# Patient Record
Sex: FEMALE | Race: OTHER | NOT HISPANIC OR LATINO
[De-identification: names, ages, dates, MRNs, and addresses within clinical notes are randomized per-mention and may not be internally consistent; named-entity substitution may affect disease eponyms.]

---

## 2019-02-11 ENCOUNTER — APPOINTMENT (OUTPATIENT)
Dept: FAMILY MEDICINE | Facility: CLINIC | Age: 64
End: 2019-02-11
Payer: COMMERCIAL

## 2019-02-11 ENCOUNTER — NON-APPOINTMENT (OUTPATIENT)
Age: 64
End: 2019-02-11

## 2019-02-11 ENCOUNTER — TRANSCRIPTION ENCOUNTER (OUTPATIENT)
Age: 64
End: 2019-02-11

## 2019-02-11 ENCOUNTER — LABORATORY RESULT (OUTPATIENT)
Age: 64
End: 2019-02-11

## 2019-02-11 VITALS
HEIGHT: 61 IN | TEMPERATURE: 98.5 F | OXYGEN SATURATION: 96 % | BODY MASS INDEX: 40.59 KG/M2 | RESPIRATION RATE: 14 BRPM | DIASTOLIC BLOOD PRESSURE: 90 MMHG | HEART RATE: 94 BPM | WEIGHT: 215 LBS | SYSTOLIC BLOOD PRESSURE: 152 MMHG

## 2019-02-11 DIAGNOSIS — Z78.9 OTHER SPECIFIED HEALTH STATUS: ICD-10-CM

## 2019-02-11 DIAGNOSIS — Z82.49 FAMILY HISTORY OF ISCHEMIC HEART DISEASE AND OTHER DISEASES OF THE CIRCULATORY SYSTEM: ICD-10-CM

## 2019-02-11 DIAGNOSIS — R03.0 ELEVATED BLOOD-PRESSURE READING, W/OUT DIAGNOSIS OF HYPERTENSION: ICD-10-CM

## 2019-02-11 PROCEDURE — 93000 ELECTROCARDIOGRAM COMPLETE: CPT

## 2019-02-11 PROCEDURE — 99386 PREV VISIT NEW AGE 40-64: CPT | Mod: 25

## 2019-02-11 NOTE — HISTORY OF PRESENT ILLNESS
[de-identified] : 63 y.o. F presenting for CPE. 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 She has not seen a doctor in over 5 years due to lapse in insurance. She recalls being told she has HTN, HLD, and DM but is not on any medications because she has not followed up in years. She denies CP/SOB, abd pain, n/v/d/c. She is stressed out because her  recently had a SAH and was in the ICU for several weeks. He is now home. She is a daily smoker for 40 years, 10 cigarettes a day. Is trying to cut back with nicotine gum but when something stressful happens she goes back to smoking. Tried chantix in the past and did not do well on it.

## 2019-02-11 NOTE — PLAN
[FreeTextEntry1] : HCM: \par -routine blood work including titers for work form \par -EKG reviewed, shows LAE ? 2/2 untreated HTN, recommend cardio eval \par -UTD with colo \par -needs pap and mammo, pt adamantly refusing to have mammogram done after her last experience, discussed risks of forgoing breast cancer screening, pt understands risks and does not want mammogram \par -had PCV shot as pt is smoker \par \par HTN: \par -pt to return in 2 weeks for repeat BP check and bring in  home log of BP readings \par -will likely need to be started on medication \par \par Smoking cessation: \par -options provided including wellbutrin and nicotene gum/lozenges/patches\par -pt to think about it and f/u with me again \par \par

## 2019-02-11 NOTE — HEALTH RISK ASSESSMENT
[Patient reported mammogram was normal] : Patient reported mammogram was normal [Patient reported PAP Smear was normal] : Patient reported PAP Smear was normal [Patient reported colonoscopy was normal] : Patient reported colonoscopy was normal [MammogramDate] : 01/13 [PapSmearDate] : 01/13 [ColonoscopyDate] : 01/13

## 2019-02-12 ENCOUNTER — OTHER (OUTPATIENT)
Age: 64
End: 2019-02-12

## 2019-02-12 DIAGNOSIS — R74.8 ABNORMAL LEVELS OF OTHER SERUM ENZYMES: ICD-10-CM

## 2019-02-12 LAB
25(OH)D3 SERPL-MCNC: 19.9 NG/ML
ALBUMIN SERPL ELPH-MCNC: 4.2 G/DL
ALP BLD-CCNC: 110 U/L
ALT SERPL-CCNC: 62 U/L
ANION GAP SERPL CALC-SCNC: 12 MMOL/L
AST SERPL-CCNC: 54 U/L
BASOPHILS # BLD AUTO: 0.04 K/UL
BASOPHILS NFR BLD AUTO: 0.4 %
BILIRUB SERPL-MCNC: 0.3 MG/DL
BUN SERPL-MCNC: 14 MG/DL
CALCIUM SERPL-MCNC: 9.5 MG/DL
CHLORIDE SERPL-SCNC: 104 MMOL/L
CHOLEST SERPL-MCNC: 237 MG/DL
CHOLEST/HDLC SERPL: 5.3 RATIO
CO2 SERPL-SCNC: 24 MMOL/L
CREAT SERPL-MCNC: 0.9 MG/DL
EOSINOPHIL # BLD AUTO: 0.47 K/UL
EOSINOPHIL NFR BLD AUTO: 4.9 %
GLUCOSE SERPL-MCNC: 240 MG/DL
HBA1C MFR BLD HPLC: 8.8 %
HBV SURFACE AB SERPL IA-ACNC: <3 MIU/ML
HCT VFR BLD CALC: 42.3 %
HDLC SERPL-MCNC: 45 MG/DL
HGB BLD-MCNC: 13.1 G/DL
IMM GRANULOCYTES NFR BLD AUTO: 0.4 %
LDLC SERPL CALC-MCNC: 150 MG/DL
LYMPHOCYTES # BLD AUTO: 2.76 K/UL
LYMPHOCYTES NFR BLD AUTO: 28.8 %
M TB IFN-G BLD-IMP: NEGATIVE
MAN DIFF?: NORMAL
MCHC RBC-ENTMCNC: 25.7 PG
MCHC RBC-ENTMCNC: 31 GM/DL
MCV RBC AUTO: 83.1 FL
MEV IGG FLD QL IA: >300 AU/ML
MEV IGG+IGM SER-IMP: POSITIVE
MONOCYTES # BLD AUTO: 0.65 K/UL
MONOCYTES NFR BLD AUTO: 6.8 %
MUV AB SER-ACNC: POSITIVE
MUV IGG SER QL IA: >300 AU/ML
NEUTROPHILS # BLD AUTO: 5.62 K/UL
NEUTROPHILS NFR BLD AUTO: 58.7 %
PLATELET # BLD AUTO: 205 K/UL
POTASSIUM SERPL-SCNC: 4 MMOL/L
PROT SERPL-MCNC: 6.9 G/DL
QUANTIFERON TB PLUS MITOGEN MINUS NIL: 8.86 IU/ML
QUANTIFERON TB PLUS NIL: 0.01 IU/ML
QUANTIFERON TB PLUS TB1 MINUS NIL: 0.01 IU/ML
QUANTIFERON TB PLUS TB2 MINUS NIL: 0 IU/ML
RBC # BLD: 5.09 M/UL
RBC # FLD: 14.5 %
RUBV IGG FLD-ACNC: 20 INDEX
RUBV IGG SER-IMP: POSITIVE
SODIUM SERPL-SCNC: 140 MMOL/L
TRIGL SERPL-MCNC: 209 MG/DL
TSH SERPL-ACNC: 4.23 UIU/ML
VZV AB TITR SER: POSITIVE
VZV IGG SER IF-ACNC: 1598 INDEX
WBC # FLD AUTO: 9.58 K/UL

## 2019-02-25 ENCOUNTER — APPOINTMENT (OUTPATIENT)
Dept: FAMILY MEDICINE | Facility: CLINIC | Age: 64
End: 2019-02-25
Payer: COMMERCIAL

## 2019-02-25 VITALS
BODY MASS INDEX: 40.62 KG/M2 | TEMPERATURE: 98.2 F | SYSTOLIC BLOOD PRESSURE: 140 MMHG | HEART RATE: 86 BPM | OXYGEN SATURATION: 96 % | RESPIRATION RATE: 14 BRPM | WEIGHT: 215 LBS | DIASTOLIC BLOOD PRESSURE: 82 MMHG

## 2019-02-25 DIAGNOSIS — Z12.11 ENCOUNTER FOR SCREENING FOR MALIGNANT NEOPLASM OF COLON: ICD-10-CM

## 2019-02-25 PROCEDURE — 90471 IMMUNIZATION ADMIN: CPT

## 2019-02-25 PROCEDURE — 99214 OFFICE O/P EST MOD 30 MIN: CPT | Mod: 25

## 2019-02-25 PROCEDURE — 90739 HEPB VACC 2/4 DOSE ADULT IM: CPT

## 2019-02-25 NOTE — HISTORY OF PRESENT ILLNESS
[de-identified] : Pt here for f/u. Started olmesartan, metformin, and lipitor. Pt reports starting keto diet and losing 4 pounds. Complaining of cramps in calves that she didn't used to have before. BP at home fluctuates but usually around 130/90.

## 2019-02-25 NOTE — PLAN
[FreeTextEntry1] : -discussed labs again with pt \par -needs to go for US of liver \par -hep B vaccination today \par -for muscle cramps: check labs, advise co-q 10, maintain good hydration \par -f/u in 4-6 weeks for BP check, repeat LFTs

## 2019-02-26 ENCOUNTER — FORM ENCOUNTER (OUTPATIENT)
Age: 64
End: 2019-02-26

## 2019-02-26 LAB
ALBUMIN SERPL ELPH-MCNC: 4.7 G/DL
ALP BLD-CCNC: 97 U/L
ALT SERPL-CCNC: 32 U/L
ANION GAP SERPL CALC-SCNC: 15 MMOL/L
AST SERPL-CCNC: 30 U/L
BILIRUB SERPL-MCNC: 0.2 MG/DL
BUN SERPL-MCNC: 17 MG/DL
CALCIUM SERPL-MCNC: 10.4 MG/DL
CHLORIDE SERPL-SCNC: 108 MMOL/L
CK SERPL-CCNC: 113 U/L
CO2 SERPL-SCNC: 24 MMOL/L
CREAT SERPL-MCNC: 0.97 MG/DL
GLUCOSE SERPL-MCNC: 150 MG/DL
POTASSIUM SERPL-SCNC: 5 MMOL/L
PROT SERPL-MCNC: 7.1 G/DL
SODIUM SERPL-SCNC: 147 MMOL/L

## 2019-02-27 ENCOUNTER — APPOINTMENT (OUTPATIENT)
Dept: ULTRASOUND IMAGING | Facility: CLINIC | Age: 64
End: 2019-02-27

## 2019-02-27 ENCOUNTER — MEDICATION RENEWAL (OUTPATIENT)
Age: 64
End: 2019-02-27

## 2019-02-27 ENCOUNTER — OUTPATIENT (OUTPATIENT)
Dept: OUTPATIENT SERVICES | Facility: HOSPITAL | Age: 64
LOS: 1 days | End: 2019-02-27
Payer: COMMERCIAL

## 2019-02-27 DIAGNOSIS — Z00.8 ENCOUNTER FOR OTHER GENERAL EXAMINATION: ICD-10-CM

## 2019-02-27 PROCEDURE — 76705 ECHO EXAM OF ABDOMEN: CPT | Mod: 26

## 2019-02-27 PROCEDURE — 76705 ECHO EXAM OF ABDOMEN: CPT

## 2019-02-28 ENCOUNTER — TRANSCRIPTION ENCOUNTER (OUTPATIENT)
Age: 64
End: 2019-02-28

## 2019-03-01 ENCOUNTER — APPOINTMENT (OUTPATIENT)
Dept: FAMILY MEDICINE | Facility: CLINIC | Age: 64
End: 2019-03-01
Payer: COMMERCIAL

## 2019-03-01 VITALS
SYSTOLIC BLOOD PRESSURE: 130 MMHG | BODY MASS INDEX: 40.22 KG/M2 | OXYGEN SATURATION: 94 % | HEIGHT: 61 IN | DIASTOLIC BLOOD PRESSURE: 70 MMHG | RESPIRATION RATE: 14 BRPM | WEIGHT: 213 LBS | HEART RATE: 74 BPM | TEMPERATURE: 97.6 F

## 2019-03-01 PROCEDURE — 99214 OFFICE O/P EST MOD 30 MIN: CPT

## 2019-03-01 RX ORDER — METFORMIN ER 500 MG 500 MG/1
500 TABLET ORAL
Qty: 120 | Refills: 2 | Status: DISCONTINUED | COMMUNITY
Start: 2019-02-12 | End: 2019-03-01

## 2019-03-01 RX ORDER — DULAGLUTIDE 0.75 MG/.5ML
0.75 INJECTION, SOLUTION SUBCUTANEOUS WEEKLY
Qty: 4 | Refills: 0 | Status: COMPLETED | COMMUNITY
Start: 2019-03-01

## 2019-03-01 NOTE — HISTORY OF PRESENT ILLNESS
[de-identified] : Pt here for f/u. Upon last visit, pt was complaining of new onset leg cramps after starting metformin which she has previously been on, lactic acid levels found to be high. Pt has been drinking lots of water and is here for repeat levels. Pt is otherwise feeling ok. Discussed her options in terms of other diabetic medications. She was previously on januvia.

## 2019-03-01 NOTE — PLAN
[FreeTextEntry1] : Metformin-induced lactic acidosis: \par -pt has discontinued metformin\par -repeat lactate levels today\par -continue with hydration, lemon juice, apple cider vinegar \par \par DM 2: \par -start januvia and trulicity \par -repeat hga1c in 3 months \par -continue with diet and exercise \par -needs to f/u with optho \par \par Hepatic steatosis: \par -confirmed on US of liver \par -needs f/u MRI to evaluate area of fatty sparing to r/o mass \par \par

## 2019-03-02 ENCOUNTER — OTHER (OUTPATIENT)
Age: 64
End: 2019-03-02

## 2019-03-02 LAB — LACTATE BLDA-MCNC: 2.9 MMOL/L

## 2019-03-05 ENCOUNTER — OTHER (OUTPATIENT)
Age: 64
End: 2019-03-05

## 2019-03-05 DIAGNOSIS — E87.2 ACIDOSIS: ICD-10-CM

## 2019-03-06 ENCOUNTER — EMERGENCY (EMERGENCY)
Facility: HOSPITAL | Age: 64
LOS: 1 days | Discharge: HOME | End: 2019-03-06
Attending: STUDENT IN AN ORGANIZED HEALTH CARE EDUCATION/TRAINING PROGRAM | Admitting: STUDENT IN AN ORGANIZED HEALTH CARE EDUCATION/TRAINING PROGRAM
Payer: COMMERCIAL

## 2019-03-06 ENCOUNTER — OTHER (OUTPATIENT)
Age: 64
End: 2019-03-06

## 2019-03-06 VITALS
TEMPERATURE: 99 F | OXYGEN SATURATION: 97 % | SYSTOLIC BLOOD PRESSURE: 158 MMHG | HEART RATE: 82 BPM | DIASTOLIC BLOOD PRESSURE: 62 MMHG | RESPIRATION RATE: 16 BRPM

## 2019-03-06 VITALS
SYSTOLIC BLOOD PRESSURE: 183 MMHG | HEIGHT: 61 IN | HEART RATE: 85 BPM | DIASTOLIC BLOOD PRESSURE: 121 MMHG | OXYGEN SATURATION: 98 % | WEIGHT: 212.97 LBS | RESPIRATION RATE: 16 BRPM | TEMPERATURE: 98 F

## 2019-03-06 DIAGNOSIS — R93.2 ABNORMAL FINDINGS ON DIAGNOSTIC IMAGING OF LIVER AND BILIARY TRACT: ICD-10-CM

## 2019-03-06 DIAGNOSIS — Z98.51 TUBAL LIGATION STATUS: Chronic | ICD-10-CM

## 2019-03-06 DIAGNOSIS — Z98.891 HISTORY OF UTERINE SCAR FROM PREVIOUS SURGERY: Chronic | ICD-10-CM

## 2019-03-06 DIAGNOSIS — Z98.890 OTHER SPECIFIED POSTPROCEDURAL STATES: Chronic | ICD-10-CM

## 2019-03-06 LAB
ALBUMIN SERPL ELPH-MCNC: 3.8 G/DL — SIGNIFICANT CHANGE UP (ref 3.3–5)
ALP SERPL-CCNC: 112 U/L — SIGNIFICANT CHANGE UP (ref 40–120)
ALT FLD-CCNC: 49 U/L — SIGNIFICANT CHANGE UP (ref 12–78)
ANION GAP SERPL CALC-SCNC: 8 MMOL/L — SIGNIFICANT CHANGE UP (ref 5–17)
APPEARANCE UR: CLEAR — SIGNIFICANT CHANGE UP
APTT BLD: 32.2 SEC — SIGNIFICANT CHANGE UP (ref 28.5–37)
AST SERPL-CCNC: 42 U/L — HIGH (ref 15–37)
BACTERIA # UR AUTO: ABNORMAL
BASOPHILS # BLD AUTO: 0.11 K/UL — SIGNIFICANT CHANGE UP (ref 0–0.2)
BASOPHILS NFR BLD AUTO: 0.8 % — SIGNIFICANT CHANGE UP (ref 0–2)
BILIRUB SERPL-MCNC: 0.2 MG/DL — SIGNIFICANT CHANGE UP (ref 0.2–1.2)
BILIRUB UR-MCNC: NEGATIVE — SIGNIFICANT CHANGE UP
BUN SERPL-MCNC: 21 MG/DL — SIGNIFICANT CHANGE UP (ref 7–23)
CALCIUM SERPL-MCNC: 8.9 MG/DL — SIGNIFICANT CHANGE UP (ref 8.5–10.1)
CHLORIDE SERPL-SCNC: 108 MMOL/L — SIGNIFICANT CHANGE UP (ref 96–108)
CO2 SERPL-SCNC: 25 MMOL/L — SIGNIFICANT CHANGE UP (ref 22–31)
COLOR SPEC: SIGNIFICANT CHANGE UP
CREAT SERPL-MCNC: 1.1 MG/DL — SIGNIFICANT CHANGE UP (ref 0.5–1.3)
DIFF PNL FLD: ABNORMAL
EOSINOPHIL # BLD AUTO: 0.53 K/UL — HIGH (ref 0–0.5)
EOSINOPHIL NFR BLD AUTO: 3.8 % — SIGNIFICANT CHANGE UP (ref 0–6)
EPI CELLS # UR: SIGNIFICANT CHANGE UP
FLU A RESULT: SIGNIFICANT CHANGE UP
FLU A RESULT: SIGNIFICANT CHANGE UP
FLUAV AG NPH QL: SIGNIFICANT CHANGE UP
FLUBV AG NPH QL: SIGNIFICANT CHANGE UP
GLUCOSE SERPL-MCNC: 115 MG/DL — HIGH (ref 70–99)
GLUCOSE UR QL: NEGATIVE — SIGNIFICANT CHANGE UP
HCT VFR BLD CALC: 42.7 % — SIGNIFICANT CHANGE UP (ref 34.5–45)
HEMOCCULT STL QL IA: NEGATIVE
HGB BLD-MCNC: 13.6 G/DL — SIGNIFICANT CHANGE UP (ref 11.5–15.5)
IMM GRANULOCYTES NFR BLD AUTO: 0.3 % — SIGNIFICANT CHANGE UP (ref 0–1.5)
INR BLD: 0.97 RATIO — SIGNIFICANT CHANGE UP (ref 0.88–1.16)
KETONES UR-MCNC: NEGATIVE — SIGNIFICANT CHANGE UP
LACTATE BLDA-MCNC: 4.4 MMOL/L
LACTATE SERPL-SCNC: 1.3 MMOL/L — SIGNIFICANT CHANGE UP (ref 0.7–2)
LEUKOCYTE ESTERASE UR-ACNC: NEGATIVE — SIGNIFICANT CHANGE UP
LYMPHOCYTES # BLD AUTO: 34.3 % — SIGNIFICANT CHANGE UP (ref 13–44)
LYMPHOCYTES # BLD AUTO: 4.75 K/UL — HIGH (ref 1–3.3)
MCHC RBC-ENTMCNC: 26.7 PG — LOW (ref 27–34)
MCHC RBC-ENTMCNC: 31.9 GM/DL — LOW (ref 32–36)
MCV RBC AUTO: 83.9 FL — SIGNIFICANT CHANGE UP (ref 80–100)
MONOCYTES # BLD AUTO: 0.98 K/UL — HIGH (ref 0–0.9)
MONOCYTES NFR BLD AUTO: 7.1 % — SIGNIFICANT CHANGE UP (ref 2–14)
NEUTROPHILS # BLD AUTO: 7.43 K/UL — HIGH (ref 1.8–7.4)
NEUTROPHILS NFR BLD AUTO: 53.7 % — SIGNIFICANT CHANGE UP (ref 43–77)
NITRITE UR-MCNC: NEGATIVE — SIGNIFICANT CHANGE UP
NRBC # BLD: 0 /100 WBCS — SIGNIFICANT CHANGE UP (ref 0–0)
PH UR: 5 — SIGNIFICANT CHANGE UP (ref 5–8)
PLATELET # BLD AUTO: 264 K/UL — SIGNIFICANT CHANGE UP (ref 150–400)
POTASSIUM SERPL-MCNC: 3.9 MMOL/L — SIGNIFICANT CHANGE UP (ref 3.5–5.3)
POTASSIUM SERPL-SCNC: 3.9 MMOL/L — SIGNIFICANT CHANGE UP (ref 3.5–5.3)
PROT SERPL-MCNC: 7.8 G/DL — SIGNIFICANT CHANGE UP (ref 6–8.3)
PROT UR-MCNC: NEGATIVE — SIGNIFICANT CHANGE UP
PROTHROM AB SERPL-ACNC: 11 SEC — SIGNIFICANT CHANGE UP (ref 10–12.9)
RBC # BLD: 5.09 M/UL — SIGNIFICANT CHANGE UP (ref 3.8–5.2)
RBC # FLD: 14.6 % — HIGH (ref 10.3–14.5)
RBC CASTS # UR COMP ASSIST: SIGNIFICANT CHANGE UP /HPF (ref 0–4)
RSV RESULT: SIGNIFICANT CHANGE UP
RSV RNA RESP QL NAA+PROBE: SIGNIFICANT CHANGE UP
SODIUM SERPL-SCNC: 141 MMOL/L — SIGNIFICANT CHANGE UP (ref 135–145)
SP GR SPEC: 1.01 — SIGNIFICANT CHANGE UP (ref 1.01–1.02)
UROBILINOGEN FLD QL: NEGATIVE — SIGNIFICANT CHANGE UP
WBC # BLD: 13.84 K/UL — HIGH (ref 3.8–10.5)
WBC # FLD AUTO: 13.84 K/UL — HIGH (ref 3.8–10.5)
WBC UR QL: ABNORMAL

## 2019-03-06 PROCEDURE — 83605 ASSAY OF LACTIC ACID: CPT

## 2019-03-06 PROCEDURE — 36415 COLL VENOUS BLD VENIPUNCTURE: CPT

## 2019-03-06 PROCEDURE — 99284 EMERGENCY DEPT VISIT MOD MDM: CPT | Mod: 25

## 2019-03-06 PROCEDURE — 87040 BLOOD CULTURE FOR BACTERIA: CPT

## 2019-03-06 PROCEDURE — 85027 COMPLETE CBC AUTOMATED: CPT

## 2019-03-06 PROCEDURE — 96360 HYDRATION IV INFUSION INIT: CPT

## 2019-03-06 PROCEDURE — 93010 ELECTROCARDIOGRAM REPORT: CPT

## 2019-03-06 PROCEDURE — 85610 PROTHROMBIN TIME: CPT

## 2019-03-06 PROCEDURE — 87086 URINE CULTURE/COLONY COUNT: CPT

## 2019-03-06 PROCEDURE — 71045 X-RAY EXAM CHEST 1 VIEW: CPT

## 2019-03-06 PROCEDURE — 87631 RESP VIRUS 3-5 TARGETS: CPT

## 2019-03-06 PROCEDURE — 81001 URINALYSIS AUTO W/SCOPE: CPT

## 2019-03-06 PROCEDURE — 80053 COMPREHEN METABOLIC PANEL: CPT

## 2019-03-06 PROCEDURE — 85730 THROMBOPLASTIN TIME PARTIAL: CPT

## 2019-03-06 PROCEDURE — 93005 ELECTROCARDIOGRAM TRACING: CPT

## 2019-03-06 PROCEDURE — 71045 X-RAY EXAM CHEST 1 VIEW: CPT | Mod: 26

## 2019-03-06 RX ORDER — NITROFURANTOIN MACROCRYSTAL 50 MG
100 CAPSULE ORAL ONCE
Qty: 0 | Refills: 0 | Status: DISCONTINUED | OUTPATIENT
Start: 2019-03-06 | End: 2019-03-10

## 2019-03-06 RX ORDER — SODIUM CHLORIDE 9 MG/ML
1000 INJECTION INTRAMUSCULAR; INTRAVENOUS; SUBCUTANEOUS ONCE
Qty: 0 | Refills: 0 | Status: COMPLETED | OUTPATIENT
Start: 2019-03-06 | End: 2019-03-06

## 2019-03-06 RX ORDER — AMLODIPINE BESYLATE 2.5 MG/1
0 TABLET ORAL
Qty: 0 | Refills: 0 | COMMUNITY

## 2019-03-06 RX ORDER — NITROFURANTOIN MACROCRYSTAL 50 MG
1 CAPSULE ORAL
Qty: 14 | Refills: 0
Start: 2019-03-06 | End: 2019-03-12

## 2019-03-06 RX ADMIN — SODIUM CHLORIDE 1000 MILLILITER(S): 9 INJECTION INTRAMUSCULAR; INTRAVENOUS; SUBCUTANEOUS at 03:00

## 2019-03-06 RX ADMIN — SODIUM CHLORIDE 1000 MILLILITER(S): 9 INJECTION INTRAMUSCULAR; INTRAVENOUS; SUBCUTANEOUS at 04:00

## 2019-03-06 NOTE — ED PROVIDER NOTE - CLINICAL SUMMARY MEDICAL DECISION MAKING FREE TEXT BOX
64 year old female p/w elevated lactate x 2 on outpatient blood work. Was advised to come to ED for hydration.  Labs, cultures, re-check lactate, CXR, UA, flu, reassess

## 2019-03-06 NOTE — ED ADULT NURSE NOTE - NSIMPLEMENTINTERV_GEN_ALL_ED
Implemented All Universal Safety Interventions:  [place] to call system. Call bell, personal items and telephone within reach. Instruct patient to call for assistance. Room bathroom lighting operational. Non-slip footwear when patient is off stretcher. Physically safe environment: no spills, clutter or unnecessary equipment. Stretcher in lowest position, wheels locked, appropriate side rails in place.

## 2019-03-06 NOTE — ED ADULT NURSE NOTE - CHIEF COMPLAINT QUOTE
Patient was seen at [facility] for physical check up and had blood works Feb 4.3 was told to drink water rechecked lactic acid 2.9 and came back again for another lactic acid at 4.4 on March 5, had some medication changes for DM and hypertension was recommended by PMD to get IV fluids in ED denied taking any blood pressure medication complaining of achy joints.

## 2019-03-06 NOTE — ED ADULT NURSE NOTE - OBJECTIVE STATEMENT
Patient was seen at [facility] for physical check up and had blood works Feb 4.3 was told to drink water rechecked lactic acid 2.9 and came back again for another lactic acid at 4.4 on March 5, had some medication changes for DM and hypertension was recommended by PMD to get IV fluids in ED denied taking any blood pressure medication complaining of achy joints.

## 2019-03-06 NOTE — ED ADULT TRIAGE NOTE - CHIEF COMPLAINT QUOTE
Patient was seen at [facility] for physical check up and had blood works Feb 4.3 was told to drink water rechecked lactic acid 2.9 and came back again for another lactic acid at 4.4 on March 5, had some medication changes for DM and hypertension was recommended by PMD to get IV fluids in ED denied taking any blood pressure medication complaining of achy joints. Patient was seen at [facility] for physical check up and had blood works Feb 22 was lactic acid was 4.3 was told to drink water rechecked  on Feb 25 lactic acid 2.9 and came back again for another lactic acid at 4.4 on March 5, had some medication changes for DM and hypertension was recommended by PMD to get IV fluids in ED denied taking any blood pressure medication complaining of achy joints.

## 2019-03-06 NOTE — ED PROVIDER NOTE - CHPI ED SYMPTOMS NEG
no numbness/no dizziness/no nausea/no tingling/no weakness/no vomiting/no chills/no pain/no decreased eating/drinking/no fever

## 2019-03-06 NOTE — ED PROVIDER NOTE - PROGRESS NOTE DETAILS
patient continues to look well.  Results of blood work, UA, CXR, EKG d/w patient. Copies of all reports given.  No symptoms of UTI but will treat and patient can f/u with PMD. Stable for d/c

## 2019-03-07 LAB
CULTURE RESULTS: SIGNIFICANT CHANGE UP
SPECIMEN SOURCE: SIGNIFICANT CHANGE UP

## 2019-03-11 ENCOUNTER — RX RENEWAL (OUTPATIENT)
Age: 64
End: 2019-03-11

## 2019-03-11 LAB
CULTURE RESULTS: SIGNIFICANT CHANGE UP
CULTURE RESULTS: SIGNIFICANT CHANGE UP
SPECIMEN SOURCE: SIGNIFICANT CHANGE UP
SPECIMEN SOURCE: SIGNIFICANT CHANGE UP

## 2019-03-15 ENCOUNTER — MEDICATION RENEWAL (OUTPATIENT)
Age: 64
End: 2019-03-15

## 2019-03-15 ENCOUNTER — TRANSCRIPTION ENCOUNTER (OUTPATIENT)
Age: 64
End: 2019-03-15

## 2019-03-21 ENCOUNTER — MEDICATION RENEWAL (OUTPATIENT)
Age: 64
End: 2019-03-21

## 2019-03-24 ENCOUNTER — FORM ENCOUNTER (OUTPATIENT)
Age: 64
End: 2019-03-24

## 2019-03-24 PROBLEM — E78.5 HYPERLIPIDEMIA, UNSPECIFIED: Chronic | Status: ACTIVE | Noted: 2019-03-06

## 2019-03-24 PROBLEM — E11.9 TYPE 2 DIABETES MELLITUS WITHOUT COMPLICATIONS: Chronic | Status: ACTIVE | Noted: 2019-03-06

## 2019-03-24 PROBLEM — E78.1 PURE HYPERGLYCERIDEMIA: Chronic | Status: ACTIVE | Noted: 2019-03-06

## 2019-03-24 PROBLEM — I10 ESSENTIAL (PRIMARY) HYPERTENSION: Chronic | Status: ACTIVE | Noted: 2019-03-06

## 2019-03-25 ENCOUNTER — OUTPATIENT (OUTPATIENT)
Dept: OUTPATIENT SERVICES | Facility: HOSPITAL | Age: 64
LOS: 1 days | End: 2019-03-25
Payer: COMMERCIAL

## 2019-03-25 ENCOUNTER — APPOINTMENT (OUTPATIENT)
Dept: MRI IMAGING | Facility: CLINIC | Age: 64
End: 2019-03-25
Payer: COMMERCIAL

## 2019-03-25 DIAGNOSIS — Z98.51 TUBAL LIGATION STATUS: Chronic | ICD-10-CM

## 2019-03-25 DIAGNOSIS — R93.2 ABNORMAL FINDINGS ON DIAGNOSTIC IMAGING OF LIVER AND BILIARY TRACT: ICD-10-CM

## 2019-03-25 DIAGNOSIS — Z98.890 OTHER SPECIFIED POSTPROCEDURAL STATES: Chronic | ICD-10-CM

## 2019-03-25 DIAGNOSIS — Z98.891 HISTORY OF UTERINE SCAR FROM PREVIOUS SURGERY: Chronic | ICD-10-CM

## 2019-03-25 DIAGNOSIS — Z00.8 ENCOUNTER FOR OTHER GENERAL EXAMINATION: ICD-10-CM

## 2019-03-25 PROCEDURE — 74183 MRI ABD W/O CNTR FLWD CNTR: CPT

## 2019-03-25 PROCEDURE — A9585: CPT

## 2019-03-25 PROCEDURE — 74183 MRI ABD W/O CNTR FLWD CNTR: CPT | Mod: 26

## 2019-03-27 ENCOUNTER — MOBILE ON CALL (OUTPATIENT)
Age: 64
End: 2019-03-27

## 2019-03-29 ENCOUNTER — RX RENEWAL (OUTPATIENT)
Age: 64
End: 2019-03-29

## 2019-04-08 ENCOUNTER — APPOINTMENT (OUTPATIENT)
Dept: FAMILY MEDICINE | Facility: CLINIC | Age: 64
End: 2019-04-08
Payer: COMMERCIAL

## 2019-04-08 ENCOUNTER — OTHER (OUTPATIENT)
Age: 64
End: 2019-04-08

## 2019-04-08 VITALS — DIASTOLIC BLOOD PRESSURE: 90 MMHG | SYSTOLIC BLOOD PRESSURE: 170 MMHG

## 2019-04-08 VITALS
DIASTOLIC BLOOD PRESSURE: 90 MMHG | TEMPERATURE: 98.4 F | SYSTOLIC BLOOD PRESSURE: 180 MMHG | WEIGHT: 209 LBS | BODY MASS INDEX: 39.49 KG/M2 | RESPIRATION RATE: 14 BRPM | HEART RATE: 84 BPM | OXYGEN SATURATION: 97 %

## 2019-04-08 DIAGNOSIS — Z23 ENCOUNTER FOR IMMUNIZATION: ICD-10-CM

## 2019-04-08 PROCEDURE — 90471 IMMUNIZATION ADMIN: CPT

## 2019-04-08 PROCEDURE — 90739 HEPB VACC 2/4 DOSE ADULT IM: CPT

## 2019-04-08 PROCEDURE — 99214 OFFICE O/P EST MOD 30 MIN: CPT | Mod: 25

## 2019-04-08 RX ORDER — SITAGLIPTIN 100 MG/1
100 TABLET, FILM COATED ORAL DAILY
Qty: 30 | Refills: 0 | Status: DISCONTINUED | COMMUNITY
Start: 2019-03-01 | End: 2019-04-08

## 2019-04-08 RX ORDER — OSELTAMIVIR PHOSPHATE 75 MG/1
75 CAPSULE ORAL
Qty: 10 | Refills: 0 | Status: DISCONTINUED | COMMUNITY
Start: 2019-03-13

## 2019-04-08 RX ORDER — NITROFURANTOIN (MONOHYDRATE/MACROCRYSTALS) 25; 75 MG/1; MG/1
100 CAPSULE ORAL
Qty: 14 | Refills: 0 | Status: DISCONTINUED | COMMUNITY
Start: 2019-03-06

## 2019-04-08 RX ORDER — DULAGLUTIDE 0.75 MG/.5ML
0.75 INJECTION, SOLUTION SUBCUTANEOUS
Qty: 4 | Refills: 0 | Status: DISCONTINUED | COMMUNITY
Start: 2019-03-01 | End: 2019-04-08

## 2019-04-08 RX ORDER — OLMESARTAN MEDOXOMIL 20 MG/1
20 TABLET, FILM COATED ORAL
Qty: 30 | Refills: 2 | Status: DISCONTINUED | COMMUNITY
Start: 2019-02-12 | End: 2019-04-08

## 2019-04-08 NOTE — HISTORY OF PRESENT ILLNESS
[de-identified] : Pt here for f/u and needs 2nd dose of hep B. Pt is overall feeling well. No acute complaints. Had to switch olmesartan to losartan as it was on back order. Pt has not been checking BP at home. Has been checking FS, they are labile. Has not had any episodes of hypoglycemia since starting glipizide.

## 2019-04-08 NOTE — PLAN
[FreeTextEntry1] : HTN: increase losartan to 100mg, pt to call me with home BP readings over the next 2 weeks \par DM: tolerating low dose glipizide, increase dose to 2.5mg \par HLD: check lipid profile and LFTs\par \par f/u in 1 month

## 2019-04-09 LAB
ALBUMIN SERPL ELPH-MCNC: 4.4 G/DL
ALP BLD-CCNC: 107 U/L
ALT SERPL-CCNC: 24 U/L
ANION GAP SERPL CALC-SCNC: 16 MMOL/L
AST SERPL-CCNC: 22 U/L
BILIRUB SERPL-MCNC: 0.2 MG/DL
BUN SERPL-MCNC: 15 MG/DL
CALCIUM SERPL-MCNC: 9.7 MG/DL
CHLORIDE SERPL-SCNC: 105 MMOL/L
CHOLEST SERPL-MCNC: 212 MG/DL
CHOLEST/HDLC SERPL: 4.7 RATIO
CO2 SERPL-SCNC: 23 MMOL/L
CREAT SERPL-MCNC: 0.97 MG/DL
GLUCOSE SERPL-MCNC: 138 MG/DL
HDLC SERPL-MCNC: 45 MG/DL
LDLC SERPL CALC-MCNC: 126 MG/DL
POTASSIUM SERPL-SCNC: 4 MMOL/L
PROT SERPL-MCNC: 6.8 G/DL
SODIUM SERPL-SCNC: 144 MMOL/L
TRIGL SERPL-MCNC: 204 MG/DL

## 2019-04-19 ENCOUNTER — RX RENEWAL (OUTPATIENT)
Age: 64
End: 2019-04-19

## 2019-04-25 ENCOUNTER — RX RENEWAL (OUTPATIENT)
Age: 64
End: 2019-04-25

## 2019-04-26 ENCOUNTER — OTHER (OUTPATIENT)
Age: 64
End: 2019-04-26

## 2019-05-05 ENCOUNTER — MOBILE ON CALL (OUTPATIENT)
Age: 64
End: 2019-05-05

## 2019-05-06 ENCOUNTER — RX RENEWAL (OUTPATIENT)
Age: 64
End: 2019-05-06

## 2019-05-07 ENCOUNTER — FORM ENCOUNTER (OUTPATIENT)
Age: 64
End: 2019-05-07

## 2019-05-07 ENCOUNTER — RX RENEWAL (OUTPATIENT)
Age: 64
End: 2019-05-07

## 2019-05-08 ENCOUNTER — APPOINTMENT (OUTPATIENT)
Dept: CT IMAGING | Facility: CLINIC | Age: 64
End: 2019-05-08
Payer: COMMERCIAL

## 2019-05-08 ENCOUNTER — OUTPATIENT (OUTPATIENT)
Dept: OUTPATIENT SERVICES | Facility: HOSPITAL | Age: 64
LOS: 1 days | End: 2019-05-08
Payer: COMMERCIAL

## 2019-05-08 ENCOUNTER — RX RENEWAL (OUTPATIENT)
Age: 64
End: 2019-05-08

## 2019-05-08 DIAGNOSIS — Z98.890 OTHER SPECIFIED POSTPROCEDURAL STATES: Chronic | ICD-10-CM

## 2019-05-08 DIAGNOSIS — Z98.891 HISTORY OF UTERINE SCAR FROM PREVIOUS SURGERY: Chronic | ICD-10-CM

## 2019-05-08 DIAGNOSIS — Z98.51 TUBAL LIGATION STATUS: Chronic | ICD-10-CM

## 2019-05-08 DIAGNOSIS — Z12.2 ENCOUNTER FOR SCREENING FOR MALIGNANT NEOPLASM OF RESPIRATORY ORGANS: ICD-10-CM

## 2019-05-08 PROCEDURE — G0297: CPT | Mod: 26

## 2019-05-08 PROCEDURE — 99406 BEHAV CHNG SMOKING 3-10 MIN: CPT

## 2019-05-08 PROCEDURE — G0296 VISIT TO DETERM LDCT ELIG: CPT

## 2019-05-16 ENCOUNTER — APPOINTMENT (OUTPATIENT)
Dept: FAMILY MEDICINE | Facility: CLINIC | Age: 64
End: 2019-05-16

## 2019-05-23 ENCOUNTER — RX RENEWAL (OUTPATIENT)
Age: 64
End: 2019-05-23

## 2019-06-06 ENCOUNTER — RX RENEWAL (OUTPATIENT)
Age: 64
End: 2019-06-06

## 2019-06-07 ENCOUNTER — APPOINTMENT (OUTPATIENT)
Dept: FAMILY MEDICINE | Facility: CLINIC | Age: 64
End: 2019-06-07
Payer: COMMERCIAL

## 2019-06-07 ENCOUNTER — LABORATORY RESULT (OUTPATIENT)
Age: 64
End: 2019-06-07

## 2019-06-07 VITALS
TEMPERATURE: 98.1 F | HEART RATE: 93 BPM | WEIGHT: 209 LBS | SYSTOLIC BLOOD PRESSURE: 144 MMHG | BODY MASS INDEX: 39.49 KG/M2 | DIASTOLIC BLOOD PRESSURE: 90 MMHG | RESPIRATION RATE: 14 BRPM | OXYGEN SATURATION: 97 %

## 2019-06-07 PROCEDURE — 99213 OFFICE O/P EST LOW 20 MIN: CPT

## 2019-06-07 RX ORDER — LOSARTAN POTASSIUM 50 MG/1
50 TABLET, FILM COATED ORAL
Qty: 30 | Refills: 0 | Status: DISCONTINUED | COMMUNITY
Start: 2019-03-21 | End: 2019-06-07

## 2019-06-07 RX ORDER — GLIPIZIDE 2.5 MG/1
2.5 TABLET, FILM COATED, EXTENDED RELEASE ORAL DAILY
Qty: 30 | Refills: 0 | Status: DISCONTINUED | COMMUNITY
Start: 2019-03-02 | End: 2019-06-07

## 2019-06-07 NOTE — PHYSICAL EXAM
[No Acute Distress] : no acute distress [Well Nourished] : well nourished [Well-Appearing] : well-appearing [Well Developed] : well developed [Normal Sclera/Conjunctiva] : normal sclera/conjunctiva [PERRL] : pupils equal round and reactive to light [EOMI] : extraocular movements intact [Normal Outer Ear/Nose] : the outer ears and nose were normal in appearance [Normal Oropharynx] : the oropharynx was normal [No JVD] : no jugular venous distention [Supple] : supple [No Lymphadenopathy] : no lymphadenopathy [Thyroid Normal, No Nodules] : the thyroid was normal and there were no nodules present [No Respiratory Distress] : no respiratory distress  [Clear to Auscultation] : lungs were clear to auscultation bilaterally [No Accessory Muscle Use] : no accessory muscle use [Normal Rate] : normal rate  [Regular Rhythm] : with a regular rhythm [Normal S1, S2] : normal S1 and S2 [No Murmur] : no murmur heard [No Carotid Bruits] : no carotid bruits [No Varicosities] : no varicosities [No Abdominal Bruit] : a ~M bruit was not heard ~T in the abdomen [Pedal Pulses Present] : the pedal pulses are present [No Edema] : there was no peripheral edema [No Extremity Clubbing/Cyanosis] : no extremity clubbing/cyanosis [No Palpable Aorta] : no palpable aorta [Soft] : abdomen soft [Non Tender] : non-tender [Non-distended] : non-distended [No Masses] : no abdominal mass palpated [No HSM] : no HSM [Normal Bowel Sounds] : normal bowel sounds [Normal Posterior Cervical Nodes] : no posterior cervical lymphadenopathy [Normal Anterior Cervical Nodes] : no anterior cervical lymphadenopathy [No CVA Tenderness] : no CVA  tenderness [No Spinal Tenderness] : no spinal tenderness [No Joint Swelling] : no joint swelling [Grossly Normal Strength/Tone] : grossly normal strength/tone [Normal Gait] : normal gait [No Rash] : no rash [Coordination Grossly Intact] : coordination grossly intact [No Focal Deficits] : no focal deficits [Deep Tendon Reflexes (DTR)] : deep tendon reflexes were 2+ and symmetric [Normal Affect] : the affect was normal [Normal Insight/Judgement] : insight and judgment were intact

## 2019-06-07 NOTE — HISTORY OF PRESENT ILLNESS
[de-identified] : Pt presenting for f/u. FBG averaging in 120s. Has been trying to diet but admits not consistently. Otherwise feeling well. Has not been taking norvasc as it was never filled by [facility].

## 2019-06-11 LAB
25(OH)D3 SERPL-MCNC: 39.2 NG/ML
ALBUMIN SERPL ELPH-MCNC: 4.3 G/DL
ALP BLD-CCNC: 117 U/L
ALT SERPL-CCNC: 32 U/L
ANION GAP SERPL CALC-SCNC: 14 MMOL/L
AST SERPL-CCNC: 34 U/L
BASOPHILS # BLD AUTO: 0.11 K/UL
BASOPHILS NFR BLD AUTO: 0.9 %
BILIRUB SERPL-MCNC: 0.2 MG/DL
BUN SERPL-MCNC: 20 MG/DL
CALCIUM SERPL-MCNC: 9.8 MG/DL
CHLORIDE SERPL-SCNC: 106 MMOL/L
CO2 SERPL-SCNC: 23 MMOL/L
CREAT SERPL-MCNC: 1.02 MG/DL
CREAT SPEC-SCNC: 102 MG/DL
EOSINOPHIL # BLD AUTO: 0.47 K/UL
EOSINOPHIL NFR BLD AUTO: 4 %
ESTIMATED AVERAGE GLUCOSE: 154 MG/DL
GLUCOSE SERPL-MCNC: 100 MG/DL
HBA1C MFR BLD HPLC: 7 %
HCT VFR BLD CALC: 40.1 %
HGB BLD-MCNC: 12.4 G/DL
IMM GRANULOCYTES NFR BLD AUTO: 0.3 %
LYMPHOCYTES # BLD AUTO: 3.7 K/UL
LYMPHOCYTES NFR BLD AUTO: 31.6 %
MAN DIFF?: NORMAL
MCHC RBC-ENTMCNC: 26.4 PG
MCHC RBC-ENTMCNC: 30.9 GM/DL
MCV RBC AUTO: 85.5 FL
MICROALBUMIN 24H UR DL<=1MG/L-MCNC: <1.2 MG/DL
MICROALBUMIN/CREAT 24H UR-RTO: NORMAL MG/G
MONOCYTES # BLD AUTO: 0.92 K/UL
MONOCYTES NFR BLD AUTO: 7.8 %
NEUTROPHILS # BLD AUTO: 6.48 K/UL
NEUTROPHILS NFR BLD AUTO: 55.4 %
PLATELET # BLD AUTO: 243 K/UL
POTASSIUM SERPL-SCNC: 4.1 MMOL/L
PROT SERPL-MCNC: 6.7 G/DL
RBC # BLD: 4.69 M/UL
RBC # FLD: 15.3 %
SODIUM SERPL-SCNC: 143 MMOL/L
TSH SERPL-ACNC: 5.92 UIU/ML
WBC # FLD AUTO: 11.72 K/UL

## 2019-06-21 ENCOUNTER — APPOINTMENT (OUTPATIENT)
Dept: FAMILY MEDICINE | Facility: CLINIC | Age: 64
End: 2019-06-21
Payer: COMMERCIAL

## 2019-06-21 VITALS
HEART RATE: 88 BPM | HEIGHT: 61 IN | DIASTOLIC BLOOD PRESSURE: 80 MMHG | BODY MASS INDEX: 39.27 KG/M2 | SYSTOLIC BLOOD PRESSURE: 130 MMHG | OXYGEN SATURATION: 91 % | WEIGHT: 208 LBS | RESPIRATION RATE: 14 BRPM | TEMPERATURE: 98.1 F

## 2019-06-21 DIAGNOSIS — R10.9 UNSPECIFIED ABDOMINAL PAIN: ICD-10-CM

## 2019-06-21 LAB
BILIRUB UR QL STRIP: NORMAL
GLUCOSE UR-MCNC: NORMAL
HCG UR QL: 0.2 EU/DL
HGB UR QL STRIP.AUTO: NORMAL
KETONES UR-MCNC: NORMAL
LEUKOCYTE ESTERASE UR QL STRIP: NORMAL
NITRITE UR QL STRIP: NORMAL
PH UR STRIP: 5.5
PROT UR STRIP-MCNC: NORMAL
SP GR UR STRIP: 1.02

## 2019-06-21 PROCEDURE — 81003 URINALYSIS AUTO W/O SCOPE: CPT | Mod: QW

## 2019-06-21 PROCEDURE — 99213 OFFICE O/P EST LOW 20 MIN: CPT | Mod: 25

## 2019-06-21 NOTE — HISTORY OF PRESENT ILLNESS
[FreeTextEntry8] : Pt developed right posterior back/flank pain yesterday suddenly after eating. Describes pain as aching, 7/10 at its worse, radiated into the side. No associated n/v. The pain bothered her throughout the night but is better today. No problems urinating. No blood in urine. Seems to get worse with bending forward, did not bother her when she laid down. \par

## 2019-06-21 NOTE — PLAN
[FreeTextEntry1] : -likely MSK, continue to monitor, NSAID PRN, apply heat\par -if recurrent, check renal US to r/o stones\par -recent MRI of abdomen showed gallbladder adenomyomatosis but pain is not in RUQ

## 2019-06-21 NOTE — PHYSICAL EXAM
[No Acute Distress] : no acute distress [Well Nourished] : well nourished [Well-Appearing] : well-appearing [Normal Sclera/Conjunctiva] : normal sclera/conjunctiva [Well Developed] : well developed [EOMI] : extraocular movements intact [PERRL] : pupils equal round and reactive to light [Normal Oropharynx] : the oropharynx was normal [Normal Outer Ear/Nose] : the outer ears and nose were normal in appearance [No JVD] : no jugular venous distention [No Lymphadenopathy] : no lymphadenopathy [Supple] : supple [No Respiratory Distress] : no respiratory distress  [Thyroid Normal, No Nodules] : the thyroid was normal and there were no nodules present [No Accessory Muscle Use] : no accessory muscle use [Clear to Auscultation] : lungs were clear to auscultation bilaterally [Normal Rate] : normal rate  [Regular Rhythm] : with a regular rhythm [Normal S1, S2] : normal S1 and S2 [No Murmur] : no murmur heard [No Carotid Bruits] : no carotid bruits [Pedal Pulses Present] : the pedal pulses are present [No Abdominal Bruit] : a ~M bruit was not heard ~T in the abdomen [No Varicosities] : no varicosities [No Edema] : there was no peripheral edema [No Extremity Clubbing/Cyanosis] : no extremity clubbing/cyanosis [No Palpable Aorta] : no palpable aorta [Soft] : abdomen soft [No Masses] : no abdominal mass palpated [Non-distended] : non-distended [Non Tender] : non-tender [No HSM] : no HSM [Normal Anterior Cervical Nodes] : no anterior cervical lymphadenopathy [Normal Bowel Sounds] : normal bowel sounds [Normal Posterior Cervical Nodes] : no posterior cervical lymphadenopathy [No CVA Tenderness] : no CVA  tenderness [Grossly Normal Strength/Tone] : grossly normal strength/tone [No Spinal Tenderness] : no spinal tenderness [No Joint Swelling] : no joint swelling [Normal Gait] : normal gait [No Rash] : no rash [Coordination Grossly Intact] : coordination grossly intact [No Focal Deficits] : no focal deficits [Normal Affect] : the affect was normal [Deep Tendon Reflexes (DTR)] : deep tendon reflexes were 2+ and symmetric [Normal Insight/Judgement] : insight and judgment were intact

## 2019-06-24 ENCOUNTER — FORM ENCOUNTER (OUTPATIENT)
Age: 64
End: 2019-06-24

## 2019-06-24 LAB
APPEARANCE: ABNORMAL
BACTERIA: ABNORMAL
BILIRUBIN URINE: NEGATIVE
BLOOD URINE: NEGATIVE
COLOR: NORMAL
GLUCOSE QUALITATIVE U: NEGATIVE
HYALINE CASTS: 4 /LPF
KETONES URINE: NORMAL
LEUKOCYTE ESTERASE URINE: NEGATIVE
MICROSCOPIC-UA: NORMAL
NITRITE URINE: NEGATIVE
PH URINE: 5.5
PROTEIN URINE: NEGATIVE
RED BLOOD CELLS URINE: 13 /HPF
SPECIFIC GRAVITY URINE: 1.02
SQUAMOUS EPITHELIAL CELLS: 7 /HPF
UROBILINOGEN URINE: NORMAL
WHITE BLOOD CELLS URINE: 6 /HPF

## 2019-06-25 ENCOUNTER — APPOINTMENT (OUTPATIENT)
Dept: ULTRASOUND IMAGING | Facility: CLINIC | Age: 64
End: 2019-06-25

## 2019-06-25 ENCOUNTER — OUTPATIENT (OUTPATIENT)
Dept: OUTPATIENT SERVICES | Facility: HOSPITAL | Age: 64
LOS: 1 days | End: 2019-06-25

## 2019-06-25 DIAGNOSIS — Z98.51 TUBAL LIGATION STATUS: Chronic | ICD-10-CM

## 2019-06-25 DIAGNOSIS — Z00.8 ENCOUNTER FOR OTHER GENERAL EXAMINATION: ICD-10-CM

## 2019-06-25 DIAGNOSIS — Z98.890 OTHER SPECIFIED POSTPROCEDURAL STATES: Chronic | ICD-10-CM

## 2019-06-25 DIAGNOSIS — Z98.891 HISTORY OF UTERINE SCAR FROM PREVIOUS SURGERY: Chronic | ICD-10-CM

## 2019-06-25 PROCEDURE — 76770 US EXAM ABDO BACK WALL COMP: CPT | Mod: 26

## 2019-06-25 PROCEDURE — 76770 US EXAM ABDO BACK WALL COMP: CPT

## 2019-06-25 PROCEDURE — G0297: CPT

## 2019-06-27 ENCOUNTER — OTHER (OUTPATIENT)
Age: 64
End: 2019-06-27

## 2019-06-27 LAB
BASOPHILS # BLD AUTO: 0.09 K/UL
BASOPHILS NFR BLD AUTO: 0.8 %
EOSINOPHIL # BLD AUTO: 0.36 K/UL
EOSINOPHIL NFR BLD AUTO: 3.3 %
HCT VFR BLD CALC: 43.2 %
HGB BLD-MCNC: 13.4 G/DL
IMM GRANULOCYTES NFR BLD AUTO: 0.4 %
LYMPHOCYTES # BLD AUTO: 2.81 K/UL
LYMPHOCYTES NFR BLD AUTO: 26 %
MAN DIFF?: NORMAL
MCHC RBC-ENTMCNC: 27 PG
MCHC RBC-ENTMCNC: 31 GM/DL
MCV RBC AUTO: 86.9 FL
MONOCYTES # BLD AUTO: 0.94 K/UL
MONOCYTES NFR BLD AUTO: 8.7 %
NEUTROPHILS # BLD AUTO: 6.55 K/UL
NEUTROPHILS NFR BLD AUTO: 60.8 %
PLATELET # BLD AUTO: 242 K/UL
RBC # BLD: 4.97 M/UL
RBC # FLD: 15.2 %
WBC # FLD AUTO: 10.79 K/UL

## 2019-07-03 ENCOUNTER — RX RENEWAL (OUTPATIENT)
Age: 64
End: 2019-07-03

## 2019-09-06 ENCOUNTER — APPOINTMENT (OUTPATIENT)
Dept: FAMILY MEDICINE | Facility: CLINIC | Age: 64
End: 2019-09-06
Payer: COMMERCIAL

## 2019-09-06 ENCOUNTER — LABORATORY RESULT (OUTPATIENT)
Age: 64
End: 2019-09-06

## 2019-09-06 VITALS
BODY MASS INDEX: 39.84 KG/M2 | HEIGHT: 61 IN | SYSTOLIC BLOOD PRESSURE: 124 MMHG | DIASTOLIC BLOOD PRESSURE: 72 MMHG | WEIGHT: 211 LBS | HEART RATE: 88 BPM | OXYGEN SATURATION: 95 % | RESPIRATION RATE: 14 BRPM

## 2019-09-06 PROCEDURE — 99214 OFFICE O/P EST MOD 30 MIN: CPT

## 2019-09-06 RX ORDER — ATORVASTATIN CALCIUM 40 MG/1
40 TABLET, FILM COATED ORAL
Qty: 90 | Refills: 0 | Status: DISCONTINUED | COMMUNITY
Start: 2019-02-12 | End: 2019-09-06

## 2019-09-06 NOTE — PHYSICAL EXAM
[Well Nourished] : well nourished [No Acute Distress] : no acute distress [Well-Appearing] : well-appearing [Well Developed] : well developed [PERRL] : pupils equal round and reactive to light [Normal Sclera/Conjunctiva] : normal sclera/conjunctiva [EOMI] : extraocular movements intact [Normal Outer Ear/Nose] : the outer ears and nose were normal in appearance [Normal Oropharynx] : the oropharynx was normal [No JVD] : no jugular venous distention [No Lymphadenopathy] : no lymphadenopathy [Supple] : supple [Thyroid Normal, No Nodules] : the thyroid was normal and there were no nodules present [No Respiratory Distress] : no respiratory distress  [No Accessory Muscle Use] : no accessory muscle use [Clear to Auscultation] : lungs were clear to auscultation bilaterally [Regular Rhythm] : with a regular rhythm [Normal Rate] : normal rate  [No Murmur] : no murmur heard [Normal S1, S2] : normal S1 and S2 [No Carotid Bruits] : no carotid bruits [No Abdominal Bruit] : a ~M bruit was not heard ~T in the abdomen [No Varicosities] : no varicosities [Pedal Pulses Present] : the pedal pulses are present [No Edema] : there was no peripheral edema [No Palpable Aorta] : no palpable aorta [No Extremity Clubbing/Cyanosis] : no extremity clubbing/cyanosis [Soft] : abdomen soft [Non Tender] : non-tender [Non-distended] : non-distended [No Masses] : no abdominal mass palpated [No HSM] : no HSM [Normal Bowel Sounds] : normal bowel sounds [Normal Posterior Cervical Nodes] : no posterior cervical lymphadenopathy [Normal Anterior Cervical Nodes] : no anterior cervical lymphadenopathy [No CVA Tenderness] : no CVA  tenderness [No Spinal Tenderness] : no spinal tenderness [No Joint Swelling] : no joint swelling [Grossly Normal Strength/Tone] : grossly normal strength/tone [No Rash] : no rash [Coordination Grossly Intact] : coordination grossly intact [No Focal Deficits] : no focal deficits [Normal Gait] : normal gait [Deep Tendon Reflexes (DTR)] : deep tendon reflexes were 2+ and symmetric [Normal Affect] : the affect was normal [Normal Insight/Judgement] : insight and judgment were intact

## 2019-09-06 NOTE — PLAN
[FreeTextEntry1] : DM II: \par -advise f/u with eye doctor for screening for diabetic retinopathy \par \par HLD: \par -switch to crestor \par \par Rectocele: \par -symptoms consistent with rectocele\par -advise kegel exercises\par -increase fiber in diet, maintain good oral hydration \par -f/u with urogyn

## 2019-09-06 NOTE — HISTORY OF PRESENT ILLNESS
[de-identified] : Pt here for f/u. Her flank pain from the last office visit has resolved. In the beginning of august, pt started developing lower back pain and other random pains in her body. She stopped the atorvastatin which helped. She is also having an issue with BMs. She has difficulty with evacuation during a bowel movement and often need to press against the back wall of the vagina to help. She has had 4 vaginal deliveries in the past.

## 2019-09-08 ENCOUNTER — MOBILE ON CALL (OUTPATIENT)
Age: 64
End: 2019-09-08

## 2019-09-09 LAB
ALBUMIN SERPL ELPH-MCNC: 4.3 G/DL
ALP BLD-CCNC: 96 U/L
ALT SERPL-CCNC: 23 U/L
ANION GAP SERPL CALC-SCNC: 14 MMOL/L
APPEARANCE: CLEAR
AST SERPL-CCNC: 28 U/L
BACTERIA: NEGATIVE
BASOPHILS # BLD AUTO: 0.08 K/UL
BASOPHILS NFR BLD AUTO: 0.7 %
BILIRUB SERPL-MCNC: 0.2 MG/DL
BILIRUBIN URINE: NEGATIVE
BLOOD URINE: NEGATIVE
BUN SERPL-MCNC: 17 MG/DL
CALCIUM SERPL-MCNC: 9.9 MG/DL
CHLORIDE SERPL-SCNC: 108 MMOL/L
CHOLEST SERPL-MCNC: 248 MG/DL
CHOLEST/HDLC SERPL: 5.6 RATIO
CO2 SERPL-SCNC: 24 MMOL/L
COLOR: NORMAL
CREAT SERPL-MCNC: 0.9 MG/DL
EOSINOPHIL # BLD AUTO: 0.47 K/UL
EOSINOPHIL NFR BLD AUTO: 4.4 %
ESTIMATED AVERAGE GLUCOSE: 146 MG/DL
GLUCOSE QUALITATIVE U: NEGATIVE
GLUCOSE SERPL-MCNC: 102 MG/DL
HBA1C MFR BLD HPLC: 6.7 %
HCT VFR BLD CALC: 41.1 %
HDLC SERPL-MCNC: 44 MG/DL
HGB BLD-MCNC: 12.3 G/DL
HYALINE CASTS: 0 /LPF
IMM GRANULOCYTES NFR BLD AUTO: 0.3 %
KETONES URINE: NEGATIVE
LDLC SERPL CALC-MCNC: 158 MG/DL
LEUKOCYTE ESTERASE URINE: NEGATIVE
LYMPHOCYTES # BLD AUTO: 3.3 K/UL
LYMPHOCYTES NFR BLD AUTO: 30.8 %
MAN DIFF?: NORMAL
MCHC RBC-ENTMCNC: 26.3 PG
MCHC RBC-ENTMCNC: 29.9 GM/DL
MCV RBC AUTO: 87.8 FL
MICROSCOPIC-UA: NORMAL
MONOCYTES # BLD AUTO: 0.97 K/UL
MONOCYTES NFR BLD AUTO: 9.1 %
NEUTROPHILS # BLD AUTO: 5.86 K/UL
NEUTROPHILS NFR BLD AUTO: 54.7 %
NITRITE URINE: NEGATIVE
PH URINE: 6.5
PLATELET # BLD AUTO: 230 K/UL
POTASSIUM SERPL-SCNC: 4.5 MMOL/L
PROT SERPL-MCNC: 6.3 G/DL
PROTEIN URINE: NEGATIVE
RBC # BLD: 4.68 M/UL
RBC # FLD: 15.7 %
RED BLOOD CELLS URINE: 3 /HPF
SODIUM SERPL-SCNC: 146 MMOL/L
SPECIFIC GRAVITY URINE: 1.03
SQUAMOUS EPITHELIAL CELLS: 2 /HPF
TRIGL SERPL-MCNC: 232 MG/DL
TSH SERPL-ACNC: 5.22 UIU/ML
UROBILINOGEN URINE: NORMAL
WBC # FLD AUTO: 10.71 K/UL
WHITE BLOOD CELLS URINE: 1 /HPF

## 2019-09-21 ENCOUNTER — TRANSCRIPTION ENCOUNTER (OUTPATIENT)
Age: 64
End: 2019-09-21

## 2019-10-10 ENCOUNTER — RX RENEWAL (OUTPATIENT)
Age: 64
End: 2019-10-10

## 2019-10-10 ENCOUNTER — TRANSCRIPTION ENCOUNTER (OUTPATIENT)
Age: 64
End: 2019-10-10

## 2019-10-14 ENCOUNTER — RX RENEWAL (OUTPATIENT)
Age: 64
End: 2019-10-14

## 2019-10-14 ENCOUNTER — TRANSCRIPTION ENCOUNTER (OUTPATIENT)
Age: 64
End: 2019-10-14

## 2019-10-23 ENCOUNTER — TRANSCRIPTION ENCOUNTER (OUTPATIENT)
Age: 64
End: 2019-10-23

## 2019-11-05 ENCOUNTER — TRANSCRIPTION ENCOUNTER (OUTPATIENT)
Age: 64
End: 2019-11-05

## 2019-11-07 ENCOUNTER — RX RENEWAL (OUTPATIENT)
Age: 64
End: 2019-11-07

## 2019-12-06 ENCOUNTER — APPOINTMENT (OUTPATIENT)
Dept: FAMILY MEDICINE | Facility: CLINIC | Age: 64
End: 2019-12-06
Payer: COMMERCIAL

## 2019-12-06 ENCOUNTER — LABORATORY RESULT (OUTPATIENT)
Age: 64
End: 2019-12-06

## 2019-12-06 VITALS
SYSTOLIC BLOOD PRESSURE: 140 MMHG | DIASTOLIC BLOOD PRESSURE: 80 MMHG | HEART RATE: 113 BPM | RESPIRATION RATE: 14 BRPM | OXYGEN SATURATION: 96 % | BODY MASS INDEX: 40.62 KG/M2 | TEMPERATURE: 98.2 F | WEIGHT: 215 LBS

## 2019-12-06 DIAGNOSIS — M25.561 PAIN IN RIGHT KNEE: ICD-10-CM

## 2019-12-06 DIAGNOSIS — Z13.29 ENCOUNTER FOR SCREENING FOR OTHER SUSPECTED ENDOCRINE DISORDER: ICD-10-CM

## 2019-12-06 PROCEDURE — 99214 OFFICE O/P EST MOD 30 MIN: CPT

## 2019-12-06 NOTE — PHYSICAL EXAM
[No Acute Distress] : no acute distress [Well Nourished] : well nourished [Well Developed] : well developed [Well-Appearing] : well-appearing [Normal Sclera/Conjunctiva] : normal sclera/conjunctiva [PERRL] : pupils equal round and reactive to light [EOMI] : extraocular movements intact [Normal Outer Ear/Nose] : the outer ears and nose were normal in appearance [Normal Oropharynx] : the oropharynx was normal [No JVD] : no jugular venous distention [No Lymphadenopathy] : no lymphadenopathy [Supple] : supple [Thyroid Normal, No Nodules] : the thyroid was normal and there were no nodules present [No Respiratory Distress] : no respiratory distress  [No Accessory Muscle Use] : no accessory muscle use [Clear to Auscultation] : lungs were clear to auscultation bilaterally [Normal Rate] : normal rate  [Regular Rhythm] : with a regular rhythm [Normal S1, S2] : normal S1 and S2 [No Murmur] : no murmur heard [No Carotid Bruits] : no carotid bruits [No Abdominal Bruit] : a ~M bruit was not heard ~T in the abdomen [No Varicosities] : no varicosities [Pedal Pulses Present] : the pedal pulses are present [No Edema] : there was no peripheral edema [No Palpable Aorta] : no palpable aorta [No Extremity Clubbing/Cyanosis] : no extremity clubbing/cyanosis [Soft] : abdomen soft [Non Tender] : non-tender [Non-distended] : non-distended [No Masses] : no abdominal mass palpated [No HSM] : no HSM [Normal Bowel Sounds] : normal bowel sounds [Normal Posterior Cervical Nodes] : no posterior cervical lymphadenopathy [Normal Anterior Cervical Nodes] : no anterior cervical lymphadenopathy [No CVA Tenderness] : no CVA  tenderness [No Spinal Tenderness] : no spinal tenderness [No Rash] : no rash [Coordination Grossly Intact] : coordination grossly intact [No Focal Deficits] : no focal deficits [Deep Tendon Reflexes (DTR)] : deep tendon reflexes were 2+ and symmetric [Normal Gait] : normal gait [Normal Insight/Judgement] : insight and judgment were intact [Normal Affect] : the affect was normal [de-identified] : pain upon palpation of medial joint line of right knee, mild edema

## 2019-12-06 NOTE — PLAN
[FreeTextEntry1] : DM: \par -check a1c \par -advise f/u with optho for diabetic eye exam \par -encourage low carb diet \par \par HLD: \par -check lipids\par \par \par HTN: \par -continue with losartan and norvasc \par \par Right knee pain: \par -?ACL/meniscus tear \par -advise naproxen for pain, apply heat\par -check MRI \par \par

## 2019-12-06 NOTE — HISTORY OF PRESENT ILLNESS
[de-identified] : Pt presenting for f/u. Pt with right knee pain for past few days but worsening on and off over past few months. Pain primarily when gets up from sitting in a chair. The pain radiates into the upper thigh and calf. The pain is not present with ambulation or at rest. She has not tried any meds. Pt has not gotten a diabetic eye exam yet. She has not followed up with her urogyn for her urinary issues and possible prolapse.

## 2019-12-09 LAB
ALBUMIN SERPL ELPH-MCNC: 4.5 G/DL
ALP BLD-CCNC: 92 U/L
ALT SERPL-CCNC: 20 U/L
ANION GAP SERPL CALC-SCNC: 14 MMOL/L
AST SERPL-CCNC: 25 U/L
BASOPHILS # BLD AUTO: 0.09 K/UL
BASOPHILS NFR BLD AUTO: 0.8 %
BILIRUB SERPL-MCNC: 0.3 MG/DL
BUN SERPL-MCNC: 15 MG/DL
CALCIUM SERPL-MCNC: 9.7 MG/DL
CHLORIDE SERPL-SCNC: 105 MMOL/L
CHOLEST SERPL-MCNC: 175 MG/DL
CHOLEST/HDLC SERPL: 3.9 RATIO
CO2 SERPL-SCNC: 24 MMOL/L
CREAT SERPL-MCNC: 0.95 MG/DL
EOSINOPHIL # BLD AUTO: 0.53 K/UL
EOSINOPHIL NFR BLD AUTO: 4.9 %
ESTIMATED AVERAGE GLUCOSE: 166 MG/DL
GLUCOSE SERPL-MCNC: 129 MG/DL
HBA1C MFR BLD HPLC: 7.4 %
HCT VFR BLD CALC: 41 %
HDLC SERPL-MCNC: 45 MG/DL
HGB BLD-MCNC: 12.7 G/DL
IMM GRANULOCYTES NFR BLD AUTO: 0.4 %
LDLC SERPL CALC-MCNC: 90 MG/DL
LYMPHOCYTES # BLD AUTO: 3.2 K/UL
LYMPHOCYTES NFR BLD AUTO: 29.6 %
MAN DIFF?: NORMAL
MCHC RBC-ENTMCNC: 26.8 PG
MCHC RBC-ENTMCNC: 31 GM/DL
MCV RBC AUTO: 86.7 FL
MONOCYTES # BLD AUTO: 0.94 K/UL
MONOCYTES NFR BLD AUTO: 8.7 %
NEUTROPHILS # BLD AUTO: 6.01 K/UL
NEUTROPHILS NFR BLD AUTO: 55.6 %
PLATELET # BLD AUTO: 224 K/UL
POTASSIUM SERPL-SCNC: 3.9 MMOL/L
PROT SERPL-MCNC: 6.6 G/DL
RBC # BLD: 4.73 M/UL
RBC # FLD: 14.8 %
SODIUM SERPL-SCNC: 142 MMOL/L
TRIGL SERPL-MCNC: 199 MG/DL
TSH SERPL-ACNC: 5.82 UIU/ML
WBC # FLD AUTO: 10.81 K/UL

## 2019-12-10 LAB — LACTATE BLDA-MCNC: 4.3 MMOL/L

## 2019-12-12 ENCOUNTER — TRANSCRIPTION ENCOUNTER (OUTPATIENT)
Age: 64
End: 2019-12-12

## 2019-12-13 ENCOUNTER — APPOINTMENT (OUTPATIENT)
Dept: MRI IMAGING | Facility: CLINIC | Age: 64
End: 2019-12-13

## 2019-12-14 ENCOUNTER — MOBILE ON CALL (OUTPATIENT)
Age: 64
End: 2019-12-14

## 2019-12-16 ENCOUNTER — TRANSCRIPTION ENCOUNTER (OUTPATIENT)
Age: 64
End: 2019-12-16

## 2019-12-23 ENCOUNTER — RX RENEWAL (OUTPATIENT)
Age: 64
End: 2019-12-23

## 2020-01-09 ENCOUNTER — RX RENEWAL (OUTPATIENT)
Age: 65
End: 2020-01-09

## 2020-01-10 ENCOUNTER — TRANSCRIPTION ENCOUNTER (OUTPATIENT)
Age: 65
End: 2020-01-10

## 2020-01-31 ENCOUNTER — RX RENEWAL (OUTPATIENT)
Age: 65
End: 2020-01-31

## 2020-02-12 ENCOUNTER — TRANSCRIPTION ENCOUNTER (OUTPATIENT)
Age: 65
End: 2020-02-12

## 2020-02-17 ENCOUNTER — APPOINTMENT (OUTPATIENT)
Dept: COLORECTAL SURGERY | Facility: CLINIC | Age: 65
End: 2020-02-17
Payer: COMMERCIAL

## 2020-02-17 VITALS
HEIGHT: 61 IN | WEIGHT: 215 LBS | DIASTOLIC BLOOD PRESSURE: 81 MMHG | SYSTOLIC BLOOD PRESSURE: 151 MMHG | BODY MASS INDEX: 40.59 KG/M2 | RESPIRATION RATE: 14 BRPM | HEART RATE: 89 BPM

## 2020-02-17 DIAGNOSIS — K60.2 ANAL FISSURE, UNSPECIFIED: ICD-10-CM

## 2020-02-17 PROCEDURE — 99243 OFF/OP CNSLTJ NEW/EST LOW 30: CPT

## 2020-02-17 NOTE — HISTORY OF PRESENT ILLNESS
[FreeTextEntry1] : 65-year-old female with few weeks history of anal pain. The pain is constant in nature but worse with bowel movements. She describes a pulsatile sensation in the rectal region. She occasionally has some bleeding with wiping. She just tend to have constipation and has recently added fiber supplement with improvement in her symptoms. No abdominal pain, nausea or vomiting. Her last colonoscopy was in December 2014.\par \par She also complains of difficulty evacuating stool and often has to push on bulge in the vagina to expel stools. She has urinary incontinence and uterine prolapse.

## 2020-02-17 NOTE — CONSULT LETTER
[Consult Letter:] : I had the pleasure of evaluating your patient, [unfilled]. [Dear  ___] : Dear  [unfilled], [Please see my note below.] : Please see my note below. [Consult Closing:] : Thank you very much for allowing me to participate in the care of this patient.  If you have any questions, please do not hesitate to contact me. [Sincerely,] : Sincerely, [FreeTextEntry1] : She has an anal fissure which would be managed medically.  [FreeTextEntry3] : Last Lujan MD\par

## 2020-02-17 NOTE — PHYSICAL EXAM
[Posterior] : posteriorly [Calm] : calm [Normal Rate and Rhythm] : normal rate and rhythm [Respiratory Effort] : normal respiratory effort [de-identified] : normocephalic and atraumatic [de-identified] : nonthrombosed external hemorrhoids [de-identified] : soft, nontender, nondistended. Lower midline incision [de-identified] : well appearing, in no distress [de-identified] : alert and oriented x3 [de-identified] : moves extremities without difficulty [de-identified] : warm and dry

## 2020-03-06 ENCOUNTER — NON-APPOINTMENT (OUTPATIENT)
Age: 65
End: 2020-03-06

## 2020-03-06 ENCOUNTER — APPOINTMENT (OUTPATIENT)
Dept: INTERNAL MEDICINE | Facility: CLINIC | Age: 65
End: 2020-03-06
Payer: COMMERCIAL

## 2020-03-06 VITALS
RESPIRATION RATE: 14 BRPM | WEIGHT: 211 LBS | DIASTOLIC BLOOD PRESSURE: 80 MMHG | BODY MASS INDEX: 39.87 KG/M2 | TEMPERATURE: 98 F | SYSTOLIC BLOOD PRESSURE: 132 MMHG | HEART RATE: 81 BPM | OXYGEN SATURATION: 96 %

## 2020-03-06 PROCEDURE — 93000 ELECTROCARDIOGRAM COMPLETE: CPT

## 2020-03-06 PROCEDURE — 90750 HZV VACC RECOMBINANT IM: CPT

## 2020-03-06 PROCEDURE — 90471 IMMUNIZATION ADMIN: CPT

## 2020-03-06 PROCEDURE — 99396 PREV VISIT EST AGE 40-64: CPT | Mod: 25

## 2020-03-06 RX ORDER — NAPROXEN 500 MG/1
500 TABLET ORAL
Qty: 20 | Refills: 0 | Status: DISCONTINUED | COMMUNITY
Start: 2019-12-06 | End: 2020-03-06

## 2020-03-06 NOTE — HISTORY OF PRESENT ILLNESS
[de-identified] : chidi pastrana - cortisone shots of both knees, MRI with arthritis, torn meniscus - no surgery cherelle of OA, PT \par dr. BOLES - fissure \par cold last friday, no fever, started with runny nose, throat irritation, using nasal spras and gargling, still with cough

## 2020-03-06 NOTE — PLAN
[FreeTextEntry1] : HCM: \par -check labs \par -EKG unchanged \par -declining mammogram, understands risks \par -FIT provided \par \par T2DM: \par -check a1c\par -advise diabetic eye exam \par -UTD with microalbumin \par -on moderate intensity statin \par \par HTN: \par -stable\par -continue meds \par \par HLD: \par -check lipids\par \par viral URI: symptomatic care, rest, hydration, pt is afebrile, lungs are CTABL

## 2020-03-06 NOTE — HISTORY OF PRESENT ILLNESS
[de-identified] : chidi pastrana - cortisone shots of both knees, MRI with arthritis, torn meniscus - no surgery cherelle of OA, PT \par dr. BOLES - fissure \par cold last friday, no fever, started with runny nose, throat irritation, using nasal spras and gargling, still with cough

## 2020-03-10 ENCOUNTER — APPOINTMENT (OUTPATIENT)
Dept: COLORECTAL SURGERY | Facility: CLINIC | Age: 65
End: 2020-03-10

## 2020-03-16 LAB
25(OH)D3 SERPL-MCNC: 46.5 NG/ML
ALBUMIN SERPL ELPH-MCNC: 4.1 G/DL
ALP BLD-CCNC: 101 U/L
ALT SERPL-CCNC: 21 U/L
ANION GAP SERPL CALC-SCNC: 13 MMOL/L
AST SERPL-CCNC: 31 U/L
BASOPHILS # BLD AUTO: 0.11 K/UL
BASOPHILS NFR BLD AUTO: 1 %
BILIRUB SERPL-MCNC: 0.2 MG/DL
BUN SERPL-MCNC: 12 MG/DL
CALCIUM SERPL-MCNC: 9 MG/DL
CHLORIDE SERPL-SCNC: 107 MMOL/L
CHOLEST SERPL-MCNC: 156 MG/DL
CHOLEST/HDLC SERPL: 3.9 RATIO
CO2 SERPL-SCNC: 22 MMOL/L
CREAT SERPL-MCNC: 0.75 MG/DL
EOSINOPHIL # BLD AUTO: 0.53 K/UL
EOSINOPHIL NFR BLD AUTO: 4.6 %
ESTIMATED AVERAGE GLUCOSE: 157 MG/DL
GLUCOSE SERPL-MCNC: 110 MG/DL
HBA1C MFR BLD HPLC: 7.1 %
HCT VFR BLD CALC: 41.3 %
HDLC SERPL-MCNC: 40 MG/DL
HGB BLD-MCNC: 12.5 G/DL
IMM GRANULOCYTES NFR BLD AUTO: 0.4 %
LDLC SERPL CALC-MCNC: 67 MG/DL
LYMPHOCYTES # BLD AUTO: 4.21 K/UL
LYMPHOCYTES NFR BLD AUTO: 36.5 %
M TB IFN-G BLD-IMP: NEGATIVE
MAN DIFF?: NORMAL
MCHC RBC-ENTMCNC: 26.3 PG
MCHC RBC-ENTMCNC: 30.3 GM/DL
MCV RBC AUTO: 86.8 FL
MONOCYTES # BLD AUTO: 0.88 K/UL
MONOCYTES NFR BLD AUTO: 7.6 %
NEUTROPHILS # BLD AUTO: 5.75 K/UL
NEUTROPHILS NFR BLD AUTO: 49.9 %
PLATELET # BLD AUTO: 240 K/UL
POTASSIUM SERPL-SCNC: 4.6 MMOL/L
PROT SERPL-MCNC: 6.4 G/DL
QUANTIFERON TB PLUS MITOGEN MINUS NIL: 7.52 IU/ML
QUANTIFERON TB PLUS NIL: 0.01 IU/ML
QUANTIFERON TB PLUS TB1 MINUS NIL: 0 IU/ML
QUANTIFERON TB PLUS TB2 MINUS NIL: 0 IU/ML
RBC # BLD: 4.76 M/UL
RBC # FLD: 14.6 %
SODIUM SERPL-SCNC: 143 MMOL/L
TRIGL SERPL-MCNC: 243 MG/DL
TSH SERPL-ACNC: 3.45 UIU/ML
VIT B12 SERPL-MCNC: >2000 PG/ML
WBC # FLD AUTO: 11.53 K/UL

## 2020-03-18 ENCOUNTER — NON-APPOINTMENT (OUTPATIENT)
Age: 65
End: 2020-03-18

## 2020-03-19 ENCOUNTER — TRANSCRIPTION ENCOUNTER (OUTPATIENT)
Age: 65
End: 2020-03-19

## 2020-03-19 LAB — HEMOCCULT STL QL IA: NEGATIVE

## 2020-04-04 ENCOUNTER — RX RENEWAL (OUTPATIENT)
Age: 65
End: 2020-04-04

## 2020-04-25 ENCOUNTER — RX RENEWAL (OUTPATIENT)
Age: 65
End: 2020-04-25

## 2020-04-28 ENCOUNTER — RX RENEWAL (OUTPATIENT)
Age: 65
End: 2020-04-28

## 2020-05-02 ENCOUNTER — TRANSCRIPTION ENCOUNTER (OUTPATIENT)
Age: 65
End: 2020-05-02

## 2020-05-20 ENCOUNTER — TRANSCRIPTION ENCOUNTER (OUTPATIENT)
Age: 65
End: 2020-05-20

## 2020-05-21 ENCOUNTER — APPOINTMENT (OUTPATIENT)
Dept: INTERNAL MEDICINE | Facility: CLINIC | Age: 65
End: 2020-05-21
Payer: COMMERCIAL

## 2020-05-21 DIAGNOSIS — R52 PAIN, UNSPECIFIED: ICD-10-CM

## 2020-05-21 DIAGNOSIS — Z11.59 ENCOUNTER FOR SCREENING FOR OTHER VIRAL DISEASES: ICD-10-CM

## 2020-05-21 DIAGNOSIS — R53.83 OTHER FATIGUE: ICD-10-CM

## 2020-05-21 PROCEDURE — 99214 OFFICE O/P EST MOD 30 MIN: CPT | Mod: 95

## 2020-05-21 NOTE — PHYSICAL EXAM
[No Acute Distress] : no acute distress [Well-Appearing] : well-appearing [No Respiratory Distress] : no respiratory distress  [Normal Voice/Communication] : normal voice/communication [de-identified] : hyperpigmented rash along nape of neck though difficult to visualize because of position

## 2020-05-21 NOTE — PLAN
[FreeTextEntry1] : -?non-specific, vague symptoms may be related to viral etiology, unlikely covid, check inflammatory markers \par -recommend NSAIDs PRN, stay well hydrated \par -topical corticosteroid for rash \par -pt to call if symptoms worsening \par

## 2020-05-21 NOTE — REVIEW OF SYSTEMS
[Fatigue] : fatigue [Skin Rash] : skin rash [Cough] : cough [Negative] : Heme/Lymph [FreeTextEntry9] : see hpi

## 2020-05-21 NOTE — HISTORY OF PRESENT ILLNESS
[Home] : at home, [unfilled] , at the time of the visit. [Medical Office: ([facility])___] : at the medical office located in  [Verbal consent obtained from patient] : the patient, [unfilled] [FreeTextEntry8] : Pt with complaint of feeling unwell for 1 week. Started as body aches in lower back and arms. Then started to develop rash on her shoulders which progressed to neck area. Rash is very itchy. Denies any change in detergents, lotions, etc. She though it may have been from a necklace she was wearing which she has since stopped wearing. Also feels fatigued. She was tested for covid on May 5th which was negative. Denies fevers, took temp during visit which was 97. No abdominal pain, n/v/d. (+)Cough which is chronic, pt is smoker. No SOB or CP, runny nose, sore throat, sinus pain/pressure.

## 2020-05-28 LAB
ANA SER IF-ACNC: NEGATIVE
BASOPHILS # BLD AUTO: 0.09 K/UL
BASOPHILS NFR BLD AUTO: 0.7 %
CK SERPL-CCNC: 125 U/L
CRP SERPL-MCNC: 0.51 MG/DL
EOSINOPHIL # BLD AUTO: 0.54 K/UL
EOSINOPHIL NFR BLD AUTO: 4.3 %
ERYTHROCYTE [SEDIMENTATION RATE] IN BLOOD BY WESTERGREN METHOD: 38 MM/HR
HCT VFR BLD CALC: 41.1 %
HGB BLD-MCNC: 12.5 G/DL
IMM GRANULOCYTES NFR BLD AUTO: 0.5 %
LYMPHOCYTES # BLD AUTO: 3.53 K/UL
LYMPHOCYTES NFR BLD AUTO: 28.1 %
MAN DIFF?: NORMAL
MCHC RBC-ENTMCNC: 26.6 PG
MCHC RBC-ENTMCNC: 30.4 GM/DL
MCV RBC AUTO: 87.4 FL
MONOCYTES # BLD AUTO: 1.07 K/UL
MONOCYTES NFR BLD AUTO: 8.5 %
NEUTROPHILS # BLD AUTO: 7.28 K/UL
NEUTROPHILS NFR BLD AUTO: 57.9 %
PLATELET # BLD AUTO: 230 K/UL
RBC # BLD: 4.7 M/UL
RBC # FLD: 14.4 %
RHEUMATOID FACT SER QL: <10 IU/ML
SARS-COV-2 IGG SERPL IA-ACNC: <0.1 INDEX
SARS-COV-2 IGG SERPL QL IA: NEGATIVE
TSH SERPL-ACNC: 3.73 UIU/ML
WBC # FLD AUTO: 12.57 K/UL

## 2020-06-05 ENCOUNTER — APPOINTMENT (OUTPATIENT)
Dept: INTERNAL MEDICINE | Facility: CLINIC | Age: 65
End: 2020-06-05
Payer: COMMERCIAL

## 2020-06-05 VITALS
OXYGEN SATURATION: 98 % | BODY MASS INDEX: 40.78 KG/M2 | DIASTOLIC BLOOD PRESSURE: 78 MMHG | SYSTOLIC BLOOD PRESSURE: 126 MMHG | RESPIRATION RATE: 14 BRPM | HEART RATE: 86 BPM | HEIGHT: 61 IN | TEMPERATURE: 98.1 F | WEIGHT: 216 LBS

## 2020-06-05 DIAGNOSIS — Z23 ENCOUNTER FOR IMMUNIZATION: ICD-10-CM

## 2020-06-05 PROCEDURE — 90471 IMMUNIZATION ADMIN: CPT

## 2020-06-05 PROCEDURE — 99213 OFFICE O/P EST LOW 20 MIN: CPT | Mod: 25

## 2020-06-05 PROCEDURE — 90750 HZV VACC RECOMBINANT IM: CPT

## 2020-06-05 NOTE — HISTORY OF PRESENT ILLNESS
[de-identified] : Pt here for f/u. Needs second dose of shingrix. Fatigue and rash from previous visit have resolved.

## 2020-06-05 NOTE — PHYSICAL EXAM
[No Acute Distress] : no acute distress [Well Nourished] : well nourished [Well-Appearing] : well-appearing [Well Developed] : well developed [Normal Sclera/Conjunctiva] : normal sclera/conjunctiva [PERRL] : pupils equal round and reactive to light [Normal Outer Ear/Nose] : the outer ears and nose were normal in appearance [EOMI] : extraocular movements intact [Normal Oropharynx] : the oropharynx was normal [No Lymphadenopathy] : no lymphadenopathy [No JVD] : no jugular venous distention [Supple] : supple [Thyroid Normal, No Nodules] : the thyroid was normal and there were no nodules present [No Respiratory Distress] : no respiratory distress  [No Accessory Muscle Use] : no accessory muscle use [Clear to Auscultation] : lungs were clear to auscultation bilaterally [Normal Rate] : normal rate  [Regular Rhythm] : with a regular rhythm [Normal S1, S2] : normal S1 and S2 [No Carotid Bruits] : no carotid bruits [No Murmur] : no murmur heard [Pedal Pulses Present] : the pedal pulses are present [No Abdominal Bruit] : a ~M bruit was not heard ~T in the abdomen [No Varicosities] : no varicosities [No Edema] : there was no peripheral edema [No Palpable Aorta] : no palpable aorta [No Extremity Clubbing/Cyanosis] : no extremity clubbing/cyanosis [Soft] : abdomen soft [No Masses] : no abdominal mass palpated [Non-distended] : non-distended [Non Tender] : non-tender [Normal Bowel Sounds] : normal bowel sounds [No HSM] : no HSM [Normal Posterior Cervical Nodes] : no posterior cervical lymphadenopathy [No CVA Tenderness] : no CVA  tenderness [Normal Anterior Cervical Nodes] : no anterior cervical lymphadenopathy [No Spinal Tenderness] : no spinal tenderness [No Joint Swelling] : no joint swelling [Grossly Normal Strength/Tone] : grossly normal strength/tone [No Rash] : no rash [Coordination Grossly Intact] : coordination grossly intact [No Focal Deficits] : no focal deficits [Normal Gait] : normal gait [Deep Tendon Reflexes (DTR)] : deep tendon reflexes were 2+ and symmetric [Normal Affect] : the affect was normal [Normal Insight/Judgement] : insight and judgment were intact

## 2020-06-05 NOTE — PLAN
[FreeTextEntry1] : T2DM: check a1c, microalbumin, continue glipizide\par HLD: continue crestor, will check lipids in 3 months, pt would like to restart keto diet \par HTN: BP stable, continue losartan and amlodipine \par HCM: 2nd dose shingrix today, due for low dose CT scan for lung cancer screening

## 2020-06-26 ENCOUNTER — RX RENEWAL (OUTPATIENT)
Age: 65
End: 2020-06-26

## 2020-06-29 ENCOUNTER — RX RENEWAL (OUTPATIENT)
Age: 65
End: 2020-06-29

## 2020-07-30 ENCOUNTER — RX RENEWAL (OUTPATIENT)
Age: 65
End: 2020-07-30

## 2020-08-03 ENCOUNTER — TRANSCRIPTION ENCOUNTER (OUTPATIENT)
Age: 65
End: 2020-08-03

## 2020-08-03 LAB
CREAT SPEC-SCNC: 204 MG/DL
ESTIMATED AVERAGE GLUCOSE: 166 MG/DL
HBA1C MFR BLD HPLC: 7.4 %
MICROALBUMIN 24H UR DL<=1MG/L-MCNC: <1.2 MG/DL
MICROALBUMIN/CREAT 24H UR-RTO: NORMAL MG/G

## 2020-08-25 ENCOUNTER — RX RENEWAL (OUTPATIENT)
Age: 65
End: 2020-08-25

## 2020-09-25 ENCOUNTER — RX RENEWAL (OUTPATIENT)
Age: 65
End: 2020-09-25

## 2020-10-06 ENCOUNTER — TRANSCRIPTION ENCOUNTER (OUTPATIENT)
Age: 65
End: 2020-10-06

## 2020-10-20 ENCOUNTER — RX RENEWAL (OUTPATIENT)
Age: 65
End: 2020-10-20

## 2020-10-26 ENCOUNTER — RX RENEWAL (OUTPATIENT)
Age: 65
End: 2020-10-26

## 2020-12-16 ENCOUNTER — RX RENEWAL (OUTPATIENT)
Age: 65
End: 2020-12-16

## 2021-01-12 ENCOUNTER — RX RENEWAL (OUTPATIENT)
Age: 66
End: 2021-01-12

## 2021-01-14 ENCOUNTER — RX RENEWAL (OUTPATIENT)
Age: 66
End: 2021-01-14

## 2021-01-18 ENCOUNTER — TRANSCRIPTION ENCOUNTER (OUTPATIENT)
Age: 66
End: 2021-01-18

## 2021-01-18 ENCOUNTER — RX RENEWAL (OUTPATIENT)
Age: 66
End: 2021-01-18

## 2021-01-21 LAB
25(OH)D3 SERPL-MCNC: 45 NG/ML
ALBUMIN SERPL ELPH-MCNC: 4.3 G/DL
ALP BLD-CCNC: 116 U/L
ALT SERPL-CCNC: 35 U/L
ANION GAP SERPL CALC-SCNC: 19 MMOL/L
AST SERPL-CCNC: 35 U/L
BASOPHILS # BLD AUTO: 0.09 K/UL
BASOPHILS NFR BLD AUTO: 0.9 %
BILIRUB SERPL-MCNC: 0.2 MG/DL
BUN SERPL-MCNC: 13 MG/DL
CALCIUM SERPL-MCNC: 9.1 MG/DL
CHLORIDE SERPL-SCNC: 102 MMOL/L
CHOLEST SERPL-MCNC: 159 MG/DL
CO2 SERPL-SCNC: 19 MMOL/L
CREAT SERPL-MCNC: 1.03 MG/DL
EOSINOPHIL # BLD AUTO: 0.5 K/UL
EOSINOPHIL NFR BLD AUTO: 4.9 %
ESTIMATED AVERAGE GLUCOSE: 258 MG/DL
GLUCOSE SERPL-MCNC: 291 MG/DL
HBA1C MFR BLD HPLC: 10.6 %
HCT VFR BLD CALC: 41.6 %
HDLC SERPL-MCNC: 48 MG/DL
HGB BLD-MCNC: 12.7 G/DL
IMM GRANULOCYTES NFR BLD AUTO: 0.2 %
LDLC SERPL CALC-MCNC: 78 MG/DL
LYMPHOCYTES # BLD AUTO: 3.22 K/UL
LYMPHOCYTES NFR BLD AUTO: 31.8 %
MAN DIFF?: NORMAL
MCHC RBC-ENTMCNC: 26.3 PG
MCHC RBC-ENTMCNC: 30.5 GM/DL
MCV RBC AUTO: 86.1 FL
MONOCYTES # BLD AUTO: 0.74 K/UL
MONOCYTES NFR BLD AUTO: 7.3 %
NEUTROPHILS # BLD AUTO: 5.56 K/UL
NEUTROPHILS NFR BLD AUTO: 54.9 %
NONHDLC SERPL-MCNC: 111 MG/DL
PLATELET # BLD AUTO: 228 K/UL
POTASSIUM SERPL-SCNC: 4.3 MMOL/L
PROT SERPL-MCNC: 6.5 G/DL
RBC # BLD: 4.83 M/UL
RBC # FLD: 14 %
SODIUM SERPL-SCNC: 140 MMOL/L
TRIGL SERPL-MCNC: 168 MG/DL
TSH SERPL-ACNC: 4.11 UIU/ML
WBC # FLD AUTO: 10.13 K/UL

## 2021-01-22 ENCOUNTER — APPOINTMENT (OUTPATIENT)
Dept: INTERNAL MEDICINE | Facility: CLINIC | Age: 66
End: 2021-01-22
Payer: COMMERCIAL

## 2021-01-22 VITALS
HEART RATE: 87 BPM | TEMPERATURE: 97.9 F | BODY MASS INDEX: 40.81 KG/M2 | SYSTOLIC BLOOD PRESSURE: 180 MMHG | OXYGEN SATURATION: 97 % | DIASTOLIC BLOOD PRESSURE: 90 MMHG | RESPIRATION RATE: 14 BRPM | WEIGHT: 216 LBS

## 2021-01-22 PROCEDURE — 99396 PREV VISIT EST AGE 40-64: CPT

## 2021-01-22 PROCEDURE — 99072 ADDL SUPL MATRL&STAF TM PHE: CPT

## 2021-01-25 LAB
CREAT SPEC-SCNC: 73 MG/DL
MICROALBUMIN 24H UR DL<=1MG/L-MCNC: 2.2 MG/DL
MICROALBUMIN/CREAT 24H UR-RTO: 30 MG/G

## 2021-01-26 ENCOUNTER — TRANSCRIPTION ENCOUNTER (OUTPATIENT)
Age: 66
End: 2021-01-26

## 2021-02-10 ENCOUNTER — TRANSCRIPTION ENCOUNTER (OUTPATIENT)
Age: 66
End: 2021-02-10

## 2021-03-04 ENCOUNTER — TRANSCRIPTION ENCOUNTER (OUTPATIENT)
Age: 66
End: 2021-03-04

## 2021-03-07 ENCOUNTER — TRANSCRIPTION ENCOUNTER (OUTPATIENT)
Age: 66
End: 2021-03-07

## 2021-03-11 LAB — HEMOCCULT STL QL IA: NEGATIVE

## 2021-04-23 ENCOUNTER — APPOINTMENT (OUTPATIENT)
Dept: INTERNAL MEDICINE | Facility: CLINIC | Age: 66
End: 2021-04-23
Payer: COMMERCIAL

## 2021-04-23 VITALS
OXYGEN SATURATION: 96 % | WEIGHT: 213 LBS | SYSTOLIC BLOOD PRESSURE: 134 MMHG | TEMPERATURE: 96 F | RESPIRATION RATE: 14 BRPM | HEIGHT: 61 IN | BODY MASS INDEX: 40.22 KG/M2 | HEART RATE: 97 BPM | DIASTOLIC BLOOD PRESSURE: 80 MMHG

## 2021-04-23 PROCEDURE — 99072 ADDL SUPL MATRL&STAF TM PHE: CPT

## 2021-04-23 PROCEDURE — 99213 OFFICE O/P EST LOW 20 MIN: CPT

## 2021-04-23 NOTE — HISTORY OF PRESENT ILLNESS
[de-identified] : Pt here for f/u. Pt's sugars have been high, she has a dexcom sensor and fasting sugars for this week have been in the low to mid 300s.  Pt saw endo-Dr. Perlman. She was given a 4 week sample of trulicity, which was improving her sugars but her insurance doesn't cover this med. Pt is trying a program called green  which focuses on low carb diet. Pt had steroid injection in right knee which has helped her pain. She is complaining of allergy symptoms such as congestion, PND, runny nose but stopped taking her flonase and antihistamine. She quit smoking in October 2020.

## 2021-04-23 NOTE — PLAN
[FreeTextEntry1] : T2DM: pt on 12.5 of glipizide ER which is not adequately controlling her sugars, will start farxiga, pt is on statin and ARB\par HTN: stable, continue norvasc and losartan \par HLD: continue rosuvastatin \par Seasonal allergies: flonase and zyrtec or xyzal

## 2021-04-23 NOTE — PHYSICAL EXAM
[No Acute Distress] : no acute distress [Well-Appearing] : well-appearing [Normal Voice/Communication] : normal voice/communication [Normal Oropharynx] : the oropharynx was normal [Normal TMs] : both tympanic membranes were normal [No Lymphadenopathy] : no lymphadenopathy [Thyroid Normal, No Nodules] : the thyroid was normal and there were no nodules present [No Respiratory Distress] : no respiratory distress  [No Accessory Muscle Use] : no accessory muscle use [Clear to Auscultation] : lungs were clear to auscultation bilaterally [Normal Rate] : normal rate  [Regular Rhythm] : with a regular rhythm [No Murmur] : no murmur heard [Soft] : abdomen soft [Non Tender] : non-tender [Non-distended] : non-distended [Normal Bowel Sounds] : normal bowel sounds [No Focal Deficits] : no focal deficits [Alert and Oriented x3] : oriented to person, place, and time

## 2021-04-26 ENCOUNTER — RX RENEWAL (OUTPATIENT)
Age: 66
End: 2021-04-26

## 2021-04-27 ENCOUNTER — NON-APPOINTMENT (OUTPATIENT)
Age: 66
End: 2021-04-27

## 2021-04-27 LAB
ALBUMIN SERPL ELPH-MCNC: 4.3 G/DL
ALP BLD-CCNC: 137 U/L
ALT SERPL-CCNC: 69 U/L
ANION GAP SERPL CALC-SCNC: 11 MMOL/L
AST SERPL-CCNC: 69 U/L
BASOPHILS # BLD AUTO: 0.11 K/UL
BASOPHILS NFR BLD AUTO: 1 %
BILIRUB SERPL-MCNC: 0.3 MG/DL
BUN SERPL-MCNC: 18 MG/DL
CALCIUM SERPL-MCNC: 9.8 MG/DL
CHLORIDE SERPL-SCNC: 103 MMOL/L
CHOLEST SERPL-MCNC: 167 MG/DL
CO2 SERPL-SCNC: 23 MMOL/L
CREAT SERPL-MCNC: 0.9 MG/DL
EOSINOPHIL # BLD AUTO: 0.49 K/UL
EOSINOPHIL NFR BLD AUTO: 4.5 %
ESTIMATED AVERAGE GLUCOSE: 275 MG/DL
GLUCOSE SERPL-MCNC: 341 MG/DL
HBA1C MFR BLD HPLC: 11.2 %
HCT VFR BLD CALC: 45 %
HDLC SERPL-MCNC: 46 MG/DL
HGB BLD-MCNC: 13.3 G/DL
IMM GRANULOCYTES NFR BLD AUTO: 0.4 %
LDLC SERPL CALC-MCNC: 59 MG/DL
LYMPHOCYTES # BLD AUTO: 2.97 K/UL
LYMPHOCYTES NFR BLD AUTO: 27.3 %
MAN DIFF?: NORMAL
MCHC RBC-ENTMCNC: 26 PG
MCHC RBC-ENTMCNC: 29.6 GM/DL
MCV RBC AUTO: 87.9 FL
MONOCYTES # BLD AUTO: 0.74 K/UL
MONOCYTES NFR BLD AUTO: 6.8 %
NEUTROPHILS # BLD AUTO: 6.51 K/UL
NEUTROPHILS NFR BLD AUTO: 60 %
NONHDLC SERPL-MCNC: 121 MG/DL
PLATELET # BLD AUTO: 221 K/UL
POTASSIUM SERPL-SCNC: 4.2 MMOL/L
PROT SERPL-MCNC: 6.9 G/DL
RBC # BLD: 5.12 M/UL
RBC # FLD: 13.8 %
SODIUM SERPL-SCNC: 136 MMOL/L
TRIGL SERPL-MCNC: 307 MG/DL
TSH SERPL-ACNC: 3.74 UIU/ML
WBC # FLD AUTO: 10.86 K/UL

## 2021-05-13 ENCOUNTER — TRANSCRIPTION ENCOUNTER (OUTPATIENT)
Age: 66
End: 2021-05-13

## 2021-05-27 ENCOUNTER — RX RENEWAL (OUTPATIENT)
Age: 66
End: 2021-05-27

## 2021-06-03 ENCOUNTER — TRANSCRIPTION ENCOUNTER (OUTPATIENT)
Age: 66
End: 2021-06-03

## 2021-06-14 ENCOUNTER — TRANSCRIPTION ENCOUNTER (OUTPATIENT)
Age: 66
End: 2021-06-14

## 2021-06-20 NOTE — ED ADULT TRIAGE NOTE - PAIN: PRESENCE, MLM
Letter by Liliana Bejarano MD at      Author: Liliana Bejarano MD Service: -- Author Type: --    Filed:  Encounter Date: 1/14/2020 Status: Signed         January 14, 2020     Patient: Ollie Davila   YOB: 2002   Date of Visit: 1/14/2020       To Whom it May Concern:    Ollie Davila was seen in my clinic on 1/14/2020. He has a diagnosis of persistent asthma with a history of poor control. He has not been seen in our clinic since September 2019 but did require Tamiflu for influenza exposure/illness in December.    If you have any questions or concerns, please don't hesitate to call.    Sincerely,         Electronically signed by Liliana Bejarano MD       
complains of pain/discomfort

## 2021-06-21 ENCOUNTER — TRANSCRIPTION ENCOUNTER (OUTPATIENT)
Age: 66
End: 2021-06-21

## 2021-07-19 ENCOUNTER — TRANSCRIPTION ENCOUNTER (OUTPATIENT)
Age: 66
End: 2021-07-19

## 2021-07-26 ENCOUNTER — RX RENEWAL (OUTPATIENT)
Age: 66
End: 2021-07-26

## 2021-07-27 ENCOUNTER — TRANSCRIPTION ENCOUNTER (OUTPATIENT)
Age: 66
End: 2021-07-27

## 2021-08-18 LAB
M TB IFN-G BLD-IMP: NEGATIVE
QUANTIFERON TB PLUS MITOGEN MINUS NIL: 7.13 IU/ML
QUANTIFERON TB PLUS NIL: 0.02 IU/ML
QUANTIFERON TB PLUS TB1 MINUS NIL: 0 IU/ML
QUANTIFERON TB PLUS TB2 MINUS NIL: 0 IU/ML

## 2021-08-26 ENCOUNTER — TRANSCRIPTION ENCOUNTER (OUTPATIENT)
Age: 66
End: 2021-08-26

## 2021-09-02 ENCOUNTER — TRANSCRIPTION ENCOUNTER (OUTPATIENT)
Age: 66
End: 2021-09-02

## 2021-12-14 ENCOUNTER — RX RENEWAL (OUTPATIENT)
Age: 66
End: 2021-12-14

## 2021-12-20 ENCOUNTER — NON-APPOINTMENT (OUTPATIENT)
Age: 66
End: 2021-12-20

## 2021-12-21 ENCOUNTER — APPOINTMENT (OUTPATIENT)
Dept: INTERNAL MEDICINE | Facility: CLINIC | Age: 66
End: 2021-12-21
Payer: COMMERCIAL

## 2021-12-21 DIAGNOSIS — J01.90 ACUTE SINUSITIS, UNSPECIFIED: ICD-10-CM

## 2021-12-21 PROCEDURE — 99213 OFFICE O/P EST LOW 20 MIN: CPT | Mod: 95

## 2021-12-21 NOTE — HISTORY OF PRESENT ILLNESS
[Home] : at home, [unfilled] , at the time of the visit. [Medical Office: ([facility])___] : at the medical office located in  [Verbal consent obtained from patient] : the patient, [unfilled] [FreeTextEntry8] : Pt's son tested positive for covid on 12/13. Since then, pt has been in quarantine as she is having symptoms including congestion and sinus pain/pressure. HOwever, these symptoms have been going on for 2-3 weeks. She had a PCR test on 12/15 which was negative. She has another pending PCR result from a test she took this morning. She has been afebrile.

## 2021-12-23 ENCOUNTER — RX RENEWAL (OUTPATIENT)
Age: 66
End: 2021-12-23

## 2021-12-28 ENCOUNTER — TRANSCRIPTION ENCOUNTER (OUTPATIENT)
Age: 66
End: 2021-12-28

## 2021-12-28 RX ORDER — PEN NEEDLE, DIABETIC 29 G X1/2"
32G X 4 MM NEEDLE, DISPOSABLE MISCELLANEOUS
Qty: 90 | Refills: 1 | Status: ACTIVE | COMMUNITY
Start: 2021-05-13 | End: 1900-01-01

## 2021-12-28 RX ORDER — INSULIN GLARGINE 100 [IU]/ML
100 INJECTION, SOLUTION SUBCUTANEOUS
Qty: 3 | Refills: 0 | Status: DISCONTINUED | COMMUNITY
Start: 2021-05-07 | End: 2021-12-28

## 2021-12-30 ENCOUNTER — RX RENEWAL (OUTPATIENT)
Age: 66
End: 2021-12-30

## 2022-01-23 ENCOUNTER — RX RENEWAL (OUTPATIENT)
Age: 67
End: 2022-01-23

## 2022-02-01 ENCOUNTER — NON-APPOINTMENT (OUTPATIENT)
Age: 67
End: 2022-02-01

## 2022-02-01 ENCOUNTER — APPOINTMENT (OUTPATIENT)
Dept: INTERNAL MEDICINE | Facility: CLINIC | Age: 67
End: 2022-02-01
Payer: COMMERCIAL

## 2022-02-01 ENCOUNTER — RESULT REVIEW (OUTPATIENT)
Age: 67
End: 2022-02-01

## 2022-02-01 VITALS
HEART RATE: 98 BPM | SYSTOLIC BLOOD PRESSURE: 162 MMHG | OXYGEN SATURATION: 98 % | RESPIRATION RATE: 14 BRPM | TEMPERATURE: 97.1 F | HEIGHT: 61 IN | DIASTOLIC BLOOD PRESSURE: 80 MMHG | WEIGHT: 224 LBS | BODY MASS INDEX: 42.29 KG/M2

## 2022-02-01 PROCEDURE — 99396 PREV VISIT EST AGE 40-64: CPT | Mod: 25

## 2022-02-01 PROCEDURE — 93000 ELECTROCARDIOGRAM COMPLETE: CPT

## 2022-02-01 RX ORDER — BLOOD-GLUCOSE TRANSMITTER
EACH MISCELLANEOUS
Qty: 1 | Refills: 0 | Status: DISCONTINUED | COMMUNITY
Start: 2021-06-14 | End: 2022-02-01

## 2022-02-01 RX ORDER — HYALURONATE SODIUM 20 MG/2 ML
20 SYRINGE (ML) INTRAARTICULAR
Qty: 6 | Refills: 0 | Status: DISCONTINUED | COMMUNITY
Start: 2021-10-15

## 2022-02-01 RX ORDER — DAPAGLIFLOZIN 5 MG/1
5 TABLET, FILM COATED ORAL
Qty: 90 | Refills: 0 | Status: DISCONTINUED | COMMUNITY
Start: 2021-04-23 | End: 2022-02-01

## 2022-02-01 RX ORDER — BENZONATATE 100 MG/1
100 CAPSULE ORAL
Qty: 30 | Refills: 0 | Status: DISCONTINUED | COMMUNITY
Start: 2021-10-30

## 2022-02-01 RX ORDER — TRIAMCINOLONE ACETONIDE 0.25 MG/G
0.03 OINTMENT TOPICAL 3 TIMES DAILY
Qty: 1 | Refills: 0 | Status: DISCONTINUED | COMMUNITY
Start: 2020-05-21 | End: 2022-02-01

## 2022-02-01 RX ORDER — DOXYCYCLINE HYCLATE 100 MG/1
100 TABLET ORAL
Qty: 20 | Refills: 0 | Status: DISCONTINUED | COMMUNITY
Start: 2021-12-21 | End: 2022-02-01

## 2022-02-01 RX ORDER — AZITHROMYCIN 250 MG/1
250 TABLET, FILM COATED ORAL
Qty: 6 | Refills: 0 | Status: DISCONTINUED | COMMUNITY
Start: 2021-10-30

## 2022-02-01 RX ORDER — GLIPIZIDE 2.5 MG/1
2.5 TABLET, FILM COATED, EXTENDED RELEASE ORAL
Qty: 60 | Refills: 0 | Status: DISCONTINUED | COMMUNITY
Start: 2019-04-08 | End: 2022-02-01

## 2022-02-01 RX ORDER — INSULIN GLARGINE 100 [IU]/ML
100 INJECTION, SOLUTION SUBCUTANEOUS AT BEDTIME
Qty: 1 | Refills: 0 | Status: DISCONTINUED | COMMUNITY
Start: 2021-12-28 | End: 2022-02-01

## 2022-02-01 RX ORDER — FLUCONAZOLE 150 MG/1
150 TABLET ORAL
Qty: 2 | Refills: 0 | Status: DISCONTINUED | COMMUNITY
Start: 2021-12-21 | End: 2022-02-01

## 2022-02-01 NOTE — REVIEW OF SYSTEMS
[Lower Ext Edema] : lower extremity edema [Cough] : cough [Incontinence] : incontinence [Negative] : Heme/Lymph

## 2022-02-01 NOTE — HISTORY OF PRESENT ILLNESS
[de-identified] : Pt here for CPE. Pt with complaint of persistent cough and tightening in the throat area since last month. She was given proventil by an urgent care center which helps when she takes it. Of note, former smoker for more than 30 years, quit in 2020. She did not have her low dose CT scan f/u last year due to pandemic. She recently switched to basaglar for her insulin due to insurance reasons, she feels her sugars are better controlled on this, her avg FBG has been around 170 for the past 7 days. SHe notes that since being on the insulin she has gained a significant amount of weight, notes fluid in her legs. No SOB. She also still has issues with urinary leakage due to her prolapse, she is yet to f/u with urogyn. 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 Her BP is elevated today, she notes that she recently started using a soup base that may have a lot of salt in it, previously she was using the no salt version.

## 2022-02-01 NOTE — PLAN
[FreeTextEntry1] : HCM: \par -check labs\par -EKG NSR \par -mammo script provided\par -advise f/u with GYN For pap smear \par -iFOBT provided\par -script for CT lung cancer screening provided \par -UTD with covid vaccine \par \par T2DM:check a1c and microalbumin, continue 12.5 of glipizide ER and basaglar 40u daily, pt is on statin and ARB, f/u with ophtho for diabetic eye exam \par HTN: elevated, pt keeps log of her BP at home which has been around 130/80s, pt to continue to check BP at home and notify me if it is >130/80, continue norvasc and losartan \par HLD: check lipids, continue rosuvastatin \par Cough: ?emphysema/RAD, will start daily maintenance inhaler, f/u low dose CT scan \par Peripheral edema: leg elevation, compression stockings, low salt diet, recommended diuretic, however pt is hesitant considering her issues with urinary incontinence, she will manage with above conservative measures for now \par needs to f/u with uro gyn

## 2022-02-01 NOTE — PHYSICAL EXAM
[No Acute Distress] : no acute distress [Well Nourished] : well nourished [Well Developed] : well developed [Well-Appearing] : well-appearing [Normal Sclera/Conjunctiva] : normal sclera/conjunctiva [PERRL] : pupils equal round and reactive to light [EOMI] : extraocular movements intact [Normal Outer Ear/Nose] : the outer ears and nose were normal in appearance [Normal Oropharynx] : the oropharynx was normal [No JVD] : no jugular venous distention [No Lymphadenopathy] : no lymphadenopathy [Supple] : supple [Thyroid Normal, No Nodules] : the thyroid was normal and there were no nodules present [No Respiratory Distress] : no respiratory distress  [No Accessory Muscle Use] : no accessory muscle use [Clear to Auscultation] : lungs were clear to auscultation bilaterally [Normal Rate] : normal rate  [Regular Rhythm] : with a regular rhythm [Normal S1, S2] : normal S1 and S2 [No Murmur] : no murmur heard [No Carotid Bruits] : no carotid bruits [No Abdominal Bruit] : a ~M bruit was not heard ~T in the abdomen [No Varicosities] : no varicosities [Pedal Pulses Present] : the pedal pulses are present [No Edema] : there was no peripheral edema [No Palpable Aorta] : no palpable aorta [No Extremity Clubbing/Cyanosis] : no extremity clubbing/cyanosis [Soft] : abdomen soft [Non Tender] : non-tender [Non-distended] : non-distended [No Masses] : no abdominal mass palpated [No HSM] : no HSM [Normal Bowel Sounds] : normal bowel sounds [Normal Posterior Cervical Nodes] : no posterior cervical lymphadenopathy [Normal Anterior Cervical Nodes] : no anterior cervical lymphadenopathy [No CVA Tenderness] : no CVA  tenderness [No Spinal Tenderness] : no spinal tenderness [No Joint Swelling] : no joint swelling [Grossly Normal Strength/Tone] : grossly normal strength/tone [No Rash] : no rash [Coordination Grossly Intact] : coordination grossly intact [No Focal Deficits] : no focal deficits [Normal Gait] : normal gait [Deep Tendon Reflexes (DTR)] : deep tendon reflexes were 2+ and symmetric [Normal Affect] : the affect was normal [Normal Insight/Judgement] : insight and judgment were intact [de-identified] : trace pretibial edema b/l LE

## 2022-02-03 LAB
25(OH)D3 SERPL-MCNC: 34.7 NG/ML
ALBUMIN SERPL ELPH-MCNC: 4.4 G/DL
ALP BLD-CCNC: 93 U/L
ALT SERPL-CCNC: 23 U/L
ANION GAP SERPL CALC-SCNC: 13 MMOL/L
AST SERPL-CCNC: 31 U/L
BASOPHILS # BLD AUTO: 0.1 K/UL
BASOPHILS NFR BLD AUTO: 1 %
BILIRUB SERPL-MCNC: 0.2 MG/DL
BUN SERPL-MCNC: 14 MG/DL
CALCIUM SERPL-MCNC: 9.9 MG/DL
CHLORIDE SERPL-SCNC: 106 MMOL/L
CHOLEST SERPL-MCNC: 166 MG/DL
CO2 SERPL-SCNC: 25 MMOL/L
CREAT SERPL-MCNC: 0.86 MG/DL
CREAT SPEC-SCNC: 166 MG/DL
EOSINOPHIL # BLD AUTO: 0.54 K/UL
EOSINOPHIL NFR BLD AUTO: 5.7 %
ESTIMATED AVERAGE GLUCOSE: 223 MG/DL
FOLATE SERPL-MCNC: >20 NG/ML
GLUCOSE SERPL-MCNC: 137 MG/DL
HBA1C MFR BLD HPLC: 9.4 %
HCT VFR BLD CALC: 41.3 %
HDLC SERPL-MCNC: 54 MG/DL
HGB BLD-MCNC: 12.6 G/DL
IMM GRANULOCYTES NFR BLD AUTO: 0.2 %
LDLC SERPL CALC-MCNC: 78 MG/DL
LYMPHOCYTES # BLD AUTO: 3.35 K/UL
LYMPHOCYTES NFR BLD AUTO: 35.1 %
MAN DIFF?: NORMAL
MCHC RBC-ENTMCNC: 26.7 PG
MCHC RBC-ENTMCNC: 30.5 GM/DL
MCV RBC AUTO: 87.5 FL
MICROALBUMIN 24H UR DL<=1MG/L-MCNC: 2.2 MG/DL
MICROALBUMIN/CREAT 24H UR-RTO: 13 MG/G
MONOCYTES # BLD AUTO: 0.67 K/UL
MONOCYTES NFR BLD AUTO: 7 %
NEUTROPHILS # BLD AUTO: 4.86 K/UL
NEUTROPHILS NFR BLD AUTO: 51 %
NONHDLC SERPL-MCNC: 112 MG/DL
PLATELET # BLD AUTO: 238 K/UL
POTASSIUM SERPL-SCNC: 4.1 MMOL/L
PROT SERPL-MCNC: 6.8 G/DL
RBC # BLD: 4.72 M/UL
RBC # FLD: 14.5 %
SODIUM SERPL-SCNC: 144 MMOL/L
TRIGL SERPL-MCNC: 169 MG/DL
TSH SERPL-ACNC: 3.35 UIU/ML
VIT B12 SERPL-MCNC: 657 PG/ML
WBC # FLD AUTO: 9.54 K/UL

## 2022-02-04 ENCOUNTER — TRANSCRIPTION ENCOUNTER (OUTPATIENT)
Age: 67
End: 2022-02-04

## 2022-02-04 DIAGNOSIS — R05.9 COUGH, UNSPECIFIED: ICD-10-CM

## 2022-02-04 LAB
M TB IFN-G BLD-IMP: NEGATIVE
QUANTIFERON TB PLUS MITOGEN MINUS NIL: 10 IU/ML
QUANTIFERON TB PLUS NIL: 0 IU/ML
QUANTIFERON TB PLUS TB1 MINUS NIL: 0 IU/ML
QUANTIFERON TB PLUS TB2 MINUS NIL: 0 IU/ML

## 2022-02-07 ENCOUNTER — TRANSCRIPTION ENCOUNTER (OUTPATIENT)
Age: 67
End: 2022-02-07

## 2022-02-08 ENCOUNTER — TRANSCRIPTION ENCOUNTER (OUTPATIENT)
Age: 67
End: 2022-02-08

## 2022-02-10 LAB — HEMOCCULT STL QL IA: NEGATIVE

## 2022-02-23 ENCOUNTER — NON-APPOINTMENT (OUTPATIENT)
Age: 67
End: 2022-02-23

## 2022-02-23 DIAGNOSIS — F17.210 NICOTINE DEPENDENCE, CIGARETTES, UNCOMPLICATED: ICD-10-CM

## 2022-02-23 DIAGNOSIS — F17.200 NICOTINE DEPENDENCE, UNSPECIFIED, UNCOMPLICATED: ICD-10-CM

## 2022-02-28 ENCOUNTER — RESULT REVIEW (OUTPATIENT)
Age: 67
End: 2022-02-28

## 2022-02-28 ENCOUNTER — APPOINTMENT (OUTPATIENT)
Dept: ULTRASOUND IMAGING | Facility: CLINIC | Age: 67
End: 2022-02-28
Payer: COMMERCIAL

## 2022-02-28 ENCOUNTER — TRANSCRIPTION ENCOUNTER (OUTPATIENT)
Age: 67
End: 2022-02-28

## 2022-02-28 ENCOUNTER — APPOINTMENT (OUTPATIENT)
Dept: CT IMAGING | Facility: CLINIC | Age: 67
End: 2022-02-28
Payer: COMMERCIAL

## 2022-02-28 ENCOUNTER — APPOINTMENT (OUTPATIENT)
Dept: MAMMOGRAPHY | Facility: CLINIC | Age: 67
End: 2022-02-28
Payer: COMMERCIAL

## 2022-02-28 ENCOUNTER — OUTPATIENT (OUTPATIENT)
Dept: OUTPATIENT SERVICES | Facility: HOSPITAL | Age: 67
LOS: 1 days | End: 2022-02-28
Payer: COMMERCIAL

## 2022-02-28 DIAGNOSIS — Z98.891 HISTORY OF UTERINE SCAR FROM PREVIOUS SURGERY: Chronic | ICD-10-CM

## 2022-02-28 DIAGNOSIS — Z98.890 OTHER SPECIFIED POSTPROCEDURAL STATES: Chronic | ICD-10-CM

## 2022-02-28 DIAGNOSIS — Z00.00 ENCOUNTER FOR GENERAL ADULT MEDICAL EXAMINATION WITHOUT ABNORMAL FINDINGS: ICD-10-CM

## 2022-02-28 DIAGNOSIS — Z98.51 TUBAL LIGATION STATUS: Chronic | ICD-10-CM

## 2022-02-28 PROCEDURE — 77066 DX MAMMO INCL CAD BI: CPT | Mod: 26

## 2022-02-28 PROCEDURE — 77066 DX MAMMO INCL CAD BI: CPT

## 2022-02-28 PROCEDURE — 76641 ULTRASOUND BREAST COMPLETE: CPT | Mod: 26,50

## 2022-02-28 PROCEDURE — G0279: CPT

## 2022-02-28 PROCEDURE — 71271 CT THORAX LUNG CANCER SCR C-: CPT | Mod: 26

## 2022-02-28 PROCEDURE — G0279: CPT | Mod: 26

## 2022-02-28 PROCEDURE — 71271 CT THORAX LUNG CANCER SCR C-: CPT

## 2022-02-28 PROCEDURE — 76641 ULTRASOUND BREAST COMPLETE: CPT

## 2022-03-01 ENCOUNTER — TRANSCRIPTION ENCOUNTER (OUTPATIENT)
Age: 67
End: 2022-03-01

## 2022-03-03 ENCOUNTER — TRANSCRIPTION ENCOUNTER (OUTPATIENT)
Age: 67
End: 2022-03-03

## 2022-03-04 ENCOUNTER — TRANSCRIPTION ENCOUNTER (OUTPATIENT)
Age: 67
End: 2022-03-04

## 2022-03-08 ENCOUNTER — TRANSCRIPTION ENCOUNTER (OUTPATIENT)
Age: 67
End: 2022-03-08

## 2022-03-08 ENCOUNTER — RX RENEWAL (OUTPATIENT)
Age: 67
End: 2022-03-08

## 2022-03-29 NOTE — REASON FOR VISIT
[Initial Visit ___] : an initial visit for [unfilled] [Urinary Incontinence] : urinary incontinence [Urinary Urgency] : urinary urgency

## 2022-04-05 ENCOUNTER — RESULT CHARGE (OUTPATIENT)
Age: 67
End: 2022-04-05

## 2022-04-05 ENCOUNTER — APPOINTMENT (OUTPATIENT)
Dept: UROGYNECOLOGY | Facility: CLINIC | Age: 67
End: 2022-04-05
Payer: COMMERCIAL

## 2022-04-05 LAB
BILIRUB UR QL STRIP: NEGATIVE
CLARITY UR: CLEAR
COLLECTION METHOD: NORMAL
GLUCOSE UR-MCNC: NEGATIVE
HCG UR QL: 0.2 EU/DL
HGB UR QL STRIP.AUTO: NEGATIVE
KETONES UR-MCNC: NEGATIVE
LEUKOCYTE ESTERASE UR QL STRIP: NEGATIVE
NITRITE UR QL STRIP: NEGATIVE
PH UR STRIP: 6.5
PROT UR STRIP-MCNC: NEGATIVE
SP GR UR STRIP: 1.02

## 2022-04-05 PROCEDURE — 51701 INSERT BLADDER CATHETER: CPT

## 2022-04-05 NOTE — PROCEDURE
[Straight Catheterization] : insertion of a straight catheter [Stress Incontinence] : stress incontinence [Urgent Incontinence] : urgent incontinence [Patient] : the patient [___ Fr Straight Tip] : a [unfilled] in [place] straight tip catheter [None] : there were no complications with the catheter insertion [Clear] : clear [No Complications] : no complications [Tolerated Well] : the patient tolerated the procedure well [Post procedure instructions and information given] : Post procedure instructions and information were given and reviewed with patient. [1] : 1 [FreeTextEntry1] : cathed to obtain pvr and uncontam specimen

## 2022-04-05 NOTE — OB HISTORY
[Total Preg ___] : : [unfilled] [Full Term ___] : [unfilled] (full-term) [Vaginal ___] : [unfilled] vaginal delivery(s) [ ___] : [unfilled]  section delivery(s) [AB Spont ___] : [unfilled] miscarriage(s) [Approximately ___ (Month)] : the LMP was approximately [unfilled] month(s) ago [Menarche Age ____] : age at menarche was [unfilled] [Last Pap Smear ___] : date of last pap smear was on [unfilled] [Sexually Active] : is not sexually active [FreeTextEntry1] : Biggest baby: 7lbs 3oz

## 2022-04-05 NOTE — ASSESSMENT
[FreeTextEntry1] : Isabell is a 68 yo P4, PMH includes DM, PSH includes PPBTL and C/S x 2, presents with DONA and mild POP. PVR on exam was normal, dip neg. POP showed stage I cystocele and stage II rectocele which was mild and distal - out of proportion to her symptoms. \par \par We reviewed POP. The patient has pelvic organ prolapse. Management options including observation, kegels with or without PT, biofeedback, pessary, and surgery were reviewed. Pessary care was reviewed. Surg options obliterative vs reconstructive, major vs minor, abd / robotic vs vaginal, with or without hysterectomy, with or without graft use discussed. I feel that her symptoms are better controlled with the bowel regimen, and while we can certainly treat the POP, I doubt a signif part of her symptoms are attributable to the mild POP. I can recheck her POP at another exam ideally later in day to recheck degree of POP.\par \par The etiology of TAYLOR was discussed. Management options including observation, behavioral modifications, medication, pessary, Impressa insert, periurethral bulking via cystoscopy, and surgery with midurethral sling were reviewed. Other anti-incontinence procedures such as a Salas or fascial sling also reviewed. The etiology of OAB was discussed. Management options including observation, behavioral modifications (dietary changes, monitoring fluid intake, bladder training, timed voids, use of pads/protective garments), kegels, PT, medications, PTNS, SNS, and bladder Botox were all reviewed. She will think about options. Weight loss discussed to reduce symtpoms and for overall health. Glucose control with DM to improve UI symptoms reviewed. All ques answered. \par \par RTO prn. Treat mild POP and DONA prn. Consider TVUS. Gyn referral. All ques asnwered.\par \par \par

## 2022-04-05 NOTE — HISTORY OF PRESENT ILLNESS
[FreeTextEntry1] : Over a year of bothersome UI - with urgency as well as with cough sneeze if full. No hematuria, no nocturia, no freq, no dysuria, no UTIs, no incomplete emptying, no flank pain. With fiber and probiotic use, BMs have become normal caliber (no blood) and she no longer splints to help evacuate. Most recent colonoscopy 2018 and was OK according to patient. Saw CRS for anal fissure eval and will followup again. No vag pain or bleeding, +bulge sensation which makes the UI symptoms worse. No treatment for these issues as of yet. Needs few pads per day for UI.\par \par AEHR review:\par  CMP wnl, H/H wnl, hgba1c 9.4%\par \par PSH includes C/S x 2, PP BTL\par POB C/S x 2,  x 2\par BMI 42\par Allg PCNs\par \par Renal sono 2019: kidneys normal size without hydronephrosis, mass, or calculi, bladder wnl.

## 2022-04-05 NOTE — PHYSICAL EXAM
[Chaperone Present] : A chaperone was present in the examining room during all aspects of the physical examination [No Acute Distress] : in no acute distress [Oriented x3] : oriented to person, place, and time [None] : no CVA tenderness [Soft, Nontender] : the abdomen was soft and nontender [Labia Majora] : were normal [Labia Minora] : were normal [Bartholin's Gland] : both Bartholin's glands were normal  [Normal Appearance] : general appearance was normal [No Bleeding] : there was no active vaginal bleeding [2] : 2 [Aa ____] : Aa [unfilled] [Ba ____] : Ba [unfilled] [C ____] : C [unfilled] [GH ____] : GH [unfilled] [PB ____] : PB [unfilled] [TVL ____] : TVL  [unfilled] [Ap ____] : Ap [unfilled] [Bp ____] : Bp [unfilled] [D ____] : D [unfilled] [] : II [Normal] : normal [Soft] :  the cervix was soft [Post Void Residual ____ml] : post void residual was [unfilled] ml [Exam Deferred] : was deferred [Tenderness] : ~T no ~M abdominal tenderness observed [Distended] : not distended [FreeTextEntry3] : empty supine cst neg, +urethral hyperm [FreeTextEntry4] : no mass cyst or lesion [de-identified] : nontender, no CMT

## 2022-04-08 ENCOUNTER — TRANSCRIPTION ENCOUNTER (OUTPATIENT)
Age: 67
End: 2022-04-08

## 2022-04-13 ENCOUNTER — APPOINTMENT (OUTPATIENT)
Dept: COLORECTAL SURGERY | Facility: CLINIC | Age: 67
End: 2022-04-13
Payer: COMMERCIAL

## 2022-04-13 VITALS
DIASTOLIC BLOOD PRESSURE: 80 MMHG | BODY MASS INDEX: 43.05 KG/M2 | RESPIRATION RATE: 14 BRPM | HEIGHT: 61 IN | HEART RATE: 88 BPM | SYSTOLIC BLOOD PRESSURE: 146 MMHG | TEMPERATURE: 96.9 F | OXYGEN SATURATION: 93 % | WEIGHT: 228 LBS

## 2022-04-13 PROCEDURE — 99212 OFFICE O/P EST SF 10 MIN: CPT | Mod: 25

## 2022-04-13 PROCEDURE — 46600 DIAGNOSTIC ANOSCOPY SPX: CPT

## 2022-04-13 NOTE — PHYSICAL EXAM
[Respiratory Effort] : normal respiratory effort [Normal Rate and Rhythm] : normal rate and rhythm [No Edema] : No edema [No Rash or Lesion] : No rash or lesion [Alert] : alert [Oriented to Person] : oriented to person [Oriented to Place] : oriented to place [Oriented to Time] : oriented to time [Calm] : calm [de-identified] : Obese, soft, nontender, nondistended [de-identified] : External exam - small skin tag anteriorly, no fissures, fistulas, excoriations.  NAHED - no masses or tenderness, normal squeeze, some apparent paradoxical contraction when asked to bear down.  On bimanual exam the perineal body and rectovaginal septum feel normal.  There is scarring at the posterior aspect of the vagina that is palpable, consistent prior repair site.  No defect along rectovaginal septum.  Rectocele noted at anterior aspect of rectum containing small balls of firm stool.  No apparent enterocele on bimanual exam.  Anoscopy without enlargement to internal hemorrhoids, no masses. [de-identified] : No acute distress [de-identified] : Normocephalic, atraumatic [de-identified] : Moves all extremities

## 2022-04-13 NOTE — HISTORY OF PRESENT ILLNESS
[FreeTextEntry1] : Ms. Koo is a 67yoF seen previously by Dr. Lujan for anal fissure in .  She has since recovered from this but most recently has had complaints of urinary incontinence, for which she saw Dr. Hardin of Urogynecology.  During urogynecology examination a rectocele was noted, and the concern was that her degree of symptoms could possibly be related to a colorectal etiology instead.\par \par The patient describes that with bowel movements, she needs to splint by inserting a finger into the vaginal canal, and she feels a bulge.  She also feels that she does not completely evacuate with each BM.  She describes continued constipation though better than before.  She takes a gummy fiber supplement daily, but does not drink enough water due to concerns for urinary incontinence.\par \par She has had four pregnancies; first two were delivered via , followed by two vaginal deliveries.  She reports that she had a tear requiring repair during her last delivery.  Denies sensation of tissue prolapse rectally.  No bleeding.  Last colonoscopy  or , reportedly normal.

## 2022-04-25 ENCOUNTER — TRANSCRIPTION ENCOUNTER (OUTPATIENT)
Age: 67
End: 2022-04-25

## 2022-04-26 ENCOUNTER — TRANSCRIPTION ENCOUNTER (OUTPATIENT)
Age: 67
End: 2022-04-26

## 2022-05-03 ENCOUNTER — LABORATORY RESULT (OUTPATIENT)
Age: 67
End: 2022-05-03

## 2022-05-03 ENCOUNTER — APPOINTMENT (OUTPATIENT)
Dept: INTERNAL MEDICINE | Facility: CLINIC | Age: 67
End: 2022-05-03
Payer: COMMERCIAL

## 2022-05-03 VITALS
HEIGHT: 61 IN | BODY MASS INDEX: 43.05 KG/M2 | WEIGHT: 228 LBS | DIASTOLIC BLOOD PRESSURE: 80 MMHG | SYSTOLIC BLOOD PRESSURE: 136 MMHG | RESPIRATION RATE: 14 BRPM

## 2022-05-03 DIAGNOSIS — E11.65 TYPE 2 DIABETES MELLITUS WITH HYPERGLYCEMIA: ICD-10-CM

## 2022-05-03 PROCEDURE — 99214 OFFICE O/P EST MOD 30 MIN: CPT

## 2022-05-03 RX ORDER — INSULIN GLARGINE 100 [IU]/ML
100 INJECTION, SOLUTION SUBCUTANEOUS DAILY
Qty: 1 | Refills: 2 | Status: DISCONTINUED | COMMUNITY
Start: 2022-01-23 | End: 2022-05-03

## 2022-05-03 NOTE — PLAN
[FreeTextEntry1] : T2DM:check a1c, continue 12.5 of glipizide ER and semglee 50u daily, pt is on statin and ARB, f/u with ophtho for diabetic eye exam \par HTN:  pt to continue to check BP at home and notify me if it is >130/80, continue norvasc and losartan \par HLD: check lipids, continue rosuvastatin \par Rectocele/POP: followed by colorectal and urogynecology \par \par

## 2022-05-03 NOTE — PHYSICAL EXAM
[No Acute Distress] : no acute distress [Well-Appearing] : well-appearing [Normal Voice/Communication] : normal voice/communication [Normal Sclera/Conjunctiva] : normal sclera/conjunctiva [PERRL] : pupils equal round and reactive to light [Normal Oropharynx] : the oropharynx was normal [Normal TMs] : both tympanic membranes were normal [No Lymphadenopathy] : no lymphadenopathy [No Respiratory Distress] : no respiratory distress  [No Accessory Muscle Use] : no accessory muscle use [Clear to Auscultation] : lungs were clear to auscultation bilaterally [Normal Rate] : normal rate  [Regular Rhythm] : with a regular rhythm [No Murmur] : no murmur heard [No Focal Deficits] : no focal deficits [Alert and Oriented x3] : oriented to person, place, and time

## 2022-05-10 LAB
25(OH)D3 SERPL-MCNC: 28.2 NG/ML
ALBUMIN SERPL ELPH-MCNC: 4.3 G/DL
ALP BLD-CCNC: 94 U/L
ALT SERPL-CCNC: 19 U/L
ANION GAP SERPL CALC-SCNC: 12 MMOL/L
AST SERPL-CCNC: 22 U/L
BASOPHILS # BLD AUTO: 0.07 K/UL
BASOPHILS NFR BLD AUTO: 0.7 %
BILIRUB SERPL-MCNC: 0.2 MG/DL
BUN SERPL-MCNC: 13 MG/DL
CALCIUM SERPL-MCNC: 9.3 MG/DL
CHLORIDE SERPL-SCNC: 103 MMOL/L
CHOLEST SERPL-MCNC: 161 MG/DL
CO2 SERPL-SCNC: 25 MMOL/L
CREAT SERPL-MCNC: 0.89 MG/DL
EGFR: 72 ML/MIN/1.73M2
EOSINOPHIL # BLD AUTO: 0.63 K/UL
EOSINOPHIL NFR BLD AUTO: 6 %
ESTIMATED AVERAGE GLUCOSE: 235 MG/DL
GLUCOSE SERPL-MCNC: 167 MG/DL
HBA1C MFR BLD HPLC: 9.8 %
HCT VFR BLD CALC: 41 %
HDLC SERPL-MCNC: 49 MG/DL
HGB BLD-MCNC: 12.6 G/DL
IMM GRANULOCYTES NFR BLD AUTO: 0.4 %
LDLC SERPL CALC-MCNC: 77 MG/DL
LYMPHOCYTES # BLD AUTO: 3.49 K/UL
LYMPHOCYTES NFR BLD AUTO: 33.1 %
MAN DIFF?: NORMAL
MCHC RBC-ENTMCNC: 26.3 PG
MCHC RBC-ENTMCNC: 30.7 GM/DL
MCV RBC AUTO: 85.6 FL
MONOCYTES # BLD AUTO: 0.77 K/UL
MONOCYTES NFR BLD AUTO: 7.3 %
NEUTROPHILS # BLD AUTO: 5.55 K/UL
NEUTROPHILS NFR BLD AUTO: 52.5 %
NONHDLC SERPL-MCNC: 112 MG/DL
PLATELET # BLD AUTO: 204 K/UL
POTASSIUM SERPL-SCNC: 4 MMOL/L
PROT SERPL-MCNC: 6.8 G/DL
RBC # BLD: 4.79 M/UL
RBC # FLD: 13.8 %
SODIUM SERPL-SCNC: 140 MMOL/L
TRIGL SERPL-MCNC: 175 MG/DL
TSH SERPL-ACNC: 7.51 UIU/ML
WBC # FLD AUTO: 10.55 K/UL

## 2022-05-16 ENCOUNTER — NON-APPOINTMENT (OUTPATIENT)
Age: 67
End: 2022-05-16

## 2022-05-17 ENCOUNTER — APPOINTMENT (OUTPATIENT)
Dept: INTERNAL MEDICINE | Facility: CLINIC | Age: 67
End: 2022-05-17
Payer: COMMERCIAL

## 2022-05-17 DIAGNOSIS — U07.1 COVID-19: ICD-10-CM

## 2022-05-17 PROCEDURE — 99213 OFFICE O/P EST LOW 20 MIN: CPT | Mod: 95

## 2022-05-19 ENCOUNTER — RX RENEWAL (OUTPATIENT)
Age: 67
End: 2022-05-19

## 2022-05-19 NOTE — HISTORY OF PRESENT ILLNESS
[Home] : at home, [unfilled] , at the time of the visit. [Medical Office: ([facility])___] : at the medical office located in  [Verbal consent obtained from patient] : the patient, [unfilled] [FreeTextEntry8] : Pt tested positive for covid today on home test. Her symptoms began yesterday. Her symptoms include headache, nasal congestion, body aches, sinus pressure, fatigue, and cough. She denies SOB and fevers.

## 2022-05-19 NOTE — PLAN
[FreeTextEntry1] : start paxlovid \par drug interactions reviewed\par pt to stop her statin for one week, last dose was last night \par pt to stop her steroid containing inhalers \par pt to monitor her BP, she is on a CCB which we will keep as is for now \par continue with symptomatic care \par ER precautions provided\par isolate for 5 days, then 5 days masked

## 2022-05-19 NOTE — PHYSICAL EXAM
[No Acute Distress] : no acute distress [Well-Appearing] : well-appearing [Normal Voice/Communication] : normal voice/communication [No Respiratory Distress] : no respiratory distress  [No Accessory Muscle Use] : no accessory muscle use

## 2022-05-19 NOTE — REVIEW OF SYSTEMS
[Fatigue] : fatigue [Cough] : cough [Muscle Pain] : muscle pain [Negative] : Heme/Lymph [FreeTextEntry4] : see hpi

## 2022-05-28 ENCOUNTER — NON-APPOINTMENT (OUTPATIENT)
Age: 67
End: 2022-05-28

## 2022-05-30 ENCOUNTER — RX RENEWAL (OUTPATIENT)
Age: 67
End: 2022-05-30

## 2022-06-01 ENCOUNTER — TRANSCRIPTION ENCOUNTER (OUTPATIENT)
Age: 67
End: 2022-06-01

## 2022-06-10 ENCOUNTER — RX RENEWAL (OUTPATIENT)
Age: 67
End: 2022-06-10

## 2022-06-13 ENCOUNTER — OUTPATIENT (OUTPATIENT)
Dept: OUTPATIENT SERVICES | Facility: HOSPITAL | Age: 67
LOS: 1 days | End: 2022-06-13
Payer: COMMERCIAL

## 2022-06-13 ENCOUNTER — APPOINTMENT (OUTPATIENT)
Dept: MRI IMAGING | Facility: CLINIC | Age: 67
End: 2022-06-13
Payer: COMMERCIAL

## 2022-06-13 DIAGNOSIS — Z98.51 TUBAL LIGATION STATUS: Chronic | ICD-10-CM

## 2022-06-13 DIAGNOSIS — Z98.891 HISTORY OF UTERINE SCAR FROM PREVIOUS SURGERY: Chronic | ICD-10-CM

## 2022-06-13 DIAGNOSIS — Z98.890 OTHER SPECIFIED POSTPROCEDURAL STATES: Chronic | ICD-10-CM

## 2022-06-13 DIAGNOSIS — R32 UNSPECIFIED URINARY INCONTINENCE: ICD-10-CM

## 2022-06-13 DIAGNOSIS — N81.6 RECTOCELE: ICD-10-CM

## 2022-06-13 PROCEDURE — 72195 MRI PELVIS W/O DYE: CPT

## 2022-06-13 PROCEDURE — 72195 MRI PELVIS W/O DYE: CPT | Mod: 26

## 2022-06-21 ENCOUNTER — NON-APPOINTMENT (OUTPATIENT)
Age: 67
End: 2022-06-21

## 2022-06-22 ENCOUNTER — NON-APPOINTMENT (OUTPATIENT)
Age: 67
End: 2022-06-22

## 2022-06-23 ENCOUNTER — NON-APPOINTMENT (OUTPATIENT)
Age: 67
End: 2022-06-23

## 2022-06-24 ENCOUNTER — APPOINTMENT (OUTPATIENT)
Dept: COLORECTAL SURGERY | Facility: CLINIC | Age: 67
End: 2022-06-24
Payer: COMMERCIAL

## 2022-06-24 VITALS
DIASTOLIC BLOOD PRESSURE: 88 MMHG | BODY MASS INDEX: 43.05 KG/M2 | RESPIRATION RATE: 14 BRPM | SYSTOLIC BLOOD PRESSURE: 167 MMHG | HEART RATE: 96 BPM | TEMPERATURE: 97.2 F | WEIGHT: 228 LBS | HEIGHT: 61 IN

## 2022-06-24 PROCEDURE — 99213 OFFICE O/P EST LOW 20 MIN: CPT

## 2022-06-24 NOTE — HISTORY OF PRESENT ILLNESS
[FreeTextEntry1] : 67F who presents to discuss the results of her MR Pelvis Defecography. Patient is being seen by Dr. Hardin (GYN URO) and Dr. Patrick (CRS) for pelvic organ prolapse. I reviewed the patient MR defecography and discussed the findings including anatomic findings and finding of thickened endometrium. Patient reports having a scheduled appointment with GYN ONC for further evaluation and work up of these findings. \par Patient reports no change in symptoms since last seen in the office, she is still experiencing urinary incontinence for which she wears pads,  and bowel movements continue to require splinting for evacuation.

## 2022-06-24 NOTE — PHYSICAL EXAM
[Abdomen Masses] : No abdominal masses [Abdomen Tenderness] : ~T No ~M abdominal tenderness [Alert] : alert [Oriented to Person] : oriented to person [Oriented to Place] : oriented to place [Oriented to Time] : oriented to time [Calm] : calm [de-identified] : NAD [de-identified] : Nonlabored [de-identified] : Normal rate

## 2022-06-24 NOTE — ASSESSMENT
[FreeTextEntry1] : 67F who presents to discuss the results of her MR Pelvis Defecography. Patient is being seen by Dr. Hardin (GYN URO) and Dr. Patrick (CRS) for pelvic organ prolapse. I reviewed the patient MR defecography and discussed the findings including anatomic findings and finding of thickened endometrium.\par Patient will follow up with GYN oncology for findings of endometrial hyperplasia vs. carcinoma\par She will follow up with Dr. Patrick and Dr. Hardin for management of pelvic organ prolapse\par A copy of her MR report was provided

## 2022-06-27 ENCOUNTER — TRANSCRIPTION ENCOUNTER (OUTPATIENT)
Age: 67
End: 2022-06-27

## 2022-07-06 ENCOUNTER — APPOINTMENT (OUTPATIENT)
Dept: GYNECOLOGIC ONCOLOGY | Facility: CLINIC | Age: 67
End: 2022-07-06

## 2022-07-06 VITALS
BODY MASS INDEX: 43.05 KG/M2 | HEIGHT: 61 IN | HEART RATE: 97 BPM | OXYGEN SATURATION: 94 % | RESPIRATION RATE: 16 BRPM | WEIGHT: 228 LBS | SYSTOLIC BLOOD PRESSURE: 165 MMHG | DIASTOLIC BLOOD PRESSURE: 86 MMHG

## 2022-07-06 DIAGNOSIS — R93.89 ABNORMAL FINDINGS ON DIAGNOSTIC IMAGING OF OTHER SPECIFIED BODY STRUCTURES: ICD-10-CM

## 2022-07-06 DIAGNOSIS — B37.9 CANDIDIASIS, UNSPECIFIED: ICD-10-CM

## 2022-07-06 PROCEDURE — 76857 US EXAM PELVIC LIMITED: CPT | Mod: 59

## 2022-07-06 PROCEDURE — 99204 OFFICE O/P NEW MOD 45 MIN: CPT | Mod: 25

## 2022-07-06 PROCEDURE — 58100 BIOPSY OF UTERUS LINING: CPT | Mod: 59

## 2022-07-06 PROCEDURE — 76830 TRANSVAGINAL US NON-OB: CPT | Mod: 59

## 2022-07-06 RX ORDER — ROSUVASTATIN CALCIUM 10 MG/1
10 TABLET, FILM COATED ORAL
Qty: 90 | Refills: 3 | Status: DISCONTINUED | COMMUNITY
Start: 2019-09-06 | End: 2022-07-06

## 2022-07-06 NOTE — OB HISTORY
[Total Preg ___] : : [unfilled] [Vaginal ___] : [unfilled] vaginal delivery(s) [ ___] : [unfilled]  section delivery(s) [AB Spont ___] : [unfilled] miscarriage(s)

## 2022-07-06 NOTE — PROCEDURE
[Endometrial Biopsy] : an endometrial biopsy [Thickened Endometrium] : thickened endometrium [Patient] : the patient [Verbal Consent] : verbal consent was obtained prior to the procedure and is detailed in the patient's record [Betadine] : betadine [No Complications] : none [Tolerated Well] : the patient tolerated the procedure well

## 2022-07-07 PROBLEM — B37.9 YEAST INFECTION: Status: ACTIVE | Noted: 2022-07-07

## 2022-07-08 ENCOUNTER — TRANSCRIPTION ENCOUNTER (OUTPATIENT)
Age: 67
End: 2022-07-08

## 2022-07-08 NOTE — ASSESSMENT
[FreeTextEntry1] : 68yo with thickened endometrium. Pt also currently being seen and managed by Colorectal Dr. Odell as well as Uro-gyn Dr. Hardin for rectocele and TAYLOR.\par

## 2022-07-08 NOTE — CHIEF COMPLAINT
[FreeTextEntry1] : [facility]\par \par [facility] [phone] at [address] [place] [phone]\par

## 2022-07-08 NOTE — END OF VISIT
[FreeTextEntry3] : Written by Mary Jane Godinez PA-C, acting as scribe for Dr. Sav Barajas.\par  [FreeTextEntry2] : This note accurately reflects the work and decisions made by me.\par

## 2022-07-08 NOTE — HISTORY OF PRESENT ILLNESS
[FreeTextEntry1] : 66yo  LMP age 57 presents today with referral from Dr. Hardin for incidental finding of thickened endometrial lining on MRI pelvis performed for urinary incontinence and pelvic organ prolapse. Patient saw colorectal surgeon Dr. Odell for rectocele without definitive plan for management as of yet. She also reports that she is unsure of her plan for urinary incontinence, she was offered both elective surgery as well as conservative options of management. MRI pelvis performed 22 revealed 10mm thickened and heterogenous endometrium, cannot differentiate between hyperplasia and carcinoma. Patient admits to  a few episodes of spotting over the last few years. She has not had GYN care since  and does not want to return to her previous gynecologist. She denies pelvic pain or issues with bowel habits. \par \par LPAP- , normal per pt: does not recall. \par LMammo- 2022, negative\par LColonoscopy- 2013, normal per pt\par LBone Density Scan- >5 years ago, has history of osteopenia: takes vit D daily. Advised to add calcium to daily regimen. \par

## 2022-07-08 NOTE — PHYSICAL EXAM
[Chaperone Present] : A chaperone was present in the examining room during all aspects of the physical examination [Normal] : Bimanual Exam: Normal [Abnormal] : Examination of vagina: Abnormal [FreeTextEntry1] : Abbi Godinez PA-C [de-identified] : yeast noted

## 2022-07-11 ENCOUNTER — TRANSCRIPTION ENCOUNTER (OUTPATIENT)
Age: 67
End: 2022-07-11

## 2022-07-12 LAB — CORE LAB BIOPSY: NORMAL

## 2022-07-15 ENCOUNTER — APPOINTMENT (OUTPATIENT)
Dept: GYNECOLOGIC ONCOLOGY | Facility: CLINIC | Age: 67
End: 2022-07-15

## 2022-07-15 ENCOUNTER — RX RENEWAL (OUTPATIENT)
Age: 67
End: 2022-07-15

## 2022-07-15 PROCEDURE — 99214 OFFICE O/P EST MOD 30 MIN: CPT | Mod: 95

## 2022-07-15 NOTE — HISTORY OF PRESENT ILLNESS
[Home] : at home, [unfilled] , at the time of the visit. [Medical Office: ([facility])___] : at the medical office located in  [Verbal consent obtained from patient] : the patient, [unfilled] [FreeTextEntry1] : Pt is a 68 yo referred by Dr. Velez for endometrial thickness. She was evaluating incontinence. The patient had endometrial biopsy in the office and presents for results.

## 2022-07-15 NOTE — ASSESSMENT
[FreeTextEntry1] : Pt is a 68 yo with thickened endometrium on US s/p EMB showing endometrial polyp. We discussed recommendation for D&C, hysteroscopy possible myosure polypectomy to make sure the entire polyp was removed. She will follow up with Dr. Hardin for discussion of incontinence management. \par \par I discussed at length with the patient the nature, purpose, risks, benefits, and alternatives to dilation with curettage and possible hysteroscopy.   She understands the risks to include (but not be limited to): uterine perforation with possible need for laparoscopy and/or laparotomy; infection with need for hospitalization; fluid overload with possible critical illness as a consequence; and bleeding with need for transfusion.  The patient agrees to proceed.\par

## 2022-07-15 NOTE — END OF VISIT
[FreeTextEntry3] : D&C, hysteroscopy\par Pre-surgical testing, CBC, BMP, ECG, PCP clearance\par Follow up with Dr. Hardin and coordinate surgery if indicated [Time Spent: ___ minutes] : I have spent [unfilled] minutes of time on the encounter.

## 2022-07-16 ENCOUNTER — TRANSCRIPTION ENCOUNTER (OUTPATIENT)
Age: 67
End: 2022-07-16

## 2022-07-16 LAB — TSH SERPL-ACNC: 3.67 UIU/ML

## 2022-07-20 ENCOUNTER — APPOINTMENT (OUTPATIENT)
Dept: GYNECOLOGIC ONCOLOGY | Facility: CLINIC | Age: 67
End: 2022-07-20

## 2022-07-21 LAB
AMPHET UR-MCNC: NEGATIVE
BARBITURATES UR-MCNC: NEGATIVE
BENZODIAZ UR-MCNC: NEGATIVE
COCAINE METAB.OTHER UR-MCNC: NEGATIVE
CREATININE, URINE: 26.8 MG/DL
METHADONE UR-MCNC: NEGATIVE
METHAQUALONE UR-MCNC: NEGATIVE
OPIATES UR-MCNC: NEGATIVE
PCP UR-MCNC: NEGATIVE
PROPOXYPH UR QL: NEGATIVE
THC UR QL: NEGATIVE

## 2022-07-25 ENCOUNTER — RX RENEWAL (OUTPATIENT)
Age: 67
End: 2022-07-25

## 2022-07-26 ENCOUNTER — APPOINTMENT (OUTPATIENT)
Dept: UROGYNECOLOGY | Facility: CLINIC | Age: 67
End: 2022-07-26

## 2022-07-26 PROCEDURE — 51729 CYSTOMETROGRAM W/VP&UP: CPT

## 2022-07-26 PROCEDURE — 51797 INTRAABDOMINAL PRESSURE TEST: CPT

## 2022-07-26 PROCEDURE — 51784 ANAL/URINARY MUSCLE STUDY: CPT

## 2022-07-26 PROCEDURE — 51741 ELECTRO-UROFLOWMETRY FIRST: CPT

## 2022-08-02 ENCOUNTER — APPOINTMENT (OUTPATIENT)
Dept: UROGYNECOLOGY | Facility: CLINIC | Age: 67
End: 2022-08-02

## 2022-08-02 ENCOUNTER — RX RENEWAL (OUTPATIENT)
Age: 67
End: 2022-08-02

## 2022-08-02 DIAGNOSIS — N81.89 OTHER FEMALE GENITAL PROLAPSE: ICD-10-CM

## 2022-08-02 PROCEDURE — 99214 OFFICE O/P EST MOD 30 MIN: CPT

## 2022-08-02 NOTE — ASSESSMENT
[FreeTextEntry1] : DONA, mild POP; imaging and testing c/w GSUI, ISD, rectocele.\par \par MRI defecog results discussed as noted above.\par \par UDS results discussed as noted above. \par \par TAYLOR vs OAB and tx options reviewed. POP and treatment options reviewed.\par \par Obliterative vs reconstructive surgery discussed, minor vs major procedures discussed. Abdominal versus vaginal routes of surgery were reviewed. Abdominal surgery via robotic laparoscopy vs laparotomy discussed. Surgery with or without hysterectomy discussed. Surgery with or without graft use discussed. Efficacies, risks, and benefits reviewed. For the vag minor AK/perineorrhaphy, risk of recurrence or failure without addressing apex, and if constipation not managed, discussed. She understood.\par \par We discussed the placement of a mid-urethral sling. Risks and benefits of a TOT sling versus a TVT vs mini sling routes were discussed including bladder and bowel perforation, vascular injury and hemorrhage, voiding dysfunction, UTIs, dyspareunia, and groin pain.\par \par The risks and benefits of surgery were discussed and included: risks of general anesthesia, MI, stroke, cardiopulmonary arrest, infection, abscess formation, bleeding, hematoma formation, hemorrhage, transfusion, blood clot formation, fistula formation, rejection, dyspareunia, chronic pain, neuropathy, damage to surrounding structures including but not limited to bowel/ureters/bladder/rectum/nerves/vessels, leakage of urine, voiding dysfunction, OAB, urinary retention, procedure failure, recurrence, need for further surgery, and going home with a catheter. With the use of mesh, risk of mesh erosion or exposure discussed. The FDA warning on transvaginally placed mesh for prolapse correction versus abdominally placed mesh or transvaginally placed mesh for midurethral sling was reviewed. Website for the FDA provided for information as desired. An exam under anesthesia was discussed and consented to as well. Perioperative care, anesthesia, NPO prior to the surgery, and postop precautions were reviewed. All questions were answered. She understood and would like to proceed. Consent was signed and witnessed in the office.\par \par Plan:\par [] book EUA / TVT MUS / cystourethroscopy / AK / perineorrhaphy / other indicated procedures at time of Dr. Barajas's D&C/HSC\par [] PSTs, covid testing\par [] med clearance

## 2022-08-02 NOTE — HISTORY OF PRESENT ILLNESS
[FreeTextEntry1] : 68 yo P4, PMH includes DM, PSH includes PPBTL and C/S x 2, with DONA and mild POP on POPQ. PVRs wnl, and on initial exam, stage I cystocele and stage II rectocele which was mild and distal - were noted. Will be undergoing DC/HSC with Dr. Barajas. Desires surg for DONA and POP tx, declines nonsurg intervention such as observation, kegels/PT, pessary, impressa, periurethral bulking. Constipation managed with fiber.\par \par PSH includes C/S x 2, PP BTL\par POB C/S x 2,  x 2\par BMI 42\par Allg PCNs\par \par Renal sono 2019: kidneys normal size without hydronephrosis, mass, or calculi, bladder wnl.\par \par UDS: PVR normal, capacity 300 ml, no DO, +GSUI, +ISD based on lowest LPP < 60cm H20, MUCPs normal.\par \par MRI defec : rectocele with contrast entrapment (anterior and apical descent mild).\par \par TVUS : fibr ut, EE 19 mm, ovs not seen, no FF.

## 2022-08-09 ENCOUNTER — NON-APPOINTMENT (OUTPATIENT)
Age: 67
End: 2022-08-09

## 2022-08-09 ENCOUNTER — TRANSCRIPTION ENCOUNTER (OUTPATIENT)
Age: 67
End: 2022-08-09

## 2022-08-12 ENCOUNTER — APPOINTMENT (OUTPATIENT)
Dept: INTERNAL MEDICINE | Facility: CLINIC | Age: 67
End: 2022-08-12

## 2022-08-12 VITALS
RESPIRATION RATE: 14 BRPM | WEIGHT: 228 LBS | HEART RATE: 100 BPM | OXYGEN SATURATION: 97 % | SYSTOLIC BLOOD PRESSURE: 140 MMHG | BODY MASS INDEX: 43.05 KG/M2 | DIASTOLIC BLOOD PRESSURE: 80 MMHG | HEIGHT: 61 IN

## 2022-08-12 DIAGNOSIS — R21 RASH AND OTHER NONSPECIFIC SKIN ERUPTION: ICD-10-CM

## 2022-08-12 DIAGNOSIS — L21.9 SEBORRHEIC DERMATITIS, UNSPECIFIED: ICD-10-CM

## 2022-08-12 PROCEDURE — 99214 OFFICE O/P EST MOD 30 MIN: CPT

## 2022-08-12 NOTE — HISTORY OF PRESENT ILLNESS
[FreeTextEntry8] : Pt with complaint of rash in b/l underarms for the past week. Left axilla has improved but right axilla with area of hyperpigmentation which has been very itchy. Has similar rash for right groin.Recently changed deodorant. Pt also with itching and bumps on scalp, has been using selsun blue.

## 2022-08-12 NOTE — PLAN
[FreeTextEntry1] : Intertrigo: start clotrimazole-betamethasone \par Seborrheic Dermatitis: start ketoconazole shampoo\par T2DM: check a1c, pt still hyperglycemic, increase lantus 2u every 2-3 days\par \par \par

## 2022-08-15 ENCOUNTER — TRANSCRIPTION ENCOUNTER (OUTPATIENT)
Age: 67
End: 2022-08-15

## 2022-08-15 ENCOUNTER — RX RENEWAL (OUTPATIENT)
Age: 67
End: 2022-08-15

## 2022-08-16 ENCOUNTER — TRANSCRIPTION ENCOUNTER (OUTPATIENT)
Age: 67
End: 2022-08-16

## 2022-08-16 LAB
ESTIMATED AVERAGE GLUCOSE: 258 MG/DL
HBA1C MFR BLD HPLC: 10.6 %

## 2022-08-20 ENCOUNTER — OUTPATIENT (OUTPATIENT)
Dept: OUTPATIENT SERVICES | Facility: HOSPITAL | Age: 67
LOS: 1 days | End: 2022-08-20
Payer: COMMERCIAL

## 2022-08-20 VITALS
SYSTOLIC BLOOD PRESSURE: 141 MMHG | TEMPERATURE: 97 F | HEART RATE: 80 BPM | RESPIRATION RATE: 16 BRPM | DIASTOLIC BLOOD PRESSURE: 81 MMHG | OXYGEN SATURATION: 97 % | WEIGHT: 226.41 LBS | HEIGHT: 61 IN

## 2022-08-20 DIAGNOSIS — I10 ESSENTIAL (PRIMARY) HYPERTENSION: ICD-10-CM

## 2022-08-20 DIAGNOSIS — E03.9 HYPOTHYROIDISM, UNSPECIFIED: ICD-10-CM

## 2022-08-20 DIAGNOSIS — N81.9 FEMALE GENITAL PROLAPSE, UNSPECIFIED: ICD-10-CM

## 2022-08-20 DIAGNOSIS — E11.9 TYPE 2 DIABETES MELLITUS WITHOUT COMPLICATIONS: ICD-10-CM

## 2022-08-20 DIAGNOSIS — Z98.891 HISTORY OF UTERINE SCAR FROM PREVIOUS SURGERY: Chronic | ICD-10-CM

## 2022-08-20 DIAGNOSIS — G47.33 OBSTRUCTIVE SLEEP APNEA (ADULT) (PEDIATRIC): ICD-10-CM

## 2022-08-20 DIAGNOSIS — Z98.51 TUBAL LIGATION STATUS: Chronic | ICD-10-CM

## 2022-08-20 DIAGNOSIS — Z91.89 OTHER SPECIFIED PERSONAL RISK FACTORS, NOT ELSEWHERE CLASSIFIED: ICD-10-CM

## 2022-08-20 DIAGNOSIS — Z01.818 ENCOUNTER FOR OTHER PREPROCEDURAL EXAMINATION: ICD-10-CM

## 2022-08-20 DIAGNOSIS — Z98.890 OTHER SPECIFIED POSTPROCEDURAL STATES: Chronic | ICD-10-CM

## 2022-08-20 LAB
A1C WITH ESTIMATED AVERAGE GLUCOSE RESULT: 9.8 % — HIGH (ref 4–5.6)
ANION GAP SERPL CALC-SCNC: 14 MMOL/L — SIGNIFICANT CHANGE UP (ref 5–17)
APPEARANCE UR: CLEAR — SIGNIFICANT CHANGE UP
APTT BLD: 29.2 SEC — SIGNIFICANT CHANGE UP (ref 27.5–35.5)
BASOPHILS # BLD AUTO: 0.09 K/UL — SIGNIFICANT CHANGE UP (ref 0–0.2)
BASOPHILS NFR BLD AUTO: 0.8 % — SIGNIFICANT CHANGE UP (ref 0–2)
BILIRUB UR-MCNC: NEGATIVE — SIGNIFICANT CHANGE UP
BLD GP AB SCN SERPL QL: SIGNIFICANT CHANGE UP
BUN SERPL-MCNC: 15.1 MG/DL — SIGNIFICANT CHANGE UP (ref 8–20)
CALCIUM SERPL-MCNC: 9.7 MG/DL — SIGNIFICANT CHANGE UP (ref 8.4–10.5)
CHLORIDE SERPL-SCNC: 102 MMOL/L — SIGNIFICANT CHANGE UP (ref 98–107)
CO2 SERPL-SCNC: 22 MMOL/L — SIGNIFICANT CHANGE UP (ref 22–29)
COLOR SPEC: YELLOW — SIGNIFICANT CHANGE UP
CREAT SERPL-MCNC: 0.99 MG/DL — SIGNIFICANT CHANGE UP (ref 0.5–1.3)
DIFF PNL FLD: NEGATIVE — SIGNIFICANT CHANGE UP
EGFR: 63 ML/MIN/1.73M2 — SIGNIFICANT CHANGE UP
EOSINOPHIL # BLD AUTO: 0.4 K/UL — SIGNIFICANT CHANGE UP (ref 0–0.5)
EOSINOPHIL NFR BLD AUTO: 3.3 % — SIGNIFICANT CHANGE UP (ref 0–6)
EPI CELLS # UR: SIGNIFICANT CHANGE UP
ESTIMATED AVERAGE GLUCOSE: 235 MG/DL — HIGH (ref 68–114)
GLUCOSE SERPL-MCNC: 189 MG/DL — HIGH (ref 70–99)
GLUCOSE UR QL: 50 MG/DL
HCT VFR BLD CALC: 43.2 % — SIGNIFICANT CHANGE UP (ref 34.5–45)
HGB BLD-MCNC: 13.5 G/DL — SIGNIFICANT CHANGE UP (ref 11.5–15.5)
IMM GRANULOCYTES NFR BLD AUTO: 0.5 % — SIGNIFICANT CHANGE UP (ref 0–1.5)
INR BLD: 1.02 RATIO — SIGNIFICANT CHANGE UP (ref 0.88–1.16)
KETONES UR-MCNC: ABNORMAL
LEUKOCYTE ESTERASE UR-ACNC: NEGATIVE — SIGNIFICANT CHANGE UP
LYMPHOCYTES # BLD AUTO: 2.64 K/UL — SIGNIFICANT CHANGE UP (ref 1–3.3)
LYMPHOCYTES # BLD AUTO: 22 % — SIGNIFICANT CHANGE UP (ref 13–44)
MCHC RBC-ENTMCNC: 26.5 PG — LOW (ref 27–34)
MCHC RBC-ENTMCNC: 31.3 GM/DL — LOW (ref 32–36)
MCV RBC AUTO: 84.9 FL — SIGNIFICANT CHANGE UP (ref 80–100)
MONOCYTES # BLD AUTO: 0.8 K/UL — SIGNIFICANT CHANGE UP (ref 0–0.9)
MONOCYTES NFR BLD AUTO: 6.7 % — SIGNIFICANT CHANGE UP (ref 2–14)
NEUTROPHILS # BLD AUTO: 8.01 K/UL — HIGH (ref 1.8–7.4)
NEUTROPHILS NFR BLD AUTO: 66.7 % — SIGNIFICANT CHANGE UP (ref 43–77)
NITRITE UR-MCNC: NEGATIVE — SIGNIFICANT CHANGE UP
PH UR: 6 — SIGNIFICANT CHANGE UP (ref 5–8)
PLATELET # BLD AUTO: 264 K/UL — SIGNIFICANT CHANGE UP (ref 150–400)
POTASSIUM SERPL-MCNC: 4.6 MMOL/L — SIGNIFICANT CHANGE UP (ref 3.5–5.3)
POTASSIUM SERPL-SCNC: 4.6 MMOL/L — SIGNIFICANT CHANGE UP (ref 3.5–5.3)
PROT UR-MCNC: 15
PROTHROM AB SERPL-ACNC: 11.8 SEC — SIGNIFICANT CHANGE UP (ref 10.5–13.4)
RBC # BLD: 5.09 M/UL — SIGNIFICANT CHANGE UP (ref 3.8–5.2)
RBC # FLD: 14.4 % — SIGNIFICANT CHANGE UP (ref 10.3–14.5)
RBC CASTS # UR COMP ASSIST: NEGATIVE /HPF — SIGNIFICANT CHANGE UP (ref 0–4)
SODIUM SERPL-SCNC: 138 MMOL/L — SIGNIFICANT CHANGE UP (ref 135–145)
SP GR SPEC: 1.02 — SIGNIFICANT CHANGE UP (ref 1.01–1.02)
UROBILINOGEN FLD QL: NEGATIVE MG/DL — SIGNIFICANT CHANGE UP
WBC # BLD: 12 K/UL — HIGH (ref 3.8–10.5)
WBC # FLD AUTO: 12 K/UL — HIGH (ref 3.8–10.5)
WBC UR QL: NEGATIVE /HPF — SIGNIFICANT CHANGE UP (ref 0–5)

## 2022-08-20 PROCEDURE — 93010 ELECTROCARDIOGRAM REPORT: CPT

## 2022-08-20 PROCEDURE — 93005 ELECTROCARDIOGRAM TRACING: CPT

## 2022-08-20 PROCEDURE — G0463: CPT

## 2022-08-20 RX ORDER — OLMESARTAN MEDOXOMIL 5 MG/1
0 TABLET, FILM COATED ORAL
Qty: 0 | Refills: 0 | DISCHARGE

## 2022-08-20 RX ORDER — ATORVASTATIN CALCIUM 80 MG/1
0 TABLET, FILM COATED ORAL
Qty: 0 | Refills: 0 | DISCHARGE

## 2022-08-20 NOTE — H&P PST ADULT - CONSTITUTIONAL
March 9, 2017    Boyd Contreras  [address]  [place] [id number]             [facility]  [address]  [facility] [address]  Phone: [phone] Dear Mr. Contreras:    [facility] is committed to your overall health.  To help you get the most out of each of your visits, we will review your information to make sure you are up to date on all of your recommended tests and/or procedures.  We have Dr. Delores Ryan listed as your primary care provider.     When you were last in our office, your blood pressure was 152/96, which is high.  Dr. Ryan would like you to come in for a nurse visit so that we can check your blood pressure.  Please contact us so that we can schedule this for you.    She has found that you may also be due for a flu immunization.     If you have had any of the above done at an outside facility, please let us know so I can update your record.  If you have a copy of these records, please provide a copy for us to scan into your chart.  If not, please provide that provider/facilities contact information so that we may obtain copies from that facility.     Otherwise, please schedule these appointments at your earliest convenience.  You are due for your annual follow up with Dr. Ryan in May of 2017.    If you have any questions or concerns, please don't hesitate to call.    Thank you for letting us care for you,  Justine Clark LPN Clinical Care Coordinator  [facility] [place]  [phone]       
normal/well-groomed/no distress/obese

## 2022-08-20 NOTE — H&P PST ADULT - NSANTHOSAYNRD_GEN_A_CORE
No. FILIPPO screening performed.  STOP BANG Legend: 0-2 = LOW Risk; 3-4 = INTERMEDIATE Risk; 5-8 = HIGH Risk

## 2022-08-20 NOTE — H&P PST ADULT - PROBLEM SELECTOR PLAN 3
A1C done at [facility]  Blood sugar monitoring  Hold diabetes medications the morning of the procedure  Advised to discuss with PMD the dose of insulin the night before the procedure

## 2022-08-20 NOTE — H&P PST ADULT - PROBLEM SELECTOR PLAN 1
Medical clearance pending  Patient is scheduled for perineorrhaphy, posterior repair, midurethral sling, cystoscopy, dilation and curettage, hysteroscopy, possible myosure polypectomy with Dr. Hardin and Dr. Barajas on 9/2/22.

## 2022-08-20 NOTE — H&P PST ADULT - CARDIOVASCULAR
details… normal/regular rate and rhythm/S1 S2 present/no gallops/no rub/no murmur/peripheral edema/pedal edema

## 2022-08-20 NOTE — H&P PST ADULT - NSICDXFAMILYHX_GEN_ALL_CORE_FT
FAMILY HISTORY:  Sibling  Still living? Unknown  FH: coronary artery disease, Age at diagnosis: Age Unknown

## 2022-08-20 NOTE — H&P PST ADULT - ASSESSMENT
Patient educated on surgical scrub, COVID testing scheduled for 22, preadmission instructions, medical clearance and day of procedure medications, verbalizes understanding. Pt instructed to stop vitamins/supplements/herbal medications/ASA/NSAIDS for one week prior to surgery and discuss with PMD.    CAPRINI SCORE    AGE RELATED RISK FACTORS                                                             [ ] Age 41-60 years                                            (1 Point)  [ ] Age: 61-74 years                                           (2 Points)                 [ ] Age= 75 years                                                (3 Points)             DISEASE RELATED RISK FACTORS                                                       [ ] Edema in the lower extremities                 (1 Point)                     [ ] Varicose veins                                               (1 Point)                                 [ ] BMI > 25 Kg/m2                                            (1 Point)                                  [ ] Serious infection (ie PNA)                            (1 Point)                     [ ] Lung disease ( COPD, Emphysema)            (1 Point)                                                                          [ ] Acute myocardial infarction                         (1 Point)                  [ ] Congestive heart failure (in the previous month)  (1 Point)         [ ] Inflammatory bowel disease                            (1 Point)                  [ ] Central venous access, PICC or Port               (2 points)       (within the last month)                                                                [ ] Stroke (in the previous month)                        (5 Points)    [ ] Previous or present malignancy                       (2 points)                                                                                                                                                         HEMATOLOGY RELATED FACTORS                                                         [ ] Prior episodes of VTE                                     (3 Points)                     [ ] Positive family history for VTE                      (3 Points)                  [ ] Prothrombin 40987 A                                     (3 Points)                     [ ] Factor V Leiden                                                (3 Points)                        [ ] Lupus anticoagulants                                      (3 Points)                                                           [ ] Anticardiolipin antibodies                              (3 Points)                                                       [ ] High homocysteine in the blood                   (3 Points)                                             [ ] Other congenital or acquired thrombophilia      (3 Points)                                                [ ] Heparin induced thrombocytopenia                  (3 Points)                                        MOBILITY RELATED FACTORS  [ ] Bed rest                                                         (1 Point)  [ ] Plaster cast                                                    (2 points)  [ ] Bed bound for more than 72 hours           (2 Points)    GENDER SPECIFIC FACTORS  [ ] Pregnancy or had a baby within the last month   (1 Point)  [ ] Post-partum < 6 weeks                                   (1 Point)  [ ] Hormonal therapy  or oral contraception   (1 Point)  [ ] History of pregnancy complications              (1 point)  [ ] Unexplained or recurrent              (1 Point)    OTHER RISK FACTORS                                           (1 Point)  [ ] BMI >40, smoking, diabetes requiring insulin, chemotherapy  blood transfusions and length of surgery over 2 hours    SURGERY RELATED RISK FACTORS  [ ]  Section within the last month     (1 Point)  [ ] Minor surgery                                                  (1 Point)  [ ] Arthroscopic surgery                                       (2 Points)  [ ] Planned major surgery lasting more            (2 Points)      than 45 minutes     [ ] Elective hip or knee joint replacement       (5 points)       surgery                                                TRAUMA RELATED RISK FACTORS  [ ] Fracture of the hip, pelvis, or leg                       (5 Points)  [ ] Spinal cord injury resulting in paralysis             (5 points)       (in the previous month)    [ ] Paralysis  (less than 1 month)                             (5 Points)  [ ] Multiple Trauma within 1 month                        (5 Points)    Total Score [        ]    Caprini Score 0-2: Low Risk, NO VTE prophylaxis required for most patients, encourage ambulation  Caprini Score 3-6: Moderate Risk , pharmacologic VTE prophylaxis is indicated for most patients (in the absence of contraindications)  Caprini Score Greater than or =7: High risk, pharmocologic VTE prophylaxis indicated for most patients (in the absence of contraindications)    OPIOID RISK TOOL    XIMENA EACH BOX THAT APPLIES AND ADD TOTALS AT THE END    FAMILY HISTORY OF SUBSTANCE ABUSE                 FEMALE         MALE                                                Alcohol                             [  ]1 pt          [  ]3pts                                               Illegal Durgs                     [  ]2 pts        [  ]3pts                                               Rx Drugs                           [  ]4 pts        [  ]4 pts    PERSONAL HISTORY OF SUBSTANCE ABUSE                                                                                          Alcohol                             [  ]3 pts       [  ]3 pts                                               Illegal Drugs                     [  ]4 pts        [  ]4 pts                                               Rx Drugs                           [  ]5 pts        [  ]5 pts    AGE BETWEEN 16-45 YEARS                                      [  ]1 pt         [  ]1 pt    HISTORY OF PREADOLESCENT   SEXUAL ABUSE                                                             [  ]3 pts        [  ]0pts    PSYCHOLOGICAL DISEASE                     ADD, OCD, Bipolar, Schizophrenia        [  ]2 pts         [  ]2 pts                      Depression                                               [  ]1 pt           [  ]1 pt           SCORING TOTAL   (add numbers and type here)              (***)                                     A score of 3 or lower indicated LOW risk for future opioid abuse  A score of 4 to 7 indicated moderate risk for future opioid abuse  A score of 8 or higher indicates a high risk for opioid abuse   68 y/o female  LMP age 62 with PMH of DM, HTN, hypothyroidism, glaucoma and pelvic organ prolapse presents to PST. She states she has been having difficulty moving her bowels due to rectocele. She states she has also been experiencing urinary incontinence with stress. States she feels a bulge into her vagina. She had an MRI defec  showed rectocele with contrast entrapment (anterior and apical descent mild). she then had a TVUS  which showed, fibr ut, EE 19 mm, ovs not seen, no FF. States due to the TVUS results she was seen by gynecology oncology who is now going to do a D&C due to the patient having polyps in her uterus. Denies dysuria, fevers, chills, blood in her urine or abnormal vaginal bleeding. Patient is scheduled for perineorrhaphy, posterior repair, midurethral sling, cystoscopy, dilation and curettage, hysteroscopy, possible myosure polypectomy with Dr. Hardin and Dr. Barajas on 22.    Patient educated on surgical scrub, COVID testing scheduled for 22, preadmission instructions, medical clearance and day of procedure medications, verbalizes understanding. Pt instructed to stop vitamins/supplements/herbal medications/ASA/NSAIDS for one week prior to surgery and discuss with PMD.    CAPRINI SCORE    AGE RELATED RISK FACTORS                                                             [ ] Age 41-60 years                                            (1 Point)  [ ] Age: 61-74 years                                           (2 Points)                 [ ] Age= 75 years                                                (3 Points)             DISEASE RELATED RISK FACTORS                                                       [ ] Edema in the lower extremities                 (1 Point)                     [ ] Varicose veins                                               (1 Point)                                 [ ] BMI > 25 Kg/m2                                            (1 Point)                                  [ ] Serious infection (ie PNA)                            (1 Point)                     [ ] Lung disease ( COPD, Emphysema)            (1 Point)                                                                          [ ] Acute myocardial infarction                         (1 Point)                  [ ] Congestive heart failure (in the previous month)  (1 Point)         [ ] Inflammatory bowel disease                            (1 Point)                  [ ] Central venous access, PICC or Port               (2 points)       (within the last month)                                                                [ ] Stroke (in the previous month)                        (5 Points)    [ ] Previous or present malignancy                       (2 points)                                                                                                                                                         HEMATOLOGY RELATED FACTORS                                                         [ ] Prior episodes of VTE                                     (3 Points)                     [ ] Positive family history for VTE                      (3 Points)                  [ ] Prothrombin 23833 A                                     (3 Points)                     [ ] Factor V Leiden                                                (3 Points)                        [ ] Lupus anticoagulants                                      (3 Points)                                                           [ ] Anticardiolipin antibodies                              (3 Points)                                                       [ ] High homocysteine in the blood                   (3 Points)                                             [ ] Other congenital or acquired thrombophilia      (3 Points)                                                [ ] Heparin induced thrombocytopenia                  (3 Points)                                        MOBILITY RELATED FACTORS  [ ] Bed rest                                                         (1 Point)  [ ] Plaster cast                                                    (2 points)  [ ] Bed bound for more than 72 hours           (2 Points)    GENDER SPECIFIC FACTORS  [ ] Pregnancy or had a baby within the last month   (1 Point)  [ ] Post-partum < 6 weeks                                   (1 Point)  [ ] Hormonal therapy  or oral contraception   (1 Point)  [ ] History of pregnancy complications              (1 point)  [ ] Unexplained or recurrent              (1 Point)    OTHER RISK FACTORS                                           (1 Point)  [ ] BMI >40, smoking, diabetes requiring insulin, chemotherapy  blood transfusions and length of surgery over 2 hours    SURGERY RELATED RISK FACTORS  [ ]  Section within the last month     (1 Point)  [ ] Minor surgery                                                  (1 Point)  [ ] Arthroscopic surgery                                       (2 Points)  [ ] Planned major surgery lasting more            (2 Points)      than 45 minutes     [ ] Elective hip or knee joint replacement       (5 points)       surgery                                                TRAUMA RELATED RISK FACTORS  [ ] Fracture of the hip, pelvis, or leg                       (5 Points)  [ ] Spinal cord injury resulting in paralysis             (5 points)       (in the previous month)    [ ] Paralysis  (less than 1 month)                             (5 Points)  [ ] Multiple Trauma within 1 month                        (5 Points)    Total Score [        ]    Caprini Score 0-2: Low Risk, NO VTE prophylaxis required for most patients, encourage ambulation  Caprini Score 3-6: Moderate Risk , pharmacologic VTE prophylaxis is indicated for most patients (in the absence of contraindications)  Caprini Score Greater than or =7: High risk, pharmocologic VTE prophylaxis indicated for most patients (in the absence of contraindications)    OPIOID RISK TOOL    XIMENA EACH BOX THAT APPLIES AND ADD TOTALS AT THE END    FAMILY HISTORY OF SUBSTANCE ABUSE                 FEMALE         MALE                                                Alcohol                             [  ]1 pt          [  ]3pts                                               Illegal Durgs                     [  ]2 pts        [  ]3pts                                               Rx Drugs                           [  ]4 pts        [  ]4 pts    PERSONAL HISTORY OF SUBSTANCE ABUSE                                                                                          Alcohol                             [  ]3 pts       [  ]3 pts                                               Illegal Drugs                     [  ]4 pts        [  ]4 pts                                               Rx Drugs                           [  ]5 pts        [  ]5 pts    AGE BETWEEN 16-45 YEARS                                      [  ]1 pt         [  ]1 pt    HISTORY OF PREADOLESCENT   SEXUAL ABUSE                                                             [  ]3 pts        [  ]0pts    PSYCHOLOGICAL DISEASE                     ADD, OCD, Bipolar, Schizophrenia        [  ]2 pts         [  ]2 pts                      Depression                                               [  ]1 pt           [  ]1 pt           SCORING TOTAL   (add numbers and type here)              (***)                                     A score of 3 or lower indicated LOW risk for future opioid abuse  A score of 4 to 7 indicated moderate risk for future opioid abuse  A score of 8 or higher indicates a high risk for opioid abuse   68 y/o female  LMP age 62 with PMH of DM, HTN, hypothyroidism, glaucoma and pelvic organ prolapse presents to PST. She states she has been having difficulty moving her bowels due to rectocele. She states she has also been experiencing urinary incontinence with stress. States she feels a bulge into her vagina. She had an MRI defec  showed rectocele with contrast entrapment (anterior and apical descent mild). she then had a TVUS  which showed, fibr ut, EE 19 mm, ovs not seen, no FF. States due to the TVUS results she was seen by gynecology oncology who is now going to do a D&C due to the patient having polyps in her uterus. Denies dysuria, fevers, chills, blood in her urine or abnormal vaginal bleeding. Patient is scheduled for perineorrhaphy, posterior repair, midurethral sling, cystoscopy, dilation and curettage, hysteroscopy, possible myosure polypectomy with Dr. Hardin and Dr. Barajas on 22. Patient educated on surgical scrub, COVID testing scheduled for 22, preadmission instructions, medical clearance and day of procedure medications, verbalizes understanding. Pt instructed to stop vitamins/supplements/herbal medications/ASA/NSAIDS for one week prior to surgery and discuss with PMD.    CAPRINI SCORE    AGE RELATED RISK FACTORS                                                             [ ] Age 41-60 years                                            (1 Point)  [ x] Age: 61-74 years                                           (2 Points)                 [ ] Age= 75 years                                                (3 Points)             DISEASE RELATED RISK FACTORS                                                       [x ] Edema in the lower extremities                 (1 Point)                     [ ] Varicose veins                                               (1 Point)                                 [ x] BMI > 25 Kg/m2                                            (1 Point)                                  [ ] Serious infection (ie PNA)                            (1 Point)                     [ ] Lung disease ( COPD, Emphysema)            (1 Point)                                                                          [ ] Acute myocardial infarction                         (1 Point)                  [ ] Congestive heart failure (in the previous month)  (1 Point)         [ ] Inflammatory bowel disease                            (1 Point)                  [ ] Central venous access, PICC or Port               (2 points)       (within the last month)                                                                [ ] Stroke (in the previous month)                        (5 Points)    [ ] Previous or present malignancy                       (2 points)                                                                                                                                                         HEMATOLOGY RELATED FACTORS                                                         [ ] Prior episodes of VTE                                     (3 Points)                     [ ] Positive family history for VTE                      (3 Points)                  [ ] Prothrombin 42347 A                                     (3 Points)                     [ ] Factor V Leiden                                                (3 Points)                        [ ] Lupus anticoagulants                                      (3 Points)                                                           [ ] Anticardiolipin antibodies                              (3 Points)                                                       [ ] High homocysteine in the blood                   (3 Points)                                             [ ] Other congenital or acquired thrombophilia      (3 Points)                                                [ ] Heparin induced thrombocytopenia                  (3 Points)                                        MOBILITY RELATED FACTORS  [ ] Bed rest                                                         (1 Point)  [ ] Plaster cast                                                    (2 points)  [ ] Bed bound for more than 72 hours           (2 Points)    GENDER SPECIFIC FACTORS  [ ] Pregnancy or had a baby within the last month   (1 Point)  [ ] Post-partum < 6 weeks                                   (1 Point)  [ ] Hormonal therapy  or oral contraception   (1 Point)  [ ] History of pregnancy complications              (1 point)  [ ] Unexplained or recurrent              (1 Point)    OTHER RISK FACTORS                                           (1 Point)  [x ] BMI >40, smoking, diabetes requiring insulin, chemotherapy  blood transfusions and length of surgery over 2 hours    SURGERY RELATED RISK FACTORS  [ ]  Section within the last month     (1 Point)  [ ] Minor surgery                                                  (1 Point)  [ ] Arthroscopic surgery                                       (2 Points)  [x ] Planned major surgery lasting more            (2 Points)      than 45 minutes     [ ] Elective hip or knee joint replacement       (5 points)       surgery                                                TRAUMA RELATED RISK FACTORS  [ ] Fracture of the hip, pelvis, or leg                       (5 Points)  [ ] Spinal cord injury resulting in paralysis             (5 points)       (in the previous month)    [ ] Paralysis  (less than 1 month)                             (5 Points)  [ ] Multiple Trauma within 1 month                        (5 Points)    Total Score [   7     ]    Caprini Score 0-2: Low Risk, NO VTE prophylaxis required for most patients, encourage ambulation  Caprini Score 3-6: Moderate Risk , pharmacologic VTE prophylaxis is indicated for most patients (in the absence of contraindications)  Caprini Score Greater than or =7: High risk, pharmocologic VTE prophylaxis indicated for most patients (in the absence of contraindications)    OPIOID RISK TOOL    XIMENA EACH BOX THAT APPLIES AND ADD TOTALS AT THE END    FAMILY HISTORY OF SUBSTANCE ABUSE                 FEMALE         MALE                                                Alcohol                             [  ]1 pt          [  ]3pts                                               Illegal Durgs                     [  ]2 pts        [  ]3pts                                               Rx Drugs                           [  ]4 pts        [  ]4 pts    PERSONAL HISTORY OF SUBSTANCE ABUSE                                                                                          Alcohol                             [  ]3 pts       [  ]3 pts                                               Illegal Drugs                     [  ]4 pts        [  ]4 pts                                               Rx Drugs                           [  ]5 pts        [  ]5 pts    AGE BETWEEN 16-45 YEARS                                      [  ]1 pt         [  ]1 pt    HISTORY OF PREADOLESCENT   SEXUAL ABUSE                                                             [  ]3 pts        [  ]0pts    PSYCHOLOGICAL DISEASE                     ADD, OCD, Bipolar, Schizophrenia        [  ]2 pts         [  ]2 pts                      Depression                                               [  ]1 pt           [  ]1 pt           SCORING TOTAL   (add numbers and type here)              (*0**)                                     A score of 3 or lower indicated LOW risk for future opioid abuse  A score of 4 to 7 indicated moderate risk for future opioid abuse  A score of 8 or higher indicates a high risk for opioid abuse

## 2022-08-20 NOTE — H&P PST ADULT - NSICDXPASTMEDICALHX_GEN_ALL_CORE_FT
PAST MEDICAL HISTORY:  Diabetes     High triglycerides     Hyperlipidemia     Hypertension      PAST MEDICAL HISTORY:  Arthritis     Chronic bronchiolitis     Diabetes     High triglycerides     Hyperlipidemia     Hypertension     Hypothyroidism     Obesity

## 2022-08-20 NOTE — H&P PST ADULT - HISTORY OF PRESENT ILLNESS
66 y/o female G P4 LMP with PMH of DM and pelvic organ prolapse presents to PST.      PVRs wnl, and on initial exam, stage I cystocele and stage II rectocele which was mild and distal - were noted. Will be undergoing DC/HSC with Dr. Barajas. Desires surg for DONA and POP tx, declines nonsurg intervention such as observation, kegels/PT, pessary, impressa, periurethral bulking. Constipation managed with fiber.    PSH includes C/S x 2, PP BTL  POB C/S x 2,  x 2  BMI 42  Allg PCNs    Renal sono 2019: kidneys normal size without hydronephrosis, mass, or calculi, bladder wnl.    UDS: PVR normal, capacity 300 ml, no DO, +GSUI, +ISD based on lowest LPP < 60cm H20, MUCPs normal.    MRI defec : rectocele with contrast entrapment (anterior and apical descent mild).    TVUS : fibr ut, EE 19 mm, ovs not seen, no FF.    66 y/o female  LMP age 62 with PMH of DM, HTN, hypothyroidism, glaucoma and pelvic organ prolapse presents to PST. She states she has been having difficulty moving her bowels due to rectocele. She states she has also been experiencing urinary incontinence with stress. States she feels a bulge into her vagina. She had an MRI defec  showed rectocele with contrast entrapment (anterior and apical descent mild). she then had a TVUS  which showed, fibr ut, EE 19 mm, ovs not seen, no FF. States due to the TVUS results she was seen by gynecology oncology who is now going to do a D&C due to the patient having polyps in her uterus. Denies dysuria, fevers, chills, blood in her urine or abnormal vaginal bleeding. Patient is scheduled for perineorrhaphy, posterior repair, midurethral sling, cystoscopy, dilation and curettage, hysteroscopy, possible myosure polypectomy with Dr. Hardin and Dr. Barajas on 22.

## 2022-08-20 NOTE — H&P PST ADULT - PROBLEM SELECTOR PLAN 2
Caprini Score, at risk, surgical team to order appropriate VTE prophylaxis Caprini Score 7, at high risk, surgical team to order appropriate VTE prophylaxis

## 2022-08-20 NOTE — H&P PST ADULT - GENERAL
Patient seen and examined with house-staff during bedside rounds.  Resident note read, including vitals, physical findings, laboratory data, and radiological reports.   Revisions included below.  Direct personal management at bed side and extensive interpretation of the data.  Plan was outlined and discussed in details with the housestaff.  Decision making of high complexity  Respiratory failure resolve sepsis bacteremia port infection Atrial fibrillation CAD breast cancer.  The patient is clinically stable compared to yesterday. The blood culture is negative but less than 24 hours. Gallium scan was negative. Patient is Tolerating is a candle. negative

## 2022-08-22 ENCOUNTER — TRANSCRIPTION ENCOUNTER (OUTPATIENT)
Age: 67
End: 2022-08-22

## 2022-08-22 LAB
CULTURE RESULTS: SIGNIFICANT CHANGE UP
SPECIMEN SOURCE: SIGNIFICANT CHANGE UP

## 2022-08-23 ENCOUNTER — TRANSCRIPTION ENCOUNTER (OUTPATIENT)
Age: 67
End: 2022-08-23

## 2022-08-23 ENCOUNTER — FORM ENCOUNTER (OUTPATIENT)
Age: 67
End: 2022-08-23

## 2022-08-23 ENCOUNTER — APPOINTMENT (OUTPATIENT)
Dept: INTERNAL MEDICINE | Facility: CLINIC | Age: 67
End: 2022-08-23

## 2022-08-23 PROBLEM — E03.9 HYPOTHYROIDISM, UNSPECIFIED: Chronic | Status: ACTIVE | Noted: 2022-08-20

## 2022-08-23 PROBLEM — J44.9 CHRONIC OBSTRUCTIVE PULMONARY DISEASE, UNSPECIFIED: Chronic | Status: ACTIVE | Noted: 2022-08-20

## 2022-08-23 PROBLEM — E66.9 OBESITY, UNSPECIFIED: Chronic | Status: ACTIVE | Noted: 2022-08-20

## 2022-08-23 PROBLEM — M19.90 UNSPECIFIED OSTEOARTHRITIS, UNSPECIFIED SITE: Chronic | Status: ACTIVE | Noted: 2022-08-20

## 2022-09-01 ENCOUNTER — APPOINTMENT (OUTPATIENT)
Dept: INTERNAL MEDICINE | Facility: CLINIC | Age: 67
End: 2022-09-01

## 2022-09-02 ENCOUNTER — TRANSCRIPTION ENCOUNTER (OUTPATIENT)
Age: 67
End: 2022-09-02

## 2022-09-02 ENCOUNTER — APPOINTMENT (OUTPATIENT)
Dept: UROGYNECOLOGY | Facility: HOSPITAL | Age: 67
End: 2022-09-02

## 2022-09-12 ENCOUNTER — RX RENEWAL (OUTPATIENT)
Age: 67
End: 2022-09-12

## 2022-09-15 ENCOUNTER — APPOINTMENT (OUTPATIENT)
Dept: ORTHOPEDIC SURGERY | Facility: CLINIC | Age: 67
End: 2022-09-15

## 2022-09-16 ENCOUNTER — APPOINTMENT (OUTPATIENT)
Dept: UROGYNECOLOGY | Facility: CLINIC | Age: 67
End: 2022-09-16

## 2022-09-16 ENCOUNTER — APPOINTMENT (OUTPATIENT)
Dept: ORTHOPEDIC SURGERY | Facility: CLINIC | Age: 67
End: 2022-09-16

## 2022-09-16 VITALS — WEIGHT: 228 LBS | BODY MASS INDEX: 43.05 KG/M2 | HEIGHT: 61 IN

## 2022-09-16 DIAGNOSIS — I10 ESSENTIAL (PRIMARY) HYPERTENSION: ICD-10-CM

## 2022-09-16 DIAGNOSIS — E11.9 TYPE 2 DIABETES MELLITUS W/OUT COMPLICATIONS: ICD-10-CM

## 2022-09-16 DIAGNOSIS — E78.00 PURE HYPERCHOLESTEROLEMIA, UNSPECIFIED: ICD-10-CM

## 2022-09-16 PROCEDURE — 73562 X-RAY EXAM OF KNEE 3: CPT | Mod: LT

## 2022-09-16 PROCEDURE — 99213 OFFICE O/P EST LOW 20 MIN: CPT

## 2022-09-16 RX ADMIN — Medication 2 MG/2ML: at 00:00

## 2022-09-16 NOTE — HISTORY OF PRESENT ILLNESS
[7] : 7 [4] : 4 [Sharp] : sharp [Shooting] : shooting [Intermittent] : intermittent [Standing] : standing [de-identified] : 9/16/22  Return visit for this 67 year female , hx of IDDM,c/o recurring rt knee pain x last few momnths duration. Known to have OA rt knee. Last series of gel injections x 10 months ago and did well until now. Would like to repeat the injections.\par \par 11/11/21: Returns today for Euflexxa injection #3 right knee. \par \par 11/4/21 here for Euflexxa injection #2 rt knee.\par \par 10/28/21: Here today to start Euflexxa injections right knee. \par \par 10/14/21: Returns today with recurrent bilateral knee pain. Finished Euflexxa injections right knee in December o\par with good relief until about three months ago. She has IDDM and would like to repeat the injections as cortisone\par injections affect her blood sugar levels.\par \par 12/17/20 Her for Euflexxa injection #3 rt knee.\par \par 12/10/20 Here for Euflexxa injection #2 rt knee.\par \par 12/3/20 here to start Euflexxa injections rt knee.\par \par 8/24/20 returns today c/o recurring rt knee pain and swelling x last 1 months duration. 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 last cortisone injection x 7 months ago which helped.Would like a repeat injection. \par awaiting auth for gel injections.\par \par 02/07/20: Returns today for right knee MRI results. States she is scheduled to start PT on February 12, but has \par deductible and is not sure she will be able to do many visits due to cost. States she feels she has developed a \par bleeding from taking the Aleve.\par \par 01/31/20: Returns today for her knees. Had cortisone injections on January 7 which helped minimally. Her left kn\par not painful but her right knee is still painful. States she is also experiencing some pain in the back of her right ca\par because she is not walking properly because of her knee.\par  \par 1/7/19: Initial visit for Ms. Isabell Koo, a 64-year-old female , presents today for R>L knee pain \par 12/5/19 gradually getting worse. No injury. Pain is worse with walking, stairs. Pain walking <.5 mi. Better with ly\par down. She takes Tylenol PRN. +limping.\par Pmhx: DMII - hgba1c 7.7, HTN, Hyperlipidemia. Living w/ OA in hips (dx by rheum in 2015) [] : no [FreeTextEntry1] : right knee [FreeTextEntry9] : aleve [de-identified] : getting up from sitting [de-identified] : 11/21 [de-identified] : dr pastrana [de-identified] :

## 2022-09-16 NOTE — REVIEW OF SYSTEMS
[Joint Pain] : joint pain [Negative] : Heme/Lymph [FreeTextEntry5] : HTN,cholesterol [de-identified] : thyroid. Dm

## 2022-09-16 NOTE — PHYSICAL EXAM
[Able to Communicate] : able to communicate [Well Developed] : well developed [Well Nourished] : well nourished [NL (0)] : extension 0 degrees [Right] : right knee [AP] : anteroposterior [Lateral] : lateral [[place]] : [place] [Mild patellofemoral OA] : Mild patellofemoral OA [de-identified] : overweight [] : non-antalgic [FreeTextEntry9] : Moderate medial compartment narrowing.  Worse since previous visit. [TWNoteComboBox7] : flexion 125 degrees

## 2022-09-19 ENCOUNTER — TRANSCRIPTION ENCOUNTER (OUTPATIENT)
Age: 67
End: 2022-09-19

## 2022-09-22 ENCOUNTER — TRANSCRIPTION ENCOUNTER (OUTPATIENT)
Age: 67
End: 2022-09-22

## 2022-09-22 LAB
ESTIMATED AVERAGE GLUCOSE: 197 MG/DL
HBA1C MFR BLD HPLC: 8.5 %

## 2022-09-23 ENCOUNTER — TRANSCRIPTION ENCOUNTER (OUTPATIENT)
Age: 67
End: 2022-09-23

## 2022-09-30 ENCOUNTER — APPOINTMENT (OUTPATIENT)
Dept: ENDOCRINOLOGY | Facility: CLINIC | Age: 67
End: 2022-09-30

## 2022-09-30 VITALS
OXYGEN SATURATION: 95 % | DIASTOLIC BLOOD PRESSURE: 87 MMHG | BODY MASS INDEX: 43.65 KG/M2 | HEART RATE: 76 BPM | SYSTOLIC BLOOD PRESSURE: 148 MMHG | WEIGHT: 231 LBS

## 2022-09-30 PROCEDURE — 99205 OFFICE O/P NEW HI 60 MIN: CPT | Mod: 25

## 2022-09-30 PROCEDURE — 82962 GLUCOSE BLOOD TEST: CPT

## 2022-10-01 ENCOUNTER — OUTPATIENT (OUTPATIENT)
Dept: OUTPATIENT SERVICES | Facility: HOSPITAL | Age: 67
LOS: 1 days | End: 2022-10-01
Payer: COMMERCIAL

## 2022-10-01 VITALS
HEIGHT: 61 IN | DIASTOLIC BLOOD PRESSURE: 68 MMHG | OXYGEN SATURATION: 98 % | RESPIRATION RATE: 18 BRPM | WEIGHT: 231.49 LBS | HEART RATE: 88 BPM | TEMPERATURE: 97 F | SYSTOLIC BLOOD PRESSURE: 118 MMHG

## 2022-10-01 DIAGNOSIS — Z98.51 TUBAL LIGATION STATUS: Chronic | ICD-10-CM

## 2022-10-01 DIAGNOSIS — Z01.818 ENCOUNTER FOR OTHER PREPROCEDURAL EXAMINATION: ICD-10-CM

## 2022-10-01 DIAGNOSIS — Z98.891 HISTORY OF UTERINE SCAR FROM PREVIOUS SURGERY: Chronic | ICD-10-CM

## 2022-10-01 DIAGNOSIS — Z98.890 OTHER SPECIFIED POSTPROCEDURAL STATES: Chronic | ICD-10-CM

## 2022-10-01 LAB
A1C WITH ESTIMATED AVERAGE GLUCOSE RESULT: 8.2 % — HIGH (ref 4–5.6)
ANION GAP SERPL CALC-SCNC: 14 MMOL/L — SIGNIFICANT CHANGE UP (ref 5–17)
APPEARANCE UR: CLEAR — SIGNIFICANT CHANGE UP
APTT BLD: 30.4 SEC — SIGNIFICANT CHANGE UP (ref 27.5–35.5)
BASOPHILS # BLD AUTO: 0.09 K/UL — SIGNIFICANT CHANGE UP (ref 0–0.2)
BASOPHILS NFR BLD AUTO: 0.9 % — SIGNIFICANT CHANGE UP (ref 0–2)
BILIRUB UR-MCNC: NEGATIVE — SIGNIFICANT CHANGE UP
BLD GP AB SCN SERPL QL: SIGNIFICANT CHANGE UP
BUN SERPL-MCNC: 14.4 MG/DL — SIGNIFICANT CHANGE UP (ref 8–20)
CALCIUM SERPL-MCNC: 10 MG/DL — SIGNIFICANT CHANGE UP (ref 8.4–10.5)
CHLORIDE SERPL-SCNC: 101 MMOL/L — SIGNIFICANT CHANGE UP (ref 98–107)
CO2 SERPL-SCNC: 23 MMOL/L — SIGNIFICANT CHANGE UP (ref 22–29)
COLOR SPEC: YELLOW — SIGNIFICANT CHANGE UP
CREAT SERPL-MCNC: 1 MG/DL — SIGNIFICANT CHANGE UP (ref 0.5–1.3)
DIFF PNL FLD: NEGATIVE — SIGNIFICANT CHANGE UP
EGFR: 63 ML/MIN/1.73M2 — SIGNIFICANT CHANGE UP
EOSINOPHIL # BLD AUTO: 0.58 K/UL — HIGH (ref 0–0.5)
EOSINOPHIL NFR BLD AUTO: 5.5 % — SIGNIFICANT CHANGE UP (ref 0–6)
ESTIMATED AVERAGE GLUCOSE: 189 MG/DL — HIGH (ref 68–114)
GLUCOSE SERPL-MCNC: 141 MG/DL — HIGH (ref 70–99)
GLUCOSE UR QL: NEGATIVE MG/DL — SIGNIFICANT CHANGE UP
HCT VFR BLD CALC: 42 % — SIGNIFICANT CHANGE UP (ref 34.5–45)
HGB BLD-MCNC: 13.3 G/DL — SIGNIFICANT CHANGE UP (ref 11.5–15.5)
IMM GRANULOCYTES NFR BLD AUTO: 0.4 % — SIGNIFICANT CHANGE UP (ref 0–0.9)
INR BLD: 1.04 RATIO — SIGNIFICANT CHANGE UP (ref 0.88–1.16)
KETONES UR-MCNC: ABNORMAL
LEUKOCYTE ESTERASE UR-ACNC: NEGATIVE — SIGNIFICANT CHANGE UP
LYMPHOCYTES # BLD AUTO: 2.38 K/UL — SIGNIFICANT CHANGE UP (ref 1–3.3)
LYMPHOCYTES # BLD AUTO: 22.7 % — SIGNIFICANT CHANGE UP (ref 13–44)
MCHC RBC-ENTMCNC: 26.7 PG — LOW (ref 27–34)
MCHC RBC-ENTMCNC: 31.7 GM/DL — LOW (ref 32–36)
MCV RBC AUTO: 84.2 FL — SIGNIFICANT CHANGE UP (ref 80–100)
MONOCYTES # BLD AUTO: 0.86 K/UL — SIGNIFICANT CHANGE UP (ref 0–0.9)
MONOCYTES NFR BLD AUTO: 8.2 % — SIGNIFICANT CHANGE UP (ref 2–14)
NEUTROPHILS # BLD AUTO: 6.53 K/UL — SIGNIFICANT CHANGE UP (ref 1.8–7.4)
NEUTROPHILS NFR BLD AUTO: 62.3 % — SIGNIFICANT CHANGE UP (ref 43–77)
NITRITE UR-MCNC: NEGATIVE — SIGNIFICANT CHANGE UP
PH UR: 5 — SIGNIFICANT CHANGE UP (ref 5–8)
PLATELET # BLD AUTO: 263 K/UL — SIGNIFICANT CHANGE UP (ref 150–400)
POTASSIUM SERPL-MCNC: 4.4 MMOL/L — SIGNIFICANT CHANGE UP (ref 3.5–5.3)
POTASSIUM SERPL-SCNC: 4.4 MMOL/L — SIGNIFICANT CHANGE UP (ref 3.5–5.3)
PROT UR-MCNC: NEGATIVE — SIGNIFICANT CHANGE UP
PROTHROM AB SERPL-ACNC: 12.1 SEC — SIGNIFICANT CHANGE UP (ref 10.5–13.4)
RBC # BLD: 4.99 M/UL — SIGNIFICANT CHANGE UP (ref 3.8–5.2)
RBC # FLD: 14.6 % — HIGH (ref 10.3–14.5)
SODIUM SERPL-SCNC: 138 MMOL/L — SIGNIFICANT CHANGE UP (ref 135–145)
SP GR SPEC: 1.02 — SIGNIFICANT CHANGE UP (ref 1.01–1.02)
UROBILINOGEN FLD QL: NEGATIVE MG/DL — SIGNIFICANT CHANGE UP
WBC # BLD: 10.48 K/UL — SIGNIFICANT CHANGE UP (ref 3.8–10.5)
WBC # FLD AUTO: 10.48 K/UL — SIGNIFICANT CHANGE UP (ref 3.8–10.5)

## 2022-10-01 PROCEDURE — G0463: CPT

## 2022-10-01 PROCEDURE — 93010 ELECTROCARDIOGRAM REPORT: CPT

## 2022-10-01 PROCEDURE — 93005 ELECTROCARDIOGRAM TRACING: CPT

## 2022-10-01 NOTE — H&P PST ADULT - PROBLEM SELECTOR PROBLEM 5
Quality 431: Preventive Care And Screening: Unhealthy Alcohol Use - Screening: Patient screened for unhealthy alcohol use using a single question and scores less than 2 times per year
Detail Level: Detailed
Quality 226: Preventive Care And Screening: Tobacco Use: Screening And Cessation Intervention: Patient screened for tobacco use and is an ex/non-smoker
FILIPPO (obstructive sleep apnea)

## 2022-10-01 NOTE — H&P PST ADULT - HISTORY OF PRESENT ILLNESS
66 yo P4, PMH includes DM, PSH includes PPBTL and C/S x 2, with DONA and mild POP on POPQ. PVRs wnl, and on initial exam, stage I cystocele and stage II rectocele which was mild and distal - were noted. Will be undergoing DC/HSC with Dr. Barajas. Desires surg for DONA and POP tx, declines nonsurg intervention such as observation, kegels/PT, pessary, impressa, periurethral bulking. Constipation managed with fiber. 66 yo P4, PMH includes DM, PSH includes PPBTL and C/S x 2, with DONA and mild POP on POPQ. PVRs wnl, and on initial exam, stage I cystocele and stage II rectocele which was mild and distal - were noted. Will be undergoing DC/HSC with Dr. Barajas. Desires surg for DONA and POP tx, declines nonsurg intervention such as observation, kegels/PT, pessary, impressa, periurethral bulking. Constipation managed with fiber.    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      y/o female  LMP age 62 with PMH of DM, HTN, hypothyroidism, glaucoma and pelvic organ prolapse presents to PST. She states she has been having difficulty moving her bowels due to rectocele. She states she has also been experiencing urinary incontinence with stress. States she feels a bulge into her vagina. She had an MRI defec  showed rectocele with contrast entrapment (anterior and apical descent mild). she then had a TVUS  which showed, fibr ut, EE 19 mm, ovs not seen, no FF. States due to the TVUS results she was seen by gynecology oncology who is now going to do a D&C due to the patient having polyps in her uterus. Denies dysuria, fevers, chills, blood in her urine or abnormal vaginal bleeding. Patient is scheduled for perineorrhaphy, posterior repair, midurethral sling, cystoscopy, dilation and curettage, hysteroscopy, possible myosure polypectomy with Dr. Hardin and Dr. Barajas on 22.     66y/o female  LMP age 62 with PMH of DM, HTN, hypothyroidism, glaucoma and pelvic organ prolapse presents to PST. She states she has been having difficulty moving her bowels due to rectocele. She states she has also been experiencing urinary incontinence with stress. States she feels a bulge into her vagina. She had an MRI defec  showed rectocele with contrast entrapment (anterior and apical descent mild). she then had a TVUS  which showed, fibr ut, EE 19 mm, ovs not seen, no FF. States due to the TVUS results she was seen by gynecology oncology who is now going to do a D&C due to the patient having polyps in her uterus. Denies dysuria, fevers, chills, blood in her urine or abnormal vaginal bleeding. Patient is scheduled for perineorrhaphy, posterior repair, midurethral sling, cystoscopy, dilation and curettage, hysteroscopy, possible myosure polypectomy with Dr. Hardin and Dr. Barajas on 10/21/22.  Was re- scheduled due to elevated A1C.

## 2022-10-01 NOTE — H&P PST ADULT - ASSESSMENT
66 y/o female  LMP age 62 with PMH of DM, HTN, hypothyroidism, glaucoma and pelvic organ prolapse presents to PST. She states she has been having difficulty moving her bowels due to rectocele. She states she has also been experiencing urinary incontinence with stress. States she feels a bulge into her vagina. She had an MRI defec  showed rectocele with contrast entrapment (anterior and apical descent mild). she then had a TVUS  which showed, fibr ut, EE 19 mm, ovs not seen, no FF. States due to the TVUS results she was seen by gynecology oncology who is now going to do a D&C due to the patient having polyps in her uterus. Denies dysuria, fevers, chills, blood in her urine or abnormal vaginal bleeding. Patient is scheduled for perineorrhaphy, posterior repair, midurethral sling, cystoscopy, dilation and curettage, hysteroscopy, possible myosure polypectomy with Dr. Hardin and Dr. Barajas on 22. Patient educated on surgical scrub, COVID testing scheduled for 22, preadmission instructions, medical clearance and day of procedure medications, verbalizes understanding. Pt instructed to stop vitamins/supplements/herbal medications/ASA/NSAIDS for one week prior to surgery and discuss with PMD.    CAPRINI SCORE    AGE RELATED RISK FACTORS                                                             [ ] Age 41-60 years                                            (1 Point)  [ x] Age: 61-74 years                                           (2 Points)                 [ ] Age= 75 years                                                (3 Points)             DISEASE RELATED RISK FACTORS                                                       [x ] Edema in the lower extremities                 (1 Point)                     [ ] Varicose veins                                               (1 Point)                                 [ x] BMI > 25 Kg/m2                                            (1 Point)                                  [ ] Serious infection (ie PNA)                            (1 Point)                     [ ] Lung disease ( COPD, Emphysema)            (1 Point)                                                                          [ ] Acute myocardial infarction                         (1 Point)                  [ ] Congestive heart failure (in the previous month)  (1 Point)         [ ] Inflammatory bowel disease                            (1 Point)                  [ ] Central venous access, PICC or Port               (2 points)       (within the last month)                                                                [ ] Stroke (in the previous month)                        (5 Points)    [ ] Previous or present malignancy                       (2 points)                                                                                                                                                         HEMATOLOGY RELATED FACTORS                                                         [ ] Prior episodes of VTE                                     (3 Points)                     [ ] Positive family history for VTE                      (3 Points)                  [ ] Prothrombin 12711 A                                     (3 Points)                     [ ] Factor V Leiden                                                (3 Points)                        [ ] Lupus anticoagulants                                      (3 Points)                                                           [ ] Anticardiolipin antibodies                              (3 Points)                                                       [ ] High homocysteine in the blood                   (3 Points)                                             [ ] Other congenital or acquired thrombophilia      (3 Points)                                                [ ] Heparin induced thrombocytopenia                  (3 Points)                                        MOBILITY RELATED FACTORS  [ ] Bed rest                                                         (1 Point)  [ ] Plaster cast                                                    (2 points)  [ ] Bed bound for more than 72 hours           (2 Points)    GENDER SPECIFIC FACTORS  [ ] Pregnancy or had a baby within the last month   (1 Point)  [ ] Post-partum < 6 weeks                                   (1 Point)  [ ] Hormonal therapy  or oral contraception   (1 Point)  [ ] History of pregnancy complications              (1 point)  [ ] Unexplained or recurrent              (1 Point)    OTHER RISK FACTORS                                           (1 Point)  [x ] BMI >40, smoking, diabetes requiring insulin, chemotherapy  blood transfusions and length of surgery over 2 hours    SURGERY RELATED RISK FACTORS  [ ]  Section within the last month     (1 Point)  [ ] Minor surgery                                                  (1 Point)  [ ] Arthroscopic surgery                                       (2 Points)  [x ] Planned major surgery lasting more            (2 Points)      than 45 minutes     [ ] Elective hip or knee joint replacement       (5 points)       surgery                                                TRAUMA RELATED RISK FACTORS  [ ] Fracture of the hip, pelvis, or leg                       (5 Points)  [ ] Spinal cord injury resulting in paralysis             (5 points)       (in the previous month)    [ ] Paralysis  (less than 1 month)                             (5 Points)  [ ] Multiple Trauma within 1 month                        (5 Points)    Total Score [   7     ]    Caprini Score 0-2: Low Risk, NO VTE prophylaxis required for most patients, encourage ambulation  Caprini Score 3-6: Moderate Risk , pharmacologic VTE prophylaxis is indicated for most patients (in the absence of contraindications)  Caprini Score Greater than or =7: High risk, pharmocologic VTE prophylaxis indicated for most patients (in the absence of contraindications)    OPIOID RISK TOOL    XIMENA EACH BOX THAT APPLIES AND ADD TOTALS AT THE END    FAMILY HISTORY OF SUBSTANCE ABUSE                 FEMALE         MALE                                                Alcohol                             [  ]1 pt          [  ]3pts                                               Illegal Durgs                     [  ]2 pts        [  ]3pts                                               Rx Drugs                           [  ]4 pts        [  ]4 pts    PERSONAL HISTORY OF SUBSTANCE ABUSE                                                                                          Alcohol                             [  ]3 pts       [  ]3 pts                                               Illegal Drugs                     [  ]4 pts        [  ]4 pts                                               Rx Drugs                           [  ]5 pts        [  ]5 pts    AGE BETWEEN 16-45 YEARS                                      [  ]1 pt         [  ]1 pt    HISTORY OF PREADOLESCENT   SEXUAL ABUSE                                                             [  ]3 pts        [  ]0pts    PSYCHOLOGICAL DISEASE                     ADD, OCD, Bipolar, Schizophrenia        [  ]2 pts         [  ]2 pts                      Depression                                               [  ]1 pt           [  ]1 pt           SCORING TOTAL   (add numbers and type here)              (*0**)                                     A score of 3 or lower indicated LOW risk for future opioid abuse  A score of 4 to 7 indicated moderate risk for future opioid abuse  A score of 8 or higher indicates a high risk for opioid abuse   66 y/o female  LMP age 62 with PMH of DM, HTN, hypothyroidism, glaucoma and pelvic organ prolapse presents to PST. She states she has been having difficulty moving her bowels due to rectocele. She states she has also been experiencing urinary incontinence with stress. States she feels a bulge into her vagina. She had an MRI defec  showed rectocele with contrast entrapment (anterior and apical descent mild). she then had a TVUS  which showed, fibr ut, EE 19 mm, ovs not seen, no FF. States due to the TVUS results she was seen by gynecology oncology who is now going to do a D&C due to the patient having polyps in her uterus. Denies dysuria, fevers, chills, blood in her urine or abnormal vaginal bleeding. Patient is scheduled for perineorrhaphy, posterior repair, midurethral sling, cystoscopy, dilation and curettage, hysteroscopy, possible myosure polypectomy with Dr. Hardin and Dr. Barajas on 22. Patient educated on surgical scrub, COVID testing, preadmission instructions, medical clearance and day of procedure medications, verbalizes understanding. Pt instructed to stop vitamins/supplements/herbal medications/ASA/NSAIDS for one week prior to surgery and discuss with PMD.    CAPRINI SCORE    AGE RELATED RISK FACTORS                                                             [ ] Age 41-60 years                                            (1 Point)  [ x] Age: 61-74 years                                           (2 Points)                 [ ] Age= 75 years                                                (3 Points)             DISEASE RELATED RISK FACTORS                                                       [x ] Edema in the lower extremities                 (1 Point)                     [ ] Varicose veins                                               (1 Point)                                 [ x] BMI > 25 Kg/m2                                            (1 Point)                                  [ ] Serious infection (ie PNA)                            (1 Point)                     [ ] Lung disease ( COPD, Emphysema)            (1 Point)                                                                          [ ] Acute myocardial infarction                         (1 Point)                  [ ] Congestive heart failure (in the previous month)  (1 Point)         [ ] Inflammatory bowel disease                            (1 Point)                  [ ] Central venous access, PICC or Port               (2 points)       (within the last month)                                                                [ ] Stroke (in the previous month)                        (5 Points)    [ ] Previous or present malignancy                       (2 points)                                                                                                                                                         HEMATOLOGY RELATED FACTORS                                                         [ ] Prior episodes of VTE                                     (3 Points)                     [ ] Positive family history for VTE                      (3 Points)                  [ ] Prothrombin 27884 A                                     (3 Points)                     [ ] Factor V Leiden                                                (3 Points)                        [ ] Lupus anticoagulants                                      (3 Points)                                                           [ ] Anticardiolipin antibodies                              (3 Points)                                                       [ ] High homocysteine in the blood                   (3 Points)                                             [ ] Other congenital or acquired thrombophilia      (3 Points)                                                [ ] Heparin induced thrombocytopenia                  (3 Points)                                        MOBILITY RELATED FACTORS  [ ] Bed rest                                                         (1 Point)  [ ] Plaster cast                                                    (2 points)  [ ] Bed bound for more than 72 hours           (2 Points)    GENDER SPECIFIC FACTORS  [ ] Pregnancy or had a baby within the last month   (1 Point)  [ ] Post-partum < 6 weeks                                   (1 Point)  [ ] Hormonal therapy  or oral contraception   (1 Point)  [ ] History of pregnancy complications              (1 point)  [ ] Unexplained or recurrent              (1 Point)    OTHER RISK FACTORS                                           (1 Point)  [x ] BMI >40, smoking, diabetes requiring insulin, chemotherapy  blood transfusions and length of surgery over 2 hours    SURGERY RELATED RISK FACTORS  [ ]  Section within the last month     (1 Point)  [ ] Minor surgery                                                  (1 Point)  [ ] Arthroscopic surgery                                       (2 Points)  [x ] Planned major surgery lasting more            (2 Points)      than 45 minutes     [ ] Elective hip or knee joint replacement       (5 points)       surgery                                                TRAUMA RELATED RISK FACTORS  [ ] Fracture of the hip, pelvis, or leg                       (5 Points)  [ ] Spinal cord injury resulting in paralysis             (5 points)       (in the previous month)    [ ] Paralysis  (less than 1 month)                             (5 Points)  [ ] Multiple Trauma within 1 month                        (5 Points)    Total Score [   7     ]    Caprini Score 0-2: Low Risk, NO VTE prophylaxis required for most patients, encourage ambulation  Caprini Score 3-6: Moderate Risk , pharmacologic VTE prophylaxis is indicated for most patients (in the absence of contraindications)  Caprini Score Greater than or =7: High risk, pharmocologic VTE prophylaxis indicated for most patients (in the absence of contraindications)    OPIOID RISK TOOL    XIMENA EACH BOX THAT APPLIES AND ADD TOTALS AT THE END    FAMILY HISTORY OF SUBSTANCE ABUSE                 FEMALE         MALE                                                Alcohol                             [  ]1 pt          [  ]3pts                                               Illegal Durgs                     [  ]2 pts        [  ]3pts                                               Rx Drugs                           [  ]4 pts        [  ]4 pts    PERSONAL HISTORY OF SUBSTANCE ABUSE                                                                                          Alcohol                             [  ]3 pts       [  ]3 pts                                               Illegal Drugs                     [  ]4 pts        [  ]4 pts                                               Rx Drugs                           [  ]5 pts        [  ]5 pts    AGE BETWEEN 16-45 YEARS                                      [  ]1 pt         [  ]1 pt    HISTORY OF PREADOLESCENT   SEXUAL ABUSE                                                             [  ]3 pts        [  ]0pts    PSYCHOLOGICAL DISEASE                     ADD, OCD, Bipolar, Schizophrenia        [  ]2 pts         [  ]2 pts                      Depression                                               [  ]1 pt           [  ]1 pt           SCORING TOTAL   (add numbers and type here)              (*0**)                                     A score of 3 or lower indicated LOW risk for future opioid abuse  A score of 4 to 7 indicated moderate risk for future opioid abuse  A score of 8 or higher indicates a high risk for opioid abuse

## 2022-10-01 NOTE — H&P PST ADULT - PROBLEM SELECTOR PLAN 3
A1C done at [facility]  Blood sugar monitoring  Hold diabetes medications the morning of the procedure  Advised to discuss with PMD the dose of insulin the night before the procedure

## 2022-10-01 NOTE — H&P PST ADULT - PROBLEM SELECTOR PLAN 1
Medical clearance pending  Patient is scheduled for perineorrhaphy, posterior repair, midurethral sling, cystoscopy, dilation and curettage, hysteroscopy, possible myosure polypectomy with Dr. Hardin and Dr. Barajas on 9/2/22. Medical clearance pending  Patient is scheduled for perineorrhaphy, posterior repair, midurethral sling, cystoscopy, dilation and curettage, hysteroscopy, possible myosure polypectomy with Dr. Hardin and Dr. Barajas on 10/21/22

## 2022-10-01 NOTE — H&P PST ADULT - LAB RESULTS AND INTERPRETATION
labs reveiwed, faxed to PCP for review. Labs sent to Dr. Hardin for review. AS FNP-BC pending Surgeon made aware of abnormal labs. States OK to proceed- BHARATHI, TARAS-NP

## 2022-10-01 NOTE — H&P PST ADULT - NS MD HP HEP C STATUS
Pt placed on CPAP only wore for short time then removed. Rt to follow.    Stoney Eaton, RT on 6/5/2022 at 4:51 AM     Negative

## 2022-10-01 NOTE — H&P PST ADULT - ATTENDING COMMENTS
Patient seen, continues to desire surg correction of POP and GSUI, declines nonsurg intervention. Risks of surg including but not limited to risks of anesthesia, cardiopulmonary arrest, MI, stroke, bleeding, hemorrhage, hematoma formation, transfusion, infection, wound breakdown, abscess formation, mesh-related complication such as erosion or exposure, fistula formation, damage to surrounding structures such as bowel / bladder / rectum / ureters / nerves / vessels, recurrence, failure, retention, need for further surgery, apical POP in future, going home with a catheter all reviewed and she would like to proceed with EUA / MUS / cysto / MN / perineorrhaphy / other indicated procedures. All ques answered,  Tayo resendiz.

## 2022-10-01 NOTE — H&P PST ADULT - NSICDXPASTMEDICALHX_GEN_ALL_CORE_FT
PAST MEDICAL HISTORY:  Arthritis     Chronic bronchiolitis     Diabetes     High triglycerides     Hyperlipidemia     Hypertension     Hypothyroidism     Obesity

## 2022-10-02 LAB
CULTURE RESULTS: SIGNIFICANT CHANGE UP
SPECIMEN SOURCE: SIGNIFICANT CHANGE UP

## 2022-10-04 ENCOUNTER — TRANSCRIPTION ENCOUNTER (OUTPATIENT)
Age: 67
End: 2022-10-04

## 2022-10-06 ENCOUNTER — TRANSCRIPTION ENCOUNTER (OUTPATIENT)
Age: 67
End: 2022-10-06

## 2022-10-07 ENCOUNTER — APPOINTMENT (OUTPATIENT)
Dept: INTERNAL MEDICINE | Facility: CLINIC | Age: 67
End: 2022-10-07

## 2022-10-07 VITALS
HEART RATE: 105 BPM | SYSTOLIC BLOOD PRESSURE: 134 MMHG | OXYGEN SATURATION: 95 % | HEIGHT: 61 IN | WEIGHT: 228 LBS | RESPIRATION RATE: 14 BRPM | BODY MASS INDEX: 43.05 KG/M2 | DIASTOLIC BLOOD PRESSURE: 74 MMHG

## 2022-10-07 PROCEDURE — 99214 OFFICE O/P EST MOD 30 MIN: CPT

## 2022-10-07 RX ORDER — INSULIN GLARGINE-YFGN 100 [IU]/ML
100 INJECTION, SOLUTION SUBCUTANEOUS DAILY
Qty: 3 | Refills: 2 | Status: DISCONTINUED | COMMUNITY
Start: 2022-05-30 | End: 2022-10-07

## 2022-10-07 RX ORDER — BUDESONIDE 180 UG/1
180 AEROSOL, POWDER RESPIRATORY (INHALATION)
Qty: 1 | Refills: 0 | Status: DISCONTINUED | COMMUNITY
Start: 2022-02-04 | End: 2022-10-07

## 2022-10-07 RX ORDER — FLUCONAZOLE 150 MG/1
150 TABLET ORAL
Qty: 1 | Refills: 0 | Status: DISCONTINUED | COMMUNITY
Start: 2022-07-07 | End: 2022-10-07

## 2022-10-07 RX ORDER — NIRMATRELVIR AND RITONAVIR 300-100 MG
20 X 150 MG & KIT ORAL
Qty: 1 | Refills: 0 | Status: DISCONTINUED | COMMUNITY
Start: 2022-05-17 | End: 2022-10-07

## 2022-10-07 RX ORDER — CLINDAMYCIN HYDROCHLORIDE 300 MG/1
300 CAPSULE ORAL
Qty: 21 | Refills: 0 | Status: DISCONTINUED | COMMUNITY
Start: 2022-03-15 | End: 2022-10-07

## 2022-10-07 RX ORDER — GLIPIZIDE 2.5 MG/1
2.5 TABLET, FILM COATED, EXTENDED RELEASE ORAL
Qty: 90 | Refills: 0 | Status: DISCONTINUED | COMMUNITY
Start: 2021-03-22 | End: 2022-10-07

## 2022-10-07 NOTE — PLAN
[FreeTextEntry1] : PST labs and EKG reviewed\par acceptable for planned procedure \par pt medically optimized

## 2022-10-07 NOTE — HISTORY OF PRESENT ILLNESS
[No Pertinent Cardiac History] : no history of aortic stenosis, atrial fibrillation, coronary artery disease, recent myocardial infarction, or implantable device/pacemaker [No Adverse Anesthesia Reaction] : no adverse anesthesia reaction in self or family member [Diabetes] : diabetes [Good (7-10 METs)] : Good (7-10 METs) [Asthma] : asthma [Chronic Anticoagulation] : no chronic anticoagulation [Chronic Kidney Disease] : no chronic kidney disease [(Patient denies any chest pain, claudication, dyspnea on exertion, orthopnea, palpitations or syncope)] : Patient denies any chest pain, claudication, dyspnea on exertion, orthopnea, palpitations or syncope [FreeTextEntry1] : rectocele repair  [FreeTextEntry2] : 10/21 [FreeTextEntry3] : Dr. Hardin, Dr. Barajas  [FreeTextEntry4] : Pt here for MCA. Pt started higher dose of trulicity this week.

## 2022-10-13 ENCOUNTER — TRANSCRIPTION ENCOUNTER (OUTPATIENT)
Age: 67
End: 2022-10-13

## 2022-10-13 DIAGNOSIS — B37.0 CANDIDAL STOMATITIS: ICD-10-CM

## 2022-10-13 NOTE — REVIEW OF SYSTEMS
[Cough] : cough [Negative] : Heme/Lymph Instructions: This plan will send the code FBSD to the PM system.  DO NOT or CHANGE the price. Price (Do Not Change): 0.00 Detail Level: Simple

## 2022-10-19 LAB — SARS-COV-2 N GENE NPH QL NAA+PROBE: NOT DETECTED

## 2022-10-20 ENCOUNTER — TRANSCRIPTION ENCOUNTER (OUTPATIENT)
Age: 67
End: 2022-10-20

## 2022-10-20 ENCOUNTER — NON-APPOINTMENT (OUTPATIENT)
Age: 67
End: 2022-10-20

## 2022-10-20 ENCOUNTER — EMERGENCY (EMERGENCY)
Facility: HOSPITAL | Age: 67
LOS: 1 days | Discharge: HOME | End: 2022-10-20
Attending: EMERGENCY MEDICINE | Admitting: EMERGENCY MEDICINE
Payer: COMMERCIAL

## 2022-10-20 ENCOUNTER — FORM ENCOUNTER (OUTPATIENT)
Age: 67
End: 2022-10-20

## 2022-10-20 VITALS
DIASTOLIC BLOOD PRESSURE: 81 MMHG | HEART RATE: 80 BPM | OXYGEN SATURATION: 99 % | RESPIRATION RATE: 19 BRPM | TEMPERATURE: 98 F | SYSTOLIC BLOOD PRESSURE: 142 MMHG

## 2022-10-20 VITALS
DIASTOLIC BLOOD PRESSURE: 80 MMHG | WEIGHT: 229.94 LBS | OXYGEN SATURATION: 97 % | TEMPERATURE: 97 F | RESPIRATION RATE: 18 BRPM | HEIGHT: 61 IN | SYSTOLIC BLOOD PRESSURE: 150 MMHG | HEART RATE: 88 BPM

## 2022-10-20 DIAGNOSIS — Z98.891 HISTORY OF UTERINE SCAR FROM PREVIOUS SURGERY: Chronic | ICD-10-CM

## 2022-10-20 DIAGNOSIS — Z98.890 OTHER SPECIFIED POSTPROCEDURAL STATES: Chronic | ICD-10-CM

## 2022-10-20 DIAGNOSIS — Z98.51 TUBAL LIGATION STATUS: Chronic | ICD-10-CM

## 2022-10-20 PROCEDURE — 99284 EMERGENCY DEPT VISIT MOD MDM: CPT

## 2022-10-20 PROCEDURE — 99282 EMERGENCY DEPT VISIT SF MDM: CPT

## 2022-10-20 RX ORDER — CYCLOBENZAPRINE HYDROCHLORIDE 10 MG/1
1 TABLET, FILM COATED ORAL
Qty: 14 | Refills: 0
Start: 2022-10-20 | End: 2022-10-26

## 2022-10-20 RX ORDER — LIDOCAINE 4 G/100G
1 CREAM TOPICAL ONCE
Refills: 0 | Status: COMPLETED | OUTPATIENT
Start: 2022-10-20 | End: 2022-10-20

## 2022-10-20 RX ADMIN — LIDOCAINE 1 PATCH: 4 CREAM TOPICAL at 12:10

## 2022-10-20 NOTE — ED PROVIDER NOTE - NS ED ATTENDING STATEMENT MOD
This was a shared visit with the RIA. I reviewed and verified the documentation and independently performed the documented:

## 2022-10-20 NOTE — ED PROVIDER NOTE - OBJECTIVE STATEMENT
68 y/o female with PMHX HTN, HLD, and DM presents today due to right sided neck pain x 1 day. reports gradual onset, radiating to scapula and right arm, and currently 7/10. pt reports tingling to right arm. pt reports she was recently sleeping on a fouton. pt denies numbness/weakness, fall trauma, vomiting, nausea, chest pain, SOB, dizziness, palpitations, or any other complaints.

## 2022-10-20 NOTE — ED ADULT NURSE NOTE - OBJECTIVE STATEMENT
Pt. received alert and oriented x3 with chief complaint of right shoulder/neck pain for 1 day. Pt. denies injury to affected area.

## 2022-10-20 NOTE — ED PROVIDER NOTE - NSFOLLOWUPINSTRUCTIONS_ED_ALL_ED_FT
1) Follow-up with Orthopedics, See referred doctor. Call today/next business day for close, prompt follow-up.  2) Return to Emergency room for any worsening or persistent pain, weakness, numbness, fever, color change to extremity, or any other concerning symptoms.  3) Take ibuprofen 600 mg every  6 hours as needed.   4) You can consider discussing with your doctor if physical therapy or further imaging as an MRI may be beneficial.  5) Be cautious when taking Flexeril as it may cause drowsiness. Do not drive or operate machinery when taking Flexeril.       Cervical Radiculopathy    Close-up of the nerves of the cervical spine.   Cervical radiculopathy means that a nerve in the neck (a cervical nerve) is pinched or bruised. This can happen because of an injury to the cervical spine (vertebrae) in the neck, or as a normal part of getting older. This condition can cause pain or loss of feeling (numbness) that runs from your neck all the way down to your arm and fingers. Often, this condition gets better with rest. Treatment may be needed if the condition does not get better.      What are the causes?    •A neck injury.      •A bulging disk in your spine.      •Sudden muscle tightening (muscle spasms).      •Tight muscles in your neck due to overuse.      •Arthritis.      •Breakdown in the bones and joints of the spine (spondylosis) due to getting older.      •Bone spurs that form near the nerves in the neck.        What are the signs or symptoms?  •Pain. The pain may:  •Run from the neck to the arm and hand.      •Be very bad or irritating.      •Get worse when you move your neck.        •Loss of feeling or tingling in your arm or hand.      •Weakness in your arm or hand, in very bad cases.        How is this treated?    In many cases, treatment is not needed for this condition. With rest, the condition often gets better over time. If treatment is needed, options may include:  •Wearing a soft neck collar (cervical collar) for short periods of time.      •Doing exercises (physical therapy) to strengthen your neck muscles.      •Taking medicines.      •Having shots (injections) in your spine, in very bad cases.      •Having surgery. This may be needed if other treatments do not help. The type of surgery that is used will depend on the cause of your condition.        Follow these instructions at home:    If you have a soft neck collar:     •Wear it as told by your doctor. Take it off only as told by your doctor.    •Ask your doctor if you can take the collar off for cleaning and bathing. If you are allowed to take the collar off for cleaning or bathing:  •Follow instructions from your doctor about how to take off the collar safely.      •Clean the collar by wiping it with mild soap and water and drying it completely.      •Take out any removable pads in the collar every 1–2 days. Wash them by hand with soap and water. Let them air-dry completely before you put them back in the collar.      •Check your skin under the collar for redness or sores. If you see any, tell your doctor.          Managing pain       Bag of ice on a towel on the skin.       A heating pad for use on the painful area.     •Take over-the-counter and prescription medicines only as told by your doctor.    •If told, put ice on the painful area. To do this:  •If you have a soft neck collar, take if off as told by your doctor.      •Put ice in a plastic bag.      •Place a towel between your skin and the bag.      •Leave the ice on for 20 minutes, 2–3 times a day.      •Take off the ice if your skin turns bright red. This is very important. If you cannot feel pain, heat, or cold, you have a greater risk of damage to the area.      •If using ice does not help, you can try using heat. Use the heat source that your doctor recommends, such as a moist heat pack or a heating pad.  •Place a towel between your skin and the heat source.      •Leave the heat on for 20–30 minutes.      •Take off the heat if your skin turns bright red. This is very important. If you cannot feel pain, heat, or cold, you have a greater risk of getting burned.        •You may try a gentle neck and shoulder rub (massage).      Activity     •Rest as needed.      •Return to your normal activities when your doctor says that it is safe.      •Do exercises as told by your doctor or physical therapist.      •You may have to avoid lifting. Ask your doctor how much you can safely lift.      General instructions     •Use a flat pillow when you sleep.      • Do not drive while wearing a soft neck collar. If you do not have a soft neck collar, ask your doctor if it is safe to drive while your neck heals.      •Ask your doctor if you should avoid driving or using machines while you are taking your medicine.      • Do not smoke or use any products that contain nicotine or tobacco. If you need help quitting, ask your doctor.      •Keep all follow-up visits.        Contact a doctor if:    •Your condition does not get better with treatment.        Get help right away if:    •Your pain gets worse and medicine does not help.      •You lose feeling or feel weak in your hand, arm, face, or leg.      •You have a high fever.      •Your neck is stiff.      •You cannot control when you poop or pee (have incontinence).      •You have trouble with walking, balance, or talking.        Summary    •Cervical radiculopathy means that a nerve in the neck is pinched or bruised.      •A nerve can get pinched from a bulging disk, arthritis, an injury to the neck, or other causes.      •Symptoms include pain, tingling, or loss of feeling that goes from the neck to the arm or hand.      •Weakness in your arm or hand can happen in very bad cases.      •Treatment may include resting, wearing a soft neck collar, and doing exercises. You might need to take medicines for pain. In very bad cases, shots or surgery may be needed.      This information is not intended to replace advice given to you by your health care provider. Make sure you discuss any questions you have with your health care provider.

## 2022-10-20 NOTE — ED PROVIDER NOTE - PHYSICAL EXAMINATION
Constitutional: Awake, Alert, non-toxic. NAD  HEAD: Normocephalic, atraumatic.   EYES: EOM intact, conjunctiva and sclera are clear bilaterally  ENT: No rhinorrhea, normal pharynx, patent, no tonsillar exudate or enlargement, mucous membranes pink/moist, no erythema, no drooling or stridor.   NECK: Supple, non-tender  BACK: No midline or paraspinal TTP of cervical/thoracic/lumbar spine, FROM. No ecchymosis or hematomas. (+) spurling test   CARDIOVASCULAR: Normal S1, S2; regular rate and rhythm.  RESPIRATORY: Normal respiratory effort; breath sounds CTAB, no wheezes, rhonchi, or rales. Speaking in full sentences. No accessory muscle use.   ABDOMEN: Soft; non-tender, non-distended. Negative Rangel sign  EXTREMITIES: Full passive and active ROM in all extremities; non-tender to palpation; distal pulses palpable and symmetric, no edema, no crepitus or step off  SKIN: Warm, dry; good skin turgor, no apparent lesions or rashes, no ecchymosis, brisk capillary refill.  NEURO: A&O x3. Sensory and motor functions are grossly intact. Speech is normal. Appearance and judgement seem appropriate for gender and age. No neurological deficits. Neurovascular sensation intact motor function 5/5 of upper and lower extremities, CN II-XII grossly intact, no ataxia, absent pronator drift, intact cerebellar function. Speech clear, without articulation or word-finding difficulties. Eyes- PERRL bilaterally. EOMs in tact. No nystagmus. No facial droop.

## 2022-10-20 NOTE — ED PROVIDER NOTE - CARE PROVIDER_API CALL
Frantz Whitaker (DO)  Orthopaedic Surgery Surgery  [address] [facility], [address] [place] [address]  Phone: [phone]  Fax: [phone]  Follow Up Time: 1-3 Days

## 2022-10-20 NOTE — ED PROVIDER NOTE - CLINICAL SUMMARY MEDICAL DECISION MAKING FREE TEXT BOX
DT: I have personally performed a face to face diagnostic evaluation on this patient.  I have reviewed the PA's note and agree with the history, exam, and plan of care, except as noted.  History and Exam by me shows 66 y/o female with PMHX HTN, HLD, and DM presents today due to right sided neck pain x 1 day. reports gradual onset, radiating to scapula and right arm, and currently 7/10. pt reports tingling to right arm. pt reports she was recently sleeping on a fouton. pt denies numbness/weakness, fall trauma, vomiting, nausea, chest pain, SOB, dizziness, palpitations, or any other complaints. .  Patient is NAD.  A n O x 3. Head NC/AT. Right neck and back- nt, from.   pt felt better c meds.

## 2022-10-20 NOTE — ED PROVIDER NOTE - PATIENT PORTAL LINK FT
You can access the FollowMyHealth Patient Portal offered by [facility] by registering at the following website: [email or web address]. By joining [employer]’s FollowMyHealth portal, you will also be able to view your health information using other applications (apps) compatible with our system.

## 2022-10-21 ENCOUNTER — RESULT REVIEW (OUTPATIENT)
Age: 67
End: 2022-10-21

## 2022-10-21 ENCOUNTER — OUTPATIENT (OUTPATIENT)
Dept: OUTPATIENT SERVICES | Facility: HOSPITAL | Age: 67
LOS: 1 days | End: 2022-10-21
Payer: COMMERCIAL

## 2022-10-21 ENCOUNTER — TRANSCRIPTION ENCOUNTER (OUTPATIENT)
Age: 67
End: 2022-10-21

## 2022-10-21 ENCOUNTER — APPOINTMENT (OUTPATIENT)
Dept: UROGYNECOLOGY | Facility: HOSPITAL | Age: 67
End: 2022-10-21

## 2022-10-21 VITALS
WEIGHT: 229.94 LBS | OXYGEN SATURATION: 99 % | DIASTOLIC BLOOD PRESSURE: 89 MMHG | RESPIRATION RATE: 16 BRPM | TEMPERATURE: 97 F | HEIGHT: 61 IN | SYSTOLIC BLOOD PRESSURE: 185 MMHG | HEART RATE: 81 BPM

## 2022-10-21 VITALS
HEART RATE: 78 BPM | DIASTOLIC BLOOD PRESSURE: 74 MMHG | OXYGEN SATURATION: 99 % | TEMPERATURE: 98 F | RESPIRATION RATE: 20 BRPM | SYSTOLIC BLOOD PRESSURE: 159 MMHG

## 2022-10-21 DIAGNOSIS — N39.3 STRESS INCONTINENCE (FEMALE) (MALE): ICD-10-CM

## 2022-10-21 DIAGNOSIS — Z98.890 OTHER SPECIFIED POSTPROCEDURAL STATES: Chronic | ICD-10-CM

## 2022-10-21 DIAGNOSIS — M19.011 PRIMARY OSTEOARTHRITIS, RIGHT SHOULDER: ICD-10-CM

## 2022-10-21 DIAGNOSIS — N81.6 RECTOCELE: ICD-10-CM

## 2022-10-21 DIAGNOSIS — Z98.891 HISTORY OF UTERINE SCAR FROM PREVIOUS SURGERY: Chronic | ICD-10-CM

## 2022-10-21 DIAGNOSIS — Z98.51 TUBAL LIGATION STATUS: Chronic | ICD-10-CM

## 2022-10-21 DIAGNOSIS — N84.0 POLYP OF CORPUS UTERI: ICD-10-CM

## 2022-10-21 DIAGNOSIS — N81.81 PERINEOCELE: ICD-10-CM

## 2022-10-21 LAB
BLD GP AB SCN SERPL QL: SIGNIFICANT CHANGE UP
GLUCOSE BLDC GLUCOMTR-MCNC: 119 MG/DL — HIGH (ref 70–99)

## 2022-10-21 PROCEDURE — C1771: CPT

## 2022-10-21 PROCEDURE — 88305 TISSUE EXAM BY PATHOLOGIST: CPT | Mod: 26

## 2022-10-21 PROCEDURE — 86850 RBC ANTIBODY SCREEN: CPT

## 2022-10-21 PROCEDURE — 82962 GLUCOSE BLOOD TEST: CPT

## 2022-10-21 PROCEDURE — 57250 REPAIR RECTUM & VAGINA: CPT

## 2022-10-21 PROCEDURE — 36415 COLL VENOUS BLD VENIPUNCTURE: CPT

## 2022-10-21 PROCEDURE — C1889: CPT

## 2022-10-21 PROCEDURE — 57288 REPAIR BLADDER DEFECT: CPT

## 2022-10-21 PROCEDURE — 58558 HYSTEROSCOPY BIOPSY: CPT

## 2022-10-21 PROCEDURE — 86901 BLOOD TYPING SEROLOGIC RH(D): CPT

## 2022-10-21 PROCEDURE — 88305 TISSUE EXAM BY PATHOLOGIST: CPT

## 2022-10-21 PROCEDURE — 86900 BLOOD TYPING SEROLOGIC ABO: CPT

## 2022-10-21 DEVICE — FLOSEAL FAST PREP 10ML: Type: IMPLANTABLE DEVICE | Status: FUNCTIONAL

## 2022-10-21 DEVICE — SLING TRANSVAGINAL MID-URETHRAL ADVANTAGE FIT: Type: IMPLANTABLE DEVICE | Status: FUNCTIONAL

## 2022-10-21 RX ORDER — CELECOXIB 200 MG/1
400 CAPSULE ORAL ONCE
Refills: 0 | Status: COMPLETED | OUTPATIENT
Start: 2022-10-21 | End: 2022-10-21

## 2022-10-21 RX ORDER — FENTANYL CITRATE 50 UG/ML
50 INJECTION INTRAVENOUS
Refills: 0 | Status: DISCONTINUED | OUTPATIENT
Start: 2022-10-21 | End: 2022-10-21

## 2022-10-21 RX ORDER — ACETAMINOPHEN 500 MG
975 TABLET ORAL EVERY 6 HOURS
Refills: 0 | Status: DISCONTINUED | OUTPATIENT
Start: 2022-10-21 | End: 2022-11-04

## 2022-10-21 RX ORDER — OXYCODONE HYDROCHLORIDE 5 MG/1
5 TABLET ORAL EVERY 4 HOURS
Refills: 0 | Status: DISCONTINUED | OUTPATIENT
Start: 2022-10-21 | End: 2022-10-21

## 2022-10-21 RX ORDER — METRONIDAZOLE 500 MG
500 TABLET ORAL ONCE
Refills: 0 | Status: COMPLETED | OUTPATIENT
Start: 2022-10-21 | End: 2022-10-21

## 2022-10-21 RX ORDER — KETOROLAC TROMETHAMINE 30 MG/ML
15 SYRINGE (ML) INJECTION EVERY 8 HOURS
Refills: 0 | Status: COMPLETED | OUTPATIENT
Start: 2022-10-21 | End: 2022-10-22

## 2022-10-21 RX ORDER — SIMETHICONE 80 MG/1
80 TABLET, CHEWABLE ORAL EVERY 6 HOURS
Refills: 0 | Status: DISCONTINUED | OUTPATIENT
Start: 2022-10-21 | End: 2022-11-04

## 2022-10-21 RX ORDER — ACETAMINOPHEN 500 MG
975 TABLET ORAL ONCE
Refills: 0 | Status: COMPLETED | OUTPATIENT
Start: 2022-10-21 | End: 2022-10-21

## 2022-10-21 RX ORDER — SODIUM CHLORIDE 9 MG/ML
1000 INJECTION, SOLUTION INTRAVENOUS
Refills: 0 | Status: DISCONTINUED | OUTPATIENT
Start: 2022-10-21 | End: 2022-10-21

## 2022-10-21 RX ORDER — GENTAMICIN SULFATE 40 MG/ML
500 VIAL (ML) INJECTION ONCE
Refills: 0 | Status: COMPLETED | OUTPATIENT
Start: 2022-10-21 | End: 2022-10-21

## 2022-10-21 RX ORDER — ONDANSETRON 8 MG/1
4 TABLET, FILM COATED ORAL ONCE
Refills: 0 | Status: DISCONTINUED | OUTPATIENT
Start: 2022-10-21 | End: 2022-10-21

## 2022-10-21 RX ORDER — SODIUM CHLORIDE 9 MG/ML
3 INJECTION INTRAMUSCULAR; INTRAVENOUS; SUBCUTANEOUS ONCE
Refills: 0 | Status: DISCONTINUED | OUTPATIENT
Start: 2022-10-21 | End: 2022-10-21

## 2022-10-21 RX ADMIN — Medication 975 MILLIGRAM(S): at 06:45

## 2022-10-21 RX ADMIN — Medication 200 MILLIGRAM(S): at 08:25

## 2022-10-21 RX ADMIN — FENTANYL CITRATE 50 MICROGRAM(S): 50 INJECTION INTRAVENOUS at 10:25

## 2022-10-21 RX ADMIN — FENTANYL CITRATE 50 MICROGRAM(S): 50 INJECTION INTRAVENOUS at 10:10

## 2022-10-21 RX ADMIN — SODIUM CHLORIDE 100 MILLILITER(S): 9 INJECTION, SOLUTION INTRAVENOUS at 10:15

## 2022-10-21 RX ADMIN — FENTANYL CITRATE 50 MICROGRAM(S): 50 INJECTION INTRAVENOUS at 10:30

## 2022-10-21 RX ADMIN — Medication 500 MILLIGRAM(S): at 08:10

## 2022-10-21 RX ADMIN — CELECOXIB 400 MILLIGRAM(S): 200 CAPSULE ORAL at 06:44

## 2022-10-21 NOTE — ASU DISCHARGE PLAN (ADULT/PEDIATRIC) - NS MD DC FALL RISK RISK
For information on Fall & Injury Prevention, visit: [email or web address] OR  [email or web address] OR  https://www.cdc.gov/steadi/patient.html

## 2022-10-21 NOTE — BRIEF OPERATIVE NOTE - NSICDXBRIEFPROCEDURE_GEN_ALL_CORE_FT
PROCEDURES:  Creation, midurethral sling, with cystoscopy 21-Oct-2022 09:49:30  Domi Hardin  Posterior colporrhaphy 21-Oct-2022 09:49:57  Domi Hardin  Repair, rectocele, with perineorrhaphy 21-Oct-2022 09:50:08  Domi Hardin

## 2022-10-21 NOTE — BRIEF OPERATIVE NOTE - NSICDXBRIEFPREOP_GEN_ALL_CORE_FT
PRE-OP DIAGNOSIS:  Stress incontinence 21-Oct-2022 09:50:36  Domi Hardin  Rectocele 21-Oct-2022 09:50:47  Domi Hardin  Perineocele 21-Oct-2022 09:50:55  Domi Hardin

## 2022-10-21 NOTE — BRIEF OPERATIVE NOTE - NSICDXBRIEFPOSTOP_GEN_ALL_CORE_FT
POST-OP DIAGNOSIS:  Female stress incontinence 21-Oct-2022 09:51:09  Domi Hardin  Rectocele 21-Oct-2022 09:51:18  Domi Hardin  Perineocele 21-Oct-2022 09:51:25  Domi Hardin

## 2022-10-21 NOTE — BRIEF OPERATIVE NOTE - OPERATION/FINDINGS
EUA, TVT MUS, cystoscopy, posterior repair, perineorrhaphy. normal cystourethroscopy, normal rectal exam.    combined case with Dr. Barajas: d&c/hsc

## 2022-10-21 NOTE — ASU DISCHARGE PLAN (ADULT/PEDIATRIC) - PROCEDURE
Dr. Hardin: TVT midurethral sling, posterior repair, perineorrhaphy, cystourethroscopy; Dr. Barajas: D&C/HSC

## 2022-10-21 NOTE — ASU DISCHARGE PLAN (ADULT/PEDIATRIC) - CARE PROVIDER_API CALL
Domi Hardin)  Female Pelvic MedReconst Surg; Obstetrics and Gynecology  [address] [facility], [address]  [place] [address]  Phone: [phone]  Fax: [phone]  Follow Up Time:     Sav Barajas)  [facility]  [address] [place] [address]  Phone: [phone]  Fax: [phone]  Follow Up Time:

## 2022-10-21 NOTE — ASU PREOP CHECKLIST - PATIENT PROBLEMS/NEEDS
Subjective:          Chief Complaint: Kuldip Espinal is a 79 y.o. male who had concerns including Disease Management.    HPI:      Very pleasant 76y/o gentleman referred for + GEORGES and reported urticaria. Sking better on Pred taper with HCQ. No ASE. Labs for ANCA negative. ESR WNL, CRP 18.8.   Scalp still pruritic but 90% improved on remaining skin with exception of pruritis.   Rash returned March 2017 and past 5-6 weeks worse.   Hx of eczema which I think is what is remaining at this time.   Did have biopsy with Dr. Any Vitale/Dr. Clint Gastelum did biopsy on right arm. - questioned any drug to attribute this to. But stopped meds and not difference.   Dr. Rogel-Immunogy/ seen twice considering Xolair  He has been on topical steroids. Steroids oral did help flares when comes off  Right hand with some excoritation looks like dishidrotic eczema  Scalp with scale.     No weakness, no fevers. Some chills. No SOB, no cough,   Irritation around his eyes itching and heavy but no rahs.     GEORGES 1-1280 homogeneous pattern with a negative GEORGES profile. He does note he has hx of eczema that is pruritic, typically on arms with scaling but denies erythema and severe scale. Rash seems to be exacerbated with stress, not sunlight.  Patient denies weight loss, rashes, dry eye, dry mouth, nasal or palatal ulcerations,  lymphadenopathy, Raynaud's, hx of DVT/miscarriages, psoriasis or family hx of psoriasis, rashes, serositis, anemia or other constitutional symptoms.          REVIEW OF SYSTEMS:    Review of Systems   Constitutional: Negative for fever, malaise/fatigue and weight loss.   HENT: Negative for sore throat.    Eyes: Negative for double vision, photophobia and redness.   Respiratory: Negative for cough, shortness of breath and wheezing.    Cardiovascular: Negative for chest pain, palpitations and orthopnea.   Gastrointestinal: Negative for abdominal pain, constipation and diarrhea.   Genitourinary: Negative for  dysuria, hematuria and urgency.   Musculoskeletal: Negative for back pain, joint pain and myalgias.   Skin: Positive for itching and rash.   Neurological: Negative for dizziness, tingling, focal weakness and headaches.   Endo/Heme/Allergies: Does not bruise/bleed easily.   Psychiatric/Behavioral: Negative for depression, hallucinations and suicidal ideas.               Objective:              Family History   Problem Relation Age of Onset    Cancer Mother     Heart disease Father     Hyperlipidemia Father     Hypertension Father      Social History   Substance Use Topics    Smoking status: Former Smoker    Smokeless tobacco: Not on file    Alcohol use Not on file         Current Outpatient Prescriptions on File Prior to Visit   Medication Sig Dispense Refill    amLODIPine (NORVASC) 2.5 MG tablet Take 2.5 mg by mouth every morning.  3    aspirin (ECOTRIN) 81 MG EC tablet Take 1 tablet by mouth once daily.      blood sugar diagnostic (BLOOD GLUCOSE TEST) Strp MARKIE MICROLET LANCETS MISC      blood sugar diagnostic Strp 1 strip by Misc.(Non-Drug; Combo Route) route before breakfast. Diagnosis code E11.9 100 strip 3    blood-glucose meter (BLOOD GLUCOSE MONITORING) kit Use as instructed 1 each 0    cetirizine (ZYRTEC) 10 MG tablet Take 10 mg by mouth every morning.  1    cholecalciferol, vitamin D3, 10,000 unit Tab Take 10,000 Units by mouth once daily.      CIALIS 5 mg tablet Take 5 mg by mouth once daily.  99    clopidogrel (PLAVIX) 75 mg tablet Take 1 tablet by mouth once daily.      CRESTOR 20 mg tablet Take 1 tablet by mouth once daily.      dulaglutide 0.75 mg/0.5 mL PnIj Inject 0.5 mLs (0.75 mg total) into the skin once a week. 12 Syringe 3    famotidine (PEPCID) 20 MG tablet Take 20 mg by mouth 2 (two) times daily.  6    fenofibric acid (FIBRICOR) 135 mg CpDR 135 capsules once daily.       glimepiride (AMARYL) 2 MG tablet Take 1 tablet (2 mg total) by mouth before breakfast. 90 tablet 3     hydrochlorothiazide (HYDRODIURIL) 25 MG tablet Take 1 tablet by mouth once daily.      hydroxychloroquine (PLAQUENIL) 200 mg tablet Take 1 tablet (200 mg total) by mouth once daily. 30 tablet 11    hydrOXYzine HCl (ATARAX) 25 MG tablet Take 25 mg by mouth every evening.  1    JANUVIA 50 mg Tab TAKE 1 TABLET (50 MG TOTAL) BY MOUTH ONCE DAILY. 90 tablet 1    lancets 30 gauge Misc USE1 LANCET BEFORE BREAKFAST.  3    lancets Misc 1 lancet by Misc.(Non-Drug; Combo Route) route before breakfast. Diagnosis code E11.9 100 each 3    metoprolol succinate (TOPROL-XL) 25 MG 24 hr tablet Take 1 tablet (25 mg total) by mouth every morning. 90 tablet 3    metoprolol succinate (TOPROL-XL) 25 MG 24 hr tablet TAKE 1 TABLET EVERY MORNING 90 tablet 1    omega-3 acid ethyl esters (LOVAZA) 1 gram capsule Take 2 capsules by mouth 2 (two) times daily.      ramipril (ALTACE) 10 MG capsule Take 10 mg by mouth 2 (two) times daily.  2    vitamin A-vitamin C-vit E-min Tab OCUVITE EYE HEALTH FORMULA CAPS      fluocinolone (SYNALAR) 0.01 % Sham Apply topically once daily. 120 mL 1     No current facility-administered medications on file prior to visit.        Vitals:    07/18/18 1438   BP: 134/80   Pulse: 60       Physical Exam:    Physical Exam   Constitutional: He is oriented to person, place, and time. He appears well-developed and well-nourished.   HENT:   Head: Normocephalic.   Right Ear: Hearing normal.   Left Ear: Hearing normal.   Nose: No rhinorrhea.   Mouth/Throat: Uvula is midline, oropharynx is clear and moist and mucous membranes are normal.   Eyes: Conjunctivae and EOM are normal. Pupils are equal, round, and reactive to light.   Cardiovascular: Normal rate, regular rhythm and normal heart sounds.    Pulmonary/Chest: Effort normal and breath sounds normal.   Musculoskeletal:        Right shoulder: He exhibits normal range of motion, no tenderness and no swelling.        Left shoulder: He exhibits normal range of motion,  no tenderness and no swelling.        Right wrist: He exhibits normal range of motion, no tenderness and no swelling.        Left wrist: He exhibits normal range of motion, no tenderness and no swelling.        Right knee: He exhibits normal range of motion and no swelling. No tenderness found.        Left knee: He exhibits normal range of motion and no swelling. No tenderness found.        Right ankle: He exhibits normal range of motion and no swelling. No tenderness.        Left ankle: He exhibits normal range of motion and no swelling. No tenderness.        Right hand: He exhibits normal range of motion, no tenderness and no swelling.        Left hand: He exhibits normal range of motion, no tenderness and no swelling.        Right foot: There is normal range of motion, no tenderness and no swelling.        Left foot: There is normal range of motion, no tenderness and no swelling.   Neurological: He is oriented to person, place, and time. He has normal strength.   Skin: Skin is warm, dry and intact.   No uriticarial rash of skin  Markedly improved skin and scalp.    Psychiatric: He has a normal mood and affect. His speech is normal and behavior is normal. Cognition and memory are normal.                 Assessment:       Encounter Diagnoses   Name Primary?    GEORGES positive Yes    Recurrent Urticaria Vasculitis     Urticaria     Positive GEORGES (antinuclear antibody)           Plan:        GEORGES positive    Recurrent Urticaria Vasculitis    Urticaria    Positive GEORGES (antinuclear antibody)         Skin markedlyimproved   biopsy consistent with urticaria hypersensitivity in setting of +GEORGES and +RNP.   Right axilla with new urticarial like rash not pruritic. Use topical steroids for this.   Continue HCQ for now.   No clinical evidence for SLE/no patho to suggest cutaneous lupus.    -Cannot ascertain any specific medication or exposure to attribute to hypersensitivity.    -++eosinophila -ANCA with MPO and PR3 negative.     -   -discussed with patient that unclear etiology of rash   -Follows annually with Dr. Washington [facility] eye exam.        Follow-up in about 4 months (around 11/18/2018).        30min consultation with greater than 50% spent in counseling, chart review and coordination of care. All questions answered.           Patient expressed no known problems or needs

## 2022-10-23 ENCOUNTER — RESULT CHARGE (OUTPATIENT)
Age: 67
End: 2022-10-23

## 2022-10-23 ENCOUNTER — APPOINTMENT (OUTPATIENT)
Dept: ORTHOPEDIC SURGERY | Facility: CLINIC | Age: 67
End: 2022-10-23

## 2022-10-23 VITALS — WEIGHT: 228 LBS | HEIGHT: 61 IN | BODY MASS INDEX: 43.05 KG/M2

## 2022-10-23 DIAGNOSIS — M47.812 SPONDYLOSIS W/OUT MYELOPATHY OR RADICULOPATHY, CERVICAL REGION: ICD-10-CM

## 2022-10-23 PROCEDURE — 72040 X-RAY EXAM NECK SPINE 2-3 VW: CPT

## 2022-10-23 PROCEDURE — 99214 OFFICE O/P EST MOD 30 MIN: CPT

## 2022-10-23 NOTE — HISTORY OF PRESENT ILLNESS
[Gradual] : gradual [8] : 8 [Sharp] : sharp [de-identified] : 10/23/2022: Pt here with 4 day hx of neck and right shoulder pain. \par Pt has intermittent right arm pain that she believes is being referred from the neck\par Pt states arm pain is alleviated with right arm elevation.\par There is no associated weakness, gait disturbance or b/b dysfunction.\par \par PMH: DM, hyperlipidemia, HTN.\par Allergies: PCN (hives/SOB).  [FreeTextEntry5] : pt c.o pain in neck going to rt side of the body since 10/20/22 states she was sleeping on a futon

## 2022-10-23 NOTE — IMAGING
[Straightening consistent with spasm] : Straightening consistent with spasm [de-identified] : Cervical ROM is mildly limited in left lateral/rotational planes.\par + Right Spurling Sign is noted.\par Strength is 5/5 to the upper extremities.\par Pain to the right arm is alleviated with right arm elevation above the head. \par Isiah Signs are negative symmetrically. \par DTRs are difficult to elicit.\par \par  [FreeTextEntry1] : there is multilevel cervical DDD From C4-7 noted.

## 2022-10-23 NOTE — ASSESSMENT
[FreeTextEntry1] : pt provided MDP and will continue Flexeril 10 mg bid.\par Instructed to monitory BS and report elevations to PCP for treatment changes.\par RTO in 10 days with Pain Management for consultation in [place].\par Rx for PT provided.

## 2022-10-25 ENCOUNTER — APPOINTMENT (OUTPATIENT)
Dept: PAIN MANAGEMENT | Facility: CLINIC | Age: 67
End: 2022-10-25

## 2022-10-25 VITALS — WEIGHT: 228 LBS | HEIGHT: 61 IN | BODY MASS INDEX: 43.05 KG/M2

## 2022-10-25 DIAGNOSIS — M54.12 RADICULOPATHY, CERVICAL REGION: ICD-10-CM

## 2022-10-25 LAB — SURGICAL PATHOLOGY STUDY: SIGNIFICANT CHANGE UP

## 2022-10-25 PROCEDURE — 20552 NJX 1/MLT TRIGGER POINT 1/2: CPT

## 2022-10-25 PROCEDURE — J3490M: CUSTOM

## 2022-10-25 PROCEDURE — 99204 OFFICE O/P NEW MOD 45 MIN: CPT | Mod: 25

## 2022-10-25 NOTE — DISCUSSION/SUMMARY
[de-identified] : After discussing various treatment options with the patient including but not limited to oral medications, physical therapy, exercise, as well as interventional spinal injections, we have decided with the following plan:\par \par - Continue home exercises, stretching, activity modification, physical therapy, and conservative care.\par - Will order cervical MRI to rule out HNP. Please let this note serve as a formal request for authorization to perform a MRI of their Cervical Spine.\par - Follow-up post diagnostic imaging to review and discuss further treatment.\par - Complete MDP\par - Recommend Tylenol 500-1000mg Q8 hours PRN.\par - Recommend continue Cyclobenzaprine 10mg BID PRN for muscle spasms and to assist with pain relief.\par - Xray C-spine reviewed and reviewed prior notes

## 2022-10-25 NOTE — PROCEDURE
[FreeTextEntry3] : Procedure Name: Trigger Point Injection (1-2 muscle groups): Celestone and Marcaine\par Trigger Point was performed because of pain. \par Celestone: An injection of Celestone 6 mg\par Marcaine: An injection of Marcaine 10 mg\par Medication was injected in the Right Trapezius muscle. \par \par Patient has tried OTC's including aspirin, Ibuprofen, Aleve etc or prescription NSAIDS, and/or exercises at home and/ or physical therapy without satisfactory response. After verbal consent using sterile preparation and technique.The risks, benefits, and alternatives to cortisone injection were explained in full to the patient. Risks outlined include but are not limited to infection, sepsis, bleeding, scarring, skin discoloration, temporary increase in pain, syncopal episode, failure to resolve symptoms, allergic reaction, symptom recurrence, and elevation of blood sugar in diabetics. Patient understood the risks. All questions were answered. After discussion of options, patient requested an injection. Oral informed consent was obtained and sterile prep was done of the injection site. Sterile technique was utilized for the procedure including the preparation of the solutions used for the injection. Patient tolerated the procedure well. Advised to ice the injection site this evening. Prep with alcohol locally to site. Sterile technique used.\par

## 2022-10-25 NOTE — HISTORY OF PRESENT ILLNESS
[Neck] : neck [10] : 10 [7] : 7 [Radiating] : radiating [Shooting] : shooting [Tingling] : tingling [Constant] : constant [Sleep] : sleep [Nothing helps with pain getting better] : Nothing helps with pain getting better [Lying in bed] : lying in bed [de-identified] : Initial HPI 10/25/2022:\par Pain started a week ago and is on the right side of the neck and radiates to the right shoulder and down the right arm to the fingers described as a sharp shooting pain with associated pins/needles \par \par Physical Therapy: Has Rx for PT \par Pain Medications: MDP on day 3, Flexeril PRN \par Past Injections: none\par Spine surgery: none \par Blood thinners: none [] : no [FreeTextEntry6] : numbness [de-identified] : applying pressure on the back

## 2022-10-25 NOTE — PHYSICAL EXAM
[de-identified] : Constitutional; Appears well, no apparent distress\par Ability to communicate: Normal \par Respiratory: non-labored breathing\par Skin: No rash noted\par Head: Normocephalic, atraumatic\par Neck: no visible thyroid enlargement\par Eyes: Extraocular movements intact\par Neurologic: Alert and oriented x3\par Psychiatric: normal mood, affect and behavior \par \par  [] : positive [place]

## 2022-10-26 ENCOUNTER — APPOINTMENT (OUTPATIENT)
Dept: UROGYNECOLOGY | Facility: CLINIC | Age: 67
End: 2022-10-26

## 2022-10-28 ENCOUNTER — NON-APPOINTMENT (OUTPATIENT)
Age: 67
End: 2022-10-28

## 2022-10-28 ENCOUNTER — TRANSCRIPTION ENCOUNTER (OUTPATIENT)
Age: 67
End: 2022-10-28

## 2022-10-31 ENCOUNTER — TRANSCRIPTION ENCOUNTER (OUTPATIENT)
Age: 67
End: 2022-10-31

## 2022-11-01 ENCOUNTER — RESULT CHARGE (OUTPATIENT)
Age: 67
End: 2022-11-01

## 2022-11-01 ENCOUNTER — APPOINTMENT (OUTPATIENT)
Dept: UROGYNECOLOGY | Facility: CLINIC | Age: 67
End: 2022-11-01

## 2022-11-01 LAB
BILIRUB UR QL STRIP: NEGATIVE
CLARITY UR: CLEAR
COLLECTION METHOD: NORMAL
GLUCOSE UR-MCNC: NEGATIVE
HCG UR QL: 0.2 EU/DL
HGB UR QL STRIP.AUTO: NORMAL
KETONES UR-MCNC: NEGATIVE
LEUKOCYTE ESTERASE UR QL STRIP: NORMAL
NITRITE UR QL STRIP: NEGATIVE
PH UR STRIP: 5.5
PROT UR STRIP-MCNC: NEGATIVE
SP GR UR STRIP: 1.02

## 2022-11-01 PROCEDURE — 81003 URINALYSIS AUTO W/O SCOPE: CPT | Mod: QW

## 2022-11-01 PROCEDURE — 99024 POSTOP FOLLOW-UP VISIT: CPT

## 2022-11-01 NOTE — SUBJECTIVE
[FreeTextEntry1] : No fever/chills, no heavy vaginal bleeding or copious discharge. Overall happy with sx. Reports she had a pinched nerve occur 10/20/22 and has had f/u with ortho since surgery with us. [FreeTextEntry8] : Reports was started on muscle relaxant cyclobenzaprine 10mg tab BID prn as of 10/20/22, did not take on day of sx 10/21/22 and has been taking since 10/22/22. Was also started on steroids for pinched nerve in neck on 1025/22 last day 10/30/22 and was given steroid injection in her right shoulder 10/25/22 and patient has DM. [FreeTextEntry7] : Patient denies pain from sx. [FreeTextEntry6] : Normal, no n/v. [FreeTextEntry5] : no leakage, no subj incomplete emptying, no gross hematuria, no dysuria, no flank pain. Reports urinary urgency continues, unchanged prior to sx. Unsure if getting worse as she was started on steroids. [FreeTextEntry4] : Normal, last BM was this morning and continues using stool softener prn.  [FreeTextEntry3] : Normal. [FreeTextEntry2] : Normal.

## 2022-11-01 NOTE — DISCUSSION/SUMMARY
[Post-Op instructions given. Pt/family verbalizes understanding] : post-operative instructions were provided to the patient/family who verbalize understanding [FreeTextEntry1] : Post-op activity restrictions discussed including pelvic rest, no heavy lifting, pushing or pulling. Assured that vaginal discharge and spotting are normal for the first 6-8 weeks. She completed steroids this weekend, will pay close attention to urinary urgency and see if lessens. Follow-up in 4 weeks for 6 week exam or sooner if needed. Instructed to call with questions or concerns.

## 2022-11-01 NOTE — ASSESSMENT
[FreeTextEntry1] : 68y/o female 1 week and 4 days s/p retropubic midurethral sling, cystourethroscopy, posterior repair and perineorrhaphy.\par op report reviewed , pathology reviewed.

## 2022-11-02 ENCOUNTER — APPOINTMENT (OUTPATIENT)
Dept: GYNECOLOGIC ONCOLOGY | Facility: CLINIC | Age: 67
End: 2022-11-02

## 2022-11-02 DIAGNOSIS — N84.0 POLYP OF CORPUS UTERI: ICD-10-CM

## 2022-11-02 PROCEDURE — 99213 OFFICE O/P EST LOW 20 MIN: CPT

## 2022-11-02 NOTE — REASON FOR VISIT
[Post Op] : post op visit [de-identified] : 10/21/22 [de-identified] : D&c, hysteroscopy polypectomy [de-identified] : Pt presents for post-op check. She reports having no issues post-operatively. She recovered well and has no concerns. No abnormal discharge, no nausea/vomiting.

## 2022-11-02 NOTE — END OF VISIT
[FreeTextEntry3] : Discharge to routine gynecologic care\par Follow up if she develops hyperplasia/malignancy [FreeTextEntry2] : Kristina Wild MA was present the entire duration of the patient interaction and gynecological exam.\par

## 2022-11-02 NOTE — REASON FOR VISIT
[Post Op] : post op visit [de-identified] : 10/21/22 [de-identified] : D&c, hysteroscopy polypectomy [de-identified] : Pt presents for post-op check. She reports having no issues post-operatively. She recovered well and has no concerns. No abnormal discharge, no nausea/vomiting.

## 2022-11-02 NOTE — ASSESSMENT
[FreeTextEntry1] : Pt is a 66 yo s/p D&C, polypectomy for endometrial polyp. We discussed that given hyperplasia only involving the polyp I would recommend routine gynecologic care. She is at risk for endometrial cancer given her DM and obesity. I would recommend weight loss to reduce her risk of an issues in the future.

## 2022-11-03 ENCOUNTER — TRANSCRIPTION ENCOUNTER (OUTPATIENT)
Age: 67
End: 2022-11-03

## 2022-11-03 ENCOUNTER — RX RENEWAL (OUTPATIENT)
Age: 67
End: 2022-11-03

## 2022-11-08 ENCOUNTER — APPOINTMENT (OUTPATIENT)
Dept: ORTHOPEDIC SURGERY | Facility: CLINIC | Age: 67
End: 2022-11-08
Payer: COMMERCIAL

## 2022-11-08 PROCEDURE — 20610 DRAIN/INJ JOINT/BURSA W/O US: CPT | Mod: RT

## 2022-11-08 PROCEDURE — 99213 OFFICE O/P EST LOW 20 MIN: CPT | Mod: 25

## 2022-11-08 NOTE — HISTORY OF PRESENT ILLNESS
[4] : 4 [5] : 5 [Dull/Aching] : dull/aching [Sharp] : sharp [Meds] : meds [Stairs] : stairs [de-identified] : 11/08/22:  Returns today to begin Euflexxa injections #1 right knee. \par \par 11/11/21: Returns today for Euflexxa injection #3 right knee. \par \par 11/4/21 here for Euflexxa injection #2 rt knee.\par \par 10/28/21: Here today to start Euflexxa injections right knee. \par \par 10/14/21: Returns today with recurrent bilateral knee pain. Finished Euflexxa injections right knee in December of 2020 with good relief until about three months ago. She has IDDM and would like to repeat the injections as cortisone injections affect her blood sugar levels.\par \par 12/17/20 Her for Euflexxa injection #3 rt knee.\par \par 12/10/20 Here for Euflexxa injection #2 rt knee.\par \par 12/3/20 here to start Euflexxa injections rt knee.\par \par 8/24/20 returns today c/o recurring rt knee pain and swelling x last 1 months duration. Pt is working as a census worker, standing and walking all day. last cortisone injection x 7 months ago which helped.Would like a repeat injection. Still awaiting auth for gel injections.\par \par 02/07/20: Returns today for right knee MRI results. States she is scheduled to start PT on February 12, but has a high deductible and is not sure she will be able to do many visits due to cost. States she feels she has developed a little bleeding from taking the Aleve.\par \par 01/31/20: Returns today for her knees. Had cortisone injections on January 7 which helped minimally. Her left knee is not painful but her right knee is still painful. States she is also experiencing some pain in the back of her right calf because she is not walking properly because of her knee. \par \par 1/7/19: Initial visit for Ms. Isabell Koo, a 64-year-old female , presents today for R>L knee pain since 12/5/19 gradually getting worse. No injury. Pain is worse with walking, stairs. Pain walking <.5 mi. Better with lying down. She takes Tylenol PRN. +limping.\par \par Pmhx: DMII - hgba1c 7.7, HTN, Hyperlipidemia. Living w/ OA in hips (dx by rheum in 2015) [] : no [FreeTextEntry1] : right knee

## 2022-11-08 NOTE — PHYSICAL EXAM
[Normal Mood and Affect] : normal mood and affect [Orientated] : orientated [Able to Communicate] : able to communicate [Well Developed] : well developed [Well Nourished] : well nourished [Right] : right knee [Left] : left knee [NL (0)] : extension 0 degrees [5___] : hamstring 5[unfilled]/5 [] : negative Lachmann [TWNoteComboBox7] : flexion 130 degrees

## 2022-11-08 NOTE — PROCEDURE
[Large Joint Injection] : Large joint injection [Right] : of the right [Knee] : knee [Pain] : pain [X-ray evidence of Osteoarthritis on this or prior visit] : x-ray evidence of Osteoarthritis on this or prior visit [Betadine] : betadine [Ethyl Chloride sprayed topically] : ethyl chloride sprayed topically [Euflexxa] : Euflexxa [#1] : series #1 [] : Patient tolerated procedure well [Call if redness, pain or fever occur] : call if redness, pain or fever occur [Apply ice for 15min out of every hour for the next 12-24 hours as tolerated] : apply ice for 15 minutes out of every hour for the next 12-24 hours as tolerated [Risks, benefits, alternatives discussed / Verbal consent obtained] : the risks benefits, and alternatives have been discussed, and verbal consent was obtained

## 2022-11-08 NOTE — HISTORY OF PRESENT ILLNESS
[4] : 4 [5] : 5 [Dull/Aching] : dull/aching [Sharp] : sharp [Meds] : meds [Stairs] : stairs [de-identified] : 11/08/22:  Returns today to begin Euflexxa injections #1 right knee. \par \par 11/11/21: Returns today for Euflexxa injection #3 right knee. \par \par 11/4/21 here for Euflexxa injection #2 rt knee.\par \par 10/28/21: Here today to start Euflexxa injections right knee. \par \par 10/14/21: Returns today with recurrent bilateral knee pain. Finished Euflexxa injections right knee in December of 2020 with good relief until about three months ago. She has IDDM and would like to repeat the injections as cortisone injections affect her blood sugar levels.\par \par 12/17/20 Her for Euflexxa injection #3 rt knee.\par \par 12/10/20 Here for Euflexxa injection #2 rt knee.\par \par 12/3/20 here to start Euflexxa injections rt knee.\par \par 8/24/20 returns today c/o recurring rt knee pain and swelling x last 1 months duration. Pt is working as a census worker, standing and walking all day. last cortisone injection x 7 months ago which helped.Would like a repeat injection. Still awaiting auth for gel injections.\par \par 02/07/20: Returns today for right knee MRI results. States she is scheduled to start PT on February 12, but has a high deductible and is not sure she will be able to do many visits due to cost. States she feels she has developed a little bleeding from taking the Aleve.\par \par 01/31/20: Returns today for her knees. Had cortisone injections on January 7 which helped minimally. Her left knee is not painful but her right knee is still painful. States she is also experiencing some pain in the back of her right calf because she is not walking properly because of her knee. \par \par 1/7/19: Initial visit for Ms. Isabell Koo, a 64-year-old female , presents today for R>L knee pain since 12/5/19 gradually getting worse. No injury. Pain is worse with walking, stairs. Pain walking <.5 mi. Better with lying down. She takes Tylenol PRN. +limping.\par \par Pmhx: DMII - hgba1c 7.7, HTN, Hyperlipidemia. Living w/ OA in hips (dx by rheum in 2015) [] : no [FreeTextEntry1] : right knee

## 2022-11-10 ENCOUNTER — RX RENEWAL (OUTPATIENT)
Age: 67
End: 2022-11-10

## 2022-11-15 ENCOUNTER — APPOINTMENT (OUTPATIENT)
Dept: ORTHOPEDIC SURGERY | Facility: CLINIC | Age: 67
End: 2022-11-15
Payer: COMMERCIAL

## 2022-11-15 VITALS — HEIGHT: 61 IN | BODY MASS INDEX: 43.05 KG/M2 | WEIGHT: 228 LBS

## 2022-11-15 PROCEDURE — 20610 DRAIN/INJ JOINT/BURSA W/O US: CPT | Mod: RT

## 2022-11-15 PROCEDURE — 99213 OFFICE O/P EST LOW 20 MIN: CPT | Mod: 25

## 2022-11-15 NOTE — PROCEDURE
[Large Joint Injection] : Large joint injection [Right] : of the right [Knee] : knee [Pain] : pain [X-ray evidence of Osteoarthritis on this or prior visit] : x-ray evidence of Osteoarthritis on this or prior visit [Betadine] : betadine [Ethyl Chloride sprayed topically] : ethyl chloride sprayed topically [Euflexxa] : Euflexxa [#2] : series #2 [] : Patient tolerated procedure well [Call if redness, pain or fever occur] : call if redness, pain or fever occur [Apply ice for 15min out of every hour for the next 12-24 hours as tolerated] : apply ice for 15 minutes out of every hour for the next 12-24 hours as tolerated [Risks, benefits, alternatives discussed / Verbal consent obtained] : the risks benefits, and alternatives have been discussed, and verbal consent was obtained

## 2022-11-15 NOTE — HISTORY OF PRESENT ILLNESS
[Dull/Aching] : dull/aching [Sharp] : sharp [Meds] : meds [Stairs] : stairs [3] : 3 [1] : 2 [Intermittent] : intermittent [Walking] : walking [de-identified] : 11/15/22:  Here today for Euflexx injection #2, right knee. \par \par 11/08/22:  Returns today to begin Euflexxa injections #1 right knee. \par \par 11/11/21: Returns today for Euflexxa injection #3 right knee. \par \par 11/4/21 here for Euflexxa injection #2 rt knee.\par \par 10/28/21: Here today to start Euflexxa injections right knee. \par \par 10/14/21: Returns today with recurrent bilateral knee pain. Finished Euflexxa injections right knee in December of 2020 with good relief until about three months ago. She has IDDM and would like to repeat the injections as cortisone injections affect her blood sugar levels.\par \par 12/17/20 Her for Euflexxa injection #3 rt knee.\par \par 12/10/20 Here for Euflexxa injection #2 rt knee.\par \par 12/3/20 here to start Euflexxa injections rt knee.\par \par 8/24/20 returns today c/o recurring rt knee pain and swelling x last 1 months duration. Pt is working as a census worker, standing and walking all day. last cortisone injection x 7 months ago which helped.Would like a repeat injection. Still awaiting auth for gel injections.\par \par 02/07/20: Returns today for right knee MRI results. States she is scheduled to start PT on February 12, but has a high deductible and is not sure she will be able to do many visits due to cost. States she feels she has developed a little bleeding from taking the Aleve.\par \par 01/31/20: Returns today for her knees. Had cortisone injections on January 7 which helped minimally. Her left knee is not painful but her right knee is still painful. States she is also experiencing some pain in the back of her right calf because she is not walking properly because of her knee. \par \par 1/7/19: Initial visit for Ms. Isabell Koo, a 64-year-old female , presents today for R>L knee pain since 12/5/19 gradually getting worse. No injury. Pain is worse with walking, stairs. Pain walking <.5 mi. Better with lying down. She takes Tylenol PRN. +limping.\par \par Pmhx: DMII - hgba1c 7.7, HTN, Hyperlipidemia. Living w/ OA in hips (dx by rheum in 2015) [] : no [FreeTextEntry1] : right knee [de-identified] : 11/08/22 [de-identified] : dr pastrana [de-identified] : none

## 2022-11-21 NOTE — H&P PST ADULT - GASTROINTESTINAL
negative normal/soft/nontender/nondistended/normal active bowel sounds Ear Wedge Repair Text: A wedge excision was completed by carrying down an excision through the full thickness of the ear and cartilage with an inward facing Burow's triangle. The wound was then closed in a layered fashion.

## 2022-11-22 ENCOUNTER — APPOINTMENT (OUTPATIENT)
Dept: ORTHOPEDIC SURGERY | Facility: CLINIC | Age: 67
End: 2022-11-22
Payer: COMMERCIAL

## 2022-11-22 VITALS — WEIGHT: 228 LBS | HEIGHT: 61 IN | BODY MASS INDEX: 43.05 KG/M2

## 2022-11-22 PROCEDURE — 99213 OFFICE O/P EST LOW 20 MIN: CPT | Mod: 25

## 2022-11-22 PROCEDURE — 20610 DRAIN/INJ JOINT/BURSA W/O US: CPT | Mod: RT

## 2022-11-22 NOTE — HISTORY OF PRESENT ILLNESS
[1] : 2 [Dull/Aching] : dull/aching [5] : 5 [Sharp] : sharp [Shooting] : shooting [Stabbing] : stabbing [Intermittent] : intermittent [Leisure] : leisure [Work] : work [Sleep] : sleep [Meds] : meds [Walking] : walking [Stairs] : stairs [Full time] : Work status: full time [3] : 3 [Euflexxa] : Euflexxa [de-identified] : 11/22/22:  Here today for Euflexxa injection #3, right knee. \par \par 11/15/22:  Here today for Euflexxa injection #2, right knee. \par \par 11/08/22:  Returns today to begin Euflexxa injections #1 right knee. \par \par 11/11/21: Returns today for Euflexxa injection #3 right knee. \par \par 11/4/21 here for Euflexxa injection #2 rt knee.\par \par 10/28/21: Here today to start Euflexxa injections right knee. \par \par 10/14/21: Returns today with recurrent bilateral knee pain. Finished Euflexxa injections right knee in December of 2020 with good relief until about three months ago. She has IDDM and would like to repeat the injections as cortisone injections affect her blood sugar levels.\par \par 12/17/20 Her for Euflexxa injection #3 rt knee.\par \par 12/10/20 Here for Euflexxa injection #2 rt knee.\par \par 12/3/20 here to start Euflexxa injections rt knee.\par \par 8/24/20 returns today c/o recurring rt knee pain and swelling x last 1 months duration. Pt is working as a census worker, standing and walking all day. last cortisone injection x 7 months ago which helped.Would like a repeat injection. Still awaiting auth for gel injections.\par \par 02/07/20: Returns today for right knee MRI results. States she is scheduled to start PT on February 12, but has a high deductible and is not sure she will be able to do many visits due to cost. States she feels she has developed a little bleeding from taking the Aleve.\par \par 01/31/20: Returns today for her knees. Had cortisone injections on January 7 which helped minimally. Her left knee is not painful but her right knee is still painful. States she is also experiencing some pain in the back of her right calf because she is not walking properly because of her knee. \par \par 1/7/19: Initial visit for Ms. Isabell Koo, a 64-year-old female , presents today for R>L knee pain since 12/5/19 gradually getting worse. No injury. Pain is worse with walking, stairs. Pain walking <.5 mi. Better with lying down. She takes Tylenol PRN. +limping.\par \par Pmhx: DMII - hgba1c 7.7, HTN, Hyperlipidemia. Living w/ OA in hips (dx by rheum in 2015) [] : Post Surgical Visit: no [FreeTextEntry1] : right knee [de-identified] : 11/08/22 [de-identified] : dr pastrana [de-identified] : xray [de-identified] : none [de-identified] :  [de-identified] : 11/15/2022 [de-identified] : right knee [de-identified] : Euflexxa

## 2022-11-22 NOTE — HISTORY OF PRESENT ILLNESS
[1] : 2 [Dull/Aching] : dull/aching [5] : 5 [Sharp] : sharp [Shooting] : shooting [Stabbing] : stabbing [Intermittent] : intermittent [Leisure] : leisure [Work] : work [Sleep] : sleep [Meds] : meds [Walking] : walking [Stairs] : stairs [Full time] : Work status: full time [3] : 3 [Euflexxa] : Euflexxa [de-identified] : 11/22/22:  Here today for Euflexxa injection #3, right knee. \par \par 11/15/22:  Here today for Euflexxa injection #2, right knee. \par \par 11/08/22:  Returns today to begin Euflexxa injections #1 right knee. \par \par 11/11/21: Returns today for Euflexxa injection #3 right knee. \par \par 11/4/21 here for Euflexxa injection #2 rt knee.\par \par 10/28/21: Here today to start Euflexxa injections right knee. \par \par 10/14/21: Returns today with recurrent bilateral knee pain. Finished Euflexxa injections right knee in December of 2020 with good relief until about three months ago. She has IDDM and would like to repeat the injections as cortisone injections affect her blood sugar levels.\par \par 12/17/20 Her for Euflexxa injection #3 rt knee.\par \par 12/10/20 Here for Euflexxa injection #2 rt knee.\par \par 12/3/20 here to start Euflexxa injections rt knee.\par \par 8/24/20 returns today c/o recurring rt knee pain and swelling x last 1 months duration. Pt is working as a census worker, standing and walking all day. last cortisone injection x 7 months ago which helped.Would like a repeat injection. Still awaiting auth for gel injections.\par \par 02/07/20: Returns today for right knee MRI results. States she is scheduled to start PT on February 12, but has a high deductible and is not sure she will be able to do many visits due to cost. States she feels she has developed a little bleeding from taking the Aleve.\par \par 01/31/20: Returns today for her knees. Had cortisone injections on January 7 which helped minimally. Her left knee is not painful but her right knee is still painful. States she is also experiencing some pain in the back of her right calf because she is not walking properly because of her knee. \par \par 1/7/19: Initial visit for Ms. Isabell Koo, a 64-year-old female , presents today for R>L knee pain since 12/5/19 gradually getting worse. No injury. Pain is worse with walking, stairs. Pain walking <.5 mi. Better with lying down. She takes Tylenol PRN. +limping.\par \par Pmhx: DMII - hgba1c 7.7, HTN, Hyperlipidemia. Living w/ OA in hips (dx by rheum in 2015) [] : Post Surgical Visit: no [FreeTextEntry1] : right knee [de-identified] : 11/08/22 [de-identified] : dr pastrana [de-identified] : xray [de-identified] : none [de-identified] :  [de-identified] : 11/15/2022 [de-identified] : right knee [de-identified] : Euflexxa

## 2022-11-22 NOTE — PROCEDURE
[Large Joint Injection] : Large joint injection [Right] : of the right [Knee] : knee [Pain] : pain [X-ray evidence of Osteoarthritis on this or prior visit] : x-ray evidence of Osteoarthritis on this or prior visit [Betadine] : betadine [Ethyl Chloride sprayed topically] : ethyl chloride sprayed topically [Euflexxa(20mg)] : 20mg of Euflexxa [#3] : series #3 [] : Patient tolerated procedure well [Call if redness, pain or fever occur] : call if redness, pain or fever occur [Apply ice for 15min out of every hour for the next 12-24 hours as tolerated] : apply ice for 15 minutes out of every hour for the next 12-24 hours as tolerated [Risks, benefits, alternatives discussed / Verbal consent obtained] : the risks benefits, and alternatives have been discussed, and verbal consent was obtained

## 2022-11-29 ENCOUNTER — APPOINTMENT (OUTPATIENT)
Dept: UROGYNECOLOGY | Facility: CLINIC | Age: 67
End: 2022-11-29

## 2022-11-29 DIAGNOSIS — R32 UNSPECIFIED URINARY INCONTINENCE: ICD-10-CM

## 2022-11-29 DIAGNOSIS — Z98.890 OTHER SPECIFIED POSTPROCEDURAL STATES: ICD-10-CM

## 2022-11-29 DIAGNOSIS — N81.6 RECTOCELE: ICD-10-CM

## 2022-11-29 PROCEDURE — 99024 POSTOP FOLLOW-UP VISIT: CPT

## 2022-11-29 PROCEDURE — 51798 US URINE CAPACITY MEASURE: CPT

## 2022-11-29 NOTE — DISCUSSION/SUMMARY
[Post-Op instructions given. Pt/family verbalizes understanding] : post-operative instructions were provided to the patient/family who verbalize understanding [Risks/Benefits discussed. Pt/family verbalizes understanding] : risks and benefits of the procedure were discussed with the patient/family who verbalize understanding [FreeTextEntry1] : 6 weeks postop from 10/21/22: TVT MUS / [place] / perineorrhaphy / cysto; case done with Gyn Onc D&C [facility] [place] em hyperplasia without atypica confined to a polyp only. She has DM and BMI is 43. With OAB-wet, had prior to surg too (DONA prior). The etiology of OAB was discussed. Management options including observation, behavioral modifications (dietary changes, monitoring fluid intake, bladder training, timed voids, use of pads/protective garments), kegels, PT, medications, PTNS, SNS, and bladder Botox were all reviewed. She will try PT, myrb 25 mg, and PTNS. Myrbetriq as a beta-ag vs the antimuscarinic OAB medications reviewed. Risks to myrbetriq including allergy, UTIs, HA, nasopharyngitis, failure, increase in PVR, increase in BP discussed. She was counseled to check BP 1-2 weeks after initiating the medication and DC if over 160 and/or 100. She understood and agreed. All ques answered.\par \par Plan:\par [] myrb 25 mg\par [] PTNS x 12 sessions\par [] pelvic floor PT\par []RTO 6 weeks med f/u - bp, pvr checks\par \par

## 2022-11-29 NOTE — OBJECTIVE
[Post Void Residual ____ ml] : Post Void Residual was [unfilled] ml [Soft and Nontender] : soft and nontender [Good Support] : Good support [Healing well] : healing well [No Masses or Tenderness] : no masses or tenderness [FreeTextEntry3] : suture lines intact, vicryl suture still present, POP reduced, no mesh exposure

## 2022-11-29 NOTE — SUBJECTIVE
[FreeTextEntry1] : no fever/chills, no vag disch or bleeding [FreeTextEntry7] : R shoulder pain s/p injection, minimal if any vaginal bleeding [FreeTextEntry6] : normal, no N/V [FreeTextEntry5] : unsure if TAYLOR leakage improved, continued bother from urgency incont; no incomplete emptying / dysuria / gross hematuria [FreeTextEntry4] : normal denies constipation or straining

## 2022-12-08 ENCOUNTER — RX RENEWAL (OUTPATIENT)
Age: 67
End: 2022-12-08

## 2023-01-06 ENCOUNTER — APPOINTMENT (OUTPATIENT)
Dept: ENDOCRINOLOGY | Facility: CLINIC | Age: 68
End: 2023-01-06
Payer: COMMERCIAL

## 2023-01-06 VITALS
DIASTOLIC BLOOD PRESSURE: 85 MMHG | SYSTOLIC BLOOD PRESSURE: 138 MMHG | BODY MASS INDEX: 43.23 KG/M2 | HEART RATE: 89 BPM | WEIGHT: 229 LBS | OXYGEN SATURATION: 96 % | HEIGHT: 61 IN

## 2023-01-06 PROCEDURE — 99214 OFFICE O/P EST MOD 30 MIN: CPT

## 2023-01-09 ENCOUNTER — FORM ENCOUNTER (OUTPATIENT)
Age: 68
End: 2023-01-09

## 2023-01-17 ENCOUNTER — RX RENEWAL (OUTPATIENT)
Age: 68
End: 2023-01-17

## 2023-01-17 ENCOUNTER — APPOINTMENT (OUTPATIENT)
Dept: UROGYNECOLOGY | Facility: CLINIC | Age: 68
End: 2023-01-17
Payer: COMMERCIAL

## 2023-01-17 VITALS
SYSTOLIC BLOOD PRESSURE: 123 MMHG | HEART RATE: 91 BPM | OXYGEN SATURATION: 95 % | HEIGHT: 61 IN | DIASTOLIC BLOOD PRESSURE: 77 MMHG | BODY MASS INDEX: 42.86 KG/M2 | WEIGHT: 227 LBS

## 2023-01-17 PROCEDURE — 51701 INSERT BLADDER CATHETER: CPT | Mod: 58

## 2023-01-17 PROCEDURE — 99213 OFFICE O/P EST LOW 20 MIN: CPT | Mod: 24,25

## 2023-01-17 NOTE — PHYSICAL EXAM
[Chaperone Present] : A chaperone was present in the examining room during all aspects of the physical examination [No Acute Distress] : in no acute distress [Oriented x3] : oriented to person, place, and time [FreeTextEntry4] : no mesh exposure or palpable arms of TVT

## 2023-01-17 NOTE — HISTORY OF PRESENT ILLNESS
[FreeTextEntry1] : Postop from 10/21/22: TVT MUS / [place] / perineorrhaphy / cysto; case done with Gyn Onc D&C [facility] em hyperplasia without atypica confined to a polyp only. She has DM and BMI is 43. With OAB-wet, had prior to surg too (DONA prior). At her last visit, OAB management was reviewed and she was started on PT, myrb 25 mg, and PTNS. She reports she didn't do PT or PTNS as she has felt great improvement with the myrb - reduced urge and is voiding q2 hours to stay empty, thus avoiding UUI episodes. No SEs, reports normal BP and no subj incomplete emptying or dysuria.\par \par \par

## 2023-01-17 NOTE — PROCEDURE
[Straight Catheterization] : insertion of a straight catheter [Urinary Retention] : urinary retention [Urgent Incontinence] : urgent incontinence [Urinary Frequency] : urinary frequency [Patient] : the patient [___ Fr Straight Tip] : a [unfilled] in Burmese straight tip catheter [None] : there were no complications with the catheter insertion [Clear] : clear [No Complications] : no complications [Tolerated Well] : the patient tolerated the procedure well [Post procedure instructions and information given] : Post procedure instructions and information were given and reviewed with patient. [1] : 1 [FreeTextEntry1] : cathed to obtain pvr of 10 ml

## 2023-01-17 NOTE — ASSESSMENT
[FreeTextEntry1] : 3 months postop from D&C [facility] [place], MUS/cysto with DONA prior to the sling. No TAYLOR, vag exam good, normal PVR. Myrb helping patient so continue, Myrbetriq as a beta-ag vs the antimuscarinic OAB medications reviewed. Risks to myrbetriq including allergy, UTIs, HA, nasopharyngitis, failure, increase in PVR, increase in BP reviewed. Renewed med for 1 year. 50 mg dosage reviewed as well if needed. RTO 1 year or prn. All ques answered.\par \par Plan:\par [] myrb 25 mg adalgisa 1 year

## 2023-01-26 ENCOUNTER — NON-APPOINTMENT (OUTPATIENT)
Age: 68
End: 2023-01-26

## 2023-02-03 ENCOUNTER — APPOINTMENT (OUTPATIENT)
Dept: INTERNAL MEDICINE | Facility: CLINIC | Age: 68
End: 2023-02-03
Payer: COMMERCIAL

## 2023-02-03 ENCOUNTER — NON-APPOINTMENT (OUTPATIENT)
Age: 68
End: 2023-02-03

## 2023-02-03 VITALS
TEMPERATURE: 98.6 F | HEART RATE: 108 BPM | DIASTOLIC BLOOD PRESSURE: 90 MMHG | OXYGEN SATURATION: 98 % | RESPIRATION RATE: 15 BRPM | BODY MASS INDEX: 42.29 KG/M2 | HEIGHT: 61 IN | SYSTOLIC BLOOD PRESSURE: 156 MMHG | WEIGHT: 224 LBS

## 2023-02-03 DIAGNOSIS — Z23 ENCOUNTER FOR IMMUNIZATION: ICD-10-CM

## 2023-02-03 PROCEDURE — 93000 ELECTROCARDIOGRAM COMPLETE: CPT | Mod: 59

## 2023-02-03 PROCEDURE — G0009: CPT

## 2023-02-03 PROCEDURE — 90677 PCV20 VACCINE IM: CPT

## 2023-02-03 PROCEDURE — 99397 PER PM REEVAL EST PAT 65+ YR: CPT | Mod: 25

## 2023-02-03 RX ORDER — METHYLPREDNISOLONE 4 MG/1
4 TABLET ORAL
Qty: 1 | Refills: 0 | Status: COMPLETED | COMMUNITY
Start: 2022-10-23 | End: 2023-02-03

## 2023-02-03 RX ORDER — DULAGLUTIDE 1.5 MG/.5ML
1.5 INJECTION, SOLUTION SUBCUTANEOUS
Qty: 9 | Refills: 3 | Status: DISCONTINUED | COMMUNITY
Start: 2022-08-16 | End: 2023-02-03

## 2023-02-03 NOTE — HISTORY OF PRESENT ILLNESS
[FreeTextEntry1] : CPE  [de-identified] : 68 yo F PMHx HTN, Urinary Incontinence, HLD, T2DM (on insulin) presenting for annual physical. \par She underwent urethral sling procedure late 2022 for urinary incontinence + d&c for endometrial thickening (which was biopsied and normal). She was started on myrbetriq for urge incontinence in november which has significantly helped. \par She has seen endo, advised to increase trulicity 3 weeks ago. would like to check her a1c today. She has been checking her finger sticks. Lowest 69. otherwise cannot recall average numbers. Plan to replace trulicity with Mounjaro if patient can afford it. \par Went to [facility] ED in october for severe right arm pain. ACS workup negative. she had f/u with ortho who dx cervical radiculopathy. Now doing home PT with some improvement. \par She checks her BP routinely. Gets occasional high numbers.  BP on prior visits at goal. \par Has torn meniscus right knee. Gets hyaloranic acid injections which improve her symptoms. \par Saw cardio last year for incidental aortic calficiations. Stress test wnl. She was told to fu in 1 year. \par Has wound right LE 2 days ago. pruiritic. no pus, no erythema. Applying topical neosporin. \par  \par HCM: \par mammogram 2022 - needs to repeat. \par colonoscopy- DUE 2013; would like diff GI. \par pap smear: DUE would like the name of regular GYN \par low CT Chest -feb 2022 normal \par optho december 2022 normal eye exam. \par flu UTD\par PNA - DUE \par DEXA several years ago

## 2023-02-03 NOTE — PLAN
[FreeTextEntry1] : # HCM \par - routine blood work\par - ECG NSR no ischemic changes \par - Low Dose CT Scan - script provided\par - Mammogram/Sono - script provided \par - DEXA - script provided \par - Pap - referred to GYN - Dr. Luh Deras \par - PNA given today patient tolerated well \par - Flu - UTD \par - Prevnar-20 today \par \par \par # Type 2 Diabetes \par - check a1c, microalbumin \par - fu endo\par - continue trulicity 3mg, semglee 40 units QHS, glipizide 10 mg\par - pt on ARB, pt was unable to tolerate statin \par - optho UTD \par \par # Hypothyroidism\par - check tsh\par - continue levothyroxine \par \par # HTN\par - BP elevated today however normal on prior visits, pt admits to not having a good night \par - advised to check BP at home with log\par - if still elevated, may consider d/c'ing amlodipine and starting BB blocker as pt has LE edema which will worsen at higher dose of amlodipine, pt's HR tends to be on higher side so would tolerate BB \par \par # wound\par - no signs of infection \par - if develops signs of symptoms of infection, pt to return to office \par

## 2023-02-03 NOTE — HEALTH RISK ASSESSMENT
[Fair] :  ~his/her~ mood as fair [Yes] : Yes [4 or more  times a week (4 pts)] : 4 or more  times a week (4 points) [No falls in past year] : Patient reported no falls in the past year [0] : 2) Feeling down, depressed, or hopeless: Not at all (0) [2] : 2 [S] : S [Patient reported mammogram was abnormal] : Patient reported mammogram was abnormal [Patient reported colonoscopy was normal] : Patient reported colonoscopy was normal [None] : None [# of Members in Household ___] :  household currently consist of [unfilled] member(s) [Significant Other] : lives with significant other [Feels Safe at Home] : Feels safe at home [Fully functional (bathing, dressing, toileting, transferring, walking, feeding)] : Fully functional (bathing, dressing, toileting, transferring, walking, feeding) [Former] : Former [20 or more] : 20 or more [< 15 Years] : < 15 Years [UDD6Klbcp] : 0 [LowDoseCTScan] : 02/2022  [MammogramDate] : 02/2022 [MammogramComments] : nodule [ColonoscopyDate] : 01/2013

## 2023-02-03 NOTE — PHYSICAL EXAM
[No Acute Distress] : no acute distress [Well Nourished] : well nourished [Well Developed] : well developed [Well-Appearing] : well-appearing [Normal Sclera/Conjunctiva] : normal sclera/conjunctiva [PERRL] : pupils equal round and reactive to light [EOMI] : extraocular movements intact [Normal Outer Ear/Nose] : the outer ears and nose were normal in appearance [Normal Oropharynx] : the oropharynx was normal [No JVD] : no jugular venous distention [No Lymphadenopathy] : no lymphadenopathy [Supple] : supple [Thyroid Normal, No Nodules] : the thyroid was normal and there were no nodules present [No Respiratory Distress] : no respiratory distress  [No Accessory Muscle Use] : no accessory muscle use [Clear to Auscultation] : lungs were clear to auscultation bilaterally [Normal Rate] : normal rate  [Regular Rhythm] : with a regular rhythm [Normal S1, S2] : normal S1 and S2 [No Murmur] : no murmur heard [No Carotid Bruits] : no carotid bruits [No Abdominal Bruit] : a ~M bruit was not heard ~T in the abdomen [No Varicosities] : no varicosities [Pedal Pulses Present] : the pedal pulses are present [No Edema] : there was no peripheral edema [No Palpable Aorta] : no palpable aorta [No Extremity Clubbing/Cyanosis] : no extremity clubbing/cyanosis [Soft] : abdomen soft [Non Tender] : non-tender [Non-distended] : non-distended [No Masses] : no abdominal mass palpated [No HSM] : no HSM [Normal Bowel Sounds] : normal bowel sounds [Normal Posterior Cervical Nodes] : no posterior cervical lymphadenopathy [Normal Anterior Cervical Nodes] : no anterior cervical lymphadenopathy [No CVA Tenderness] : no CVA  tenderness [No Spinal Tenderness] : no spinal tenderness [No Joint Swelling] : no joint swelling [Grossly Normal Strength/Tone] : grossly normal strength/tone [No Rash] : no rash [Coordination Grossly Intact] : coordination grossly intact [No Focal Deficits] : no focal deficits [Normal Gait] : normal gait [Deep Tendon Reflexes (DTR)] : deep tendon reflexes were 2+ and symmetric [Normal Affect] : the affect was normal [Normal Insight/Judgement] : insight and judgment were intact [de-identified] : + 1 cm wound , no pus erythema or discharge noted RLE

## 2023-02-06 LAB
25(OH)D3 SERPL-MCNC: 34.4 NG/ML
ALBUMIN SERPL ELPH-MCNC: 4.3 G/DL
ALP BLD-CCNC: 109 U/L
ALT SERPL-CCNC: 40 U/L
ANION GAP SERPL CALC-SCNC: 13 MMOL/L
AST SERPL-CCNC: 57 U/L
BASOPHILS # BLD AUTO: 0.11 K/UL
BASOPHILS NFR BLD AUTO: 1 %
BILIRUB SERPL-MCNC: 0.2 MG/DL
BUN SERPL-MCNC: 13 MG/DL
CALCIUM SERPL-MCNC: 10.1 MG/DL
CHLORIDE SERPL-SCNC: 105 MMOL/L
CHOLEST SERPL-MCNC: 256 MG/DL
CO2 SERPL-SCNC: 24 MMOL/L
CREAT SERPL-MCNC: 1 MG/DL
CREAT SPEC-SCNC: 245 MG/DL
EGFR: 63 ML/MIN/1.73M2
EOSINOPHIL # BLD AUTO: 0.54 K/UL
EOSINOPHIL NFR BLD AUTO: 5.2 %
ESTIMATED AVERAGE GLUCOSE: 194 MG/DL
GLUCOSE SERPL-MCNC: 181 MG/DL
HBA1C MFR BLD HPLC: 8.4 %
HCT VFR BLD CALC: 42.9 %
HDLC SERPL-MCNC: 45 MG/DL
HGB BLD-MCNC: 13.4 G/DL
IMM GRANULOCYTES NFR BLD AUTO: 0.4 %
LDLC SERPL CALC-MCNC: 165 MG/DL
LYMPHOCYTES # BLD AUTO: 2.74 K/UL
LYMPHOCYTES NFR BLD AUTO: 26.1 %
MAN DIFF?: NORMAL
MCHC RBC-ENTMCNC: 27 PG
MCHC RBC-ENTMCNC: 31.2 GM/DL
MCV RBC AUTO: 86.3 FL
MICROALBUMIN 24H UR DL<=1MG/L-MCNC: 1.4 MG/DL
MICROALBUMIN/CREAT 24H UR-RTO: 6 MG/G
MONOCYTES # BLD AUTO: 0.76 K/UL
MONOCYTES NFR BLD AUTO: 7.3 %
NEUTROPHILS # BLD AUTO: 6.29 K/UL
NEUTROPHILS NFR BLD AUTO: 60 %
NONHDLC SERPL-MCNC: 211 MG/DL
PLATELET # BLD AUTO: 248 K/UL
POTASSIUM SERPL-SCNC: 4.7 MMOL/L
PROT SERPL-MCNC: 7.2 G/DL
RBC # BLD: 4.97 M/UL
RBC # FLD: 14.4 %
SODIUM SERPL-SCNC: 142 MMOL/L
TRIGL SERPL-MCNC: 226 MG/DL
TSH SERPL-ACNC: 3.55 UIU/ML
WBC # FLD AUTO: 10.48 K/UL

## 2023-02-07 ENCOUNTER — TRANSCRIPTION ENCOUNTER (OUTPATIENT)
Age: 68
End: 2023-02-07

## 2023-02-09 LAB
M TB IFN-G BLD-IMP: NEGATIVE
QUANTIFERON TB PLUS MITOGEN MINUS NIL: 1.84 IU/ML
QUANTIFERON TB PLUS NIL: 0.01 IU/ML
QUANTIFERON TB PLUS TB1 MINUS NIL: 0 IU/ML
QUANTIFERON TB PLUS TB2 MINUS NIL: 0 IU/ML

## 2023-02-13 ENCOUNTER — APPOINTMENT (OUTPATIENT)
Dept: OBGYN | Facility: CLINIC | Age: 68
End: 2023-02-13
Payer: COMMERCIAL

## 2023-02-13 ENCOUNTER — RX RENEWAL (OUTPATIENT)
Age: 68
End: 2023-02-13

## 2023-02-13 VITALS
HEIGHT: 61 IN | BODY MASS INDEX: 5.29 KG/M2 | WEIGHT: 28 LBS | DIASTOLIC BLOOD PRESSURE: 84 MMHG | SYSTOLIC BLOOD PRESSURE: 138 MMHG

## 2023-02-13 DIAGNOSIS — Z01.419 ENCOUNTER FOR GYNECOLOGICAL EXAMINATION (GENERAL) (ROUTINE) W/OUT ABNORMAL FINDINGS: ICD-10-CM

## 2023-02-13 PROCEDURE — 82270 OCCULT BLOOD FECES: CPT

## 2023-02-13 PROCEDURE — 99387 INIT PM E/M NEW PAT 65+ YRS: CPT

## 2023-02-13 NOTE — HISTORY OF PRESENT ILLNESS
[FreeTextEntry1] : 67 year-old patient had a surgery for incontinence, status post D&C for endometrial polyp is here for a Pap smear.\par According to the patient has not had a Pap in several years,\par Patient complains of overactive bladder and cyst your gynecologist for this.\par Patient is diabetic.\par Patient denies any vaginal bleeding or discharge. [Mammogramdate] : 8.2022 [PapSmeardate] : 2012 [ColonoscopyDate] : 2013 [Currently In Menopause] : currently in menopause [Menopause Age: ____] : age at menopause was [unfilled]

## 2023-02-13 NOTE — PLAN
[FreeTextEntry1] : Examination and Pap smear was done.\par And is going for BMD and mammogram in March.\par Exercise and diet discussed with the patient

## 2023-02-15 LAB — HPV HIGH+LOW RISK DNA PNL CVX: NOT DETECTED

## 2023-02-17 ENCOUNTER — APPOINTMENT (OUTPATIENT)
Dept: ENDOCRINOLOGY | Facility: CLINIC | Age: 68
End: 2023-02-17
Payer: COMMERCIAL

## 2023-02-17 PROCEDURE — G0108 DIAB MANAGE TRN  PER INDIV: CPT

## 2023-02-21 ENCOUNTER — NON-APPOINTMENT (OUTPATIENT)
Age: 68
End: 2023-02-21

## 2023-02-22 ENCOUNTER — NON-APPOINTMENT (OUTPATIENT)
Age: 68
End: 2023-02-22

## 2023-02-22 LAB — CYTOLOGY CVX/VAG DOC THIN PREP: ABNORMAL

## 2023-02-28 ENCOUNTER — NON-APPOINTMENT (OUTPATIENT)
Age: 68
End: 2023-02-28

## 2023-02-28 VITALS — BODY MASS INDEX: 43.05 KG/M2 | HEIGHT: 61 IN | WEIGHT: 228 LBS

## 2023-02-28 NOTE — HISTORY OF PRESENT ILLNESS
[Former] : Former [TextBox_13] : Referred by Dr. Joselyn eSgura\par \par Ms. BELTRAN is a 68 year year old female with a history of DM2, HTN, high cholesterol.  Reviewed and confirmed that the patient meets screening eligibility criteria:\par \par 68 years old \par \par Smoking Status: Former Smoker\par \par Number of pack(s) per day: 1 pack per day x 10 years, 1.5 packs per day x 20 years, 1/2 pack per day x 5 years\par Number of years smoked: 35\par Number of pack years smokin pack years\par Quit year: \par \par No symptoms of lung cancer, including new cough, change in cough, hemoptysis, and unintentional weight loss.\par \par No personal history of lung cancer. No lung cancer in a first degree relative. No history of lung disease or occupational exposures.  [YearQuit] : 2020 [PacksperYear] : 43

## 2023-03-07 ENCOUNTER — RESULT REVIEW (OUTPATIENT)
Age: 68
End: 2023-03-07

## 2023-03-07 ENCOUNTER — APPOINTMENT (OUTPATIENT)
Dept: MAMMOGRAPHY | Facility: CLINIC | Age: 68
End: 2023-03-07
Payer: COMMERCIAL

## 2023-03-07 ENCOUNTER — APPOINTMENT (OUTPATIENT)
Dept: RADIOLOGY | Facility: CLINIC | Age: 68
End: 2023-03-07
Payer: COMMERCIAL

## 2023-03-07 ENCOUNTER — OUTPATIENT (OUTPATIENT)
Dept: OUTPATIENT SERVICES | Facility: HOSPITAL | Age: 68
LOS: 1 days | End: 2023-03-07
Payer: COMMERCIAL

## 2023-03-07 ENCOUNTER — APPOINTMENT (OUTPATIENT)
Dept: CT IMAGING | Facility: CLINIC | Age: 68
End: 2023-03-07
Payer: COMMERCIAL

## 2023-03-07 ENCOUNTER — APPOINTMENT (OUTPATIENT)
Dept: ULTRASOUND IMAGING | Facility: CLINIC | Age: 68
End: 2023-03-07
Payer: COMMERCIAL

## 2023-03-07 DIAGNOSIS — Z98.891 HISTORY OF UTERINE SCAR FROM PREVIOUS SURGERY: Chronic | ICD-10-CM

## 2023-03-07 DIAGNOSIS — Z00.00 ENCOUNTER FOR GENERAL ADULT MEDICAL EXAMINATION WITHOUT ABNORMAL FINDINGS: ICD-10-CM

## 2023-03-07 DIAGNOSIS — Z00.8 ENCOUNTER FOR OTHER GENERAL EXAMINATION: ICD-10-CM

## 2023-03-07 DIAGNOSIS — Z98.890 OTHER SPECIFIED POSTPROCEDURAL STATES: Chronic | ICD-10-CM

## 2023-03-07 DIAGNOSIS — Z98.51 TUBAL LIGATION STATUS: Chronic | ICD-10-CM

## 2023-03-07 PROCEDURE — 77067 SCR MAMMO BI INCL CAD: CPT

## 2023-03-07 PROCEDURE — 77063 BREAST TOMOSYNTHESIS BI: CPT | Mod: 26

## 2023-03-07 PROCEDURE — 77085 DXA BONE DENSITY AXL VRT FX: CPT | Mod: 26

## 2023-03-07 PROCEDURE — 71271 CT THORAX LUNG CANCER SCR C-: CPT | Mod: 26

## 2023-03-07 PROCEDURE — 77085 DXA BONE DENSITY AXL VRT FX: CPT

## 2023-03-07 PROCEDURE — 77067 SCR MAMMO BI INCL CAD: CPT | Mod: 26

## 2023-03-07 PROCEDURE — 76641 ULTRASOUND BREAST COMPLETE: CPT | Mod: 26,50

## 2023-03-07 PROCEDURE — 76641 ULTRASOUND BREAST COMPLETE: CPT

## 2023-03-07 PROCEDURE — 71271 CT THORAX LUNG CANCER SCR C-: CPT

## 2023-03-07 PROCEDURE — 77063 BREAST TOMOSYNTHESIS BI: CPT

## 2023-03-09 ENCOUNTER — TRANSCRIPTION ENCOUNTER (OUTPATIENT)
Age: 68
End: 2023-03-09

## 2023-03-10 ENCOUNTER — TRANSCRIPTION ENCOUNTER (OUTPATIENT)
Age: 68
End: 2023-03-10

## 2023-03-14 ENCOUNTER — TRANSCRIPTION ENCOUNTER (OUTPATIENT)
Age: 68
End: 2023-03-14

## 2023-03-22 ENCOUNTER — TRANSCRIPTION ENCOUNTER (OUTPATIENT)
Age: 68
End: 2023-03-22

## 2023-03-22 RX ORDER — BUDESONIDE AND FORMOTEROL FUMARATE DIHYDRATE 160; 4.5 UG/1; UG/1
160-4.5 AEROSOL RESPIRATORY (INHALATION) TWICE DAILY
Qty: 1 | Refills: 0 | Status: DISCONTINUED | COMMUNITY
Start: 2022-02-01 | End: 2023-03-22

## 2023-04-26 ENCOUNTER — TRANSCRIPTION ENCOUNTER (OUTPATIENT)
Age: 68
End: 2023-04-26

## 2023-05-08 ENCOUNTER — APPOINTMENT (OUTPATIENT)
Dept: ENDOCRINOLOGY | Facility: CLINIC | Age: 68
End: 2023-05-08
Payer: COMMERCIAL

## 2023-05-08 VITALS
OXYGEN SATURATION: 94 % | DIASTOLIC BLOOD PRESSURE: 69 MMHG | SYSTOLIC BLOOD PRESSURE: 147 MMHG | HEIGHT: 61 IN | BODY MASS INDEX: 42.48 KG/M2 | WEIGHT: 225 LBS | HEART RATE: 88 BPM

## 2023-05-08 PROCEDURE — 99214 OFFICE O/P EST MOD 30 MIN: CPT

## 2023-05-09 ENCOUNTER — APPOINTMENT (OUTPATIENT)
Dept: HEPATOLOGY | Facility: CLINIC | Age: 68
End: 2023-05-09

## 2023-05-11 ENCOUNTER — RX RENEWAL (OUTPATIENT)
Age: 68
End: 2023-05-11

## 2023-05-23 ENCOUNTER — TRANSCRIPTION ENCOUNTER (OUTPATIENT)
Age: 68
End: 2023-05-23

## 2023-06-01 ENCOUNTER — TRANSCRIPTION ENCOUNTER (OUTPATIENT)
Age: 68
End: 2023-06-01

## 2023-06-21 ENCOUNTER — TRANSCRIPTION ENCOUNTER (OUTPATIENT)
Age: 68
End: 2023-06-21

## 2023-06-21 LAB
ESTIMATED AVERAGE GLUCOSE: 166 MG/DL
HBA1C MFR BLD HPLC: 7.4 %

## 2023-08-07 ENCOUNTER — APPOINTMENT (OUTPATIENT)
Dept: INTERNAL MEDICINE | Facility: CLINIC | Age: 68
End: 2023-08-07
Payer: COMMERCIAL

## 2023-08-07 VITALS
HEART RATE: 89 BPM | TEMPERATURE: 98.6 F | RESPIRATION RATE: 15 BRPM | HEIGHT: 61 IN | SYSTOLIC BLOOD PRESSURE: 120 MMHG | OXYGEN SATURATION: 97 % | BODY MASS INDEX: 41.35 KG/M2 | DIASTOLIC BLOOD PRESSURE: 80 MMHG | WEIGHT: 219 LBS

## 2023-08-07 DIAGNOSIS — L80 VITILIGO: ICD-10-CM

## 2023-08-07 DIAGNOSIS — Z01.818 ENCOUNTER FOR OTHER PREPROCEDURAL EXAMINATION: ICD-10-CM

## 2023-08-07 PROCEDURE — 99214 OFFICE O/P EST MOD 30 MIN: CPT

## 2023-08-07 RX ORDER — DULAGLUTIDE 1.5 MG/.5ML
1.5 INJECTION, SOLUTION SUBCUTANEOUS
Qty: 3 | Refills: 3 | Status: DISCONTINUED | COMMUNITY
Start: 2023-01-30 | End: 2023-08-07

## 2023-08-07 RX ORDER — CLOTRIMAZOLE AND BETAMETHASONE DIPROPIONATE 10; .5 MG/G; MG/G
1-0.05 CREAM TOPICAL TWICE DAILY
Qty: 45 | Refills: 0 | Status: DISCONTINUED | COMMUNITY
Start: 2022-08-12 | End: 2023-08-07

## 2023-08-07 RX ORDER — KETOCONAZOLE 20.5 MG/ML
2 SHAMPOO, SUSPENSION TOPICAL
Qty: 120 | Refills: 2 | Status: DISCONTINUED | COMMUNITY
Start: 2022-08-12 | End: 2023-08-07

## 2023-08-07 RX ORDER — COVID-19 ANTIGEN TEST
KIT MISCELLANEOUS
Qty: 8 | Refills: 0 | Status: DISCONTINUED | COMMUNITY
Start: 2023-04-11

## 2023-08-07 RX ORDER — NYSTATIN 100000 [USP'U]/ML
100000 SUSPENSION ORAL 4 TIMES DAILY
Qty: 280 | Refills: 0 | Status: DISCONTINUED | COMMUNITY
Start: 2022-10-13 | End: 2023-08-07

## 2023-08-07 NOTE — HISTORY OF PRESENT ILLNESS
[No Pertinent Cardiac History] : no history of aortic stenosis, atrial fibrillation, coronary artery disease, recent myocardial infarction, or implantable device/pacemaker [COPD] : COPD [No Adverse Anesthesia Reaction] : no adverse anesthesia reaction in self or family member [Diabetes] : diabetes [(Patient denies any chest pain, claudication, dyspnea on exertion, orthopnea, palpitations or syncope)] : Patient denies any chest pain, claudication, dyspnea on exertion, orthopnea, palpitations or syncope [Moderate (4-6 METs)] : Moderate (4-6 METs) [Asthma] : no asthma [Sleep Apnea] : no sleep apnea [Smoker] : not a smoker [Chronic Anticoagulation] : no chronic anticoagulation [Chronic Kidney Disease] : no chronic kidney disease [FreeTextEntry1] : root canal on 3 teeth, crown, IV sedation  [FreeTextEntry2] : 8/8 [FreeTextEntry3] : Dr. Gitlin [FreeTextEntry4] : Pt here for clearance. Does not take A/C or antiplatelets.   Pt started on ozempic mid-June 1mg weekly, replaced trulicity. Also taking semglee 38units at bedtime. Last a1c 7.4

## 2023-08-07 NOTE — PHYSICAL EXAM
[Normal] : no acute distress, well nourished, well developed and well-appearing [Normal Oropharynx] : the oropharynx was normal [No Respiratory Distress] : no respiratory distress  [No Accessory Muscle Use] : no accessory muscle use [Clear to Auscultation] : lungs were clear to auscultation bilaterally [Normal Rate] : normal rate  [Regular Rhythm] : with a regular rhythm [No Murmur] : no murmur heard [Alert and Oriented x3] : oriented to person, place, and time

## 2023-08-11 ENCOUNTER — RX RENEWAL (OUTPATIENT)
Age: 68
End: 2023-08-11

## 2023-08-11 ENCOUNTER — TRANSCRIPTION ENCOUNTER (OUTPATIENT)
Age: 68
End: 2023-08-11

## 2023-08-16 ENCOUNTER — APPOINTMENT (OUTPATIENT)
Dept: OBGYN | Facility: CLINIC | Age: 68
End: 2023-08-16
Payer: COMMERCIAL

## 2023-08-16 VITALS — BODY MASS INDEX: 41.72 KG/M2 | WEIGHT: 221 LBS | HEIGHT: 61 IN

## 2023-08-16 PROCEDURE — 99213 OFFICE O/P EST LOW 20 MIN: CPT

## 2023-08-16 NOTE — HISTORY OF PRESENT ILLNESS
[FreeTextEntry1] : 68-year-old patient is here for follow-up of an abnormal Pap smear ASCUS HPV negative in February. Patient now complains of a nodule in her left breast. History of urgency incontinence and patient is seeing URO-gynecologist for this.  Patient had a urethral suspension several years ago [Currently In Menopause] : currently in menopause [Menopause Age: ____] : age at menopause was [unfilled]

## 2023-08-16 NOTE — PLAN
[FreeTextEntry1] : Examination and Pap smear was done, Breast nodule-corresponds to the benign nodule seen on the left breast sonogram which was done 6 months ago.  Patient to reexamine and if there is any change follow-up

## 2023-08-16 NOTE — PHYSICAL EXAM
[___cm] : a ~M [unfilled] ~Ucm superior lateral quadrant mass was palpated [Labia Majora] : normal [Labia Minora] : normal [Normal] : normal [Uterine Adnexae] : normal

## 2023-08-18 LAB — HPV HIGH+LOW RISK DNA PNL CVX: NOT DETECTED

## 2023-08-23 ENCOUNTER — NON-APPOINTMENT (OUTPATIENT)
Age: 68
End: 2023-08-23

## 2023-08-23 LAB — CYTOLOGY CVX/VAG DOC THIN PREP: ABNORMAL

## 2023-09-05 ENCOUNTER — RX RENEWAL (OUTPATIENT)
Age: 68
End: 2023-09-05

## 2023-09-05 ENCOUNTER — TRANSCRIPTION ENCOUNTER (OUTPATIENT)
Age: 68
End: 2023-09-05

## 2023-09-07 ENCOUNTER — APPOINTMENT (OUTPATIENT)
Dept: NEUROLOGY | Facility: CLINIC | Age: 68
End: 2023-09-07
Payer: COMMERCIAL

## 2023-09-07 PROCEDURE — 95909 NRV CNDJ TST 5-6 STUDIES: CPT

## 2023-09-07 PROCEDURE — 95886 MUSC TEST DONE W/N TEST COMP: CPT

## 2023-09-18 ENCOUNTER — NON-APPOINTMENT (OUTPATIENT)
Age: 68
End: 2023-09-18

## 2023-09-25 ENCOUNTER — TRANSCRIPTION ENCOUNTER (OUTPATIENT)
Age: 68
End: 2023-09-25

## 2023-09-28 ENCOUNTER — APPOINTMENT (OUTPATIENT)
Dept: ORTHOPEDIC SURGERY | Facility: CLINIC | Age: 68
End: 2023-09-28
Payer: COMMERCIAL

## 2023-09-28 VITALS — WEIGHT: 217 LBS | HEIGHT: 61 IN | BODY MASS INDEX: 40.97 KG/M2

## 2023-09-28 PROCEDURE — 99213 OFFICE O/P EST LOW 20 MIN: CPT

## 2023-09-28 RX ADMIN — Medication 2 MG/2ML: at 00:00

## 2023-09-29 ENCOUNTER — APPOINTMENT (OUTPATIENT)
Dept: ENDOCRINOLOGY | Facility: CLINIC | Age: 68
End: 2023-09-29
Payer: COMMERCIAL

## 2023-09-29 VITALS
RESPIRATION RATE: 18 BRPM | HEART RATE: 94 BPM | OXYGEN SATURATION: 95 % | DIASTOLIC BLOOD PRESSURE: 82 MMHG | HEIGHT: 61 IN | BODY MASS INDEX: 41.16 KG/M2 | WEIGHT: 218 LBS | SYSTOLIC BLOOD PRESSURE: 140 MMHG

## 2023-09-29 PROCEDURE — 83036 HEMOGLOBIN GLYCOSYLATED A1C: CPT | Mod: 59,QW

## 2023-09-29 PROCEDURE — 99214 OFFICE O/P EST MOD 30 MIN: CPT | Mod: 25

## 2023-09-29 RX ORDER — MUPIROCIN 20 MG/G
2 OINTMENT TOPICAL
Qty: 1 | Refills: 0 | Status: DISCONTINUED | COMMUNITY
Start: 2022-05-03 | End: 2023-09-29

## 2023-09-29 RX ORDER — BLOOD-GLUCOSE METER
W/DEVICE EACH MISCELLANEOUS
Qty: 1 | Refills: 0 | Status: DISCONTINUED | COMMUNITY
Start: 2019-03-02 | End: 2023-09-29

## 2023-09-29 RX ORDER — LANCETS 30 GAUGE
EACH MISCELLANEOUS
Qty: 1 | Refills: 0 | Status: DISCONTINUED | COMMUNITY
Start: 2019-03-02 | End: 2023-09-29

## 2023-09-29 RX ORDER — CYCLOBENZAPRINE HYDROCHLORIDE 10 MG/1
10 TABLET, FILM COATED ORAL TWICE DAILY
Qty: 20 | Refills: 1 | Status: DISCONTINUED | COMMUNITY
Start: 2022-10-23 | End: 2023-09-29

## 2023-09-29 RX ORDER — BLOOD SUGAR DIAGNOSTIC
STRIP MISCELLANEOUS
Qty: 100 | Refills: 0 | Status: DISCONTINUED | COMMUNITY
Start: 2019-03-02 | End: 2023-09-29

## 2023-10-02 ENCOUNTER — APPOINTMENT (OUTPATIENT)
Dept: OBGYN | Facility: CLINIC | Age: 68
End: 2023-10-02
Payer: COMMERCIAL

## 2023-10-02 VITALS — WEIGHT: 218 LBS | HEIGHT: 61 IN | BODY MASS INDEX: 41.16 KG/M2

## 2023-10-02 DIAGNOSIS — R87.610 ATYPICAL SQUAMOUS CELLS OF UNDETERMINED SIGNIFICANCE ON CYTOLOGIC SMEAR OF CERVIX (ASC-US): ICD-10-CM

## 2023-10-02 PROCEDURE — 57454 BX/CURETT OF CERVIX W/SCOPE: CPT

## 2023-10-03 ENCOUNTER — TRANSCRIPTION ENCOUNTER (OUTPATIENT)
Age: 68
End: 2023-10-03

## 2023-10-11 ENCOUNTER — RX RENEWAL (OUTPATIENT)
Age: 68
End: 2023-10-11

## 2023-10-11 ENCOUNTER — NON-APPOINTMENT (OUTPATIENT)
Age: 68
End: 2023-10-11

## 2023-10-11 LAB — CORE LAB BIOPSY: NORMAL

## 2023-10-11 RX ORDER — PEN NEEDLE, DIABETIC 32GX 5/32"
32G X 4 MM NEEDLE, DISPOSABLE MISCELLANEOUS
Qty: 100 | Refills: 3 | Status: ACTIVE | COMMUNITY
Start: 2022-06-10 | End: 1900-01-01

## 2023-10-17 ENCOUNTER — APPOINTMENT (OUTPATIENT)
Dept: ORTHOPEDIC SURGERY | Facility: CLINIC | Age: 68
End: 2023-10-17
Payer: COMMERCIAL

## 2023-10-17 PROCEDURE — 99213 OFFICE O/P EST LOW 20 MIN: CPT | Mod: 25

## 2023-10-17 PROCEDURE — 20610 DRAIN/INJ JOINT/BURSA W/O US: CPT | Mod: RT

## 2023-10-24 ENCOUNTER — APPOINTMENT (OUTPATIENT)
Dept: ORTHOPEDIC SURGERY | Facility: CLINIC | Age: 68
End: 2023-10-24
Payer: COMMERCIAL

## 2023-10-24 VITALS — WEIGHT: 218 LBS | HEIGHT: 61 IN | BODY MASS INDEX: 41.16 KG/M2

## 2023-10-24 PROCEDURE — 20610 DRAIN/INJ JOINT/BURSA W/O US: CPT | Mod: RT

## 2023-10-24 PROCEDURE — 99213 OFFICE O/P EST LOW 20 MIN: CPT | Mod: 25

## 2023-10-31 ENCOUNTER — APPOINTMENT (OUTPATIENT)
Dept: ORTHOPEDIC SURGERY | Facility: CLINIC | Age: 68
End: 2023-10-31
Payer: COMMERCIAL

## 2023-10-31 VITALS — BODY MASS INDEX: 41.91 KG/M2 | WEIGHT: 222 LBS | HEIGHT: 61 IN

## 2023-10-31 DIAGNOSIS — M17.11 UNILATERAL PRIMARY OSTEOARTHRITIS, RIGHT KNEE: ICD-10-CM

## 2023-10-31 PROCEDURE — 99213 OFFICE O/P EST LOW 20 MIN: CPT | Mod: 25

## 2023-10-31 PROCEDURE — 20610 DRAIN/INJ JOINT/BURSA W/O US: CPT | Mod: RT

## 2023-11-29 ENCOUNTER — TRANSCRIPTION ENCOUNTER (OUTPATIENT)
Age: 68
End: 2023-11-29

## 2023-12-04 ENCOUNTER — TRANSCRIPTION ENCOUNTER (OUTPATIENT)
Age: 68
End: 2023-12-04

## 2023-12-04 ENCOUNTER — NON-APPOINTMENT (OUTPATIENT)
Age: 68
End: 2023-12-04

## 2023-12-22 ENCOUNTER — RX RENEWAL (OUTPATIENT)
Age: 68
End: 2023-12-22

## 2023-12-27 ENCOUNTER — NON-APPOINTMENT (OUTPATIENT)
Age: 68
End: 2023-12-27

## 2023-12-30 ENCOUNTER — TRANSCRIPTION ENCOUNTER (OUTPATIENT)
Age: 68
End: 2023-12-30

## 2024-01-02 ENCOUNTER — APPOINTMENT (OUTPATIENT)
Dept: UROGYNECOLOGY | Facility: CLINIC | Age: 69
End: 2024-01-02
Payer: MEDICARE

## 2024-01-02 VITALS
OXYGEN SATURATION: 97 % | HEART RATE: 100 BPM | BODY MASS INDEX: 41.35 KG/M2 | RESPIRATION RATE: 18 BRPM | SYSTOLIC BLOOD PRESSURE: 126 MMHG | HEIGHT: 61 IN | WEIGHT: 219 LBS | DIASTOLIC BLOOD PRESSURE: 62 MMHG

## 2024-01-02 PROCEDURE — 99213 OFFICE O/P EST LOW 20 MIN: CPT

## 2024-01-02 NOTE — ASSESSMENT
[FreeTextEntry1] : The patient has urinary symptoms consistent with overactive bladder. The etiology of OAB was discussed. Management options including observation, behavioral modifications (dietary changes, monitoring fluid intake, bladder training, timed voids, use of pads/protective garments), kegels, PT, medications, PTNS, SNS, and bladder Botox were all reviewed. Myrbetriq as a beta-ag vs the antimuscarinic OAB medications reviewed. Risks to myrbetriq including allergy, UTIs, HA, nasopharyngitis, failure, increase in PVR, increase in BP discussed. We will increase to 50 mg. She will call when her new insurance with rx coverage kicks in, then the myrb 50 mg can be rx'd. All ques answered, RTO 1 year or prn.

## 2024-01-02 NOTE — HISTORY OF PRESENT ILLNESS
[FreeTextEntry1] : Patient last seen by myself 1/2023. She underwent surgery with me 10/21/22: TVT MUS / NC / perineorrhaphy / cysto; case done with Gyn Onc D&C HSC em hyperplasia without atypica confined to a polyp only. She has DM and obesity. Last visit, she was started on myrbetriq 25 mg. She had DONA prior to the urogyn surgery. She comes in today denying SEs from the myrb and thinks it helps. The only residual issue is UUI with first AM void, but otherwise nocturia 0-1, no daytime freq or UUI, no TAYLOR, no POP symptoms or bleeding, and no incomplete emptying.

## 2024-01-02 NOTE — PHYSICAL EXAM
[Chaperone Present] : A chaperone was present in the examining room during all aspects of the physical examination [No Acute Distress] : in no acute distress [Oriented x3] : oriented to person, place, and time [Tenderness] : ~T no ~M abdominal tenderness observed [Distended] : not distended [None] : no CVA tenderness [] : 0 [Post Void Residual ____ml] : post void residual was [unfilled] ml [de-identified] : no mesh exposure

## 2024-01-03 ENCOUNTER — TRANSCRIPTION ENCOUNTER (OUTPATIENT)
Age: 69
End: 2024-01-03

## 2024-01-30 ENCOUNTER — APPOINTMENT (OUTPATIENT)
Dept: ENDOCRINOLOGY | Facility: CLINIC | Age: 69
End: 2024-01-30

## 2024-01-31 ENCOUNTER — APPOINTMENT (OUTPATIENT)
Dept: ENDOCRINOLOGY | Facility: CLINIC | Age: 69
End: 2024-01-31
Payer: MEDICARE

## 2024-01-31 DIAGNOSIS — E66.01 MORBID (SEVERE) OBESITY DUE TO EXCESS CALORIES: ICD-10-CM

## 2024-01-31 PROCEDURE — 99214 OFFICE O/P EST MOD 30 MIN: CPT

## 2024-01-31 RX ORDER — SEMAGLUTIDE 1.34 MG/ML
4 INJECTION, SOLUTION SUBCUTANEOUS
Qty: 1 | Refills: 0 | Status: DISCONTINUED | COMMUNITY
Start: 2023-05-08 | End: 2024-01-31

## 2024-01-31 NOTE — REASON FOR VISIT
[Home] : at home, [unfilled] , at the time of the visit. [Medical Office: ([facility])___] : at the medical office located in  [Patient] : the patient [Follow - Up] : a follow-up visit [DM Type 2] : DM Type 2

## 2024-01-31 NOTE — ASSESSMENT
[Diabetes Foot Care] : diabetes foot care [Long Term Vascular Complications] : long term vascular complications of diabetes [Carbohydrate Consistent Diet] : carbohydrate consistent diet [Importance of Diet and Exercise] : importance of diet and exercise to improve glycemic control, achieve weight loss and improve cardiovascular health [Exercise/Effect on Glucose] : exercise/effect on glucose [Hypoglycemia Management] : hypoglycemia management [Ketone Testing] : ketone testing [Action and use of Insulin] : action and use of short and long-acting insulin [Self Monitoring of Blood Glucose] : self monitoring of blood glucose [Insulin Self-Administration] : insulin self-administration [Injection Technique, Storage, Sharps Disposal] : injection technique, storage, and sharps disposal [Sick-Day Management] : sick-day management [Retinopathy Screening] : Patient was referred to ophthalmology for retinopathy screening [Weight Loss] : weight loss [Diabetic Medications] : Risks and benefits of diabetic medications were discussed [Levothyroxine] : The patient was instructed to take Levothyroxine on an empty stomach, separate from vitamins, and wait at least 30 minutes before eating [FreeTextEntry1] : 68 year old female with PMH of HLD, hypothyroidism, HTN, HLD, BMI 43, no ischemic CAD, type 2 DM since 2012 is presenting for follow up care for her Diabetes and hypothyroidism.   1. Type 2 diabetes mellitus. HbA1c 8.5% Sept 2022 --> 8.4% Feb 2023 --> 7.4% June 2023 --> 7.0% Sept 2023  -We discussed the cardiovascular and microvascular complications of uncontrolled diabetes. We discussed the importance of diet and exercise and lifestyle modification for glycemic control and weight loss. We discussed referral to our diabetes educator and nutrition. We discussed pharmacologic options for glycemic control and weight loss.  -Continue Semglee but decrease to 20 units HS for a fasting BG goal of 80 to 120mg/dl. If needed, discussed increasing or decreasing by 5 units.  -Continue glipizide ER 10mg QD  -Continue Trulicity 1.5mg weekly. No h/o MTC, MEN2 or pancreatitis. GI s/e discussed.  -Check SMBG 2-3x daily  -Pt had EMG done, pending results.  -Urine ACR UTD  -Opthal UTD  -Foot care discussed.  -LDL 77, pt not on statin.  -A1c to be checked next month with PCP.   2. Hypothyroidism  C/w LT4 25mcg QD  TSH wnl Feb 2023 TSH to be checked next month with PCP.   Patient verbalized understanding of the above. All questions were answered to patient's satisfaction. Dispo: Patient will follow up in 6 months.

## 2024-01-31 NOTE — HISTORY OF PRESENT ILLNESS
[FreeTextEntry1] :  is presenting for follow up care for her Diabetes and hypothyroidism.  Works 4pm to 12am   68 year old female with PMH of HLD, hypothyroidism, HTN, HLD, BMI 43, no ischemic CAD, Osteopenia, tooth extraction and bone grafting, type 2 DM since .  Pt had rectocele repaired, urethral sling and D&C. Hospital requires <8.0% for surgery. Her surgery has been canceled once. Pt frustrated with 20 pound weight gain.   Reports no microvascular complications, no history of retinopathy, nephropathy. Pt has neuropathy.  Reports no history of cardiovascular risk factors, no history of CHF or CVA in the past.   Current DM meds: Semglee 33 units (at 1am), Glipizide ER 10mg daily, Trulicity 1.5mg weekly.  Prior DM meds: Trulicity 1.5mg weekly (unable to obtain 3mg dose so was able to double up to 3mg, no side effects). Metformin (possibly had lactic acidosis in ). Ozempic 1.0mg (has nausea) Other pertinent meds:   POCT A1c%: 8.5% 2022 --> 8.1% Oct 2022 --> 8.4% 2023 --> 7.4% 2023 --> 7.0% 2023  POCT B   FSG: Checks 2x daily. [place] 7 day avg 112 14 day avg 123  30 day average 129  90 day average 143 Pre-Breakfast: 132, 118, 105, 110, 98, 81, 115 Bedtime: 136, 165, 78, 98, 86, 111, 65, 89, 104, 167, 96 Hypoglycemic episodes:   Diet:  Breakfast: samosas  Lunch: half sandwich with corn beef and pumpkin soup  Dinner:  Snacks: No soda or juice. Fruit: Berries.   Exercise: Walks 2 miles 4x/week Urine micro: 2022 wnl ACE: C-peptide:  Cr: 0.89 GFR: 72  Lipid profile: LDL 78 TGs 169 Statin: DId not tolerate statin  HbA1c%: 7.0%  Ophthalmology: Dec 2021 No DR.  Neuropathy: B/l feet  Podiatry/Diabetic foot exam:    #Hypothyroidism  Diagnosed last year.  TSH 3. Pt on Levothyroxine 25mcg QD   Interval hx:  Pt now trying to be vegan.  Pt skipped her semglee and her fasting sugar was 137 mg/dl.  Above information updated as needed.

## 2024-02-04 ENCOUNTER — TRANSCRIPTION ENCOUNTER (OUTPATIENT)
Age: 69
End: 2024-02-04

## 2024-02-05 ENCOUNTER — TRANSCRIPTION ENCOUNTER (OUTPATIENT)
Age: 69
End: 2024-02-05

## 2024-02-06 ENCOUNTER — NON-APPOINTMENT (OUTPATIENT)
Age: 69
End: 2024-02-06

## 2024-02-06 ENCOUNTER — APPOINTMENT (OUTPATIENT)
Dept: INTERNAL MEDICINE | Facility: CLINIC | Age: 69
End: 2024-02-06
Payer: MEDICARE

## 2024-02-06 VITALS
BODY MASS INDEX: 41.54 KG/M2 | OXYGEN SATURATION: 97 % | WEIGHT: 220 LBS | RESPIRATION RATE: 18 BRPM | SYSTOLIC BLOOD PRESSURE: 140 MMHG | HEIGHT: 61 IN | HEART RATE: 94 BPM | DIASTOLIC BLOOD PRESSURE: 80 MMHG | TEMPERATURE: 98.4 F

## 2024-02-06 DIAGNOSIS — Z12.2 ENCOUNTER FOR SCREENING FOR MALIGNANT NEOPLASM OF RESPIRATORY ORGANS: ICD-10-CM

## 2024-02-06 DIAGNOSIS — E03.9 HYPOTHYROIDISM, UNSPECIFIED: ICD-10-CM

## 2024-02-06 DIAGNOSIS — Z00.00 ENCOUNTER FOR GENERAL ADULT MEDICAL EXAMINATION W/OUT ABNORMAL FINDINGS: ICD-10-CM

## 2024-02-06 PROCEDURE — G0402 INITIAL PREVENTIVE EXAM: CPT

## 2024-02-06 PROCEDURE — 93000 ELECTROCARDIOGRAM COMPLETE: CPT

## 2024-02-06 PROCEDURE — G0439: CPT

## 2024-02-06 RX ORDER — INSULIN GLARGINE 100 [IU]/ML
100 INJECTION, SOLUTION SUBCUTANEOUS DAILY
Qty: 1 | Refills: 0 | Status: DISCONTINUED | COMMUNITY
Start: 2022-04-25 | End: 2024-02-06

## 2024-02-06 NOTE — HISTORY OF PRESENT ILLNESS
[FreeTextEntry1] : CPE [de-identified] : 69yo F with PMHx HTN, HLD, DM2, OA, OAB, hypothyroidism, COPD presenting today for annual exam.  Has had extensive dental work recently, with root canal and bone graft (2 weeks ago), and also with URI in December so has been on a lot of antibiotics over the past 2 months. Today is concerned because she has been getting headaches for the past 2 months, occurs multiple times a week, that start in the neck and creep up the back of her head, occur sporadically throughout the day, usually lasts < 1hr and gets relief with NSAIDs. Has an appointment with neurology for LE parasthesias, had an EMG done.  Complaining of HYMAN. Had stress test with cardio-Dr. Buchanan which was negative. Has not been using her Advair inhaler daily as it is not on her formulary. Also with some LE swelling, worse by the end of the day, improves with elevation.   Saw endo last month, is taking Semglee 20u qhs, Trulicity 1.5mg weekly, and glipizide 10mg qd. Has been checking her sugars at home, morning readings are usually in the 130s.  Is seeing ortho and getting hyaluronic acid injections to her right knee.  HCM: Pap: 08/23 ASCUS, colpo 10/23 benign tissue Mammo: DUE, last 03/23 BIRADS 2 DEXA: 2023 osteopenia, repeat scan in 2025 Colonoscopy: DUE, last done in 2013 Lung Ca Screening: DUE, last low dose CT scan 2023 with new 3mm RUL nodule Vaccines: UTD with PNA, flu and Shingles

## 2024-02-06 NOTE — PHYSICAL EXAM
[Normal] : soft, non-tender, non-distended, no masses palpated, no HSM and normal bowel sounds [Normal Posterior Cervical Nodes] : no posterior cervical lymphadenopathy [Normal Anterior Cervical Nodes] : no anterior cervical lymphadenopathy [No Focal Deficits] : no focal deficits [Alert and Oriented x3] : oriented to person, place, and time [de-identified] : hearing aids present  [de-identified] : 1+ pitting edema b/l LE< R>L

## 2024-02-06 NOTE — HEALTH RISK ASSESSMENT
[Yes] : Yes [2 - 3 times a week (3 pts)] : 2 - 3  times a week (3 points) [1 or 2 (0 pts)] : 1 or 2 (0 points) [Never (0 pts)] : Never (0 points) [Not at All (0)] : 9.) Thoughts that you would be off dead or of hurting yourself in some way? Not at all [2] : 2 [Patient reported PAP Smear was abnormal] : Patient reported PAP Smear was abnormal [Patient reported colonoscopy was normal] : Patient reported colonoscopy was normal [With Family] : lives with family [Employed] : employed [Reports changes in dental health] : Reports changes in dental health [Former] : Former [20 or more] : 20 or more [< 15 Years] : < 15 Years [Audit-CScore] : 3 [de-identified] : walks 2 miles 4x weekly [de-identified] : described as poor, lots of carbs [LowDoseCTScan] : 03/23 [Reports changes in hearing] : Reports no changes in hearing [Reports changes in vision] : Reports no changes in vision [PapSmearDate] : 08/23 [PapSmearComments] : ASCUS, colpo 10/23 benign [BoneDensityComments] : osteopenia [ColonoscopyDate] : 01/13 [FreeTextEntry2] :

## 2024-02-06 NOTE — PLAN
[FreeTextEntry1] : #CPE - check labs - EKG NSR  - mammo script provided - set up for cologuard - UTD with DEXA - low CT chest script provided    #HTN - BP elevated in office today - Continue amlodipine 10mg qd and losartan 100mg qd - 2 week home BP log - if persistently elevated will need to add 3rd agent  #T2DM - check a1c and microalbumin  - Continue Trulicity 1.5mg weekly, [id number] qd and Semglee 20u qhs - Following with endo - Previously on statin, unable to tolerate. Discussed diet modification  #COPD - Has been having HYMAN, not taking her Advair daily, pt will f/u with what is covered on formulary  - Continue daily inhaler with PRN albuterol rescue inhaler  #Hypothyroidism - Continue levothyroxine 25mcg qd, titrate dose based on labs  #Headaches - Possibly 2/2 hypertonicity of trapezius vs elevated BPs - Seeing neurology tomorrow

## 2024-02-06 NOTE — REVIEW OF SYSTEMS
[Headache] : headache [Negative] : Musculoskeletal [Chest Pain] : no chest pain [Palpitations] : no palpitations [Lower Ext Edema] : lower extremity edema [Dyspnea on Exertion] : dyspnea on exertion [de-identified] : +b/l feet parasthesias

## 2024-02-07 ENCOUNTER — APPOINTMENT (OUTPATIENT)
Dept: NEUROLOGY | Facility: CLINIC | Age: 69
End: 2024-02-07
Payer: MEDICARE

## 2024-02-07 VITALS
SYSTOLIC BLOOD PRESSURE: 118 MMHG | WEIGHT: 220 LBS | DIASTOLIC BLOOD PRESSURE: 70 MMHG | BODY MASS INDEX: 41.54 KG/M2 | HEIGHT: 61 IN

## 2024-02-07 DIAGNOSIS — G62.9 POLYNEUROPATHY, UNSPECIFIED: ICD-10-CM

## 2024-02-07 DIAGNOSIS — M54.2 CERVICALGIA: ICD-10-CM

## 2024-02-07 PROCEDURE — G2211 COMPLEX E/M VISIT ADD ON: CPT

## 2024-02-07 PROCEDURE — 99214 OFFICE O/P EST MOD 30 MIN: CPT

## 2024-02-07 NOTE — HISTORY OF PRESENT ILLNESS
[FreeTextEntry1] : This 68-year-old woman was seen in neurological consultation today For about a year she reports numbness in her feet especially after prolonged walking.  It does not bother her much at other times She has no pain in the feet legs or back She is a diabetic and had EMG and nerve conduction studies on 9/7/2023 which showed moderate axonal neuropathy. She has no weakness of the legs She does have osteoarthritis of the knee  She also reports a few month history of pain in the neck radiating up the back of the head causing headaches.  She has had neck issues before  Medical history significant for diabetes, hypertension, hyperlipidemia, hypothyroid  She works as a .  Former smoker.  Drinks alcohol in moderation  Review of the records show that her latest hemoglobin A1c was 7.3 although over the last 2 to 3 years it is ranged as high as 11.2

## 2024-02-07 NOTE — ASSESSMENT
[FreeTextEntry1] : Her examination is relatively unremarkable She does have peripheral neuropathy as per EMG and nerve conduction testing Likely this is diabetes related.  I have cautioned her about alcohol use as well Suggesting a trial of gabapentin to build up to 100 mg twice a day  With regards to the neck pains radiating up the back of the head I am suggesting some physical therapy  Follow-up here 3 to 4 months and by phone prior to that as needed.

## 2024-02-07 NOTE — PHYSICAL EXAM
[FreeTextEntry1] : There is no cervical palpation tenderness. There is full range of movement of the cervical spine  There is no lumbar palpation tenderness. There is full range of movement of the lumbar spine. Straight leg raising is negative bilaterally. Nikolas's maneuver negative bilaterally. [General Appearance - Alert] : alert [General Appearance - In No Acute Distress] : in no acute distress [General Appearance - Well-Appearing] : healthy appearing [Oriented To Time, Place, And Person] : oriented to person, place, and time [Impaired Insight] : insight and judgment were intact [Affect] : the affect was normal [Memory Recent] : recent memory was not impaired [Person] : oriented to person [Place] : oriented to place [Time] : oriented to time [Concentration Intact] : normal concentrating ability [Fluency] : fluency intact [Comprehension] : comprehension intact [Past History] : adequate knowledge of personal past history [Cranial Nerves Optic (II)] : visual acuity intact bilaterally,  visual fields full to confrontation, pupils equal round and reactive to light [Cranial Nerves Oculomotor (III)] : extraocular motion intact [Cranial Nerves Trigeminal (V)] : facial sensation intact symmetrically [Cranial Nerves Facial (VII)] : face symmetrical [Cranial Nerves Vestibulocochlear (VIII)] : hearing was intact bilaterally [Cranial Nerves Glossopharyngeal (IX)] : tongue and palate midline [Cranial Nerves Accessory (XI - Cranial And Spinal)] : head turning and shoulder shrug symmetric [Cranial Nerves Hypoglossal (XII)] : there was no tongue deviation with protrusion [Motor Tone] : muscle tone was normal in all four extremities [Motor Strength] : muscle strength was normal in all four extremities [No Muscle Atrophy] : normal bulk in all four extremities [Paresis Pronator Drift Right-Sided] : no pronator drift on the right [Paresis Pronator Drift Left-Sided] : no pronator drift on the left [Sensation Tactile Decrease] : light touch was intact [Sensation Pain / Temperature Decrease] : pain and temperature was intact [Proprioception] : proprioception was intact [Romberg's Sign] : Romberg's sign was negtive [Abnormal Walk] : normal gait [Balance] : balance was intact [Past-pointing] : there was no past-pointing [Tremor] : no tremor present [Coordination - Dysmetria Impaired Finger-to-Nose Bilateral] : not present [Coordination - Dysmetria Impaired Heel-to-Shin Bilateral] : not present [2+] : Brachioradialis left 2+ [1+] : Ankle jerk left 1+ [PERRL With Normal Accommodation] : pupils were equal in size, round, reactive to light, with normal accommodation [Extraocular Movements] : extraocular movements were intact [Full Visual Field] : full visual field

## 2024-02-08 LAB
ALBUMIN SERPL ELPH-MCNC: 4.5 G/DL
ALP BLD-CCNC: 94 U/L
ALT SERPL-CCNC: 10 U/L
ANION GAP SERPL CALC-SCNC: 13 MMOL/L
AST SERPL-CCNC: 24 U/L
BASOPHILS # BLD AUTO: 0.12 K/UL
BASOPHILS NFR BLD AUTO: 1.1 %
BILIRUB SERPL-MCNC: 0.2 MG/DL
BUN SERPL-MCNC: 14 MG/DL
CALCIUM SERPL-MCNC: 9.4 MG/DL
CHLORIDE SERPL-SCNC: 104 MMOL/L
CHOLEST SERPL-MCNC: 254 MG/DL
CO2 SERPL-SCNC: 24 MMOL/L
CREAT SERPL-MCNC: 0.82 MG/DL
CREAT SPEC-SCNC: 103 MG/DL
EGFR: 79 ML/MIN/1.73M2
EOSINOPHIL # BLD AUTO: 0.64 K/UL
EOSINOPHIL NFR BLD AUTO: 6 %
ESTIMATED AVERAGE GLUCOSE: 163 MG/DL
GLUCOSE SERPL-MCNC: 135 MG/DL
HBA1C MFR BLD HPLC: 7.3 %
HCT VFR BLD CALC: 39.8 %
HDLC SERPL-MCNC: 47 MG/DL
HGB BLD-MCNC: 12.5 G/DL
IMM GRANULOCYTES NFR BLD AUTO: 0.4 %
LDLC SERPL CALC-MCNC: 160 MG/DL
LYMPHOCYTES # BLD AUTO: 2.88 K/UL
LYMPHOCYTES NFR BLD AUTO: 27 %
MAN DIFF?: NORMAL
MCHC RBC-ENTMCNC: 26.7 PG
MCHC RBC-ENTMCNC: 31.4 GM/DL
MCV RBC AUTO: 85 FL
MICROALBUMIN 24H UR DL<=1MG/L-MCNC: <1.2 MG/DL
MICROALBUMIN/CREAT 24H UR-RTO: NORMAL MG/G
MONOCYTES # BLD AUTO: 0.76 K/UL
MONOCYTES NFR BLD AUTO: 7.1 %
NEUTROPHILS # BLD AUTO: 6.24 K/UL
NEUTROPHILS NFR BLD AUTO: 58.4 %
NONHDLC SERPL-MCNC: 206 MG/DL
PLATELET # BLD AUTO: 293 K/UL
POTASSIUM SERPL-SCNC: 4.6 MMOL/L
PROT SERPL-MCNC: 7.3 G/DL
RBC # BLD: 4.68 M/UL
RBC # FLD: 14.7 %
SODIUM SERPL-SCNC: 142 MMOL/L
TRIGL SERPL-MCNC: 248 MG/DL
TSH SERPL-ACNC: 2.46 UIU/ML
WBC # FLD AUTO: 10.68 K/UL

## 2024-02-12 ENCOUNTER — TRANSCRIPTION ENCOUNTER (OUTPATIENT)
Age: 69
End: 2024-02-12

## 2024-02-12 LAB
M TB IFN-G BLD-IMP: NEGATIVE
QUANTIFERON TB PLUS MITOGEN MINUS NIL: >10 IU/ML
QUANTIFERON TB PLUS NIL: 0.03 IU/ML
QUANTIFERON TB PLUS TB1 MINUS NIL: -0.01 IU/ML
QUANTIFERON TB PLUS TB2 MINUS NIL: 0 IU/ML

## 2024-02-15 ENCOUNTER — TRANSCRIPTION ENCOUNTER (OUTPATIENT)
Age: 69
End: 2024-02-15

## 2024-02-27 ENCOUNTER — APPOINTMENT (OUTPATIENT)
Dept: HEPATOLOGY | Facility: CLINIC | Age: 69
End: 2024-02-27

## 2024-03-28 ENCOUNTER — APPOINTMENT (OUTPATIENT)
Dept: NEUROLOGY | Facility: CLINIC | Age: 69
End: 2024-03-28
Payer: OTHER MISCELLANEOUS

## 2024-03-28 VITALS
HEIGHT: 61 IN | DIASTOLIC BLOOD PRESSURE: 80 MMHG | WEIGHT: 225 LBS | BODY MASS INDEX: 42.48 KG/M2 | SYSTOLIC BLOOD PRESSURE: 150 MMHG

## 2024-03-28 DIAGNOSIS — R29.6 REPEATED FALLS: ICD-10-CM

## 2024-03-28 PROCEDURE — 99204 OFFICE O/P NEW MOD 45 MIN: CPT

## 2024-03-28 NOTE — HISTORY OF PRESENT ILLNESS
[FreeTextEntry1] : This 69-year-old woman was seen in neurological consultation today because of 2 work-related injuries She works as a  On 2/13/2024 she fell on ice.  She says she suffered some abdominal muscular strain but everything cleared up after about 3 days On 2/24/2024 she tripped over a metal plate in low lighting landing on her left side.  She injured her left knee and left arm and those problems have resolved.  She said she had x-rays which were okay  Neurological clearance was requested prior to returning to work because of the 2 falls  Her medical history significant for diabetes, hypertension, hyperlipidemia, hypothyroid She has diabetic peripheral neuropathy.  She tried low-dose of gabapentin which made her tired and did not help.

## 2024-03-28 NOTE — ASSESSMENT
[FreeTextEntry1] : Her examination is unremarkable other than some mild hyporeflexia in the legs consistent with her known diabetic neuropathy In particular gait and balance are normal  She is cleared from a neurological standpoint to return to her work as a  without any restrictions.

## 2024-03-28 NOTE — PHYSICAL EXAM
[FreeTextEntry1] : There is no cervical palpation tenderness. There is full range of movement of the cervical spine  There is no lumbar palpation tenderness. There is full range of movement of the lumbar spine. Straight leg raising is negative bilaterally. Nikolas's maneuver negative bilaterally. [General Appearance - Alert] : alert [General Appearance - In No Acute Distress] : in no acute distress [General Appearance - Well-Appearing] : healthy appearing [Oriented To Time, Place, And Person] : oriented to person, place, and time [Impaired Insight] : insight and judgment were intact [Affect] : the affect was normal [Memory Recent] : recent memory was not impaired [Person] : oriented to person [Place] : oriented to place [Time] : oriented to time [Concentration Intact] : normal concentrating ability [Fluency] : fluency intact [Comprehension] : comprehension intact [Past History] : adequate knowledge of personal past history [Cranial Nerves Optic (II)] : visual acuity intact bilaterally,  visual fields full to confrontation, pupils equal round and reactive to light [Cranial Nerves Oculomotor (III)] : extraocular motion intact [Cranial Nerves Trigeminal (V)] : facial sensation intact symmetrically [Cranial Nerves Facial (VII)] : face symmetrical [Cranial Nerves Vestibulocochlear (VIII)] : hearing was intact bilaterally [Cranial Nerves Glossopharyngeal (IX)] : tongue and palate midline [Cranial Nerves Accessory (XI - Cranial And Spinal)] : head turning and shoulder shrug symmetric [Cranial Nerves Hypoglossal (XII)] : there was no tongue deviation with protrusion [Motor Tone] : muscle tone was normal in all four extremities [Motor Strength] : muscle strength was normal in all four extremities [No Muscle Atrophy] : normal bulk in all four extremities [Paresis Pronator Drift Right-Sided] : no pronator drift on the right [Paresis Pronator Drift Left-Sided] : no pronator drift on the left [Sensation Tactile Decrease] : light touch was intact [Proprioception] : proprioception was intact [Sensation Pain / Temperature Decrease] : pain and temperature was intact [Romberg's Sign] : Romberg's sign was negtive [Abnormal Walk] : normal gait [Balance] : balance was intact [Tremor] : no tremor present [Past-pointing] : there was no past-pointing [Coordination - Dysmetria Impaired Finger-to-Nose Bilateral] : not present [Coordination - Dysmetria Impaired Heel-to-Shin Bilateral] : not present [2+] : Brachioradialis left 2+ [1+] : Ankle jerk left 1+ [PERRL With Normal Accommodation] : pupils were equal in size, round, reactive to light, with normal accommodation [Extraocular Movements] : extraocular movements were intact [Full Visual Field] : full visual field

## 2024-04-09 ENCOUNTER — RESULT REVIEW (OUTPATIENT)
Age: 69
End: 2024-04-09

## 2024-04-09 ENCOUNTER — NON-APPOINTMENT (OUTPATIENT)
Age: 69
End: 2024-04-09

## 2024-04-09 ENCOUNTER — TRANSCRIPTION ENCOUNTER (OUTPATIENT)
Age: 69
End: 2024-04-09

## 2024-04-09 VITALS — WEIGHT: 225 LBS | HEIGHT: 61 IN | BODY MASS INDEX: 42.48 KG/M2

## 2024-04-09 DIAGNOSIS — Z87.891 PERSONAL HISTORY OF NICOTINE DEPENDENCE: ICD-10-CM

## 2024-04-09 NOTE — HISTORY OF PRESENT ILLNESS
[Former] : Former [TextBox_13] : Referred by Dr. Joselyn Segura Ms. BELTRAN is a 69 year year old female with a history of DM2, HTN, high cholesterol. Reviewed and confirmed that the patient meets screening eligibility criteria: 69 years old Smoking Status: Former Smoker Number of pack(s) per day: 1 pack per day x 10 years, 1.5 packs per day x 20 years, 1/2 pack per day x 5 years Number of years smoked: 35 Number of pack years smokin pack years Quit year:   No symptoms of lung cancer, including new cough, change in cough, hemoptysis, and unintentional weight loss. No personal history of lung cancer. No lung cancer in a first degree relative. No history of lung disease or occupational exposures. [YearQuit] : 2020 [PacksperYear] : 43

## 2024-04-14 ENCOUNTER — TRANSCRIPTION ENCOUNTER (OUTPATIENT)
Age: 69
End: 2024-04-14

## 2024-04-15 ENCOUNTER — APPOINTMENT (OUTPATIENT)
Dept: INTERNAL MEDICINE | Facility: CLINIC | Age: 69
End: 2024-04-15
Payer: MEDICARE

## 2024-04-15 VITALS
HEIGHT: 61 IN | OXYGEN SATURATION: 97 % | DIASTOLIC BLOOD PRESSURE: 80 MMHG | RESPIRATION RATE: 15 BRPM | WEIGHT: 224 LBS | HEART RATE: 84 BPM | BODY MASS INDEX: 42.29 KG/M2 | TEMPERATURE: 98 F | SYSTOLIC BLOOD PRESSURE: 120 MMHG

## 2024-04-15 DIAGNOSIS — T14.8XXA OTHER INJURY OF UNSPECIFIED BODY REGION, INITIAL ENCOUNTER: ICD-10-CM

## 2024-04-15 DIAGNOSIS — L03.119 CELLULITIS OF UNSPECIFIED PART OF LIMB: ICD-10-CM

## 2024-04-15 PROCEDURE — 99213 OFFICE O/P EST LOW 20 MIN: CPT

## 2024-04-15 NOTE — PHYSICAL EXAM
[Normal] : affect was normal and insight and judgment were intact [de-identified] : circular wound noted on right shin, with surrounding erythema.

## 2024-04-15 NOTE — HISTORY OF PRESENT ILLNESS
[FreeTextEntry8] : 69 year old female who presents today with complaint of an open wound on her right shin. She had been scratching her right shin 3 weeks ago and developed a wound in that area. It has been erythematous and draining pus intermittently. She has no fevers or chills. She has been applying topical Neosporin.

## 2024-04-15 NOTE — PLAN
[FreeTextEntry1] : Given pus and surrounding erythema, will start empiric Bactrim 800-160 mg BID for 10 days.  Referred to wound care for evaluation.  ER precautions provided.  Advised to see endocrinology for diabetes management, has followup.

## 2024-04-16 ENCOUNTER — TRANSCRIPTION ENCOUNTER (OUTPATIENT)
Age: 69
End: 2024-04-16

## 2024-04-16 ENCOUNTER — APPOINTMENT (OUTPATIENT)
Dept: WOUND CARE | Facility: HOSPITAL | Age: 69
End: 2024-04-16
Payer: MEDICARE

## 2024-04-16 ENCOUNTER — OUTPATIENT (OUTPATIENT)
Dept: OUTPATIENT SERVICES | Facility: HOSPITAL | Age: 69
LOS: 1 days | Discharge: HOME | End: 2024-04-16
Payer: MEDICARE

## 2024-04-16 VITALS
RESPIRATION RATE: 18 BRPM | HEART RATE: 81 BPM | TEMPERATURE: 98 F | OXYGEN SATURATION: 95 % | HEIGHT: 61 IN | DIASTOLIC BLOOD PRESSURE: 75 MMHG | WEIGHT: 224 LBS | BODY MASS INDEX: 42.29 KG/M2 | SYSTOLIC BLOOD PRESSURE: 128 MMHG

## 2024-04-16 DIAGNOSIS — L97.812 NON-PRESSURE CHRONIC ULCER OF OTHER PART OF RIGHT LOWER LEG WITH FAT LAYER EXPOSED: ICD-10-CM

## 2024-04-16 DIAGNOSIS — Z98.890 OTHER SPECIFIED POSTPROCEDURAL STATES: Chronic | ICD-10-CM

## 2024-04-16 DIAGNOSIS — Z98.891 HISTORY OF UTERINE SCAR FROM PREVIOUS SURGERY: Chronic | ICD-10-CM

## 2024-04-16 DIAGNOSIS — Z78.9 OTHER SPECIFIED HEALTH STATUS: ICD-10-CM

## 2024-04-16 DIAGNOSIS — Z98.51 TUBAL LIGATION STATUS: Chronic | ICD-10-CM

## 2024-04-16 DIAGNOSIS — L97.801 NON-PRESSURE CHRONIC ULCER OF OTHER PART OF UNSPECIFIED LOWER LEG LIMITED TO BREAKDOWN OF SKIN: ICD-10-CM

## 2024-04-16 PROCEDURE — 99213 OFFICE O/P EST LOW 20 MIN: CPT

## 2024-04-16 PROCEDURE — 99203 OFFICE O/P NEW LOW 30 MIN: CPT

## 2024-04-16 PROCEDURE — G0463: CPT

## 2024-04-16 RX ORDER — ISOPROPYL ALCOHOL 70 ML/100ML
SWAB TOPICAL
Qty: 1 | Refills: 0 | Status: COMPLETED | COMMUNITY
Start: 2021-05-13 | End: 2024-04-16

## 2024-04-16 RX ORDER — GABAPENTIN 100 MG/1
100 CAPSULE ORAL TWICE DAILY
Qty: 60 | Refills: 5 | Status: COMPLETED | COMMUNITY
Start: 2024-02-07 | End: 2024-04-16

## 2024-04-16 RX ORDER — DULAGLUTIDE 1.5 MG/.5ML
1.5 INJECTION, SOLUTION SUBCUTANEOUS
Qty: 3 | Refills: 2 | Status: COMPLETED | COMMUNITY
Start: 2024-01-31 | End: 2024-04-16

## 2024-04-16 NOTE — PLAN
[FreeTextEntry1] : leg elevation no dsg vascular study then vascular consult f/u 2 wks  time spent 30 mins.

## 2024-04-16 NOTE — HISTORY OF PRESENT ILLNESS
[FreeTextEntry1] :      70 yo WF, here for assessment of a RLE ulcer which she believes it came about from scratching. Has had vacular studies one yr ago and was told she had venous insufficiency. The wound/ulcer appears newly healed. No SOI.    RN note- SARITHA BELTRAN is being seen for a initial nursing assessment visit. Patient presents to the [facility] with RLE Wound x 2 weeks ago. Patient states the wound started with "intense itching" where patient states it was related to an insect bite. Patient self-treated with peroxide and Neosporin to no relief. Patient relates that the wound has subsided in pain but c/o itchiness. Patient was prescribed oral Bactrim DS (Sahra Lozano DO). To date, patient has only taken (1) dose of oral antibiotic therapy. Patient accompanied by Self.

## 2024-04-16 NOTE — ASSESSMENT
[Verbal] : Verbal [Demo] : Demo [Patient] : Patient [Good - alert, interested, motivated] : Good - alert, interested, motivated [Demonstrates independently] : demonstrates independently [Dressing changes] : dressing changes [Skin Care] : skin care [Signs and symptoms of infection] : sign and symptoms of infection [Venous Disease] : venous disease [Nutrition] : nutrition [How and When to Call] : how and when to call [Off-loading] : off-loading [Compression Therapy] : compression therapy [Patient responsibility to plan of care] : patient responsibility to plan of care [Glycemic Control] : glycemic control [] : Yes [Stable] : stable [Home] : Home [Ambulatory] : Ambulatory [Not Applicable - Long Term Care/Home Health Agency] : Long Term Care/Home Health Agency: Not Applicable [FreeTextEntry2] : Infection Prevention Foot and nail care Nutrition and wound healing Pt Demonstrates use of both nonpharmacological and pharmacological pain relief strategies.  [FreeTextEntry3] : Initial visit [FreeTextEntry4] : New Eval Protocol; Vascular studies ordered. Auth submitted.  Vascular consult submitted. Pt aware to await phone call(s) from both. Topics discussed; Lower extremity elevation, and low sodium diet. Pt and Pt's daughter understanding of same. F/U 2 Weeks

## 2024-04-16 NOTE — PHYSICAL EXAM
[JVD] : no jugular venous distention  [Normal Thyroid] : the thyroid was normal [Normal Breath Sounds] : Normal breath sounds [Normal Heart Sounds] : normal heart sounds [Normal Rate and Rhythm] : normal rate and rhythm [1+] : left 1+ [Ankle Swelling (On Exam)] : not present [Ankle Swelling Bilaterally] : bilaterally  [Ankle Swelling On The Left] : moderate [Abdomen Masses] : No abdominal massess [Abdomen Tenderness] : ~T ~M No abdominal tenderness [Tender] : nontender [Enlarged] : not enlarged [Alert] : alert [Oriented to Person] : oriented to person [Oriented to Place] : oriented to place [Oriented to Time] : oriented to time [Calm] : calm [de-identified] : adult HF, NAD, alert ,Ox3. [FreeTextEntry1] : Right Leg  [FreeTextEntry2] : 0.5 [FreeTextEntry3] : 0.5 [FreeTextEntry4] : 0.1 [de-identified] : none [de-identified] : none [de-identified] : other [de-identified] : none [de-identified] : intact [de-identified] : none [de-identified] : none [de-identified] : 100% [de-identified] : none [de-identified] : none [de-identified] : Mechanically cleansed with sterile gauze and normal saline 0.9% Dry Dressing  CIRCULATION  Dorsalis Pedis: R Palpable, Regular, 2+ L Palpable, Regular, 2+ Posterior Tibialis: R Palpable, Regular, 2+ L Palpable, Regular, 2+ Extremity Color: Pigmented Extremity Temperature: Warm Capillary Refill: < 3 seconds bilaterally  Vascular studies ordered by Dr HOLA pena sheet submitted

## 2024-04-17 ENCOUNTER — NON-APPOINTMENT (OUTPATIENT)
Age: 69
End: 2024-04-17

## 2024-04-17 DIAGNOSIS — E03.9 HYPOTHYROIDISM, UNSPECIFIED: ICD-10-CM

## 2024-04-17 DIAGNOSIS — M17.11 UNILATERAL PRIMARY OSTEOARTHRITIS, RIGHT KNEE: ICD-10-CM

## 2024-04-17 DIAGNOSIS — I10 ESSENTIAL (PRIMARY) HYPERTENSION: ICD-10-CM

## 2024-04-17 DIAGNOSIS — Z79.899 OTHER LONG TERM (CURRENT) DRUG THERAPY: ICD-10-CM

## 2024-04-17 DIAGNOSIS — Z83.79 FAMILY HISTORY OF OTHER DISEASES OF THE DIGESTIVE SYSTEM: ICD-10-CM

## 2024-04-17 DIAGNOSIS — Z79.890 HORMONE REPLACEMENT THERAPY: ICD-10-CM

## 2024-04-17 DIAGNOSIS — L97.812 NON-PRESSURE CHRONIC ULCER OF OTHER PART OF RIGHT LOWER LEG WITH FAT LAYER EXPOSED: ICD-10-CM

## 2024-04-17 DIAGNOSIS — E78.5 HYPERLIPIDEMIA, UNSPECIFIED: ICD-10-CM

## 2024-04-17 DIAGNOSIS — Z79.4 LONG TERM (CURRENT) USE OF INSULIN: ICD-10-CM

## 2024-04-17 DIAGNOSIS — Z82.49 FAMILY HISTORY OF ISCHEMIC HEART DISEASE AND OTHER DISEASES OF THE CIRCULATORY SYSTEM: ICD-10-CM

## 2024-04-17 DIAGNOSIS — J44.9 CHRONIC OBSTRUCTIVE PULMONARY DISEASE, UNSPECIFIED: ICD-10-CM

## 2024-04-17 DIAGNOSIS — Z79.84 LONG TERM (CURRENT) USE OF ORAL HYPOGLYCEMIC DRUGS: ICD-10-CM

## 2024-04-17 DIAGNOSIS — Z86.19 PERSONAL HISTORY OF OTHER INFECTIOUS AND PARASITIC DISEASES: ICD-10-CM

## 2024-04-17 DIAGNOSIS — E11.40 TYPE 2 DIABETES MELLITUS WITH DIABETIC NEUROPATHY, UNSPECIFIED: ICD-10-CM

## 2024-04-17 DIAGNOSIS — Z87.891 PERSONAL HISTORY OF NICOTINE DEPENDENCE: ICD-10-CM

## 2024-04-17 DIAGNOSIS — Z86.16 PERSONAL HISTORY OF COVID-19: ICD-10-CM

## 2024-04-17 DIAGNOSIS — Z98.890 OTHER SPECIFIED POSTPROCEDURAL STATES: ICD-10-CM

## 2024-04-17 DIAGNOSIS — I25.10 ATHEROSCLEROTIC HEART DISEASE OF NATIVE CORONARY ARTERY WITHOUT ANGINA PECTORIS: ICD-10-CM

## 2024-04-23 ENCOUNTER — RESULT REVIEW (OUTPATIENT)
Age: 69
End: 2024-04-23

## 2024-04-23 ENCOUNTER — APPOINTMENT (OUTPATIENT)
Dept: CT IMAGING | Facility: CLINIC | Age: 69
End: 2024-04-23
Payer: MEDICARE

## 2024-04-23 ENCOUNTER — OUTPATIENT (OUTPATIENT)
Dept: OUTPATIENT SERVICES | Facility: HOSPITAL | Age: 69
LOS: 1 days | End: 2024-04-23
Payer: MEDICARE

## 2024-04-23 ENCOUNTER — APPOINTMENT (OUTPATIENT)
Dept: ULTRASOUND IMAGING | Facility: CLINIC | Age: 69
End: 2024-04-23
Payer: MEDICARE

## 2024-04-23 ENCOUNTER — APPOINTMENT (OUTPATIENT)
Dept: MAMMOGRAPHY | Facility: CLINIC | Age: 69
End: 2024-04-23
Payer: MEDICARE

## 2024-04-23 DIAGNOSIS — Z98.51 TUBAL LIGATION STATUS: Chronic | ICD-10-CM

## 2024-04-23 DIAGNOSIS — N63.0 UNSPECIFIED LUMP IN UNSPECIFIED BREAST: ICD-10-CM

## 2024-04-23 DIAGNOSIS — Z98.890 OTHER SPECIFIED POSTPROCEDURAL STATES: Chronic | ICD-10-CM

## 2024-04-23 DIAGNOSIS — Z98.891 HISTORY OF UTERINE SCAR FROM PREVIOUS SURGERY: Chronic | ICD-10-CM

## 2024-04-23 PROCEDURE — 77067 SCR MAMMO BI INCL CAD: CPT

## 2024-04-23 PROCEDURE — 77063 BREAST TOMOSYNTHESIS BI: CPT | Mod: 26

## 2024-04-23 PROCEDURE — 76641 ULTRASOUND BREAST COMPLETE: CPT | Mod: 26,50,GY

## 2024-04-23 PROCEDURE — 71271 CT THORAX LUNG CANCER SCR C-: CPT | Mod: 26

## 2024-04-23 PROCEDURE — 77063 BREAST TOMOSYNTHESIS BI: CPT

## 2024-04-23 PROCEDURE — 77067 SCR MAMMO BI INCL CAD: CPT | Mod: 26

## 2024-04-23 PROCEDURE — 76641 ULTRASOUND BREAST COMPLETE: CPT

## 2024-04-23 PROCEDURE — 71271 CT THORAX LUNG CANCER SCR C-: CPT

## 2024-04-24 ENCOUNTER — TRANSCRIPTION ENCOUNTER (OUTPATIENT)
Age: 69
End: 2024-04-24

## 2024-04-25 ENCOUNTER — RESULT REVIEW (OUTPATIENT)
Age: 69
End: 2024-04-25

## 2024-04-25 ENCOUNTER — TRANSCRIPTION ENCOUNTER (OUTPATIENT)
Age: 69
End: 2024-04-25

## 2024-04-25 DIAGNOSIS — R92.8 OTHER ABNORMAL AND INCONCLUSIVE FINDINGS ON DIAGNOSTIC IMAGING OF BREAST: ICD-10-CM

## 2024-04-29 ENCOUNTER — RESULT REVIEW (OUTPATIENT)
Age: 69
End: 2024-04-29

## 2024-04-29 ENCOUNTER — TRANSCRIPTION ENCOUNTER (OUTPATIENT)
Age: 69
End: 2024-04-29

## 2024-04-29 ENCOUNTER — APPOINTMENT (OUTPATIENT)
Dept: MAMMOGRAPHY | Facility: CLINIC | Age: 69
End: 2024-04-29
Payer: MEDICARE

## 2024-04-29 ENCOUNTER — OUTPATIENT (OUTPATIENT)
Dept: OUTPATIENT SERVICES | Facility: HOSPITAL | Age: 69
LOS: 1 days | End: 2024-04-29
Payer: MEDICARE

## 2024-04-29 ENCOUNTER — APPOINTMENT (OUTPATIENT)
Dept: ULTRASOUND IMAGING | Facility: CLINIC | Age: 69
End: 2024-04-29
Payer: MEDICARE

## 2024-04-29 DIAGNOSIS — Z98.51 TUBAL LIGATION STATUS: Chronic | ICD-10-CM

## 2024-04-29 DIAGNOSIS — R92.8 OTHER ABNORMAL AND INCONCLUSIVE FINDINGS ON DIAGNOSTIC IMAGING OF BREAST: ICD-10-CM

## 2024-04-29 DIAGNOSIS — Z98.891 HISTORY OF UTERINE SCAR FROM PREVIOUS SURGERY: Chronic | ICD-10-CM

## 2024-04-29 PROCEDURE — 76642 ULTRASOUND BREAST LIMITED: CPT | Mod: 26,50

## 2024-04-29 PROCEDURE — G0279: CPT | Mod: 26

## 2024-04-29 PROCEDURE — 77066 DX MAMMO INCL CAD BI: CPT | Mod: 26

## 2024-04-29 PROCEDURE — G0279: CPT

## 2024-04-29 PROCEDURE — 77066 DX MAMMO INCL CAD BI: CPT

## 2024-04-29 PROCEDURE — 76642 ULTRASOUND BREAST LIMITED: CPT

## 2024-05-03 ENCOUNTER — TRANSCRIPTION ENCOUNTER (OUTPATIENT)
Age: 69
End: 2024-05-03

## 2024-05-23 ENCOUNTER — INPATIENT (INPATIENT)
Facility: HOSPITAL | Age: 69
LOS: 0 days | Discharge: HOME | DRG: 287 | End: 2024-05-24
Attending: STUDENT IN AN ORGANIZED HEALTH CARE EDUCATION/TRAINING PROGRAM | Admitting: STUDENT IN AN ORGANIZED HEALTH CARE EDUCATION/TRAINING PROGRAM
Payer: MEDICARE

## 2024-05-23 VITALS
DIASTOLIC BLOOD PRESSURE: 68 MMHG | TEMPERATURE: 97 F | OXYGEN SATURATION: 99 % | HEIGHT: 61 IN | HEART RATE: 88 BPM | WEIGHT: 225.09 LBS | SYSTOLIC BLOOD PRESSURE: 148 MMHG | RESPIRATION RATE: 18 BRPM

## 2024-05-23 DIAGNOSIS — I20.0 UNSTABLE ANGINA: ICD-10-CM

## 2024-05-23 DIAGNOSIS — Z98.891 HISTORY OF UTERINE SCAR FROM PREVIOUS SURGERY: Chronic | ICD-10-CM

## 2024-05-23 DIAGNOSIS — Z98.890 OTHER SPECIFIED POSTPROCEDURAL STATES: Chronic | ICD-10-CM

## 2024-05-23 DIAGNOSIS — Z98.51 TUBAL LIGATION STATUS: Chronic | ICD-10-CM

## 2024-05-23 LAB
ALBUMIN SERPL ELPH-MCNC: 4 G/DL — SIGNIFICANT CHANGE UP (ref 3.3–5)
ALP SERPL-CCNC: 83 U/L — SIGNIFICANT CHANGE UP (ref 40–120)
ALT FLD-CCNC: 26 U/L — SIGNIFICANT CHANGE UP (ref 12–78)
ANION GAP SERPL CALC-SCNC: 1 MMOL/L — LOW (ref 5–17)
AST SERPL-CCNC: 43 U/L — HIGH (ref 15–37)
BASOPHILS # BLD AUTO: 0.13 K/UL — SIGNIFICANT CHANGE UP (ref 0–0.2)
BASOPHILS NFR BLD AUTO: 0.9 % — SIGNIFICANT CHANGE UP (ref 0–2)
BILIRUB SERPL-MCNC: 0.4 MG/DL — SIGNIFICANT CHANGE UP (ref 0.2–1.2)
BUN SERPL-MCNC: 18 MG/DL — SIGNIFICANT CHANGE UP (ref 7–23)
CALCIUM SERPL-MCNC: 9.7 MG/DL — SIGNIFICANT CHANGE UP (ref 8.5–10.1)
CHLORIDE SERPL-SCNC: 109 MMOL/L — HIGH (ref 96–108)
CO2 SERPL-SCNC: 28 MMOL/L — SIGNIFICANT CHANGE UP (ref 22–31)
CREAT SERPL-MCNC: 0.99 MG/DL — SIGNIFICANT CHANGE UP (ref 0.5–1.3)
EGFR: 62 ML/MIN/1.73M2 — SIGNIFICANT CHANGE UP
EOSINOPHIL # BLD AUTO: 0.76 K/UL — HIGH (ref 0–0.5)
EOSINOPHIL NFR BLD AUTO: 5.5 % — SIGNIFICANT CHANGE UP (ref 0–6)
GLUCOSE SERPL-MCNC: 131 MG/DL — HIGH (ref 70–99)
HCT VFR BLD CALC: 39.5 % — SIGNIFICANT CHANGE UP (ref 34.5–45)
HGB BLD-MCNC: 12.6 G/DL — SIGNIFICANT CHANGE UP (ref 11.5–15.5)
IMM GRANULOCYTES NFR BLD AUTO: 0.5 % — SIGNIFICANT CHANGE UP (ref 0–0.9)
LYMPHOCYTES # BLD AUTO: 31.6 % — SIGNIFICANT CHANGE UP (ref 13–44)
LYMPHOCYTES # BLD AUTO: 4.37 K/UL — HIGH (ref 1–3.3)
MCHC RBC-ENTMCNC: 27 PG — SIGNIFICANT CHANGE UP (ref 27–34)
MCHC RBC-ENTMCNC: 31.9 GM/DL — LOW (ref 32–36)
MCV RBC AUTO: 84.8 FL — SIGNIFICANT CHANGE UP (ref 80–100)
MONOCYTES # BLD AUTO: 1.17 K/UL — HIGH (ref 0–0.9)
MONOCYTES NFR BLD AUTO: 8.4 % — SIGNIFICANT CHANGE UP (ref 2–14)
NEUTROPHILS # BLD AUTO: 7.35 K/UL — SIGNIFICANT CHANGE UP (ref 1.8–7.4)
NEUTROPHILS NFR BLD AUTO: 53.1 % — SIGNIFICANT CHANGE UP (ref 43–77)
NRBC # BLD: 0 /100 WBCS — SIGNIFICANT CHANGE UP (ref 0–0)
NT-PROBNP SERPL-SCNC: 74 PG/ML — SIGNIFICANT CHANGE UP (ref 0–125)
PLATELET # BLD AUTO: 266 K/UL — SIGNIFICANT CHANGE UP (ref 150–400)
POTASSIUM SERPL-MCNC: 4.7 MMOL/L — SIGNIFICANT CHANGE UP (ref 3.5–5.3)
POTASSIUM SERPL-SCNC: 4.7 MMOL/L — SIGNIFICANT CHANGE UP (ref 3.5–5.3)
PROT SERPL-MCNC: 8.3 G/DL — SIGNIFICANT CHANGE UP (ref 6–8.3)
RBC # BLD: 4.66 M/UL — SIGNIFICANT CHANGE UP (ref 3.8–5.2)
RBC # FLD: 14.7 % — HIGH (ref 10.3–14.5)
SODIUM SERPL-SCNC: 138 MMOL/L — SIGNIFICANT CHANGE UP (ref 135–145)
TROPONIN I, HIGH SENSITIVITY RESULT: 9.4 NG/L — SIGNIFICANT CHANGE UP
WBC # BLD: 13.85 K/UL — HIGH (ref 3.8–10.5)
WBC # FLD AUTO: 13.85 K/UL — HIGH (ref 3.8–10.5)

## 2024-05-23 PROCEDURE — 71045 X-RAY EXAM CHEST 1 VIEW: CPT | Mod: 26

## 2024-05-23 PROCEDURE — 93010 ELECTROCARDIOGRAM REPORT: CPT

## 2024-05-23 PROCEDURE — 99285 EMERGENCY DEPT VISIT HI MDM: CPT

## 2024-05-23 PROCEDURE — 99222 1ST HOSP IP/OBS MODERATE 55: CPT

## 2024-05-23 RX ORDER — ONDANSETRON 8 MG/1
4 TABLET, FILM COATED ORAL EVERY 8 HOURS
Refills: 0 | Status: DISCONTINUED | OUTPATIENT
Start: 2024-05-23 | End: 2024-05-24

## 2024-05-23 RX ORDER — ASPIRIN/CALCIUM CARB/MAGNESIUM 324 MG
325 TABLET ORAL ONCE
Refills: 0 | Status: COMPLETED | OUTPATIENT
Start: 2024-05-23 | End: 2024-05-23

## 2024-05-23 RX ORDER — ATORVASTATIN CALCIUM 80 MG/1
40 TABLET, FILM COATED ORAL AT BEDTIME
Refills: 0 | Status: DISCONTINUED | OUTPATIENT
Start: 2024-05-23 | End: 2024-05-24

## 2024-05-23 RX ORDER — INSULIN LISPRO 100/ML
VIAL (ML) SUBCUTANEOUS AT BEDTIME
Refills: 0 | Status: DISCONTINUED | OUTPATIENT
Start: 2024-05-23 | End: 2024-05-24

## 2024-05-23 RX ORDER — ACETAMINOPHEN 500 MG
650 TABLET ORAL EVERY 6 HOURS
Refills: 0 | Status: DISCONTINUED | OUTPATIENT
Start: 2024-05-23 | End: 2024-05-24

## 2024-05-23 RX ORDER — ENOXAPARIN SODIUM 100 MG/ML
40 INJECTION SUBCUTANEOUS EVERY 24 HOURS
Refills: 0 | Status: DISCONTINUED | OUTPATIENT
Start: 2024-05-23 | End: 2024-05-24

## 2024-05-23 RX ORDER — DEXTROSE 50 % IN WATER 50 %
12.5 SYRINGE (ML) INTRAVENOUS ONCE
Refills: 0 | Status: DISCONTINUED | OUTPATIENT
Start: 2024-05-23 | End: 2024-05-24

## 2024-05-23 RX ORDER — INSULIN LISPRO 100/ML
VIAL (ML) SUBCUTANEOUS
Refills: 0 | Status: DISCONTINUED | OUTPATIENT
Start: 2024-05-23 | End: 2024-05-24

## 2024-05-23 RX ORDER — ALBUTEROL 90 UG/1
2 AEROSOL, METERED ORAL EVERY 6 HOURS
Refills: 0 | Status: DISCONTINUED | OUTPATIENT
Start: 2024-05-23 | End: 2024-05-24

## 2024-05-23 RX ORDER — CALCIUM CARBONATE 500(1250)
1 TABLET ORAL
Qty: 0 | Refills: 0 | DISCHARGE

## 2024-05-23 RX ORDER — CHOLECALCIFEROL (VITAMIN D3) 125 MCG
1 CAPSULE ORAL
Qty: 0 | Refills: 0 | DISCHARGE

## 2024-05-23 RX ORDER — CLOPIDOGREL BISULFATE 75 MG/1
300 TABLET, FILM COATED ORAL ONCE
Refills: 0 | Status: COMPLETED | OUTPATIENT
Start: 2024-05-23 | End: 2024-05-23

## 2024-05-23 RX ORDER — DULAGLUTIDE 4.5 MG/.5ML
1 INJECTION, SOLUTION SUBCUTANEOUS
Qty: 0 | Refills: 0 | DISCHARGE

## 2024-05-23 RX ORDER — DEXTROSE 10 % IN WATER 10 %
125 INTRAVENOUS SOLUTION INTRAVENOUS ONCE
Refills: 0 | Status: DISCONTINUED | OUTPATIENT
Start: 2024-05-23 | End: 2024-05-24

## 2024-05-23 RX ORDER — SODIUM CHLORIDE 9 MG/ML
1000 INJECTION, SOLUTION INTRAVENOUS
Refills: 0 | Status: DISCONTINUED | OUTPATIENT
Start: 2024-05-23 | End: 2024-05-24

## 2024-05-23 RX ORDER — GLUCAGON INJECTION, SOLUTION 0.5 MG/.1ML
1 INJECTION, SOLUTION SUBCUTANEOUS ONCE
Refills: 0 | Status: DISCONTINUED | OUTPATIENT
Start: 2024-05-23 | End: 2024-05-24

## 2024-05-23 RX ORDER — INSULIN GLARGINE 100 [IU]/ML
55 INJECTION, SOLUTION SUBCUTANEOUS
Qty: 0 | Refills: 0 | DISCHARGE

## 2024-05-23 RX ORDER — FUROSEMIDE 40 MG
20 TABLET ORAL ONCE
Refills: 0 | Status: COMPLETED | OUTPATIENT
Start: 2024-05-23 | End: 2024-05-23

## 2024-05-23 RX ORDER — DEXTROSE 50 % IN WATER 50 %
25 SYRINGE (ML) INTRAVENOUS ONCE
Refills: 0 | Status: DISCONTINUED | OUTPATIENT
Start: 2024-05-23 | End: 2024-05-24

## 2024-05-23 RX ORDER — AMLODIPINE BESYLATE 2.5 MG/1
10 TABLET ORAL DAILY
Refills: 0 | Status: DISCONTINUED | OUTPATIENT
Start: 2024-05-23 | End: 2024-05-24

## 2024-05-23 RX ORDER — LEVOTHYROXINE SODIUM 125 MCG
25 TABLET ORAL DAILY
Refills: 0 | Status: DISCONTINUED | OUTPATIENT
Start: 2024-05-23 | End: 2024-05-24

## 2024-05-23 RX ORDER — AMLODIPINE BESYLATE 2.5 MG/1
1 TABLET ORAL
Qty: 0 | Refills: 0 | DISCHARGE

## 2024-05-23 RX ORDER — SODIUM CHLORIDE 9 MG/ML
1000 INJECTION INTRAMUSCULAR; INTRAVENOUS; SUBCUTANEOUS
Refills: 0 | Status: DISCONTINUED | OUTPATIENT
Start: 2024-05-23 | End: 2024-05-23

## 2024-05-23 RX ORDER — LANOLIN ALCOHOL/MO/W.PET/CERES
3 CREAM (GRAM) TOPICAL AT BEDTIME
Refills: 0 | Status: DISCONTINUED | OUTPATIENT
Start: 2024-05-23 | End: 2024-05-24

## 2024-05-23 RX ORDER — DEXTROSE 50 % IN WATER 50 %
15 SYRINGE (ML) INTRAVENOUS ONCE
Refills: 0 | Status: DISCONTINUED | OUTPATIENT
Start: 2024-05-23 | End: 2024-05-24

## 2024-05-23 RX ADMIN — ENOXAPARIN SODIUM 40 MILLIGRAM(S): 100 INJECTION SUBCUTANEOUS at 22:21

## 2024-05-23 RX ADMIN — ATORVASTATIN CALCIUM 40 MILLIGRAM(S): 80 TABLET, FILM COATED ORAL at 22:02

## 2024-05-23 RX ADMIN — CLOPIDOGREL BISULFATE 300 MILLIGRAM(S): 75 TABLET, FILM COATED ORAL at 20:14

## 2024-05-23 RX ADMIN — Medication 325 MILLIGRAM(S): at 19:41

## 2024-05-23 RX ADMIN — Medication 20 MILLIGRAM(S): at 19:41

## 2024-05-23 NOTE — ED PROVIDER NOTE - OBJECTIVE STATEMENT
Patient is a 69-year-old female with history of hypertension diabetes hyperlipidemia and mild COPD presenting now to the emergency department at [facility] complaining of ill-defined anterior neck burning squeezing sensation radiating to both shoulders and upper back.  Patient states that recently she has had new onset of dyspnea on exertion and then today after walking a distance at work which she has in the past asymptomatically today on the other hand she developed anterior neck burning squeezing sensation radiating to both arms upper back patient sat and rested symptoms abated then returned after walking a shorter distance thereafter.  Ultimately patient went home and then while walking shorter distances in the parking which was no more than 300 feet she developed similar discomfort though discomfort did not radiate to her back this time.  No fever no chills no cough no abdominal pain no paresthesias no weakness.

## 2024-05-23 NOTE — ED ADULT NURSE NOTE - NSFALLRISKINTERV_ED_ALL_ED

## 2024-05-23 NOTE — ED ADULT NURSE NOTE - OBJECTIVE STATEMENT
Pt c/o SOB along with back pain. Pt states she has had worsening SOB today. Pt states she does not use O2 at home - O2 WDL on room air - pt speaking in clear sentences. Pt states she experienced sharp back pain that radiated to her shoulders today. Pt states she also noted new onset b/l ankle swelling. Pt denies chest pain, n/v/d fever or chills at this time. pt remains resting in stretcher with  at bedside.

## 2024-05-23 NOTE — ED PROVIDER NOTE - NSICDXFAMILYHX_GEN_ALL_CORE_FT
Pt family took all belongings  Pt left via stretcher to hospice house  FAMILY HISTORY:  Sibling  Still living? Unknown  FH: coronary artery disease, Age at diagnosis: Age Unknown

## 2024-05-23 NOTE — ED PROVIDER NOTE - PHYSICAL EXAMINATION
Examination reveals a well-developed well-nourished obese female in no acute distress at this time.  HEENT normocephalic atraumatic pupils equal round react to light accommodation extraocular movements intact.  Neck is supple no JVD no bruits lungs are a fine dry rales at the base of about the lungs heart regular rate and rhythm without any murmurs rubs gallops abdomen was soft nontender obese and extremities reveal 1-2+ pitting pretibial edema bilaterally.  Neurologic evaluation shows her to be alert and oriented x 4 without any focal neurologic deficits.

## 2024-05-23 NOTE — ED ADULT TRIAGE NOTE - CHIEF COMPLAINT QUOTE
became short of breath today while walking experienced throat pain and pain radiating to shoulders into mid back

## 2024-05-23 NOTE — ED PROVIDER NOTE - NSTIMEPROVIDERCAREINITIATE_GEN_ER
Spray Paint Text: The liquid nitrogen was applied to the skin utilizing a spray paint frosting technique.
Consent: The patient's consent was obtained including but not limited to risks of crusting, scabbing, blistering, scarring, darker or lighter pigmentary change, recurrence, incomplete removal and infection.
Add 52 Modifier (Optional): no
Show Topical Anesthesia Variable?: Yes
Medical Necessity Information: It is in your best interest to select a reason for this procedure from the list below. All of these items fulfill various CMS LCD requirements except the new and changing color options.
Medical Necessity Clause: This procedure was medically necessary because the lesions that were treated were:
Detail Level: Zone
Post-Care Instructions: I reviewed with the patient in detail post-care instructions. Patient is to wear sunprotection, and avoid picking at any of the treated lesions. Pt may apply Vaseline to crusted or scabbing areas.
23-May-2024 18:46

## 2024-05-23 NOTE — ED PROVIDER NOTE - CLINICAL SUMMARY MEDICAL DECISION MAKING FREE TEXT BOX
Neck pain radiating to both shoulders and back with slight shortness of breath here now requiring thorough evaluation labs EKG imaging and medications.  I have already discussed the case with Dr. Del Cid cardiology who requests aspirin and Plavix and to admit the patient to telemetry and he will have the cardiology service evaluate the patient tomorrow for possible angiography.

## 2024-05-23 NOTE — H&P ADULT - ASSESSMENT
70 y/o female presented to ED to be evaluated for chest pain r/o ACS:  -1st trop neg and EKG w/o acute ST wave changes  -s/p aspirin 324 mg and plavix 300 mg in ED  -Cardiology aware from ED   -NPO post midnight for possible cath in AM  -C/w statin  -F/u a1c/lipid panel   -trend troponins  -monitor on tele     Hypothyroidism:  -C/w home synthroid    DMT2:  -C/w insulin SS    HTN:  -C/w amlodipine 10 mg qd   -hold off ARB's prior to cath    Full code      70 y/o female presented to ED to be evaluated for chest pain r/o ACS:  -1st trop neg and EKG w/o acute ST wave changes  -s/p aspirin 324 mg and plavix 300 mg in ED  -Cardiology aware from ED   -NPO post midnight for possible cath in AM  -C/w statin  -F/u a1c/lipid panel   -trend troponins  -monitor on tele     Asthma:  -not in exacerbation  -C/w albuterol PRN     Hypothyroidism:  -C/w home synthroid    DMT2:  -C/w insulin SS    HTN:  -C/w amlodipine 10 mg qd   -hold off ARB's prior to cath    Full code

## 2024-05-23 NOTE — H&P ADULT - HISTORY OF PRESENT ILLNESS
70 y/o female with PMH of HTN/HLD, DMT2, Asthma who presented to ED to be evaluated for chest pain associated with exertional shortness of breath. this afternoon pt was climbing stairs to go from one building to the next and started to develop sharp neck pain radiating to her back and both the shoulders.  pt pain lasted for about 30 sec and dissipated upon rest. pt do not take advair which was prescribed to her because thrush. admits of being compliance to all her medication at home  in ED cardiology on call was consulted and pt received 324 mg aspirin with 300 mg plavix. recommendation was  not to start on heparin drip    pt is currently asymptomatic. EKG w/o acute ST wave changes   68 y/o female with PMH of HTN/HLD, DMT2, Asthma who presented to ED to be evaluated for chest pain associated with exertional shortness of breath. this afternoon pt was climbing stairs to go from one building to the next and started to develop sharp neck pain radiating to her back and both the shoulders.  pt pain lasted for about 30 sec and dissipated upon rest. pt was prescribed advair which she stopped taking due to thrush.  admits of being compliance to all other  medication at home  in ED cardiology on call was consulted and pt received 324 mg aspirin with 300 mg plavix. recommendation was  not to start on heparin drip    pt is currently asymptomatic. EKG w/o acute ST wave changes

## 2024-05-23 NOTE — ED ADULT NURSE NOTE - NSFALLLASTSIX_ED_ALL_ED
Assessment done per SGNA guidelines, breathing nonlabored, skin warm and dry, abdomen soft, non-tender     Yes.

## 2024-05-23 NOTE — H&P ADULT - NSHPPHYSICALEXAM_GEN_ALL_CORE
Constitutional: NAD  HEENT: AT/NC  CV: RRR,Normal S1S2  Respi: CTA B/L ,No rhonchi/rales or wheezing   Abd: soft, NT/ND, Pos BS  Ext: no edema   Neuro: A and O X 3

## 2024-05-23 NOTE — ED PROVIDER NOTE - DIFFERENTIAL DIAGNOSIS
Differential diagnosis includes acute coronary syndrome NSTEMI CHF doubt exacerbation COPD a pe doubt aortic dissection Differential Diagnosis

## 2024-05-24 ENCOUNTER — TRANSCRIPTION ENCOUNTER (OUTPATIENT)
Age: 69
End: 2024-05-24

## 2024-05-24 ENCOUNTER — INPATIENT (INPATIENT)
Facility: HOSPITAL | Age: 69
LOS: 11 days | Discharge: HOME HEALTH | DRG: 303 | End: 2024-06-05
Attending: STUDENT IN AN ORGANIZED HEALTH CARE EDUCATION/TRAINING PROGRAM | Admitting: STUDENT IN AN ORGANIZED HEALTH CARE EDUCATION/TRAINING PROGRAM
Payer: MEDICARE

## 2024-05-24 VITALS
DIASTOLIC BLOOD PRESSURE: 77 MMHG | RESPIRATION RATE: 18 BRPM | HEART RATE: 79 BPM | SYSTOLIC BLOOD PRESSURE: 171 MMHG | OXYGEN SATURATION: 96 %

## 2024-05-24 VITALS
HEART RATE: 80 BPM | OXYGEN SATURATION: 96 % | DIASTOLIC BLOOD PRESSURE: 73 MMHG | WEIGHT: 225.09 LBS | SYSTOLIC BLOOD PRESSURE: 121 MMHG | HEIGHT: 61 IN | RESPIRATION RATE: 18 BRPM | TEMPERATURE: 98 F

## 2024-05-24 DIAGNOSIS — I10 ESSENTIAL (PRIMARY) HYPERTENSION: ICD-10-CM

## 2024-05-24 DIAGNOSIS — Z98.891 HISTORY OF UTERINE SCAR FROM PREVIOUS SURGERY: Chronic | ICD-10-CM

## 2024-05-24 DIAGNOSIS — Z98.890 OTHER SPECIFIED POSTPROCEDURAL STATES: Chronic | ICD-10-CM

## 2024-05-24 DIAGNOSIS — Z86.39 PERSONAL HISTORY OF OTHER ENDOCRINE, NUTRITIONAL AND METABOLIC DISEASE: ICD-10-CM

## 2024-05-24 DIAGNOSIS — E11.9 TYPE 2 DIABETES MELLITUS WITHOUT COMPLICATIONS: ICD-10-CM

## 2024-05-24 DIAGNOSIS — Z98.51 TUBAL LIGATION STATUS: Chronic | ICD-10-CM

## 2024-05-24 DIAGNOSIS — E03.9 HYPOTHYROIDISM, UNSPECIFIED: ICD-10-CM

## 2024-05-24 DIAGNOSIS — I25.10 ATHEROSCLEROTIC HEART DISEASE OF NATIVE CORONARY ARTERY WITHOUT ANGINA PECTORIS: ICD-10-CM

## 2024-05-24 DIAGNOSIS — E78.5 HYPERLIPIDEMIA, UNSPECIFIED: ICD-10-CM

## 2024-05-24 LAB
A1C WITH ESTIMATED AVERAGE GLUCOSE RESULT: 8.8 % — HIGH (ref 4–5.6)
ALBUMIN SERPL ELPH-MCNC: 3.9 G/DL — SIGNIFICANT CHANGE UP (ref 3.3–5)
ALP SERPL-CCNC: 92 U/L — SIGNIFICANT CHANGE UP (ref 40–120)
ALT FLD-CCNC: 18 U/L — SIGNIFICANT CHANGE UP (ref 10–45)
ANION GAP SERPL CALC-SCNC: 14 MMOL/L — SIGNIFICANT CHANGE UP (ref 5–17)
ANION GAP SERPL CALC-SCNC: 6 MMOL/L — SIGNIFICANT CHANGE UP (ref 5–17)
APPEARANCE UR: CLEAR — SIGNIFICANT CHANGE UP
AST SERPL-CCNC: 25 U/L — SIGNIFICANT CHANGE UP (ref 10–40)
BASOPHILS # BLD AUTO: 0.08 K/UL — SIGNIFICANT CHANGE UP (ref 0–0.2)
BASOPHILS # BLD AUTO: 0.09 K/UL — SIGNIFICANT CHANGE UP (ref 0–0.2)
BASOPHILS NFR BLD AUTO: 0.8 % — SIGNIFICANT CHANGE UP (ref 0–2)
BASOPHILS NFR BLD AUTO: 0.8 % — SIGNIFICANT CHANGE UP (ref 0–2)
BILIRUB SERPL-MCNC: 0.2 MG/DL — SIGNIFICANT CHANGE UP (ref 0.2–1.2)
BILIRUB UR-MCNC: NEGATIVE — SIGNIFICANT CHANGE UP
BLD GP AB SCN SERPL QL: NEGATIVE — SIGNIFICANT CHANGE UP
BUN SERPL-MCNC: 16 MG/DL — SIGNIFICANT CHANGE UP (ref 7–23)
BUN SERPL-MCNC: 17 MG/DL — SIGNIFICANT CHANGE UP (ref 7–23)
CALCIUM SERPL-MCNC: 9.2 MG/DL — SIGNIFICANT CHANGE UP (ref 8.5–10.1)
CALCIUM SERPL-MCNC: 9.5 MG/DL — SIGNIFICANT CHANGE UP (ref 8.4–10.5)
CHLORIDE SERPL-SCNC: 106 MMOL/L — SIGNIFICANT CHANGE UP (ref 96–108)
CHLORIDE SERPL-SCNC: 109 MMOL/L — HIGH (ref 96–108)
CHOLEST SERPL-MCNC: 217 MG/DL — HIGH
CO2 SERPL-SCNC: 19 MMOL/L — LOW (ref 22–31)
CO2 SERPL-SCNC: 27 MMOL/L — SIGNIFICANT CHANGE UP (ref 22–31)
COLOR SPEC: YELLOW — SIGNIFICANT CHANGE UP
CREAT SERPL-MCNC: 0.95 MG/DL — SIGNIFICANT CHANGE UP (ref 0.5–1.3)
CREAT SERPL-MCNC: 1.02 MG/DL — SIGNIFICANT CHANGE UP (ref 0.5–1.3)
DIFF PNL FLD: NEGATIVE — SIGNIFICANT CHANGE UP
EGFR: 60 ML/MIN/1.73M2 — SIGNIFICANT CHANGE UP
EGFR: 66 ML/MIN/1.73M2 — SIGNIFICANT CHANGE UP
EOSINOPHIL # BLD AUTO: 0.6 K/UL — HIGH (ref 0–0.5)
EOSINOPHIL # BLD AUTO: 0.76 K/UL — HIGH (ref 0–0.5)
EOSINOPHIL NFR BLD AUTO: 6.2 % — HIGH (ref 0–6)
EOSINOPHIL NFR BLD AUTO: 7.1 % — HIGH (ref 0–6)
ESTIMATED AVERAGE GLUCOSE: 206 MG/DL — HIGH (ref 68–114)
GLUCOSE BLDC GLUCOMTR-MCNC: 221 MG/DL — HIGH (ref 70–99)
GLUCOSE SERPL-MCNC: 222 MG/DL — HIGH (ref 70–99)
GLUCOSE SERPL-MCNC: 99 MG/DL — SIGNIFICANT CHANGE UP (ref 70–99)
GLUCOSE UR QL: NEGATIVE MG/DL — SIGNIFICANT CHANGE UP
HCT VFR BLD CALC: 36.8 % — SIGNIFICANT CHANGE UP (ref 34.5–45)
HCT VFR BLD CALC: 37.9 % — SIGNIFICANT CHANGE UP (ref 34.5–45)
HDLC SERPL-MCNC: 40 MG/DL — LOW
HGB BLD-MCNC: 11.5 G/DL — SIGNIFICANT CHANGE UP (ref 11.5–15.5)
HGB BLD-MCNC: 11.8 G/DL — SIGNIFICANT CHANGE UP (ref 11.5–15.5)
IMM GRANULOCYTES NFR BLD AUTO: 0.3 % — SIGNIFICANT CHANGE UP (ref 0–0.9)
IMM GRANULOCYTES NFR BLD AUTO: 0.4 % — SIGNIFICANT CHANGE UP (ref 0–0.9)
KETONES UR-MCNC: ABNORMAL MG/DL
LEUKOCYTE ESTERASE UR-ACNC: NEGATIVE — SIGNIFICANT CHANGE UP
LIPID PNL WITH DIRECT LDL SERPL: 127 MG/DL — HIGH
LYMPHOCYTES # BLD AUTO: 2.81 K/UL — SIGNIFICANT CHANGE UP (ref 1–3.3)
LYMPHOCYTES # BLD AUTO: 29.1 % — SIGNIFICANT CHANGE UP (ref 13–44)
LYMPHOCYTES # BLD AUTO: 3.84 K/UL — HIGH (ref 1–3.3)
LYMPHOCYTES # BLD AUTO: 36.1 % — SIGNIFICANT CHANGE UP (ref 13–44)
MCHC RBC-ENTMCNC: 26.6 PG — LOW (ref 27–34)
MCHC RBC-ENTMCNC: 26.7 PG — LOW (ref 27–34)
MCHC RBC-ENTMCNC: 31.1 GM/DL — LOW (ref 32–36)
MCHC RBC-ENTMCNC: 31.3 GM/DL — LOW (ref 32–36)
MCV RBC AUTO: 85 FL — SIGNIFICANT CHANGE UP (ref 80–100)
MCV RBC AUTO: 85.7 FL — SIGNIFICANT CHANGE UP (ref 80–100)
MONOCYTES # BLD AUTO: 0.72 K/UL — SIGNIFICANT CHANGE UP (ref 0–0.9)
MONOCYTES # BLD AUTO: 0.84 K/UL — SIGNIFICANT CHANGE UP (ref 0–0.9)
MONOCYTES NFR BLD AUTO: 6.8 % — SIGNIFICANT CHANGE UP (ref 2–14)
MONOCYTES NFR BLD AUTO: 8.7 % — SIGNIFICANT CHANGE UP (ref 2–14)
NEUTROPHILS # BLD AUTO: 5.19 K/UL — SIGNIFICANT CHANGE UP (ref 1.8–7.4)
NEUTROPHILS # BLD AUTO: 5.29 K/UL — SIGNIFICANT CHANGE UP (ref 1.8–7.4)
NEUTROPHILS NFR BLD AUTO: 48.9 % — SIGNIFICANT CHANGE UP (ref 43–77)
NEUTROPHILS NFR BLD AUTO: 54.8 % — SIGNIFICANT CHANGE UP (ref 43–77)
NITRITE UR-MCNC: NEGATIVE — SIGNIFICANT CHANGE UP
NON HDL CHOLESTEROL: 176 MG/DL — HIGH
NRBC # BLD: 0 /100 WBCS — SIGNIFICANT CHANGE UP (ref 0–0)
NRBC # BLD: 0 /100 WBCS — SIGNIFICANT CHANGE UP (ref 0–0)
NT-PROBNP SERPL-SCNC: <36 PG/ML — SIGNIFICANT CHANGE UP (ref 0–300)
PA ADP PRP-ACNC: 213 PRU — SIGNIFICANT CHANGE UP (ref 194–417)
PH UR: 6 — SIGNIFICANT CHANGE UP (ref 5–8)
PLATELET # BLD AUTO: 211 K/UL — SIGNIFICANT CHANGE UP (ref 150–400)
PLATELET # BLD AUTO: 248 K/UL — SIGNIFICANT CHANGE UP (ref 150–400)
POTASSIUM SERPL-MCNC: 3.5 MMOL/L — SIGNIFICANT CHANGE UP (ref 3.5–5.3)
POTASSIUM SERPL-MCNC: 4.1 MMOL/L — SIGNIFICANT CHANGE UP (ref 3.5–5.3)
POTASSIUM SERPL-SCNC: 3.5 MMOL/L — SIGNIFICANT CHANGE UP (ref 3.5–5.3)
POTASSIUM SERPL-SCNC: 4.1 MMOL/L — SIGNIFICANT CHANGE UP (ref 3.5–5.3)
PROT SERPL-MCNC: 7.3 G/DL — SIGNIFICANT CHANGE UP (ref 6–8.3)
PROT UR-MCNC: NEGATIVE MG/DL — SIGNIFICANT CHANGE UP
RBC # BLD: 4.33 M/UL — SIGNIFICANT CHANGE UP (ref 3.8–5.2)
RBC # BLD: 4.42 M/UL — SIGNIFICANT CHANGE UP (ref 3.8–5.2)
RBC # FLD: 14.2 % — SIGNIFICANT CHANGE UP (ref 10.3–14.5)
RBC # FLD: 14.6 % — HIGH (ref 10.3–14.5)
RH IG SCN BLD-IMP: POSITIVE — SIGNIFICANT CHANGE UP
SODIUM SERPL-SCNC: 139 MMOL/L — SIGNIFICANT CHANGE UP (ref 135–145)
SODIUM SERPL-SCNC: 142 MMOL/L — SIGNIFICANT CHANGE UP (ref 135–145)
SP GR SPEC: >1.03 — HIGH (ref 1–1.03)
TRIGL SERPL-MCNC: 277 MG/DL — HIGH
TROPONIN I, HIGH SENSITIVITY RESULT: 7.2 NG/L — SIGNIFICANT CHANGE UP
TROPONIN I, HIGH SENSITIVITY RESULT: 7.7 NG/L — SIGNIFICANT CHANGE UP
UFH PPP CHRO-ACNC: 0.34 IU/ML — SIGNIFICANT CHANGE UP (ref 0.3–0.7)
UROBILINOGEN FLD QL: 0.2 MG/DL — SIGNIFICANT CHANGE UP (ref 0.2–1)
WBC # BLD: 10.63 K/UL — HIGH (ref 3.8–10.5)
WBC # BLD: 9.66 K/UL — SIGNIFICANT CHANGE UP (ref 3.8–10.5)
WBC # FLD AUTO: 10.63 K/UL — HIGH (ref 3.8–10.5)
WBC # FLD AUTO: 9.66 K/UL — SIGNIFICANT CHANGE UP (ref 3.8–10.5)

## 2024-05-24 PROCEDURE — C1894: CPT

## 2024-05-24 PROCEDURE — 99285 EMERGENCY DEPT VISIT HI MDM: CPT

## 2024-05-24 PROCEDURE — 36415 COLL VENOUS BLD VENIPUNCTURE: CPT

## 2024-05-24 PROCEDURE — C1769: CPT

## 2024-05-24 PROCEDURE — 93571 IV DOP VEL&/PRESS C FLO 1ST: CPT | Mod: 26,52,LD

## 2024-05-24 PROCEDURE — 99223 1ST HOSP IP/OBS HIGH 75: CPT

## 2024-05-24 PROCEDURE — 80053 COMPREHEN METABOLIC PANEL: CPT

## 2024-05-24 PROCEDURE — 83036 HEMOGLOBIN GLYCOSYLATED A1C: CPT

## 2024-05-24 PROCEDURE — 93880 EXTRACRANIAL BILAT STUDY: CPT | Mod: 26

## 2024-05-24 PROCEDURE — C1887: CPT

## 2024-05-24 PROCEDURE — 93799 UNLISTED CV SVC/PROCEDURE: CPT

## 2024-05-24 PROCEDURE — 99152 MOD SED SAME PHYS/QHP 5/>YRS: CPT

## 2024-05-24 PROCEDURE — C1751: CPT

## 2024-05-24 PROCEDURE — 84484 ASSAY OF TROPONIN QUANT: CPT

## 2024-05-24 PROCEDURE — 71045 X-RAY EXAM CHEST 1 VIEW: CPT

## 2024-05-24 PROCEDURE — 85025 COMPLETE CBC W/AUTO DIFF WBC: CPT

## 2024-05-24 PROCEDURE — 36569 INSJ PICC 5 YR+ W/O IMAGING: CPT

## 2024-05-24 PROCEDURE — 99233 SBSQ HOSP IP/OBS HIGH 50: CPT

## 2024-05-24 PROCEDURE — 93005 ELECTROCARDIOGRAM TRACING: CPT

## 2024-05-24 PROCEDURE — 80048 BASIC METABOLIC PNL TOTAL CA: CPT

## 2024-05-24 PROCEDURE — 96374 THER/PROPH/DIAG INJ IV PUSH: CPT

## 2024-05-24 PROCEDURE — 82962 GLUCOSE BLOOD TEST: CPT

## 2024-05-24 PROCEDURE — 93458 L HRT ARTERY/VENTRICLE ANGIO: CPT

## 2024-05-24 PROCEDURE — 71045 X-RAY EXAM CHEST 1 VIEW: CPT | Mod: 26

## 2024-05-24 PROCEDURE — 93458 L HRT ARTERY/VENTRICLE ANGIO: CPT | Mod: 26

## 2024-05-24 PROCEDURE — 80061 LIPID PANEL: CPT

## 2024-05-24 PROCEDURE — 83880 ASSAY OF NATRIURETIC PEPTIDE: CPT

## 2024-05-24 PROCEDURE — 99222 1ST HOSP IP/OBS MODERATE 55: CPT

## 2024-05-24 RX ORDER — ATORVASTATIN CALCIUM 80 MG/1
80 TABLET, FILM COATED ORAL AT BEDTIME
Refills: 0 | Status: DISCONTINUED | OUTPATIENT
Start: 2024-05-24 | End: 2024-05-29

## 2024-05-24 RX ORDER — INSULIN GLARGINE 100 [IU]/ML
30 INJECTION, SOLUTION SUBCUTANEOUS AT BEDTIME
Refills: 0 | Status: DISCONTINUED | OUTPATIENT
Start: 2024-05-24 | End: 2024-05-28

## 2024-05-24 RX ORDER — INSULIN GLARGINE 100 [IU]/ML
45 INJECTION, SOLUTION SUBCUTANEOUS
Refills: 0 | DISCHARGE

## 2024-05-24 RX ORDER — ASPIRIN/CALCIUM CARB/MAGNESIUM 324 MG
81 TABLET ORAL DAILY
Refills: 0 | Status: DISCONTINUED | OUTPATIENT
Start: 2024-05-24 | End: 2024-05-24

## 2024-05-24 RX ORDER — LEVOTHYROXINE SODIUM 125 MCG
25 TABLET ORAL DAILY
Refills: 0 | Status: DISCONTINUED | OUTPATIENT
Start: 2024-05-24 | End: 2024-05-29

## 2024-05-24 RX ORDER — INSULIN GLARGINE 100 [IU]/ML
35 INJECTION, SOLUTION SUBCUTANEOUS AT BEDTIME
Refills: 0 | Status: DISCONTINUED | OUTPATIENT
Start: 2024-05-24 | End: 2024-05-24

## 2024-05-24 RX ORDER — NITROGLYCERIN 6.5 MG
0.5 CAPSULE, EXTENDED RELEASE ORAL EVERY 6 HOURS
Refills: 0 | Status: DISCONTINUED | OUTPATIENT
Start: 2024-05-24 | End: 2024-05-24

## 2024-05-24 RX ORDER — SODIUM CHLORIDE 9 MG/ML
250 INJECTION INTRAMUSCULAR; INTRAVENOUS; SUBCUTANEOUS ONCE
Refills: 0 | Status: COMPLETED | OUTPATIENT
Start: 2024-05-24 | End: 2024-05-24

## 2024-05-24 RX ORDER — ATORVASTATIN CALCIUM 80 MG/1
1 TABLET, FILM COATED ORAL
Qty: 0 | Refills: 0 | DISCHARGE
Start: 2024-05-24

## 2024-05-24 RX ORDER — ASPIRIN/CALCIUM CARB/MAGNESIUM 324 MG
81 TABLET ORAL DAILY
Refills: 0 | Status: DISCONTINUED | OUTPATIENT
Start: 2024-05-24 | End: 2024-05-29

## 2024-05-24 RX ORDER — HEPARIN SODIUM 5000 [USP'U]/ML
1000 INJECTION INTRAVENOUS; SUBCUTANEOUS
Qty: 25000 | Refills: 0 | Status: DISCONTINUED | OUTPATIENT
Start: 2024-05-24 | End: 2024-05-29

## 2024-05-24 RX ORDER — NITROGLYCERIN 6.5 MG
0.4 CAPSULE, EXTENDED RELEASE ORAL
Refills: 0 | Status: DISCONTINUED | OUTPATIENT
Start: 2024-05-24 | End: 2024-05-24

## 2024-05-24 RX ORDER — INSULIN LISPRO 100/ML
6 VIAL (ML) SUBCUTANEOUS
Refills: 0 | Status: DISCONTINUED | OUTPATIENT
Start: 2024-05-24 | End: 2024-05-26

## 2024-05-24 RX ORDER — SODIUM CHLORIDE 9 MG/ML
500 INJECTION INTRAMUSCULAR; INTRAVENOUS; SUBCUTANEOUS
Refills: 0 | Status: DISCONTINUED | OUTPATIENT
Start: 2024-05-24 | End: 2024-05-24

## 2024-05-24 RX ORDER — SODIUM CHLORIDE 9 MG/ML
3 INJECTION INTRAMUSCULAR; INTRAVENOUS; SUBCUTANEOUS EVERY 8 HOURS
Refills: 0 | Status: DISCONTINUED | OUTPATIENT
Start: 2024-05-24 | End: 2024-05-29

## 2024-05-24 RX ORDER — INSULIN LISPRO 100/ML
VIAL (ML) SUBCUTANEOUS
Refills: 0 | Status: DISCONTINUED | OUTPATIENT
Start: 2024-05-24 | End: 2024-05-29

## 2024-05-24 RX ORDER — ATORVASTATIN CALCIUM 80 MG/1
80 TABLET, FILM COATED ORAL AT BEDTIME
Refills: 0 | Status: DISCONTINUED | OUTPATIENT
Start: 2024-05-24 | End: 2024-05-24

## 2024-05-24 RX ORDER — METOPROLOL TARTRATE 50 MG
25 TABLET ORAL
Refills: 0 | Status: DISCONTINUED | OUTPATIENT
Start: 2024-05-24 | End: 2024-05-29

## 2024-05-24 RX ORDER — AMLODIPINE BESYLATE 2.5 MG/1
10 TABLET ORAL DAILY
Refills: 0 | Status: DISCONTINUED | OUTPATIENT
Start: 2024-05-24 | End: 2024-05-29

## 2024-05-24 RX ORDER — METOPROLOL TARTRATE 50 MG
5 TABLET ORAL ONCE
Refills: 0 | Status: COMPLETED | OUTPATIENT
Start: 2024-05-24 | End: 2024-05-24

## 2024-05-24 RX ORDER — ROSUVASTATIN CALCIUM 5 MG/1
1 TABLET ORAL
Qty: 0 | Refills: 0 | DISCHARGE

## 2024-05-24 RX ORDER — ALBUTEROL 90 UG/1
2 AEROSOL, METERED ORAL EVERY 6 HOURS
Refills: 0 | Status: DISCONTINUED | OUTPATIENT
Start: 2024-05-24 | End: 2024-05-29

## 2024-05-24 RX ADMIN — Medication 5 MILLIGRAM(S): at 15:25

## 2024-05-24 RX ADMIN — SODIUM CHLORIDE 75 MILLILITER(S): 9 INJECTION INTRAMUSCULAR; INTRAVENOUS; SUBCUTANEOUS at 12:19

## 2024-05-24 RX ADMIN — INSULIN GLARGINE 30 UNIT(S): 100 INJECTION, SOLUTION SUBCUTANEOUS at 21:36

## 2024-05-24 RX ADMIN — HEPARIN SODIUM 10 UNIT(S)/HR: 5000 INJECTION INTRAVENOUS; SUBCUTANEOUS at 20:18

## 2024-05-24 RX ADMIN — Medication 25 MICROGRAM(S): at 05:04

## 2024-05-24 RX ADMIN — Medication 0.4 MILLIGRAM(S): at 15:15

## 2024-05-24 RX ADMIN — Medication 81 MILLIGRAM(S): at 14:35

## 2024-05-24 RX ADMIN — SODIUM CHLORIDE 500 MILLILITER(S): 9 INJECTION INTRAMUSCULAR; INTRAVENOUS; SUBCUTANEOUS at 12:20

## 2024-05-24 RX ADMIN — AMLODIPINE BESYLATE 10 MILLIGRAM(S): 2.5 TABLET ORAL at 05:04

## 2024-05-24 RX ADMIN — Medication 0.5 INCH(S): at 16:14

## 2024-05-24 RX ADMIN — SODIUM CHLORIDE 3 MILLILITER(S): 9 INJECTION INTRAMUSCULAR; INTRAVENOUS; SUBCUTANEOUS at 22:09

## 2024-05-24 RX ADMIN — ATORVASTATIN CALCIUM 80 MILLIGRAM(S): 80 TABLET, FILM COATED ORAL at 21:08

## 2024-05-24 NOTE — DISCHARGE NOTE PROVIDER - CARE PROVIDER_API CALL
Joselyn Segura  [address] [address]  Phone: [phone]  Fax: [phone]  Follow Up Time: 1 week

## 2024-05-24 NOTE — PATIENT PROFILE ADULT - FALL HARM RISK - HARM RISK INTERVENTIONS

## 2024-05-24 NOTE — DISCHARGE NOTE PROVIDER - ATTENDING DISCHARGE PHYSICAL EXAMINATION:
Vital Signs Last 24 Hrs  T(C): 36.4 (24 May 2024 14:30), Max: 36.7 (23 May 2024 23:26)  T(F): 97.6 (24 May 2024 14:30), Max: 98.1 (24 May 2024 03:19)  HR: 70 (24 May 2024 15:00) (64 - 96)  BP: 160/88 (24 May 2024 15:00) (117/63 - 173/79)  BP(mean): --  RR: 20 (24 May 2024 15:00) (16 - 20)  SpO2: 96% (24 May 2024 15:00) (94% - 99%)    Parameters below as of 24 May 2024 15:00  Patient On (Oxygen Delivery Method): room air

## 2024-05-24 NOTE — CONSULT NOTE ADULT - ATTENDING COMMENTS
70 y/o female with PMH of HTN/HLD, DM2, Asthma who presented to ED to be evaluated for chest pain associated with exertional shortness of breath.    - symptoms suggestive of unstable angina  - asa, given plavix load yesterday (300 mg?) and statin  - heavy smoker and strong family history of CAD, recent CT with significant coronary atherosclerosis  - echocardiogram  - given symptoms and high risk features, will plan for cardiac cath today  - dw patient and  in detail

## 2024-05-24 NOTE — H&P ADULT - NSHPREVIEWOFSYSTEMS_GEN_ALL_CORE
REVIEW OF SYSTEMS:  CONSTITUTIONAL: No fever, weight loss, or fatigue  EYES: No eye pain, visual disturbances, or discharge  ENMT:  No difficulty hearing, tinnitus, vertigo; No sinus or throat pain  NECK: No pain or stiffness  RESPIRATORY: Has non productive cough,No wheezing, chills or hemoptysis; No shortness of breath  CARDIOVASCULAR: + chest pain; No palpitations, dizziness, or leg swelling  GASTROINTESTINAL: No abdominal or epigastric pain. No nausea, vomiting, or hematemesis; No diarrhea ,has No melena  GENITOURINARY: No dysuria, frequency, hematuria, or incontinence  NEUROLOGICAL: No headaches, memory loss, loss of strength, numbness, or tremors  SKIN: No itching, burning, rashes, or lesions   LYMPH NODES: No enlarged glands  ENDOCRINE: No heat or cold intolerance; No hair loss  MUSCULOSKELETAL: No joint pain or swelling; No muscle, back, or extremity pain  PSYCHIATRIC: No depression, anxiety, mood swings, or difficulty sleeping  HEME/LYMPH: No easy bruising, or bleeding gums  ALLERGY: No hives or eczema

## 2024-05-24 NOTE — CONSULT NOTE ADULT - SUBJECTIVE AND OBJECTIVE BOX
[facility]         Sandra Pope Patel, Savella, Juan      [phone] (office)    Reason for Consult: chest pain    Interval HPI: Patient seen and examined at bedside. No acute events overnight. Pt states that for the past 6 years she has been experiencing b/l neck pain and shortness of breath with exertion. Has been progressively worsening. Pt had an episode of neck pain with radiation to b/l chest for the first time yesterday. Lasting 30s. Chest pain is described as sharp. Does endorse history of hashimotos & emphysema. Also has noticed b/l lower extremity swelling for the first time. Denies any orthopnea. Patient has seen a cardiologist in  (dr roy) who performed a TTE that showed aortic calcification, recommended yearly follow up; has not followed since. No records available on HIE. On interview, denies any chest pain, palpitations, shortness of breath, nausea, vomiting, or abdominal pain.     HPI:  68 y/o female with PMH of HTN/HLD, DMT2, Asthma who presented to ED to be evaluated for chest pain associated with exertional shortness of breath. this afternoon pt was climbing stairs to go from one building to the next and started to develop sharp neck pain radiating to her back and both the shoulders.  pt pain lasted for about 30 sec and dissipated upon rest. pt was prescribed advair which she stopped taking due to thrush.  admits of being compliance to all other  medication at home  in ED cardiology on call was consulted and pt received 324 mg aspirin with 300 mg plavix. recommendation was  not to start on heparin drip    pt is currently asymptomatic. EKG w/o acute ST wave changes   (23 May 2024 21:59)      PAST MEDICAL & SURGICAL HISTORY:  Hypertension      Diabetes      Hyperlipidemia      High triglycerides      Hypothyroidism      Obesity      Arthritis      Chronic bronchiolitis      H/O  section      H/O tubal ligation      H/O eye surgery          SOCIAL HISTORY: No active tobacco, alcohol or illicit drug use    FAMILY HISTORY:  FH: coronary artery disease (Sibling)        Home Medications:  Albuterol (Eqv-ProAir HFA) 90 mcg/inh inhalation aerosol: 2 puff(s) inhaled every 6 hours (23 May 2024 21:55)  amLODIPine 10 mg oral tablet: 1 tab(s) orally once a day (23 May 2024 21:53)  glipiZIDE 10 mg oral tablet, extended release: 1 tab(s) orally once a day (23 May 2024 21:55)  Lantus 100 units/mL subcutaneous solution: 45 unit(s) subcutaneous once a day (at bedtime) (23 May 2024 21:54)  levothyroxine 25 mcg (0.025 mg) oral tablet: 1 tab(s) orally once a day (23 May 2024 21:55)  losartan 100 mg oral tablet: 1 tab(s) orally once a day (23 May 2024 21:55)  Myrbetriq 25 mg oral tablet, extended release: 1 tab(s) orally once a day (23 May 2024 21:54)  rosuvastatin 10 mg oral tablet: 1 tab(s) orally once a day (23 May 2024 21:55)      MEDICATIONS  (STANDING):  amLODIPine   Tablet 10 milliGRAM(s) Oral daily  atorvastatin 40 milliGRAM(s) Oral at bedtime  dextrose 10% Bolus 125 milliLiter(s) IV Bolus once  dextrose 5%. 1000 milliLiter(s) (50 mL/Hr) IV Continuous <Continuous>  dextrose 5%. 1000 milliLiter(s) (100 mL/Hr) IV Continuous <Continuous>  dextrose 50% Injectable 25 Gram(s) IV Push once  dextrose 50% Injectable 12.5 Gram(s) IV Push once  enoxaparin Injectable 40 milliGRAM(s) SubCutaneous every 24 hours  glucagon  Injectable 1 milliGRAM(s) IntraMuscular once  insulin lispro (ADMELOG) corrective regimen sliding scale   SubCutaneous three times a day before meals  insulin lispro (ADMELOG) corrective regimen sliding scale   SubCutaneous at bedtime  levothyroxine 25 MICROGram(s) Oral daily    MEDICATIONS  (PRN):  acetaminophen     Tablet .. 650 milliGRAM(s) Oral every 6 hours PRN Temp greater or equal to 38C (100.4F), Mild Pain (1 - 3)  albuterol    90 MICROgram(s) HFA Inhaler 2 Puff(s) Inhalation every 6 hours PRN Shortness of Breath  aluminum hydroxide/magnesium hydroxide/simethicone Suspension 30 milliLiter(s) Oral every 4 hours PRN Dyspepsia  dextrose Oral Gel 15 Gram(s) Oral once PRN Blood Glucose LESS THAN 70 milliGRAM(s)/deciliter  melatonin 3 milliGRAM(s) Oral at bedtime PRN Insomnia  ondansetron Injectable 4 milliGRAM(s) IV Push every 8 hours PRN Nausea and/or Vomiting      Allergies    penicillin (Hives)    Intolerances        REVIEW OF SYSTEMS: Negative except as per HPI.    VITAL SIGNS:   Vital Signs Last 24 Hrs  T(C): 36.7 (24 May 2024 03:19), Max: 36.7 (23 May 2024 23:26)  T(F): 98.1 (24 May 2024 03:19), Max: 98.1 (24 May 2024 03:19)  HR: 85 (24 May 2024 03:19) (81 - 88)  BP: 122/73 (24 May 2024 03:19) (117/63 - 148/68)  BP(mean): --  RR: 18 (24 May 2024 03:19) (16 - 18)  SpO2: 94% (24 May 2024 03:19) (94% - 99%)    Parameters below as of 24 May 2024 03:19  Patient On (Oxygen Delivery Method): room air        I&O's Summary      PHYSICAL EXAM:  Constitutional: NAD, well-developed  HEENT NC/AT, moist mucous membranes  Pulmonary: Non-labored, breath sounds are clear bilaterally, no wheezing, rales or rhonchi  Cardiovascular: +S1, S2, RRR, no murmur  Gastrointestinal: Soft, nontender, nondistended, normoactive bowel sounds  Extremities: +1 pitting edema   Neurological: Alert, strength and sensitivity are grossly intact  Skin: No obvious lesions/rashes  Psych: Mood & affect appropriate    LABS: All Labs Reviewed:                        11.5   9.66  )-----------( 211      ( 24 May 2024 07:20 )             36.8                         12.6   13.85 )-----------( 266      ( 23 May 2024 20:10 )             39.5     24 May 2024 07:20    142    |  109    |  17     ----------------------------<  99     3.5     |  27     |  0.95   23 May 2024 19:41    138    |  109    |  18     ----------------------------<  131    4.7     |  28     |  0.99     Ca    9.2        24 May 2024 07:20  Ca    9.7        23 May 2024 19:41    TPro  8.3    /  Alb  4.0    /  TBili  0.4    /  DBili  x      /  AST  43     /  ALT  26     /  AlkPhos  83     23 May 2024 19:41          Blood Culture:         EKG: NSR, no ischemic ST changes     RADIOLOGY:      CXR: no acute lung processes, on personal read     
                                                                      Department of Cardiology                                                               Division of Interventional Cardiology                                                               [facility] / [place] [id number]                                                                                 [phone]                                                                                                                               Interventional Cardiology Consult / Pre-Procedure Note    Recent/pertinent 24 hour events: HPI as noted. D&C and urethral sling 10/2023. Pt reports her older sister had CABG (age 63) approx 5 yrs ago. Pt states she "gets throat tightening in her thyroid d/t hashimoto's" but the pain yesterday was different (radiated to jaw and bilat shoulders. Accompanied by SOB. Currently she denies CP, SOB, palpitations, N/V, fever/chills, abd pain, other c/o at this time.       HPI:  68 y/o female with PMH of HTN/HLD, DMT2, Asthma who presented to ED to be evaluated for chest pain associated with exertional shortness of breath. this afternoon pt was climbing stairs to go from one building to the next and started to develop sharp neck pain radiating to her back and both the shoulders.  pt pain lasted for about 30 sec and dissipated upon rest. pt was prescribed advair which she stopped taking due to thrush.  admits of being compliance to all other  medication at home  in ED cardiology on call was consulted and pt received 324 mg aspirin with 300 mg plavix. recommendation was  not to start on heparin drip    pt is currently asymptomatic. EKG w/o acute ST wave changes   (23 May 2024 21:59)      PAST MEDICAL & SURGICAL HISTORY:  Hypertension  Diabetes  Hyperlipidemia  High triglycerides  Hypothyroidism  Obesity  Arthritis  Chronic bronchiolitis  H/O  section  H/O tubal ligation  H/O eye surgery    FAMILY HISTORY:  FH: coronary artery disease (Sibling)      MEDICATIONS  (STANDING):  amLODIPine   Tablet 10 milliGRAM(s) Oral daily  atorvastatin 40 milliGRAM(s) Oral at bedtime  insulin glargine Injectable (LANTUS) 35 Unit(s) SubCutaneous at bedtime  insulin lispro (ADMELOG) corrective regimen sliding scale   SubCutaneous three times a day before meals  insulin lispro (ADMELOG) corrective regimen sliding scale   SubCutaneous at bedtime  levothyroxine 25 MICROGram(s) Oral daily    MEDICATIONS  (PRN):  acetaminophen     Tablet .. 650 milliGRAM(s) Oral every 6 hours PRN Temp greater or equal to 38C (100.4F), Mild Pain (1 - 3)  albuterol    90 MICROgram(s) HFA Inhaler 2 Puff(s) Inhalation every 6 hours PRN Shortness of Breath  aluminum hydroxide/magnesium hydroxide/simethicone Suspension 30 milliLiter(s) Oral every 4 hours PRN Dyspepsia  dextrose Oral Gel 15 Gram(s) Oral once PRN Blood Glucose LESS THAN 70 milliGRAM(s)/deciliter  melatonin 3 milliGRAM(s) Oral at bedtime PRN Insomnia  ondansetron Injectable 4 milliGRAM(s) IV Push every 8 hours PRN Nausea and/or Vomiting    Allergies: penicillin (Hives)      T(C): 36.7 (24 @ 03:19), Max: 36.7 (24 @ 23:26)  HR: 85 (24 @ 03:19) (81 - 88)  Tele: SR/ST 90-100s, no ectopy  BP: 122/73 (24 @ 03:19) (117/63 - 148/68)  RR: 18 (24 @ 03:19) (16 - 18)  SpO2: 94% (24 @ 03:19) (94% - 99%) on room air    Daily Height in cm: 154.94 (23 May 2024 17:37)    Daily Weight in k.7 (24 May 2024 03:19)      LABS:	 	                        11.5   9.66  )-----------( 211      ( 24 May 2024 07:20 )             36.8         142  |  109<H>  |  17  ----------------------------<  99  3.5   |  27  |  0.95    Ca    9.2      24 May 2024 07:20  TPro  8.3  /  Alb  4.0  /  TBili  0.4  /  DBili  x   /  AST  43<H>  /  ALT  26  /  AlkPhos  83      Troponin I, High Sensitivity (24 @ 07:20) 7.2 ng/L  Troponin I, High Sensitivity (24 @ 23:34) 7.7 ng/L  Troponin I, High Sensitivity (24 @ 19:41) 9.4 ng/L    Pro-Brain Natriuretic Peptide (24 @ 19:41) 74 pg/mL      EKG: NSR, no ischemic ST changes       Physical Exam:  Constitutional: NAD  Neuro: A+O x 3, non-focal, speech clear and intact  HEENT: NC/AT, PERRL, EOMI, anicteric sclerae, oral mucosa pink and moist  Neck: supple, no JVD  CV: regular rate, regular rhythm, +S1S2, no murmurs or rub  Pulm/chest: lung sounds CTA and equal bilaterally, no accessory muscle use noted  Abd: soft, NT, ND  Ext: RUELAS x 4, no C/C/E  Pulses: R radial 2+, bilat DP 2+  Skin: warm, well perfused  Psych: calm, appropriate affect

## 2024-05-24 NOTE — PROGRESS NOTE ADULT - SUBJECTIVE AND OBJECTIVE BOX
Patient is a 69y old  Female who presents with a chief complaint of chest pain/shortness of breath (23 May 2024 21:59)      INTERVAL HPI/OVERNIGHT EVENTS: Pt seen/examined at bedside. No acute overnight events. Endorses resolution of shortness of breath and chest pain, but reports a feeling of mid-sternal, non-radiating chest pressure (1/10), states that it feels as if a "cat was sitting on my chest." Also endorses headache which she has intermittently experienced for the past few months. Denies vision changes, palpitations, chest pain.     MEDICATIONS  (STANDING):  amLODIPine   Tablet 10 milliGRAM(s) Oral daily  atorvastatin 40 milliGRAM(s) Oral at bedtime  dextrose 10% Bolus 125 milliLiter(s) IV Bolus once  dextrose 5%. 1000 milliLiter(s) (50 mL/Hr) IV Continuous <Continuous>  dextrose 5%. 1000 milliLiter(s) (100 mL/Hr) IV Continuous <Continuous>  dextrose 50% Injectable 25 Gram(s) IV Push once  dextrose 50% Injectable 12.5 Gram(s) IV Push once  enoxaparin Injectable 40 milliGRAM(s) SubCutaneous every 24 hours  glucagon  Injectable 1 milliGRAM(s) IntraMuscular once  insulin lispro (ADMELOG) corrective regimen sliding scale   SubCutaneous three times a day before meals  insulin lispro (ADMELOG) corrective regimen sliding scale   SubCutaneous at bedtime  levothyroxine 25 MICROGram(s) Oral daily    MEDICATIONS  (PRN):  acetaminophen     Tablet .. 650 milliGRAM(s) Oral every 6 hours PRN Temp greater or equal to 38C (100.4F), Mild Pain (1 - 3)  albuterol    90 MICROgram(s) HFA Inhaler 2 Puff(s) Inhalation every 6 hours PRN Shortness of Breath  aluminum hydroxide/magnesium hydroxide/simethicone Suspension 30 milliLiter(s) Oral every 4 hours PRN Dyspepsia  dextrose Oral Gel 15 Gram(s) Oral once PRN Blood Glucose LESS THAN 70 milliGRAM(s)/deciliter  melatonin 3 milliGRAM(s) Oral at bedtime PRN Insomnia  ondansetron Injectable 4 milliGRAM(s) IV Push every 8 hours PRN Nausea and/or Vomiting      Allergies    penicillin (Hives)    Intolerances        REVIEW OF SYSTEMS:  CONSTITUTIONAL: No fever or chills  HEENT:  + headache, no sore throat  RESPIRATORY: No cough, wheezing, or shortness of breath  CARDIOVASCULAR: No chest pain, palpitations; + chest pressure  GASTROINTESTINAL: No abd pain, nausea, vomiting, or diarrhea  GENITOURINARY: No dysuria, frequency, or hematuria  NEUROLOGICAL: no focal weakness or dizziness  MUSCULOSKELETAL: no myalgias     Vital Signs Last 24 Hrs  T(C): 36.7 (24 May 2024 03:19), Max: 36.7 (23 May 2024 23:26)  T(F): 98.1 (24 May 2024 03:19), Max: 98.1 (24 May 2024 03:19)  HR: 85 (24 May 2024 03:19) (81 - 88)  BP: 122/73 (24 May 2024 03:19) (117/63 - 148/68)  BP(mean): --  RR: 18 (24 May 2024 03:19) (16 - 18)  SpO2: 94% (24 May 2024 03:19) (94% - 99%)    Parameters below as of 24 May 2024 03:19  Patient On (Oxygen Delivery Method): room air        PHYSICAL EXAM:  GENERAL: NAD  HEENT:  anicteric, moist mucous membranes  CHEST/LUNG:  CTA b/l, no rales, wheezes, or rhonchi  HEART:  RRR, S1, S2  ABDOMEN:  BS+, soft, nontender, nondistended  EXTREMITIES: no edema, cyanosis, or calf tenderness  NERVOUS SYSTEM: answers questions and follows commands appropriately    LABS:                        11.5   9.66  )-----------( 211      ( 24 May 2024 07:20 )             36.8     CBC Full  -  ( 24 May 2024 07:20 )  WBC Count : 9.66 K/uL  Hemoglobin : 11.5 g/dL  Hematocrit : 36.8 %  Platelet Count - Automated : 211 K/uL  Mean Cell Volume : 85.0 fl  Mean Cell Hemoglobin : 26.6 pg  Mean Cell Hemoglobin Concentration : 31.3 gm/dL  Auto Neutrophil # : 5.29 K/uL  Auto Lymphocyte # : 2.81 K/uL  Auto Monocyte # : 0.84 K/uL  Auto Eosinophil # : 0.60 K/uL  Auto Basophil # : 0.08 K/uL  Auto Neutrophil % : 54.8 %  Auto Lymphocyte % : 29.1 %  Auto Monocyte % : 8.7 %  Auto Eosinophil % : 6.2 %  Auto Basophil % : 0.8 %    24 May 2024 07:20    142    |  109    |  17     ----------------------------<  99     3.5     |  27     |  0.95     Ca    9.2        24 May 2024 07:20    TPro  8.3    /  Alb  4.0    /  TBili  0.4    /  DBili  x      /  AST  43     /  ALT  26     /  AlkPhos  83     23 May 2024 19:41      Urinalysis Basic - ( 24 May 2024 07:20 )    Color: x / Appearance: x / SG: x / pH: x  Gluc: 99 mg/dL / Ketone: x  / Bili: x / Urobili: x   Blood: x / Protein: x / Nitrite: x   Leuk Esterase: x / RBC: x / WBC x   Sq Epi: x / Non Sq Epi: x / Bacteria: x      CAPILLARY BLOOD GLUCOSE      POCT Blood Glucose.: 115 mg/dL (24 May 2024 08:35)  POCT Blood Glucose.: 72 mg/dL (24 May 2024 05:07)  POCT Blood Glucose.: 235 mg/dL (23 May 2024 22:17)          RADIOLOGY & ADDITIONAL TESTS:    Personally reviewed.     Consultant(s) Notes Reviewed:  [x] YES  [ ] NO     Patient is a 69y old  Female who presents with a chief complaint of chest pain/shortness of breath (23 May 2024 21:59)      INTERVAL HPI/OVERNIGHT EVENTS: Pt seen/examined at bedside. No acute overnight events. States that she experiences shortness of breath most notably with exertion, but not at rest. Reports a feeling of mid-sternal, non-radiating chest pressure (1/10), states that it feels as if a "cat was sitting on my chest." Also endorses headache which she has intermittently experienced for the past few months. Denies vision changes, palpitations, chest pain.     MEDICATIONS  (STANDING):  amLODIPine   Tablet 10 milliGRAM(s) Oral daily  atorvastatin 40 milliGRAM(s) Oral at bedtime  dextrose 10% Bolus 125 milliLiter(s) IV Bolus once  dextrose 5%. 1000 milliLiter(s) (50 mL/Hr) IV Continuous <Continuous>  dextrose 5%. 1000 milliLiter(s) (100 mL/Hr) IV Continuous <Continuous>  dextrose 50% Injectable 25 Gram(s) IV Push once  dextrose 50% Injectable 12.5 Gram(s) IV Push once  enoxaparin Injectable 40 milliGRAM(s) SubCutaneous every 24 hours  glucagon  Injectable 1 milliGRAM(s) IntraMuscular once  insulin lispro (ADMELOG) corrective regimen sliding scale   SubCutaneous three times a day before meals  insulin lispro (ADMELOG) corrective regimen sliding scale   SubCutaneous at bedtime  levothyroxine 25 MICROGram(s) Oral daily    MEDICATIONS  (PRN):  acetaminophen     Tablet .. 650 milliGRAM(s) Oral every 6 hours PRN Temp greater or equal to 38C (100.4F), Mild Pain (1 - 3)  albuterol    90 MICROgram(s) HFA Inhaler 2 Puff(s) Inhalation every 6 hours PRN Shortness of Breath  aluminum hydroxide/magnesium hydroxide/simethicone Suspension 30 milliLiter(s) Oral every 4 hours PRN Dyspepsia  dextrose Oral Gel 15 Gram(s) Oral once PRN Blood Glucose LESS THAN 70 milliGRAM(s)/deciliter  melatonin 3 milliGRAM(s) Oral at bedtime PRN Insomnia  ondansetron Injectable 4 milliGRAM(s) IV Push every 8 hours PRN Nausea and/or Vomiting      Allergies    penicillin (Hives)    Intolerances        REVIEW OF SYSTEMS:  CONSTITUTIONAL: No fever or chills  HEENT:  + headache, no sore throat  RESPIRATORY: No cough, wheezing, or shortness of breath  CARDIOVASCULAR: No chest pain, palpitations; + chest pressure  GASTROINTESTINAL: No abd pain, nausea, vomiting, or diarrhea  GENITOURINARY: No dysuria, frequency, or hematuria  NEUROLOGICAL: no focal weakness or dizziness  MUSCULOSKELETAL: no myalgias     Vital Signs Last 24 Hrs  T(C): 36.7 (24 May 2024 03:19), Max: 36.7 (23 May 2024 23:26)  T(F): 98.1 (24 May 2024 03:19), Max: 98.1 (24 May 2024 03:19)  HR: 85 (24 May 2024 03:19) (81 - 88)  BP: 122/73 (24 May 2024 03:19) (117/63 - 148/68)  BP(mean): --  RR: 18 (24 May 2024 03:19) (16 - 18)  SpO2: 94% (24 May 2024 03:19) (94% - 99%)    Parameters below as of 24 May 2024 03:19  Patient On (Oxygen Delivery Method): room air        PHYSICAL EXAM:  GENERAL: NAD  HEENT:  anicteric, moist mucous membranes  CHEST/LUNG:  CTA b/l, no rales, wheezes, or rhonchi  HEART:  RRR, S1, S2  ABDOMEN:  BS+, soft, nontender, nondistended  EXTREMITIES: no edema, cyanosis, or calf tenderness  NERVOUS SYSTEM: answers questions and follows commands appropriately    LABS:                        11.5   9.66  )-----------( 211      ( 24 May 2024 07:20 )             36.8     CBC Full  -  ( 24 May 2024 07:20 )  WBC Count : 9.66 K/uL  Hemoglobin : 11.5 g/dL  Hematocrit : 36.8 %  Platelet Count - Automated : 211 K/uL  Mean Cell Volume : 85.0 fl  Mean Cell Hemoglobin : 26.6 pg  Mean Cell Hemoglobin Concentration : 31.3 gm/dL  Auto Neutrophil # : 5.29 K/uL  Auto Lymphocyte # : 2.81 K/uL  Auto Monocyte # : 0.84 K/uL  Auto Eosinophil # : 0.60 K/uL  Auto Basophil # : 0.08 K/uL  Auto Neutrophil % : 54.8 %  Auto Lymphocyte % : 29.1 %  Auto Monocyte % : 8.7 %  Auto Eosinophil % : 6.2 %  Auto Basophil % : 0.8 %    24 May 2024 07:20    142    |  109    |  17     ----------------------------<  99     3.5     |  27     |  0.95     Ca    9.2        24 May 2024 07:20    TPro  8.3    /  Alb  4.0    /  TBili  0.4    /  DBili  x      /  AST  43     /  ALT  26     /  AlkPhos  83     23 May 2024 19:41      Urinalysis Basic - ( 24 May 2024 07:20 )    Color: x / Appearance: x / SG: x / pH: x  Gluc: 99 mg/dL / Ketone: x  / Bili: x / Urobili: x   Blood: x / Protein: x / Nitrite: x   Leuk Esterase: x / RBC: x / WBC x   Sq Epi: x / Non Sq Epi: x / Bacteria: x      CAPILLARY BLOOD GLUCOSE      POCT Blood Glucose.: 115 mg/dL (24 May 2024 08:35)  POCT Blood Glucose.: 72 mg/dL (24 May 2024 05:07)  POCT Blood Glucose.: 235 mg/dL (23 May 2024 22:17)          RADIOLOGY & ADDITIONAL TESTS:    Personally reviewed.     Consultant(s) Notes Reviewed:  [x] YES  [ ] NO

## 2024-05-24 NOTE — CONSULT NOTE ADULT - ASSESSMENT
69 year old female with PMH HTN, HLD, DMT2, Asthma who presented to ED to be evaluated for chest pain associated with exertional shortness of breath. Admitted for cardiac workup.     troponin negative x 2  no acute changes on EKG compared to previous    Continue home amlodipine  ARB on hold pending cath    lovenox d/c'd for cath    plan for [facility] this afternoon  maintain NPO status  ECG and labs reviewed  aspirin 325 and plavix 300 5/23 @ 7-8pm  need for beta-blocker deferred to non-interventional cardiology   continue statin  TTE pending  procedure discussed with patient; risks and benefits explained, questions answered  consent to be obtained by attending IC  pre cath IVF ordered to prevent CINDY  remainder of plan per primary team/non-interventional cardiology

## 2024-05-24 NOTE — PATIENT PROFILE ADULT - FUNCTIONAL ASSESSMENT - BASIC MOBILITY 6.
4-calculated by average/Not able to assess (calculate score using [facility] averaging method)

## 2024-05-24 NOTE — DISCHARGE NOTE PROVIDER - HOSPITAL COURSE
ADMISSION DATE:  05-23-24    ---  FROM ADMISSION H+P:   HPI:  68 y/o female with PMH of HTN/HLD, DMT2, Asthma who presented to ED to be evaluated for chest pain associated with exertional shortness of breath. this afternoon pt was climbing stairs to go from one building to the next and started to develop sharp neck pain radiating to her back and both the shoulders.  pt pain lasted for about 30 sec and dissipated upon rest. pt was prescribed advair which she stopped taking due to thrush.  admits of being compliance to all other  medication at home  in ED cardiology on call was consulted and pt received 324 mg aspirin with 300 mg plavix. recommendation was  not to start on heparin drip    pt is currently asymptomatic. EKG w/o acute ST wave changes   (23 May 2024 21:59)      ---  HOSPITAL COURSE/PERTINENT LABS/PROCEDURES PERFORMED/PENDING TESTS:   Pt was admitted for chest pain associated w/ exertional shortness of breath, likely in setting of unstable angina. Troponins trended to peak (9.4). Pt s/p cardiac cath 5/24 with recommendation for transfer to [place] for CABG.    Patient was seen and examined on day of discharge. Patient was medically optimized for transfer with close outpatient follow up.    --  VITALS:   T(C): 36.6 (05-24-24 @ 11:50), Max: 36.7 (05-23-24 @ 23:26)  HR: 96 (05-24-24 @ 11:50) (64 - 96)  BP: 146/76 (05-24-24 @ 11:50) (117/63 - 148/68)  RR: 16 (05-24-24 @ 11:50) (16 - 18)  SpO2: 97% (05-24-24 @ 11:50) (94% - 99%)    PHYSICAL EXAM ON DAY OF DISCHARGE:  GENERAL: NAD  HEENT:  anicteric, moist mucous membranes  CHEST/LUNG:  CTA b/l, no rales, wheezes, or rhonchi  HEART:  RRR, S1, S2  ABDOMEN:  BS+, soft, nontender, nondistended  EXTREMITIES: +trace edema, cyanosis, or calf tenderness  NERVOUS SYSTEM: answers questions and follows commands appropriately    ---  CONSULTANTS:   Interventional Cardiology Dr. Frazier  Cardiology Dr. Stephens    ---  TIME SPENT:  I, the attending physician, was physically present for the key portions of the evaluation and management (E/M) service provided. The total amount of time spent reviewing the hospital notes, laboratory values, imaging findings, assessing/counseling the patient, discussing with consultant physicians, social work, nursing staff was -- minutes       ADMISSION DATE:  05-23-24    ---  FROM ADMISSION H+P:   HPI:  68 y/o female with PMH of HTN/HLD, DMT2, Asthma who presented to ED to be evaluated for chest pain associated with exertional shortness of breath. this afternoon pt was climbing stairs to go from one building to the next and started to develop sharp neck pain radiating to her back and both the shoulders.  pt pain lasted for about 30 sec and dissipated upon rest. pt was prescribed advair which she stopped taking due to thrush.  admits of being compliance to all other  medication at home  in ED cardiology on call was consulted and pt received 324 mg aspirin with 300 mg plavix. recommendation was  not to start on heparin drip    pt is currently asymptomatic. EKG w/o acute ST wave changes   (23 May 2024 21:59)      ---  HOSPITAL COURSE/PERTINENT LABS/PROCEDURES PERFORMED/PENDING TESTS:   Pt was admitted for chest pain associated w/ exertional shortness of breath, likely in setting of unstable angina. Troponins trended to peak (9.4). Pt s/p cardiac cath 5/24 demonstrating obstructive multi-vessel CAD; recommendations from Interventional Cardiology for tx to [facility] for CBAG eval    Patient was seen and examined on day of transfer. Patient was medically optimized for transfer with close outpatient follow up.    --  VITALS:   T(C): 36.6 (05-24-24 @ 11:50), Max: 36.7 (05-23-24 @ 23:26)  HR: 96 (05-24-24 @ 11:50) (64 - 96)  BP: 146/76 (05-24-24 @ 11:50) (117/63 - 148/68)  RR: 16 (05-24-24 @ 11:50) (16 - 18)  SpO2: 97% (05-24-24 @ 11:50) (94% - 99%)    PHYSICAL EXAM ON DAY OF DISCHARGE:  GENERAL: NAD  HEENT:  anicteric, moist mucous membranes  CHEST/LUNG:  CTA b/l, no rales, wheezes, or rhonchi  HEART:  RRR, S1, S2  ABDOMEN:  BS+, soft, nontender, nondistended  EXTREMITIES: +trace edema, cyanosis, or calf tenderness  NERVOUS SYSTEM: answers questions and follows commands appropriately    ---  CONSULTANTS:   Interventional Cardiology Dr. Boutis  Cardiology Dr. Stephens    ---  TIME SPENT:  I, the attending physician, was physically present for the key portions of the evaluation and management (E/M) service provided. The total amount of time spent reviewing the hospital notes, laboratory values, imaging findings, assessing/counseling the patient, discussing with consultant physicians, social work, nursing staff was -- minutes

## 2024-05-24 NOTE — CONSULT NOTE ADULT - ASSESSMENT
Assessment: 70 y/o female with PMH of HTN/HLD, DMT2, Asthma who presented to ED to be evaluated for chest pain associated with exertional shortness of breath. Admitted for cardiac workup.     CHARTING IN PROGRESS     Plan:   - Patient with apparent worsening dyspnea on exertion with neck/chest pain.   - No clear evidence of acute ischemia, trops negative x 2.   - Monitor closely for the development of anginal symptoms or clinical signs of ischemia.   - No acute changes on EKG compared to previous.  - Consideration for non-urgent cath for assessment of coronaries.     - Does appear volume overloaded on exam, with B/L LE pitting edema   - ProBNP 74; no previous diagnosis of CHF, no prior TTE   - Recommend TTE   - Strict I/Os, daily weights.    - BP well controlled, monitor routine hemodynamics.  - Continue home amlodipine  - Hold home ARB for possible cath      - Monitor and replete lytes, keep K>4, Mg>2.  - Other cardiovascular workup will depend on clinical course.  - All other workup per primary team.  - Will continue to follow.             Assessment: 70 y/o female with PMH of HTN/HLD, DMT2, Asthma who presented to ED to be evaluated for chest pain associated with exertional shortness of breath. Admitted for cardiac workup.     Plan:     Unstable Angina   - Patient with apparent worsening dyspnea, chest pain on exertion   - Trop negative x 2   - No acute changes on EKG compared to previous.  - CT Chest reviewed; calcifications in coronaries   - s/p ASA & Plavix load yesterday   - Plan for cath today    - Does not appear volume overloaded on exam.   - ProBNP 74; no previous diagnosis of CHF, no prior TTE   - Recommend TTE     - BP well controlled, monitor routine hemodynamics.  - Continue home amlodipine  - Hold home ARB until after cath.     - Monitor and replete lytes, keep K>4, Mg>2.  - Other cardiovascular workup will depend on clinical course.  - All other workup per primary team.  - Will continue to follow. Assessment: 70 y/o female with PMH of HTN/HLD, DMT2, Asthma who presented to ED to be evaluated for chest pain associated with exertional shortness of breath. Admitted for cardiac workup.     Plan:     Unstable Angina   - Patient with apparent worsening dyspnea, chest pain on exertion   - Trop negative x 2   - No acute changes on EKG compared to previous.  - Pt notes normal prior stress test with Dr. Buchanan 2022.   - CT Chest reviewed, w/ calcifications in coronaries   - s/p ASA & Plavix load yesterday   - Plan for cath today    - Does not appear volume overloaded on exam.   - ProBNP 74; no previous diagnosis of CHF, no prior TTE   - Non-urgent TTE     - BP well controlled, monitor routine hemodynamics.  - Continue home amlodipine  - Hold home ARB until after cath.     - Monitor and replete lytes, keep K>4, Mg>2.  - Other cardiovascular workup will depend on clinical course.  - All other workup per primary team.  - Will continue to follow.

## 2024-05-24 NOTE — DISCHARGE NOTE PROVIDER - NSDCFUSCHEDAPPT_GEN_ALL_CORE_FT
Larry Martinez Physician [facility]  NEUROLOGY [address]  Scheduled Appointment: 06/06/2024

## 2024-05-24 NOTE — DISCHARGE NOTE PROVIDER - NSDCFUADDINST_GEN_ALL_CORE_FT
Wound Care:   the day AFTER your procedure...     Remove the bandage from the site and gently clean with soap and water then pat dry; leave open to air.     You may take a brief shower     Do NOT apply lotions, creams, powders, ointments, or perfumes to your incision site unless prescribed by your physician     Do NOT soak your procedure site for 1 week (no baths, no pools, no tubs, etc...)     Check  your groin and /or wrist daily. A small amount of bruising, and soreness are normal    ACTIVITY: for 24 hours      - DO NOT DRIVE     - DO NOT make any important decisions or sign legal documents      - DO NOT operate heavy machinery      - you may resume sexual activity in 48 hours, unless otherwise instructed by your cardiologist          If your procedure was done through the WRIST: for the NEXT 3 DAYS:          - avoid pushing, pulling, with that affected wrist (such as pushing up from a seated position)          - avoid repeated movement of that hand and wrist (such as typing or hammering)          - DO NOT LIFT anything more than 5 pounds         If your procedure was done through the GROIN: for the NEXT 5 DAYS          - Limit climbing stairs, DO NOT soak in bathtub or pool          - no strenuous activities, pushing, pulling, straining          - Do not lift anything more than 10 pounds     MEDICATION:      Please take your medications as explained to you (found on your discharge paperwork)      DO NOT STOP taking them without consulting with your cardiologist first.      **if you have diabetes and take metformin please do not take this medication for 2 days after the procedure. Restart and take as usual starting day 3.    Follow the heart healthy diet recommended by your doctor.   Drink plenty of water for the next 24 hours unless otherwise instructed.  Do not drink any alcoholic beverages for 24 hours (beer, wine, liquor, etc).    If you smoke: STOP SMOKING. Call the [facility] at [phone] for assistance.    CALL your cardiologist/primary care doctor to make a follow-up appointment in 2 WEEKS     **CALL YOUR DOCTOR if you experience     fever, chills, body aches, or severe pain, swelling, redness, heat or yellow discharge at incision site     bleeding or excruciating pain at the procedural site, swelling (golf ball size) at your procedural site     CHEST PAIN     numbness, tingling, temperature change (of your procedural site)     Pain  -you may have pain after your surgery or procedure at the puncture site or in the artery/vein that has been treated.  -take pain medication as directed by your doctor.  call your doctor if your pain is not getting better within 5 days or if it gets worse  -prescription pain medication should be taken with food, and can cause constipation, an over-the-counter softener may be helpful    Nausea  -anesthesia/sedation can upset your stomach  -eat bland foods (Jell-o, crackers, toast) and drink ginger ale if you are nauseated  -drink plenty of fluids such as water or ginger ale (unless instructed otherwise by your doctor)  -if you have nausea or vomiting the day after your procedure, call your doctor    Bleeding  -you may have a small amount of oozing from your surgical or procedural site  -bleeding as the site can be dangerous and should prompt immediate medical attention    Infection  -if you have any of the following signs of infection, call your doctor:       redness, swelling, fever over 101 degrees, thick yellow/white drainage    If you are unable to reach your doctor, you may contact:   Dr Juan Manuel Frazier @ [phone]    **Call 911 immediately if:     - your hand or leg becomes blue, feels cold to touch, or if you have numbness or tingling     - bleeding or swelling from your wrist or groin site cannot be controlled or if area becomes very red or hot to touch     - you have pain, pressure, tightness or burning in your chest, arms, jaw or stomach; shortness of breath; nausea or excessive sweating; lightheadedness; dizziness or a fainting spell; or if you have sudden back or stomach pain     -you have rapid heartbeat or palpitations     - you have bright red blood in large amounts, severe pain at access site (wrist or groin) or significant new swelling at the puncture site    If/because you had anesthesia, for the next 24 hours you should NOT:  -drive a car, operate power tool or machinery  -drink alcohol, beer, or wine  -make important personal or business decisions  If you had any type of sedation, you may experience lightheadedness, dizziness, or sleepiness following your procedure. A responsible adult should stay with you for at least 24 hours following your procedure.

## 2024-05-24 NOTE — CHART NOTE - NSCHARTNOTEFT_GEN_A_CORE
Post Diagnostic Cardiac Catheterization Chart Note      Prelim cath report:   [facility] via RRA  severe multivessel CAD  full/official report to follow      Patient without complaints. Denies CP, SOB, palpitations, N/V, fever/chills, abd pain, numbness/tingling/weakness, other c/o at this time.    A+O x 3, neurologically intact  RRA access site stable (clean, dry, intact, without bleeding, heat, erythema, or hematoma). Radial band in place.  RUE motor, neuro, circ intact.  Hemodynamically stable, neurologically intact, VS stable, afebrile    A/P: s/p diagnostic [facility]  obstructive multi-vessel CAD: tx to [facility] for CBAG eval  post cath access site precautions reviewed with pt who verbalized good understanding  if needs heparin okay to restart 4 hours post successful band removal (band due off at 1730 on 5/24)  see discharge for instructions/plan

## 2024-05-24 NOTE — H&P ADULT - PROBLEM SELECTOR PLAN 1
preop workup in progress  continue asa and statin  start heparin gttp  start bb- lop 25 mg po bid  ck echo/ carotids/ pft's   ck ekg/ ua/ chest xray  ck type and cross  ck mrsa nares  ck p2y12   OR with Dr. Goncalves - OR date tbd; ck p2y12

## 2024-05-24 NOTE — DISCHARGE NOTE PROVIDER - NSDCCPTREATMENT_GEN_ALL_CORE_FT
Booster status unknown PRINCIPAL PROCEDURE  Procedure: Left heart catheterization  Findings and Treatment:      no

## 2024-05-24 NOTE — H&P ADULT - NS ATTEND AMEND GEN_ALL_CORE FT
Mr Hayes has multivessel CAD and would benefit from CABG.    She understands the 1-2% risk of major complications.

## 2024-05-24 NOTE — DISCHARGE NOTE PROVIDER - NSDCCPCAREPLAN_GEN_ALL_CORE_FT
PRINCIPAL DISCHARGE DIAGNOSIS  Diagnosis: 3-vessel CAD  Assessment and Plan of Treatment:      PRINCIPAL DISCHARGE DIAGNOSIS  Diagnosis: Unstable angina  Assessment and Plan of Treatment: You presented with chest pain associated with exertional shortness of breath. You were given high dose aspirin and Plavix. Your troponins were trended to peak. Lipid panel- . You underwent left heart catheterization with recommendations for transfer to [place] for further management.   - You will be transferred for CABG  - Continue high dose atorvastatin  - Fup with your PCP and cardiologist for further monitoring.      SECONDARY DISCHARGE DIAGNOSES  Diagnosis: Type 2 diabetes mellitus  Assessment and Plan of Treatment: You have a known history of diabetes prior to your admission. Your hemoglobin A1c on this hospital admission was 8.8%.   - Continue your diabetes medication (glipizide, Basaglar) to control your blood sugar and prevent the possible complications from this disease.   - Follow up with your PCP, podiatrist, ophthalmologist on a regular basis.       PRINCIPAL DISCHARGE DIAGNOSIS  Diagnosis: CAD, multiple vessel  Assessment and Plan of Treatment: You presented with chest pain associated with exertional shortness of breath. You were given high dose aspirin and Plavix. Your troponins were trended to peak. Lipid panel- . You underwent left heart catheterization demonstrating obstructive multi-vessel CAD. You were transferred to [facility] for further management.   - You will be transferred to [facility] for CABG evaluation  - Continue high dose atorvastatin  - Fup with your PCP and cardiologist for further monitoring.      SECONDARY DISCHARGE DIAGNOSES  Diagnosis: Type 2 diabetes mellitus  Assessment and Plan of Treatment: You have a known history of diabetes prior to your admission. Your hemoglobin A1c on this hospital admission was 8.8%.   - Continue your diabetes medication (glipizide, Basaglar) to control your blood sugar and prevent the possible complications from this disease.   - Follow up with your PCP, podiatrist, ophthalmologist on a regular basis.

## 2024-05-24 NOTE — H&P ADULT - NSHPPHYSICALEXAM_GEN_ALL_CORE
General: NAD  HEENT:  NC/AT  Neuro: A&Ox4, gait steady, speech clear, no focal deficits noted  Respiratory: B/L BS CTA, no wheeze, no rhonchi, no crackles noted  Cardiovascular: RSR 60's normal S1S2, no murmur noted  GI: Abd soft, NT/ND, +BSx4Q +BM; obese abdomen   Peripheral Vascular:  B/L LE neg edema, 2+ peripheral pulses, no clubbing, cyanosis, varicosities/PVD noted  Musculoskeletal: B/L UE and LE 5/5 strength   Psychiatric: Normal mood, normal affect observed  Skin: Normal exam to inspection and palpation. no bleeding, no hematoma; cath site- rt groin cdi w/ dressing- neg hematoma/bleeding

## 2024-05-24 NOTE — H&P ADULT - HISTORY OF PRESENT ILLNESS
70 y/o female with PMH of HTN/HLD, DMT2, Asthma. obesity, hoshimoto's, former smoker  who presented to ED to be evaluated for chest pain associated with exertional shortness of breath. this afternoon pt was climbing stairs to go from one building to the next and started to develop sharp neck pain radiating to her back and both the shoulders.  pt pain lasted for about 30 sec and dissipated upon rest. pt was prescribed advair which she stopped taking due to thrush.  admits of being compliance to all other  medication at home  in ED cardiology on call was consulted and pt received 324 mg aspirin with 300 mg plavix. recommendation was  not to start on heparin drip  pt is currently asymptomatic. EKG w/o acute ST wave changes; Cath performed at [facility] which revealed multivessel cad; pt transferred for OHS eval with Dr. Goncalves, Pt asymptomatic at this time.    70 y/o female with PMH of HTN/HLD, DMT2, Asthma. obesity, hoshimoto's, former smoker  who presented to ED to be evaluated for chest pain associated with exertional shortness of breath. this afternoon pt was climbing stairs to go from one building to the next and started to develop sharp neck pain radiating to her back and both the shoulders.  pt pain lasted for about 30 sec and dissipated upon rest. pt was prescribed advair which she stopped taking due to thrush.  admits of being compliance to all other  medication at home  in ED cardiology on call was consulted and pt received 324 mg aspirin with 300 mg plavix. recommendation was  not to start on heparin drip  pt is currently asymptomatic. EKG w/o acute ST wave changes; Cath performed at [facility] which revealed multivessel cad; pt transferred for OHS eval with Dr. Goncalves, Pt asymptomatic at this time. pt also developed chest pain after sheath was pulled- nitropaste given and chest pain was resolved.

## 2024-05-24 NOTE — DISCHARGE NOTE PROVIDER - NSDCMRMEDTOKEN_GEN_ALL_CORE_FT
Albuterol (Eqv-ProAir HFA) 90 mcg/inh inhalation aerosol: 2 puff(s) inhaled every 6 hours  amLODIPine 10 mg oral tablet: 1 tab(s) orally once a day  glipiZIDE 10 mg oral tablet, extended release: 1 tab(s) orally once a day  Lantus 100 units/mL subcutaneous solution: 45 unit(s) subcutaneous once a day (at bedtime)  levothyroxine 25 mcg (0.025 mg) oral tablet: 1 tab(s) orally once a day  losartan 100 mg oral tablet: 1 tab(s) orally once a day  Myrbetriq 25 mg oral tablet, extended release: 1 tab(s) orally once a day  rosuvastatin 10 mg oral tablet: 1 tab(s) orally once a day   Albuterol (Eqv-ProAir HFA) 90 mcg/inh inhalation aerosol: 2 puff(s) inhaled every 6 hours  amLODIPine 10 mg oral tablet: 1 tab(s) orally once a day  atorvastatin 80 mg oral tablet: 1 tab(s) orally once a day (at bedtime)  glipiZIDE 10 mg oral tablet, extended release: 1 tab(s) orally once a day  levothyroxine 25 mcg (0.025 mg) oral tablet: 1 tab(s) orally once a day  losartan 100 mg oral tablet: 1 tab(s) orally once a day  Myrbetriq 25 mg oral tablet, extended release: 1 tab(s) orally once a day

## 2024-05-24 NOTE — H&P ADULT - ASSESSMENT
70 y/o female with PMH of HTN/HLD, DMT2, Asthma. obesity, hoshimoto's, former smoker  who presented to ED to be evaluated for chest pain associated with exertional shortness of breath. this afternoon pt was climbing stairs to go from one building to the next and started to develop sharp neck pain radiating to her back and both the shoulders.  pt pain lasted for about 30 sec and dissipated upon rest. pt was prescribed advair which she stopped taking due to thrush.  admits of being compliance to all other  medication at home  in ED cardiology on call was consulted and pt received 324 mg aspirin with 300 mg plavix. recommendation was  not to start on heparin drip  pt is currently asymptomatic. EKG w/o acute ST wave changes; Cath performed at [facility] which revealed multivessel cad; pt transferred for OHS eval with Dr. Goncalves, Pt asymptomatic at this time.      68 y/o female with PMH of HTN/HLD, DMT2, Asthma. obesity, hoshimoto's, former smoker  who presented to ED to be evaluated for chest pain associated with exertional shortness of breath. this afternoon pt was climbing stairs to go from one building to the next and started to develop sharp neck pain radiating to her back and both the shoulders.  pt pain lasted for about 30 sec and dissipated upon rest. pt was prescribed advair which she stopped taking due to thrush.  admits of being compliance to all other  medication at home  in ED cardiology on call was consulted and pt received 324 mg aspirin with 300 mg plavix. recommendation was  not to start on heparin drip  pt is currently asymptomatic. EKG w/o acute ST wave changes; Cath performed at [facility] which revealed multivessel cad; pt also developed chest pain after sheath was pulled- nitropaste given and chest pain was resolved.pt transferred for OHS eval with Dr. Goncalves, Pt asymptomatic at this time.

## 2024-05-24 NOTE — PROGRESS NOTE ADULT - ASSESSMENT
70 y/o female with PMH of HTN/HLD, DMT2, Asthma who presented to ED to be evaluated for chest pain associated with exertional shortness of breath. Admitted for r/o ACS:    # Chest pain  - s/p aspirin 324 mg and plavix 300 mg in ED  - troponin trended to peak 9.4 -> 7.7 -> 7.2  - C/w statin  - F/u a1c/lipid panel   - monitor on tele   - Cardio consulted     # T2DM  - home regimen: Lantus 50U qHS, glipizide 10mg daily  - hold home glipizide  - start 30U Lantus qHS  - low dose sliding scale  - hypoglycemia protocol in place, fingersticks q ac, q hs   - goal -180 in hospital setting, adjust insulin regimen prn  - f/u HbA1c     # Asthma:  - not in exacerbation  -C/w albuterol PRN     # Hypothyroidism:  - C/w home synthroid    # HTN:  -C/w amlodipine 10 mg qd   -hold off ARB's prior to cath    # DVT ppx w/ Lovenox 40         68 y/o female with PMH of HTN/HLD, DMT2, Asthma who presented to ED to be evaluated for chest pain associated with exertional shortness of breath. Admitted for r/o ACS:    # Chest pain  - s/p aspirin 324 mg and plavix 300 mg in ED  - troponin trended to peak 9.4 -> 7.7 -> 7.2  - C/w statin  - F/u a1c/lipid panel   - f/u TTE  - monitor on tele   - Cardio consulted     # T2DM  - home regimen: Lantus 50U qHS, glipizide 10mg daily  - hold home glipizide  - start 30U Lantus qHS  - low dose sliding scale  - hypoglycemia protocol in place, fingersticks q ac, q hs   - goal -180 in hospital setting, adjust insulin regimen prn  - f/u HbA1c     # Asthma:  - not in exacerbation  -C/w albuterol PRN     # Hypothyroidism:  - C/w home synthroid    # HTN:  -C/w amlodipine 10 mg qd   -hold off ARB's prior to cath    # DVT ppx w/ Lovenox 40         70 y/o female with PMH of HTN/HLD, DMT2, Asthma who presented to ED to be evaluated for chest pain associated with exertional shortness of breath. Admitted for r/o ACS:    # Chest pain  - s/p aspirin 324 mg and plavix 300 mg in ED  - troponin trended to peak 9.4 -> 7.7 -> 7.2  - C/w statin  - F/u a1c/lipid panel   - f/u TTE  - monitor on tele   - Cardio consulted     # T2DM  - home regimen: Lantus 50U qHS, glipizide 10mg daily  - hold home glipizide  - start 35U Lantus qHS  - low dose sliding scale  - hypoglycemia protocol in place, fingersticks q ac, q hs   - goal -180 in hospital setting, adjust insulin regimen prn  - f/u HbA1c     # Asthma:  - not in exacerbation  -C/w albuterol PRN     # Hypothyroidism:  - C/w home synthroid    # HTN:  -C/w amlodipine 10 mg qd   -hold off ARB's prior to cath    # DVT ppx w/ Lovenox 40         68 y/o female with PMH of HTN/HLD, DMT2, Asthma who presented to ED to be evaluated for chest pain associated with exertional shortness of breath. Admitted for r/o ACS:    # Chest pain  - s/p aspirin 324 mg and plavix 300 mg in ED  - troponin trended to peak 9.4 -> 7.7 -> 7.2  - C/w statin  - lipid panel noted,   - f/u TTE  - scheduled for cath 5/24 PM  - Cardio consulted     # T2DM  - home regimen: Lantus 50U qHS, glipizide 10mg daily  - hold home glipizide  - start 35U Lantus qHS  - low dose sliding scale  - hypoglycemia protocol in place, fingersticks q ac, q hs   - goal -180 in hospital setting, adjust insulin regimen prn  - HbA1c 8.8%    # Asthma:  - not in exacerbation  -C/w albuterol PRN     # Hypothyroidism:  - C/w home synthroid    # HTN:  -C/w amlodipine 10 mg qd   -hold off ARB's prior to cath    # DVT ppx SCDs in setting of cath

## 2024-05-24 NOTE — PATIENT PROFILE ADULT - FALL HARM RISK - HARM RISK INTERVENTIONS

## 2024-05-25 LAB
A1C WITH ESTIMATED AVERAGE GLUCOSE RESULT: 8.7 % — HIGH (ref 4–5.6)
ALBUMIN SERPL ELPH-MCNC: 3.7 G/DL — SIGNIFICANT CHANGE UP (ref 3.3–5)
ALP SERPL-CCNC: 84 U/L — SIGNIFICANT CHANGE UP (ref 40–120)
ALT FLD-CCNC: 13 U/L — SIGNIFICANT CHANGE UP (ref 10–45)
ANION GAP SERPL CALC-SCNC: 10 MMOL/L — SIGNIFICANT CHANGE UP (ref 5–17)
APTT BLD: 63.3 SEC — HIGH (ref 24.5–35.6)
APTT BLD: 74.4 SEC — HIGH (ref 24.5–35.6)
AST SERPL-CCNC: 22 U/L — SIGNIFICANT CHANGE UP (ref 10–40)
BASOPHILS # BLD AUTO: 0.06 K/UL — SIGNIFICANT CHANGE UP (ref 0–0.2)
BASOPHILS NFR BLD AUTO: 0.6 % — SIGNIFICANT CHANGE UP (ref 0–2)
BILIRUB SERPL-MCNC: 0.2 MG/DL — SIGNIFICANT CHANGE UP (ref 0.2–1.2)
BLD GP AB SCN SERPL QL: NEGATIVE — SIGNIFICANT CHANGE UP
BUN SERPL-MCNC: 16 MG/DL — SIGNIFICANT CHANGE UP (ref 7–23)
CALCIUM SERPL-MCNC: 9.6 MG/DL — SIGNIFICANT CHANGE UP (ref 8.4–10.5)
CHLORIDE SERPL-SCNC: 107 MMOL/L — SIGNIFICANT CHANGE UP (ref 96–108)
CO2 SERPL-SCNC: 23 MMOL/L — SIGNIFICANT CHANGE UP (ref 22–31)
CREAT SERPL-MCNC: 1.1 MG/DL — SIGNIFICANT CHANGE UP (ref 0.5–1.3)
EGFR: 55 ML/MIN/1.73M2 — LOW
EOSINOPHIL # BLD AUTO: 0.77 K/UL — HIGH (ref 0–0.5)
EOSINOPHIL NFR BLD AUTO: 7.2 % — HIGH (ref 0–6)
ESTIMATED AVERAGE GLUCOSE: 203 MG/DL — HIGH (ref 68–114)
GLUCOSE BLDC GLUCOMTR-MCNC: 124 MG/DL — HIGH (ref 70–99)
GLUCOSE BLDC GLUCOMTR-MCNC: 162 MG/DL — HIGH (ref 70–99)
GLUCOSE BLDC GLUCOMTR-MCNC: 177 MG/DL — HIGH (ref 70–99)
GLUCOSE BLDC GLUCOMTR-MCNC: 177 MG/DL — HIGH (ref 70–99)
GLUCOSE SERPL-MCNC: 134 MG/DL — HIGH (ref 70–99)
HCT VFR BLD CALC: 36.1 % — SIGNIFICANT CHANGE UP (ref 34.5–45)
HGB BLD-MCNC: 11.3 G/DL — LOW (ref 11.5–15.5)
IMM GRANULOCYTES NFR BLD AUTO: 0.3 % — SIGNIFICANT CHANGE UP (ref 0–0.9)
INR BLD: 1.03 RATIO — SIGNIFICANT CHANGE UP (ref 0.85–1.18)
LYMPHOCYTES # BLD AUTO: 4.31 K/UL — HIGH (ref 1–3.3)
LYMPHOCYTES # BLD AUTO: 40.5 % — SIGNIFICANT CHANGE UP (ref 13–44)
MCHC RBC-ENTMCNC: 26.4 PG — LOW (ref 27–34)
MCHC RBC-ENTMCNC: 31.3 GM/DL — LOW (ref 32–36)
MCV RBC AUTO: 84.3 FL — SIGNIFICANT CHANGE UP (ref 80–100)
MONOCYTES # BLD AUTO: 0.83 K/UL — SIGNIFICANT CHANGE UP (ref 0–0.9)
MONOCYTES NFR BLD AUTO: 7.8 % — SIGNIFICANT CHANGE UP (ref 2–14)
MRSA PCR RESULT.: SIGNIFICANT CHANGE UP
NEUTROPHILS # BLD AUTO: 4.63 K/UL — SIGNIFICANT CHANGE UP (ref 1.8–7.4)
NEUTROPHILS NFR BLD AUTO: 43.6 % — SIGNIFICANT CHANGE UP (ref 43–77)
NRBC # BLD: 0 /100 WBCS — SIGNIFICANT CHANGE UP (ref 0–0)
PLATELET # BLD AUTO: 221 K/UL — SIGNIFICANT CHANGE UP (ref 150–400)
POTASSIUM SERPL-MCNC: 3.8 MMOL/L — SIGNIFICANT CHANGE UP (ref 3.5–5.3)
POTASSIUM SERPL-SCNC: 3.8 MMOL/L — SIGNIFICANT CHANGE UP (ref 3.5–5.3)
PREALB SERPL-MCNC: 22 MG/DL — SIGNIFICANT CHANGE UP (ref 20–40)
PROT SERPL-MCNC: 6.5 G/DL — SIGNIFICANT CHANGE UP (ref 6–8.3)
PROTHROM AB SERPL-ACNC: 10.8 SEC — SIGNIFICANT CHANGE UP (ref 9.5–13)
RBC # BLD: 4.28 M/UL — SIGNIFICANT CHANGE UP (ref 3.8–5.2)
RBC # FLD: 14.5 % — SIGNIFICANT CHANGE UP (ref 10.3–14.5)
RH IG SCN BLD-IMP: POSITIVE — SIGNIFICANT CHANGE UP
S AUREUS DNA NOSE QL NAA+PROBE: SIGNIFICANT CHANGE UP
SODIUM SERPL-SCNC: 140 MMOL/L — SIGNIFICANT CHANGE UP (ref 135–145)
T4 FREE SERPL-MCNC: 1.4 NG/DL — SIGNIFICANT CHANGE UP (ref 0.9–1.8)
TSH SERPL-MCNC: 2.94 UIU/ML — SIGNIFICANT CHANGE UP (ref 0.27–4.2)
WBC # BLD: 10.63 K/UL — HIGH (ref 3.8–10.5)
WBC # FLD AUTO: 10.63 K/UL — HIGH (ref 3.8–10.5)

## 2024-05-25 PROCEDURE — 93010 ELECTROCARDIOGRAM REPORT: CPT

## 2024-05-25 PROCEDURE — 99232 SBSQ HOSP IP/OBS MODERATE 35: CPT | Mod: FS

## 2024-05-25 PROCEDURE — 99223 1ST HOSP IP/OBS HIGH 75: CPT | Mod: 57

## 2024-05-25 RX ORDER — BUDESONIDE AND FORMOTEROL FUMARATE DIHYDRATE 160; 4.5 UG/1; UG/1
1 AEROSOL RESPIRATORY (INHALATION) EVERY 12 HOURS
Refills: 0 | Status: DISCONTINUED | OUTPATIENT
Start: 2024-05-25 | End: 2024-05-29

## 2024-05-25 RX ADMIN — SODIUM CHLORIDE 3 MILLILITER(S): 9 INJECTION INTRAMUSCULAR; INTRAVENOUS; SUBCUTANEOUS at 22:21

## 2024-05-25 RX ADMIN — ATORVASTATIN CALCIUM 80 MILLIGRAM(S): 80 TABLET, FILM COATED ORAL at 21:10

## 2024-05-25 RX ADMIN — Medication 6 UNIT(S): at 11:12

## 2024-05-25 RX ADMIN — HEPARIN SODIUM 10 UNIT(S)/HR: 5000 INJECTION INTRAVENOUS; SUBCUTANEOUS at 02:23

## 2024-05-25 RX ADMIN — Medication 25 MICROGRAM(S): at 05:41

## 2024-05-25 RX ADMIN — Medication 6 UNIT(S): at 16:53

## 2024-05-25 RX ADMIN — SODIUM CHLORIDE 3 MILLILITER(S): 9 INJECTION INTRAMUSCULAR; INTRAVENOUS; SUBCUTANEOUS at 05:41

## 2024-05-25 RX ADMIN — Medication 1: at 11:13

## 2024-05-25 RX ADMIN — HEPARIN SODIUM 10 UNIT(S)/HR: 5000 INJECTION INTRAVENOUS; SUBCUTANEOUS at 09:51

## 2024-05-25 RX ADMIN — Medication 81 MILLIGRAM(S): at 11:13

## 2024-05-25 RX ADMIN — INSULIN GLARGINE 30 UNIT(S): 100 INJECTION, SOLUTION SUBCUTANEOUS at 21:12

## 2024-05-25 RX ADMIN — Medication 25 MILLIGRAM(S): at 05:41

## 2024-05-25 RX ADMIN — Medication 25 MILLIGRAM(S): at 17:13

## 2024-05-25 RX ADMIN — AMLODIPINE BESYLATE 10 MILLIGRAM(S): 2.5 TABLET ORAL at 05:40

## 2024-05-25 RX ADMIN — SODIUM CHLORIDE 3 MILLILITER(S): 9 INJECTION INTRAMUSCULAR; INTRAVENOUS; SUBCUTANEOUS at 13:13

## 2024-05-25 NOTE — PROGRESS NOTE ADULT - PROBLEM SELECTOR PLAN 1
continue asa and statin  start heparin gttp  start bb- lop 25 mg po bid  ck echo/ carotids/ pft's   ck ekg/ ua/ chest xray  ck type and cross  ck mrsa nares  ck p2y12   OR with Dr. Goncalves - OR date tbd; ck p2y12.

## 2024-05-25 NOTE — PROGRESS NOTE ADULT - ASSESSMENT
68 y/o female with PMH of HTN/HLD, DMT2, Asthma. obesity, hoshimoto's, former smoker  who presented to ED to be evaluated for chest pain associated with exertional shortness of breath. this afternoon pt was climbing stairs to go from one building to the next and started to develop sharp neck pain radiating to her back and both the shoulders.  pt pain lasted for about 30 sec and dissipated upon rest. pt was prescribed advair which she stopped taking due to thrush.  admits of being compliance to all other  medication at home  in ED cardiology on call was consulted and pt received 324 mg aspirin with 300 mg plavix. recommendation was  not to start on heparin drip  pt is currently asymptomatic.   5/25 P2y12 213, Carotid doppler pending , echo pending. Endocrinology consulted for assistance in management

## 2024-05-25 NOTE — CONSULT NOTE ADULT - ASSESSMENT
68 y/o female with PMH of HTN/HLD, DMT2, Asthma. obesity, hoshimoto's, former smoker  who presented to ED to be evaluated for chest pain, now admitted to CTS for surgery eval.     Assessment  DMT2: 69y Female with DM T2 with hyperglycemia, A1C 8.7% , was on oral meds and insulin at home (basaglar) , now on basal bolus insulin with coverage, blood sugars running high.  CAD: on medications, stable, monitored. CTS eval   Hypothyroidism: On Synthroid 25 mcg po daily, compliant with Synthroid intake, asymptomatic. TFTs euthyroid.   HTN: on antihypertensive medications, monitored, asymptomatic.  Obesity: No strict exercise routines, not on any weight loss program, neither on low calorie diet.        Discussed plan and management wit Dr Jyoti Montes MD  Cell: [phone]  Office: [phone]

## 2024-05-25 NOTE — CONSULT NOTE ADULT - PROBLEM SELECTOR RECOMMENDATION 3
Will continue current Synthroid dose, will FU.  Suggest to repeat thyroid function test in 4-6 weeks. Outpatient follow up.

## 2024-05-25 NOTE — PROGRESS NOTE ADULT - SUBJECTIVE AND OBJECTIVE BOX
VITAL SIGNS    Telemetry:  SR 60-70  Vital Signs Last 24 Hrs  T(C): 36.2 (24 @ 11:51), Max: 36.8 (24 @ 04:52)  T(F): 97.2 (24 @ 11:51), Max: 98.2 (24 @ 04:52)  HR: 81 (24 @ 11:51) (67 - 87)  BP: 98/53 (24 @ 11:51) (98/53 - 178/89)  RR: 18 (24 @ 11:51) (15 - 22)  SpO2: 96% (24 @ 11:51) (96% - 97%)             @ 07:01  -   @ 07:00  --------------------------------------------------------  IN: 340 mL / OUT: 750 mL / NET: -410 mL     @ 07:01  -   @ 12:42  --------------------------------------------------------  IN: 240 mL / OUT: 600 mL / NET: -360 mL       Daily Height in cm: 154.94 (24 May 2024 19:00)    Daily Weight in k.3 (25 May 2024 10:08)  Admit Wt: Drug Dosing Weight  Height (cm): 154.9 (24 May 2024 19:00)  Weight (kg): 102 (24 May 2024 19:00)  BMI (kg/m2): 42.5 (24 May 2024 19:00)  BSA (m2): 1.99 (24 May 2024 19:00)    Bilirubin Total: 0.2 mg/dL ( 01:53)  Bilirubin Total: 0.2 mg/dL ( 22:13)    CAPILLARY BLOOD GLUCOSE      POCT Blood Glucose.: 177 mg/dL (25 May 2024 11:12)  POCT Blood Glucose.: 162 mg/dL (25 May 2024 07:28)  POCT Blood Glucose.: 221 mg/dL (24 May 2024 21:29)  POCT Blood Glucose.: 82 mg/dL (24 May 2024 16:18)  POCT Blood Glucose.: 79 mg/dL (24 May 2024 14:39)  POCT Blood Glucose.: 95 mg/dL (24 May 2024 13:29)          MEDICATIONS  albuterol    90 MICROgram(s) HFA Inhaler 2 Puff(s) Inhalation every 6 hours PRN  amLODIPine   Tablet 10 milliGRAM(s) Oral daily  aspirin enteric coated 81 milliGRAM(s) Oral daily  atorvastatin 80 milliGRAM(s) Oral at bedtime  budesonide  80 MICROgram(s)/formoterol 4.5 MICROgram(s) Inhaler 1 Puff(s) Inhalation every 12 hours  heparin  Infusion 1000 Unit(s)/Hr IV Continuous <Continuous>  insulin glargine Injectable (LANTUS) 30 Unit(s) SubCutaneous at bedtime  insulin lispro (ADMELOG) corrective regimen sliding scale   SubCutaneous three times a day before meals  insulin lispro Injectable (ADMELOG) 6 Unit(s) SubCutaneous before dinner  insulin lispro Injectable (ADMELOG) 6 Unit(s) SubCutaneous before breakfast  insulin lispro Injectable (ADMELOG) 6 Unit(s) SubCutaneous before lunch  levothyroxine 25 MICROGram(s) Oral daily  metoprolol tartrate 25 milliGRAM(s) Oral two times a day  sodium chloride 0.9% lock flush 3 milliLiter(s) IV Push every 8 hours      >>> <<<  PHYSICAL EXAM  Subjective: NAD  Neurology: alert and oriented x 3, nonfocal, no gross deficits  CV : s1s2  Lungs: CTA  Abdomen: soft, NT,ND, ( +)BM  :  voiding  Extremities:  -c/c/e     LABS      140  |  107  |  16  ----------------------------<  134<H>  3.8   |  23  |  1.10    Ca    9.6      25 May 2024 01:53    TPro  6.5  /  Alb  3.7  /  TBili  0.2  /  DBili  x   /  AST  22  /  ALT  13  /  AlkPhos  84  05-25                                 11.3   10.63 )-----------( 221      ( 25 May 2024 01:53 )             36.1          PT/INR - ( 25 May 2024 01:53 )   PT: 10.8 sec;   INR: 1.03 ratio         PTT - ( 25 May 2024 08:58 )  PTT:74.4 sec       PAST MEDICAL & SURGICAL HISTORY:  Hypertension      Diabetes      Hyperlipidemia      High triglycerides      Hypothyroidism      Obesity      Arthritis      Chronic bronchiolitis      Hashimoto's thyroiditis      H/O  section      H/O tubal ligation      H/O eye surgery

## 2024-05-25 NOTE — CONSULT NOTE ADULT - SUBJECTIVE AND OBJECTIVE BOX
HPI:  70 y/o female with PMH of HTN/HLD, DMT2, Asthma. obesity, hoshimoto's, former smoker  who presented to ED to be evaluated for chest pain associated with exertional shortness of breath. this afternoon pt was climbing stairs to go from one building to the next and started to develop sharp neck pain radiating to her back and both the shoulders.  pt pain lasted for about 30 sec and dissipated upon rest. pt was prescribed advair which she stopped taking due to thrush.  admits of being compliance to all other  medication at home  in ED cardiology on call was consulted and pt received 324 mg aspirin with 300 mg plavix. recommendation was  not to start on heparin drip  pt is currently asymptomatic. EKG w/o acute ST wave changes; Cath performed at [facility] which revealed multivessel cad; pt transferred for [facility] eval with Dr. Goncalves, Pt asymptomatic at this time. pt also developed chest pain after sheath was pulled- nitropaste given and chest pain was resolved.    (24 May 2024 18:26)      Patient has history of diabetes, A1C A1C with Estimated Average Glucose Result: 8.7 %  Endo was consulted for glycemic control.      PAST MEDICAL & SURGICAL HISTORY:  Hypertension      Diabetes      Hyperlipidemia      High triglycerides      Hypothyroidism      Obesity      Arthritis      Chronic bronchiolitis      Hashimoto's thyroiditis      H/O  section      H/O tubal ligation      H/O eye surgery          FAMILY HISTORY:  FH: coronary artery disease (Sibling)        Social History:  - lives w/ spouse and father in law  former smoker; social [employer]   (24 May 2024 18:26)            HOME MEDICATIONS:  Home Medications:  Albuterol (Eqv-ProAir HFA) 90 mcg/inh inhalation aerosol: 2 puff(s) inhaled every 6 hours (23 May 2024 21:55)  amLODIPine 10 mg oral tablet: 1 tab(s) orally once a day (23 May 2024 21:53)  atorvastatin 80 mg oral tablet: 1 tab(s) orally once a day (at bedtime) (24 May 2024 14:46)  glipiZIDE 10 mg oral tablet, extended release: 1 tab(s) orally once a day (23 May 2024 21:55)  levothyroxine 25 mcg (0.025 mg) oral tablet: 1 tab(s) orally once a day (23 May 2024 21:55)  losartan 100 mg oral tablet: 1 tab(s) orally once a day (23 May 2024 21:55)  Myrbetriq 25 mg oral tablet, extended release: 1 tab(s) orally once a day (23 May 2024 21:54)            MEDICATIONS  (STANDING):  amLODIPine   Tablet 10 milliGRAM(s) Oral daily  aspirin enteric coated 81 milliGRAM(s) Oral daily  atorvastatin 80 milliGRAM(s) Oral at bedtime  budesonide  80 MICROgram(s)/formoterol 4.5 MICROgram(s) Inhaler 1 Puff(s) Inhalation every 12 hours  heparin  Infusion 1000 Unit(s)/Hr (10 mL/Hr) IV Continuous <Continuous>  insulin glargine Injectable (LANTUS) 30 Unit(s) SubCutaneous at bedtime  insulin lispro (ADMELOG) corrective regimen sliding scale   SubCutaneous three times a day before meals  insulin lispro Injectable (ADMELOG) 6 Unit(s) SubCutaneous before dinner  insulin lispro Injectable (ADMELOG) 6 Unit(s) SubCutaneous before breakfast  insulin lispro Injectable (ADMELOG) 6 Unit(s) SubCutaneous before lunch  levothyroxine 25 MICROGram(s) Oral daily  metoprolol tartrate 25 milliGRAM(s) Oral two times a day  sodium chloride 0.9% lock flush 3 milliLiter(s) IV Push every 8 hours    MEDICATIONS  (PRN):  albuterol    90 MICROgram(s) HFA Inhaler 2 Puff(s) Inhalation every 6 hours PRN Wheezing      Allergies    penicillin (Hives)    Intolerances        Review of Systems:  Neuro: No HA, no dizziness  Cardiovascular: No chest pain, no palpitations  Respiratory: no SOB, no cough  GI: No nausea, vomiting, abdominal pain  MSK: Denies joint/muscle pain      ALL OTHER SYSTEMS REVIEWED AND NEGATIVE        PHYSICAL EXAM:  VITALS: T(C): 36.2 (24 @ 11:51)  T(F): 97.2 (24 @ 11:51), Max: 98.2 (24 @ 04:52)  HR: 81 (24 @ 11:51) (67 - 87)  BP: 98/53 (24 @ 11:51) (98/53 - 178/89)  RR:  (15 - 22)  SpO2:  (96% - 97%)  Wt(kg): --  GENERAL: NAD, well-groomed, well-developed  NEURO:  alert and oriented  RESPIRATORY: Clear to auscultation bilaterally; No rales, rhonchi, wheezing  CARDIOVASCULAR: Si S2  GI: Soft, non distended, normal bowel sounds  MUSCULOSKELETAL: Moves all extremities equally       POCT Blood Glucose.: 177 mg/dL (24 @ 11:12)  POCT Blood Glucose.: 162 mg/dL (24 @ 07:28)  POCT Blood Glucose.: 221 mg/dL (24 @ 21:29)  POCT Blood Glucose.: 82 mg/dL (24 @ 16:18)  POCT Blood Glucose.: 79 mg/dL (24 @ 14:39)  POCT Blood Glucose.: 95 mg/dL (24 @ 13:29)  POCT Blood Glucose.: 106 mg/dL (24 @ 12:03)  POCT Blood Glucose.: 115 mg/dL (24 @ 08:35)  POCT Blood Glucose.: 72 mg/dL (24 @ 05:07)  POCT Blood Glucose.: 235 mg/dL (24 @ 22:17)                            11.3   10.63 )-----------( 221      ( 25 May 2024 01:53 )             36.1           140  |  107  |  16  ----------------------------<  134<H>  3.8   |  23  |  1.10    eGFR: 55<L>    Ca    9.6          TPro  6.5  /  Alb  3.7  /  TBili  0.2  /  DBili  x   /  AST  22  /  ALT  13  /  AlkPhos  84        Thyroid Function Tests:   @ 22:13 TSH 2.94 FreeT4 1.4 T3 -- Anti TPO -- Anti Thyroglobulin Ab -- TSI --    Diet, Consistent Carbohydrate/No Snacks (24 @ 18:55) [Active]         Chol 217<H> Direct LDL -- LDL calculated 127<H> HDL 40<L> Trig 277<H>  A1C with Estimated Average Glucose Result: 8.7 % (24 @ 22:13)  A1C with Estimated Average Glucose Result: 8.8 % (24 @ 07:20)

## 2024-05-26 LAB
ANION GAP SERPL CALC-SCNC: 11 MMOL/L — SIGNIFICANT CHANGE UP (ref 5–17)
APTT BLD: 72.7 SEC — HIGH (ref 24.5–35.6)
BUN SERPL-MCNC: 17 MG/DL — SIGNIFICANT CHANGE UP (ref 7–23)
CALCIUM SERPL-MCNC: 9.6 MG/DL — SIGNIFICANT CHANGE UP (ref 8.4–10.5)
CHLORIDE SERPL-SCNC: 106 MMOL/L — SIGNIFICANT CHANGE UP (ref 96–108)
CO2 SERPL-SCNC: 24 MMOL/L — SIGNIFICANT CHANGE UP (ref 22–31)
CREAT SERPL-MCNC: 0.9 MG/DL — SIGNIFICANT CHANGE UP (ref 0.5–1.3)
EGFR: 70 ML/MIN/1.73M2 — SIGNIFICANT CHANGE UP
GLUCOSE BLDC GLUCOMTR-MCNC: 116 MG/DL — HIGH (ref 70–99)
GLUCOSE BLDC GLUCOMTR-MCNC: 149 MG/DL — HIGH (ref 70–99)
GLUCOSE BLDC GLUCOMTR-MCNC: 176 MG/DL — HIGH (ref 70–99)
GLUCOSE BLDC GLUCOMTR-MCNC: 248 MG/DL — HIGH (ref 70–99)
GLUCOSE SERPL-MCNC: 163 MG/DL — HIGH (ref 70–99)
HCT VFR BLD CALC: 37.4 % — SIGNIFICANT CHANGE UP (ref 34.5–45)
HGB BLD-MCNC: 11.7 G/DL — SIGNIFICANT CHANGE UP (ref 11.5–15.5)
MCHC RBC-ENTMCNC: 26.4 PG — LOW (ref 27–34)
MCHC RBC-ENTMCNC: 31.3 GM/DL — LOW (ref 32–36)
MCV RBC AUTO: 84.4 FL — SIGNIFICANT CHANGE UP (ref 80–100)
NRBC # BLD: 0 /100 WBCS — SIGNIFICANT CHANGE UP (ref 0–0)
PLATELET # BLD AUTO: 221 K/UL — SIGNIFICANT CHANGE UP (ref 150–400)
POTASSIUM SERPL-MCNC: 4.3 MMOL/L — SIGNIFICANT CHANGE UP (ref 3.5–5.3)
POTASSIUM SERPL-SCNC: 4.3 MMOL/L — SIGNIFICANT CHANGE UP (ref 3.5–5.3)
RBC # BLD: 4.43 M/UL — SIGNIFICANT CHANGE UP (ref 3.8–5.2)
RBC # FLD: 14.6 % — HIGH (ref 10.3–14.5)
SODIUM SERPL-SCNC: 141 MMOL/L — SIGNIFICANT CHANGE UP (ref 135–145)
WBC # BLD: 10.43 K/UL — SIGNIFICANT CHANGE UP (ref 3.8–10.5)
WBC # FLD AUTO: 10.43 K/UL — SIGNIFICANT CHANGE UP (ref 3.8–10.5)

## 2024-05-26 PROCEDURE — 99231 SBSQ HOSP IP/OBS SF/LOW 25: CPT | Mod: FS

## 2024-05-26 RX ORDER — ACETAMINOPHEN 500 MG
650 TABLET ORAL EVERY 6 HOURS
Refills: 0 | Status: DISCONTINUED | OUTPATIENT
Start: 2024-05-26 | End: 2024-05-29

## 2024-05-26 RX ORDER — INSULIN LISPRO 100/ML
6 VIAL (ML) SUBCUTANEOUS
Refills: 0 | Status: DISCONTINUED | OUTPATIENT
Start: 2024-05-26 | End: 2024-05-27

## 2024-05-26 RX ADMIN — Medication 81 MILLIGRAM(S): at 13:15

## 2024-05-26 RX ADMIN — BUDESONIDE AND FORMOTEROL FUMARATE DIHYDRATE 1 PUFF(S): 160; 4.5 AEROSOL RESPIRATORY (INHALATION) at 05:43

## 2024-05-26 RX ADMIN — HEPARIN SODIUM 10 UNIT(S)/HR: 5000 INJECTION INTRAVENOUS; SUBCUTANEOUS at 18:59

## 2024-05-26 RX ADMIN — Medication 6 UNIT(S): at 08:07

## 2024-05-26 RX ADMIN — BUDESONIDE AND FORMOTEROL FUMARATE DIHYDRATE 1 PUFF(S): 160; 4.5 AEROSOL RESPIRATORY (INHALATION) at 19:00

## 2024-05-26 RX ADMIN — INSULIN GLARGINE 30 UNIT(S): 100 INJECTION, SOLUTION SUBCUTANEOUS at 20:53

## 2024-05-26 RX ADMIN — ATORVASTATIN CALCIUM 80 MILLIGRAM(S): 80 TABLET, FILM COATED ORAL at 20:41

## 2024-05-26 RX ADMIN — HEPARIN SODIUM 10 UNIT(S)/HR: 5000 INJECTION INTRAVENOUS; SUBCUTANEOUS at 20:42

## 2024-05-26 RX ADMIN — SODIUM CHLORIDE 3 MILLILITER(S): 9 INJECTION INTRAMUSCULAR; INTRAVENOUS; SUBCUTANEOUS at 13:04

## 2024-05-26 RX ADMIN — HEPARIN SODIUM 10 UNIT(S)/HR: 5000 INJECTION INTRAVENOUS; SUBCUTANEOUS at 08:06

## 2024-05-26 RX ADMIN — Medication 6 UNIT(S): at 11:53

## 2024-05-26 RX ADMIN — SODIUM CHLORIDE 3 MILLILITER(S): 9 INJECTION INTRAMUSCULAR; INTRAVENOUS; SUBCUTANEOUS at 06:07

## 2024-05-26 RX ADMIN — Medication 6 UNIT(S): at 17:18

## 2024-05-26 RX ADMIN — Medication 650 MILLIGRAM(S): at 20:40

## 2024-05-26 RX ADMIN — AMLODIPINE BESYLATE 10 MILLIGRAM(S): 2.5 TABLET ORAL at 05:43

## 2024-05-26 RX ADMIN — Medication 25 MILLIGRAM(S): at 17:42

## 2024-05-26 RX ADMIN — Medication 1: at 08:07

## 2024-05-26 RX ADMIN — SODIUM CHLORIDE 3 MILLILITER(S): 9 INJECTION INTRAMUSCULAR; INTRAVENOUS; SUBCUTANEOUS at 20:35

## 2024-05-26 RX ADMIN — Medication 25 MILLIGRAM(S): at 05:42

## 2024-05-26 RX ADMIN — Medication 650 MILLIGRAM(S): at 21:34

## 2024-05-26 RX ADMIN — Medication 25 MICROGRAM(S): at 05:42

## 2024-05-26 NOTE — PROGRESS NOTE ADULT - ASSESSMENT
68 y/o female with PMH of HTN/HLD, DMT2, Asthma. obesity, hoshimoto's, former smoker  who presented to ED to be evaluated for chest pain, now admitted to CTS for surgery eval.     Assessment  DMT2: 69y Female with DM T2 with hyperglycemia, A1C 8.7% , was on oral meds and insulin at home (basaglar) , now on basal bolus insulin with coverage, blood sugars improving. Pending surgery   CAD: on medications, stable, monitored. CTS eval   Hypothyroidism: On Synthroid 25 mcg po daily, compliant with Synthroid intake, asymptomatic. TFTs euthyroid.   HTN: on antihypertensive medications, monitored, asymptomatic.  Obesity: No strict exercise routines, not on any weight loss program, neither on low calorie diet.        Discussed plan and management wit Dr Jyoti Montes MD  Cell: [phone]  Office: [phone]             70 y/o female with PMH of HTN/HLD, DMT2, Asthma. obesity, hoshimoto's, former smoker  who presented to ED to be evaluated for chest pain, now admitted to CTS for surgery eval.     Assessment  DMT2: 69y Female with DM T2 with hyperglycemia, A1C 8.7% , was on oral meds and insulin at home (basaglar) , now on basal bolus insulin with coverage, blood sugars improving.  Pending surgery   CAD: on medications, stable, monitored. CTS eval   Hypothyroidism: On Synthroid 25 mcg po daily, compliant with Synthroid intake, asymptomatic. TFTs euthyroid.   HTN: on antihypertensive medications, monitored, asymptomatic.  Obesity: No strict exercise routines, not on any weight loss program, neither on low calorie diet.        Discussed plan and management wit Dr Jyoti Montes MD  Cell: [phone]  Office: [phone]

## 2024-05-26 NOTE — PROGRESS NOTE ADULT - ASSESSMENT
70 y/o female with PMH of HTN/HLD, DMT2, Asthma. obesity, hoshimoto's, former smoker  who presented to ED to be evaluated for chest pain associated with exertional shortness of breath. this afternoon pt was climbing stairs to go from one building to the next and started to develop sharp neck pain radiating to her back and both the shoulders.  pt pain lasted for about 30 sec and dissipated upon rest. pt was prescribed advair which she stopped taking due to thrush.  admits of being compliance to all other  medication at home  in ED cardiology on call was consulted and pt received 324 mg aspirin with 300 mg plavix. recommendation was  not to start on heparin drip  pt is currently asymptomatic.   5/25 P2y12 213, Carotid doppler pending , echo pending. Endocrinology consulted for assistance in management  5/26 VSS no c/o CP A/C OR thurs 5/30

## 2024-05-26 NOTE — PROGRESS NOTE ADULT - SUBJECTIVE AND OBJECTIVE BOX
Chief complaint  Patient is a 69y old  Female who presents with a chief complaint of chest pain/shortness of breath (26 May 2024 07:37)         Labs and Fingersticks  CAPILLARY BLOOD GLUCOSE      POCT Blood Glucose.: 116 mg/dL (26 May 2024 11:31)  POCT Blood Glucose.: 176 mg/dL (26 May 2024 07:32)  POCT Blood Glucose.: 177 mg/dL (25 May 2024 21:07)  POCT Blood Glucose.: 124 mg/dL (25 May 2024 16:33)      Anion Gap: 11 (05-26 @ 06:03)  Anion Gap: 10 (05-25 @ 01:53)  Anion Gap: 14 (05-24 @ 22:13)      Calcium: 9.6 (05-26 @ 06:03)  Calcium: 9.6 (05-25 @ 01:53)  Calcium: 9.5 (05-24 @ 22:13)  Albumin: 3.7 (05-25 @ 01:53)  Albumin: 3.9 (05-24 @ 22:13)    Alanine Aminotransferase (ALT/SGPT): 13 (05-25 @ 01:53)  Alanine Aminotransferase (ALT/SGPT): 18 (05-24 @ 22:13)  Alkaline Phosphatase: 84 (05-25 @ 01:53)  Alkaline Phosphatase: 92 (05-24 @ 22:13)  Aspartate Aminotransferase (AST/SGOT): 22 (05-25 @ 01:53)  Aspartate Aminotransferase (AST/SGOT): 25 (05-24 @ 22:13)        05-26    141  |  106  |  17  ----------------------------<  163<H>  4.3   |  24  |  0.90    Ca    9.6      26 May 2024 06:03    TPro  6.5  /  Alb  3.7  /  TBili  0.2  /  DBili  x   /  AST  22  /  ALT  13  /  AlkPhos  84  05-25                        11.7   10.43 )-----------( 221      ( 26 May 2024 06:03 )             37.4     Medications  MEDICATIONS  (STANDING):  amLODIPine   Tablet 10 milliGRAM(s) Oral daily  aspirin enteric coated 81 milliGRAM(s) Oral daily  atorvastatin 80 milliGRAM(s) Oral at bedtime  budesonide  80 MICROgram(s)/formoterol 4.5 MICROgram(s) Inhaler 1 Puff(s) Inhalation every 12 hours  heparin  Infusion 1000 Unit(s)/Hr (10 mL/Hr) IV Continuous <Continuous>  insulin glargine Injectable (LANTUS) 30 Unit(s) SubCutaneous at bedtime  insulin lispro (ADMELOG) corrective regimen sliding scale   SubCutaneous three times a day before meals  insulin lispro Injectable (ADMELOG) 6 Unit(s) SubCutaneous three times a day before meals  levothyroxine 25 MICROGram(s) Oral daily  metoprolol tartrate 25 milliGRAM(s) Oral two times a day  sodium chloride 0.9% lock flush 3 milliLiter(s) IV Push every 8 hours      Physical Exam  General: Patient comfortable in bed   Vital Signs Last 12 Hrs  T(F): 98.2 (05-26-24 @ 13:12), Max: 98.2 (05-26-24 @ 13:12)  HR: 68 (05-26-24 @ 13:12) (62 - 77)  BP: 151/79 (05-26-24 @ 13:12) (151/79 - 169/76)  BP(mean): --  RR: 18 (05-26-24 @ 13:12) (18 - 18)  SpO2: 98% (05-26-24 @ 13:12) (95% - 99%)    CVS: S1S2   Respiratory: No wheezing, no crepitations  GI: Abdomen soft, bowel sounds positive  Musculoskeletal:  moves all extremities  : Voiding         Chief complaint  Patient is a 69y old  Female who presents with a chief complaint of chest pain/shortness of breath (26 May 2024 07:37)     Labs and Fingersticks  CAPILLARY BLOOD GLUCOSE      POCT Blood Glucose.: 116 mg/dL (26 May 2024 11:31)  POCT Blood Glucose.: 176 mg/dL (26 May 2024 07:32)  POCT Blood Glucose.: 177 mg/dL (25 May 2024 21:07)  POCT Blood Glucose.: 124 mg/dL (25 May 2024 16:33)      Anion Gap: 11 (05-26 @ 06:03)  Anion Gap: 10 (05-25 @ 01:53)  Anion Gap: 14 (05-24 @ 22:13)      Calcium: 9.6 (05-26 @ 06:03)  Calcium: 9.6 (05-25 @ 01:53)  Calcium: 9.5 (05-24 @ 22:13)  Albumin: 3.7 (05-25 @ 01:53)  Albumin: 3.9 (05-24 @ 22:13)    Alanine Aminotransferase (ALT/SGPT): 13 (05-25 @ 01:53)  Alanine Aminotransferase (ALT/SGPT): 18 (05-24 @ 22:13)  Alkaline Phosphatase: 84 (05-25 @ 01:53)  Alkaline Phosphatase: 92 (05-24 @ 22:13)  Aspartate Aminotransferase (AST/SGOT): 22 (05-25 @ 01:53)  Aspartate Aminotransferase (AST/SGOT): 25 (05-24 @ 22:13)        05-26    141  |  106  |  17  ----------------------------<  163<H>  4.3   |  24  |  0.90    Ca    9.6      26 May 2024 06:03    TPro  6.5  /  Alb  3.7  /  TBili  0.2  /  DBili  x   /  AST  22  /  ALT  13  /  AlkPhos  84  05-25                        11.7   10.43 )-----------( 221      ( 26 May 2024 06:03 )             37.4     Medications  MEDICATIONS  (STANDING):  amLODIPine   Tablet 10 milliGRAM(s) Oral daily  aspirin enteric coated 81 milliGRAM(s) Oral daily  atorvastatin 80 milliGRAM(s) Oral at bedtime  budesonide  80 MICROgram(s)/formoterol 4.5 MICROgram(s) Inhaler 1 Puff(s) Inhalation every 12 hours  heparin  Infusion 1000 Unit(s)/Hr (10 mL/Hr) IV Continuous <Continuous>  insulin glargine Injectable (LANTUS) 30 Unit(s) SubCutaneous at bedtime  insulin lispro (ADMELOG) corrective regimen sliding scale   SubCutaneous three times a day before meals  insulin lispro Injectable (ADMELOG) 6 Unit(s) SubCutaneous three times a day before meals  levothyroxine 25 MICROGram(s) Oral daily  metoprolol tartrate 25 milliGRAM(s) Oral two times a day  sodium chloride 0.9% lock flush 3 milliLiter(s) IV Push every 8 hours      Physical Exam  General: Patient comfortable in bed   Vital Signs Last 12 Hrs  T(F): 98.2 (05-26-24 @ 13:12), Max: 98.2 (05-26-24 @ 13:12)  HR: 68 (05-26-24 @ 13:12) (62 - 77)  BP: 151/79 (05-26-24 @ 13:12) (151/79 - 169/76)  BP(mean): --  RR: 18 (05-26-24 @ 13:12) (18 - 18)  SpO2: 98% (05-26-24 @ 13:12) (95% - 99%)    CVS: S1S2   Respiratory: No wheezing, no crepitations  GI: Abdomen soft, bowel sounds positive  Musculoskeletal:  moves all extremities  : Voiding

## 2024-05-26 NOTE — PROGRESS NOTE ADULT - SUBJECTIVE AND OBJECTIVE BOX
VITAL SIGNS-Telemetry:  SB/SR   Vital Signs Last 24 Hrs  T(C): 36.4 (24 @ 05:10), Max: 37 (24 @ 20:13)  T(F): 97.6 (24 @ 05:10), Max: 98.6 (24 @ 20:13)  HR: 62 (24 @ 05:10) (62 - 81)  BP: 158/82 (24 @ 05:10) (98/53 - 158/82)  RR: 18 (24 @ 05:10) (18 - 18)  SpO2: 95% (24 @ 05:10) (95% - 97%)          @ 07:01  -   @ 07:00  --------------------------------------------------------  IN: 1450 mL / OUT: 2000 mL / NET: -550 mL    Daily     Daily Weight in k.3 (25 May 2024 10:08)    CAPILLARY BLOOD GLUCOSE  POCT Blood Glucose.: 177 mg/dL (25 May 2024 21:07)  POCT Blood Glucose.: 124 mg/dL (25 May 2024 16:33)  POCT Blood Glucose.: 177 mg/dL (25 May 2024 11:12)          PHYSICAL EXAM:  Neurology: alert and oriented x 3, nonfocal, no gross deficits  CV :S1S2  Lungs: cta  Abdomen: soft, nontender, nondistended, positive bowel sounds, last bowel movement         Extremities:  no edema no calf tenderness       albuterol    90 MICROgram(s) HFA Inhaler 2 Puff(s) Inhalation every 6 hours PRN  amLODIPine   Tablet 10 milliGRAM(s) Oral daily  aspirin enteric coated 81 milliGRAM(s) Oral daily  atorvastatin 80 milliGRAM(s) Oral at bedtime  budesonide  80 MICROgram(s)/formoterol 4.5 MICROgram(s) Inhaler 1 Puff(s) Inhalation every 12 hours  heparin  Infusion 1000 Unit(s)/Hr IV Continuous <Continuous>  insulin glargine Injectable (LANTUS) 30 Unit(s) SubCutaneous at bedtime  insulin lispro (ADMELOG) corrective regimen sliding scale   SubCutaneous three times a day before meals  insulin lispro Injectable (ADMELOG) 6 Unit(s) SubCutaneous before dinner  insulin lispro Injectable (ADMELOG) 6 Unit(s) SubCutaneous before breakfast  insulin lispro Injectable (ADMELOG) 6 Unit(s) SubCutaneous before lunch  levothyroxine 25 MICROGram(s) Oral daily  metoprolol tartrate 25 milliGRAM(s) Oral two times a day  sodium chloride 0.9% lock flush 3 milliLiter(s) IV Push every 8 hours      Physical Therapy Rec:   Home  [ x ]   Home w/ PT  [  ]  Rehab  [  ]  Discussed with Cardiothoracic Team at AM rounds.

## 2024-05-27 LAB
ANION GAP SERPL CALC-SCNC: 12 MMOL/L — SIGNIFICANT CHANGE UP (ref 5–17)
APTT BLD: 62 SEC — HIGH (ref 24.5–35.6)
BUN SERPL-MCNC: 20 MG/DL — SIGNIFICANT CHANGE UP (ref 7–23)
CALCIUM SERPL-MCNC: 9.8 MG/DL — SIGNIFICANT CHANGE UP (ref 8.4–10.5)
CHLORIDE SERPL-SCNC: 105 MMOL/L — SIGNIFICANT CHANGE UP (ref 96–108)
CO2 SERPL-SCNC: 23 MMOL/L — SIGNIFICANT CHANGE UP (ref 22–31)
CREAT SERPL-MCNC: 1 MG/DL — SIGNIFICANT CHANGE UP (ref 0.5–1.3)
EGFR: 62 ML/MIN/1.73M2 — SIGNIFICANT CHANGE UP
GLUCOSE BLDC GLUCOMTR-MCNC: 153 MG/DL — HIGH (ref 70–99)
GLUCOSE BLDC GLUCOMTR-MCNC: 167 MG/DL — HIGH (ref 70–99)
GLUCOSE BLDC GLUCOMTR-MCNC: 174 MG/DL — HIGH (ref 70–99)
GLUCOSE BLDC GLUCOMTR-MCNC: 177 MG/DL — HIGH (ref 70–99)
GLUCOSE SERPL-MCNC: 159 MG/DL — HIGH (ref 70–99)
HCT VFR BLD CALC: 37.5 % — SIGNIFICANT CHANGE UP (ref 34.5–45)
HGB BLD-MCNC: 11.6 G/DL — SIGNIFICANT CHANGE UP (ref 11.5–15.5)
INR BLD: 1.04 RATIO — SIGNIFICANT CHANGE UP (ref 0.85–1.18)
MCHC RBC-ENTMCNC: 26.1 PG — LOW (ref 27–34)
MCHC RBC-ENTMCNC: 30.9 GM/DL — LOW (ref 32–36)
MCV RBC AUTO: 84.3 FL — SIGNIFICANT CHANGE UP (ref 80–100)
NRBC # BLD: 0 /100 WBCS — SIGNIFICANT CHANGE UP (ref 0–0)
PLATELET # BLD AUTO: 233 K/UL — SIGNIFICANT CHANGE UP (ref 150–400)
POTASSIUM SERPL-MCNC: 3.9 MMOL/L — SIGNIFICANT CHANGE UP (ref 3.5–5.3)
POTASSIUM SERPL-SCNC: 3.9 MMOL/L — SIGNIFICANT CHANGE UP (ref 3.5–5.3)
PROTHROM AB SERPL-ACNC: 10.9 SEC — SIGNIFICANT CHANGE UP (ref 9.5–13)
RBC # BLD: 4.45 M/UL — SIGNIFICANT CHANGE UP (ref 3.8–5.2)
RBC # FLD: 14.5 % — SIGNIFICANT CHANGE UP (ref 10.3–14.5)
SODIUM SERPL-SCNC: 140 MMOL/L — SIGNIFICANT CHANGE UP (ref 135–145)
WBC # BLD: 11.23 K/UL — HIGH (ref 3.8–10.5)
WBC # FLD AUTO: 11.23 K/UL — HIGH (ref 3.8–10.5)

## 2024-05-27 PROCEDURE — 93010 ELECTROCARDIOGRAM REPORT: CPT

## 2024-05-27 PROCEDURE — 99232 SBSQ HOSP IP/OBS MODERATE 35: CPT

## 2024-05-27 RX ORDER — NITROGLYCERIN 6.5 MG
1 CAPSULE, EXTENDED RELEASE ORAL ONCE
Refills: 0 | Status: COMPLETED | OUTPATIENT
Start: 2024-05-27 | End: 2024-05-27

## 2024-05-27 RX ORDER — INSULIN LISPRO 100/ML
8 VIAL (ML) SUBCUTANEOUS
Refills: 0 | Status: DISCONTINUED | OUTPATIENT
Start: 2024-05-27 | End: 2024-05-28

## 2024-05-27 RX ORDER — NITROGLYCERIN 6.5 MG
0.4 CAPSULE, EXTENDED RELEASE ORAL
Refills: 0 | Status: DISCONTINUED | OUTPATIENT
Start: 2024-05-27 | End: 2024-05-29

## 2024-05-27 RX ORDER — FLUTICASONE PROPIONATE 50 MCG
1 SPRAY, SUSPENSION NASAL
Refills: 0 | Status: DISCONTINUED | OUTPATIENT
Start: 2024-05-27 | End: 2024-05-29

## 2024-05-27 RX ADMIN — Medication 1: at 11:41

## 2024-05-27 RX ADMIN — SODIUM CHLORIDE 3 MILLILITER(S): 9 INJECTION INTRAMUSCULAR; INTRAVENOUS; SUBCUTANEOUS at 21:55

## 2024-05-27 RX ADMIN — Medication 25 MICROGRAM(S): at 05:15

## 2024-05-27 RX ADMIN — Medication 8 UNIT(S): at 11:42

## 2024-05-27 RX ADMIN — Medication 81 MILLIGRAM(S): at 11:41

## 2024-05-27 RX ADMIN — Medication 1: at 07:38

## 2024-05-27 RX ADMIN — INSULIN GLARGINE 30 UNIT(S): 100 INJECTION, SOLUTION SUBCUTANEOUS at 21:39

## 2024-05-27 RX ADMIN — BUDESONIDE AND FORMOTEROL FUMARATE DIHYDRATE 1 PUFF(S): 160; 4.5 AEROSOL RESPIRATORY (INHALATION) at 16:58

## 2024-05-27 RX ADMIN — Medication 650 MILLIGRAM(S): at 08:02

## 2024-05-27 RX ADMIN — BUDESONIDE AND FORMOTEROL FUMARATE DIHYDRATE 1 PUFF(S): 160; 4.5 AEROSOL RESPIRATORY (INHALATION) at 05:16

## 2024-05-27 RX ADMIN — ATORVASTATIN CALCIUM 80 MILLIGRAM(S): 80 TABLET, FILM COATED ORAL at 21:01

## 2024-05-27 RX ADMIN — Medication 25 MILLIGRAM(S): at 05:15

## 2024-05-27 RX ADMIN — Medication 650 MILLIGRAM(S): at 07:32

## 2024-05-27 RX ADMIN — Medication 25 MILLIGRAM(S): at 17:01

## 2024-05-27 RX ADMIN — SODIUM CHLORIDE 3 MILLILITER(S): 9 INJECTION INTRAMUSCULAR; INTRAVENOUS; SUBCUTANEOUS at 14:18

## 2024-05-27 RX ADMIN — Medication 6 UNIT(S): at 07:39

## 2024-05-27 RX ADMIN — Medication 1: at 16:58

## 2024-05-27 RX ADMIN — Medication 1 INCH(S): at 20:59

## 2024-05-27 RX ADMIN — Medication 8 UNIT(S): at 16:59

## 2024-05-27 RX ADMIN — SODIUM CHLORIDE 3 MILLILITER(S): 9 INJECTION INTRAMUSCULAR; INTRAVENOUS; SUBCUTANEOUS at 04:44

## 2024-05-27 RX ADMIN — AMLODIPINE BESYLATE 10 MILLIGRAM(S): 2.5 TABLET ORAL at 05:15

## 2024-05-27 NOTE — PROGRESS NOTE ADULT - ASSESSMENT
68 y/o female with PMH of HTN/HLD, DMT2, Asthma who presented to ED to be evaluated for chest pain associated with exertional shortness of breath. Admitted for cardiac workup.       Unstable Angina -CAD-Multivessel disease  - Patient with apparent worsening dyspnea, chest pain on exertion   - Trop negative x 2   - No acute changes on EKG compared to previous.  - Pt notes normal prior stress test with Dr. Buchanan 2022.   - CT Chest reviewed, w/ calcifications in coronaries   - s/p ASA & Plavix load yesterday   - Cath-Diffuse multivessel CAD with intermediate syntax score.   -5/25 P2y12 213,  -Transferred to NS for CTS evaluation   -Plan for [facility]FG 5/30 Dr Goncalves      - Does not appear volume overloaded on exam.   - ProBNP 74; no previous diagnosis of CHF, no prior TTE   - Non-urgent TTE     - BP well controlled, monitor routine hemodynamics.  - Continue home amlodipine  - Hold home ARB     DM2 (diabetes mellitus, type 2).   - dm diet  -A1c- 8.7%  lantus 30 units qhs  ademelog 6 ac      History of Hashimoto thyroiditis.   -c/w synthroid   - Monitor and replete lytes, keep K>4, Mg>2.  - Other cardiovascular workup will depend on clinical course.  - All other workup per primary team.  - Will continue to follow.

## 2024-05-27 NOTE — PROGRESS NOTE ADULT - SUBJECTIVE AND OBJECTIVE BOX
Chief complaint    Patient is a 69y old  Female who presents with a chief complaint of chest pain/shortness of breath (27 May 2024 07:10)   Review of systems  Patient appears comfortable.    Labs and Fingersticks  CAPILLARY BLOOD GLUCOSE      POCT Blood Glucose.: 153 mg/dL (27 May 2024 11:37)  POCT Blood Glucose.: 174 mg/dL (27 May 2024 07:34)  POCT Blood Glucose.: 248 mg/dL (26 May 2024 20:44)  POCT Blood Glucose.: 149 mg/dL (26 May 2024 16:44)      Anion Gap: 12 (05-27 @ 05:40)  Anion Gap: 11 (05-26 @ 06:03)      Calcium: 9.8 (05-27 @ 05:40)  Calcium: 9.6 (05-26 @ 06:03)          05-27    140  |  105  |  20  ----------------------------<  159<H>  3.9   |  23  |  1.00    Ca    9.8      27 May 2024 05:40                          11.6   11.23 )-----------( 233      ( 27 May 2024 05:40 )             37.5     Medications  MEDICATIONS  (STANDING):  amLODIPine   Tablet 10 milliGRAM(s) Oral daily  aspirin enteric coated 81 milliGRAM(s) Oral daily  atorvastatin 80 milliGRAM(s) Oral at bedtime  budesonide  80 MICROgram(s)/formoterol 4.5 MICROgram(s) Inhaler 1 Puff(s) Inhalation every 12 hours  heparin  Infusion 1000 Unit(s)/Hr (10 mL/Hr) IV Continuous <Continuous>  insulin glargine Injectable (LANTUS) 30 Unit(s) SubCutaneous at bedtime  insulin lispro (ADMELOG) corrective regimen sliding scale   SubCutaneous three times a day before meals  insulin lispro Injectable (ADMELOG) 8 Unit(s) SubCutaneous three times a day before meals  levothyroxine 25 MICROGram(s) Oral daily  metoprolol tartrate 25 milliGRAM(s) Oral two times a day  sodium chloride 0.9% lock flush 3 milliLiter(s) IV Push every 8 hours      Physical Exam  General: Patient appears comfortable.  Vital Signs Last 12 Hrs  T(F): 98.1 (05-27-24 @ 05:05), Max: 98.1 (05-27-24 @ 05:05)  HR: 79 (05-27-24 @ 05:05) (79 - 79)  BP: 147/74 (05-27-24 @ 05:05) (147/74 - 147/74)  BP(mean): --  RR: 18 (05-27-24 @ 05:05) (18 - 18)  SpO2: 96% (05-27-24 @ 05:05) (96% - 96%)  Neck: No palpable thyroid nodules.  CVS: S1S2, No murmurs  Respiratory: No wheezing, no crepitations  GI: Abdomen soft, non tender.    Diagnostics        Radiology:

## 2024-05-27 NOTE — PROGRESS NOTE ADULT - SUBJECTIVE AND OBJECTIVE BOX
Subjective " hello I am ok no CP"    VITAL SIGNS    Telemetry:  NSR/SB    Vital Signs Last 24 Hrs  T(C): 36.7 (24 @ 05:05), Max: 37 (24 @ 19:53)  T(F): 98.1 (24 @ 05:05), Max: 98.6 (24 @ 19:53)  HR: 79 (24 @ 05:05) (62 - 79)  BP: 147/74 (24 @ 05:05) (127/72 - 169/76)  RR: 18 (24 @ 05:05) (18 - 18)  SpO2: 96% (24 @ 05:05) (96% - 99%)            @ 07:01  -   @ 07:00  --------------------------------------------------------  IN: 1190 mL / OUT: 2800 mL / NET: -1610 mL    Daily Weight in k.1 (26 May 2024 08:00)                        11.6   11.23 )-----------( 233      ( 27 May 2024 05:40 )             37.5           140  |  105  |  20  ----------------------------<  159<H>  3.9   |  23  |  1.00    Ca    9.8      27 May 2024 05:40      MEDICATIONS  (STANDING):  amLODIPine   Tablet 10 milliGRAM(s) Oral daily  aspirin enteric coated 81 milliGRAM(s) Oral daily  atorvastatin 80 milliGRAM(s) Oral at bedtime  budesonide  80 MICROgram(s)/formoterol 4.5 MICROgram(s) Inhaler 1 Puff(s) Inhalation every 12 hours  heparin  Infusion 1000 Unit(s)/Hr (10 mL/Hr) IV Continuous <Continuous>  insulin glargine Injectable (LANTUS) 30 Unit(s) SubCutaneous at bedtime  insulin lispro (ADMELOG) corrective regimen sliding scale   SubCutaneous three times a day before meals  insulin lispro Injectable (ADMELOG) 6 Unit(s) SubCutaneous three times a day before meals  levothyroxine 25 MICROGram(s) Oral daily  metoprolol tartrate 25 milliGRAM(s) Oral two times a day  sodium chloride 0.9% lock flush 3 milliLiter(s) IV Push every 8 hours    MEDICATIONS  (PRN):  acetaminophen     Tablet .. 650 milliGRAM(s) Oral every 6 hours PRN Temp greater or equal to 38.5C (101.3F), Mild Pain (1 - 3)  albuterol    90 MICROgram(s) HFA Inhaler 2 Puff(s) Inhalation every 6 hours PRN Wheezing        CAPILLARY BLOOD GLUCOSE      POCT Blood Glucose.: 248 mg/dL (26 May 2024 20:44)  POCT Blood Glucose.: 149 mg/dL (26 May 2024 16:44)  POCT Blood Glucose.: 116 mg/dL (26 May 2024 11:31)  POCT Blood Glucose.: 176 mg/dL (26 May 2024 07:32)                                      PHYSICAL EXAM        Neurology: alert and oriented x 3, nonfocal, no gross deficits    CV : [id number]    Lungs: B/l cTA on room air    Abdomen: soft, nontender, nondistended, positive bowel sounds,+ bm    : voids             Extremities:  warm well perfused equal strength throughout   B/ll e + DP no calftenderness                                          Discussed with Cardiothoracic Team at AM rounds. Subjective " hello I am ok no CP"    VITAL SIGNS    Telemetry:  NSR/SB    Vital Signs Last 24 Hrs  T(C): 36.7 (24 @ 05:05), Max: 37 (24 @ 19:53)  T(F): 98.1 (24 @ 05:05), Max: 98.6 (24 @ 19:53)  HR: 79 (24 @ 05:05) (62 - 79)  BP: 147/74 (24 @ 05:05) (127/72 - 169/76)  RR: 18 (24 @ 05:05) (18 - 18)  SpO2: 96% (24 @ 05:05) (96% - 99%)            @ 07:01  -   @ 07:00  --------------------------------------------------------  IN: 1190 mL / OUT: 2800 mL / NET: -1610 mL    Daily Weight in k.1 (26 May 2024 08:00)                        11.6   11.23 )-----------( 233      ( 27 May 2024 05:40 )             37.5           140  |  105  |  20  ----------------------------<  159<H>  3.9   |  23  |  1.00    Ca    9.8      27 May 2024 05:40      MEDICATIONS  (STANDING):  amLODIPine   Tablet 10 milliGRAM(s) Oral daily  aspirin enteric coated 81 milliGRAM(s) Oral daily  atorvastatin 80 milliGRAM(s) Oral at bedtime  budesonide  80 MICROgram(s)/formoterol 4.5 MICROgram(s) Inhaler 1 Puff(s) Inhalation every 12 hours  heparin  Infusion 1000 Unit(s)/Hr (10 mL/Hr) IV Continuous <Continuous>  insulin glargine Injectable (LANTUS) 30 Unit(s) SubCutaneous at bedtime  insulin lispro (ADMELOG) corrective regimen sliding scale   SubCutaneous three times a day before meals  insulin lispro Injectable (ADMELOG) 6 Unit(s) SubCutaneous three times a day before meals  levothyroxine 25 MICROGram(s) Oral daily  metoprolol tartrate 25 milliGRAM(s) Oral two times a day  sodium chloride 0.9% lock flush 3 milliLiter(s) IV Push every 8 hours    MEDICATIONS  (PRN):  acetaminophen     Tablet .. 650 milliGRAM(s) Oral every 6 hours PRN Temp greater or equal to 38.5C (101.3F), Mild Pain (1 - 3)  albuterol    90 MICROgram(s) HFA Inhaler 2 Puff(s) Inhalation every 6 hours PRN Wheezing        CAPILLARY BLOOD GLUCOSE      POCT Blood Glucose.: 248 mg/dL (26 May 2024 20:44)  POCT Blood Glucose.: 149 mg/dL (26 May 2024 16:44)  POCT Blood Glucose.: 116 mg/dL (26 May 2024 11:31)  POCT Blood Glucose.: 176 mg/dL (26 May 2024 07:32)      < from: VA Duplex Carotid, Bilat (24 @ 20:12) >  No significant hemodynamic stenosis of the right carotid artery.  50-69% left internal carotid artery stenosis.  Left external carotid artery stenosis.    < end of copied text >  < from: Xray Chest 1 View- PORTABLE-Routine (24 @ 19:35) >    IMPRESSION: Clear lungs.      < end of copied text >                                          PHYSICAL EXAM        Neurology: alert and oriented x 3, nonfocal, no gross deficits    CV : [id number]    Lungs: B/l cTA on room air    Abdomen: soft, nontender, nondistended, positive bowel sounds,+ bm    : voids             Extremities:  warm well perfused equal strength throughout   B/ll e + DP no calftenderness                                          Discussed with Cardiothoracic Team at AM rounds.

## 2024-05-27 NOTE — PROGRESS NOTE ADULT - ASSESSMENT
70 y/o female with PMH of HTN/HLD, DMT2, Asthma. obesity, hoshimoto's, former smoker  who presented to ED to be evaluated for chest pain associated with exertional shortness of breath. this afternoon pt was climbing stairs to go from one building to the next and started to develop sharp neck pain radiating to her back and both the shoulders.  pt pain lasted for about 30 sec and dissipated upon rest. pt was prescribed advair which she stopped taking due to thrush.  admits of being compliance to all other  medication at home  in ED cardiology on call was consulted and pt received 324 mg aspirin with 300 mg plavix. recommendation was  not to start on heparin drip  pt is currently asymptomatic.   5/25 P2y12 213, Carotid doppler pending , echo pending. Endocrinology consulted for assistance in management  5/26 VSS no c/o CP A/C OR thurs 5/30 5/27 VSS mdenies CP /PALPS preop workup underway  70 y/o female with PMH of HTN/HLD, DMT2, Asthma. obesity, hoshimoto's, former smoker  who presented to ED to be evaluated for chest pain associated with exertional shortness of breath. this afternoon pt was climbing stairs to go from one building to the next and started to develop sharp neck pain radiating to her back and both the shoulders.  pt pain lasted for about 30 sec and dissipated upon rest. pt was prescribed advair which she stopped taking due to thrush.  admits of being compliance to all other  medication at home  in ED cardiology on call was consulted and pt received 324 mg aspirin with 300 mg plavix. recommendation was  not to start on heparin drip  pt is currently asymptomatic.   5/25 P2y12 213, Carotid doppler pending , echo pending. Endocrinology consulted for assistance in management  5/26 VSS no c/o CP A/C OR thurs 5/30 5/27 VSSmdenies CP /PALPS preop workup underway

## 2024-05-27 NOTE — PROGRESS NOTE ADULT - SUBJECTIVE AND OBJECTIVE BOX
MR#[id number]  PATIENT NAME:VERONICA BELTRAN    DATE OF SERVICE: 05-27-24 @ 06:39  Patient was seen and examined by Timothy Ferguson MD on    05-27-24 @ 06:39 .  Interim events noted.Consultant notes ,Labs,Telemetry reviewed by me       HOSPITAL COURSE: HPI:  68 y/o female with PMH of HTN/HLD, DMT2, Asthma. obesity, hoshimoto's, former smoker  who presented to ED to be evaluated for chest pain associated with exertional shortness of breath. this afternoon pt was climbing stairs to go from one building to the next and started to develop sharp neck pain radiating to her back and both the shoulders.  pt pain lasted for about 30 sec and dissipated upon rest. pt was prescribed advair which she stopped taking due to thrush.  admits of being compliance to all other  medication at home  in ED cardiology on call was consulted and pt received 324 mg aspirin with 300 mg plavix. recommendation was  not to start on heparin drip  pt is currently asymptomatic. EKG w/o acute ST wave changes; Cath performed at [facility] which revealed multivessel cad; pt transferred for [facility] eval with Dr. Goncalves, Pt asymptomatic at this time. pt also developed chest pain after sheath was pulled- nitropaste given and chest pain was resolved.    (24 May 2024 18:26)      INTERIM EVENTS:Patient seen at bedside ,interim events noted.  Awake alert no chest pain -Sinus rhythm remains on heparin gtt OR planned for 5/30    PMH -reviewed admission note, no change since admission  HEART FAILURE: Acute[ ]Chronic[ ] Systolic[ ] Diastolic[ ] Combined Systolic and Diastolic[ ]  CAD[x ] CABG[ ] PCI[ ]  DEVICES[ ] PPM[ ] ICD[ ] ILR[ ]  ATRIAL FIBRILLATION[ ] Paroxysmal[ ] Permanent[ ] CHADS2-[  ]  JEFF[ ] CKD1[ ] CKD2[ ] CKD3[ ] CKD4[ ] ESRD[ ]  COPD[ ] HTN[x ]   DM[ x] Type1[ ] Type 2[x ]   CVA[ ] Paresis[ ]    AMBULATION: Assisted[ ] Cane/walker[ ] Independent[x ]    MEDICATIONS  (STANDING):  amLODIPine   Tablet 10 milliGRAM(s) Oral daily  aspirin enteric coated 81 milliGRAM(s) Oral daily  atorvastatin 80 milliGRAM(s) Oral at bedtime  budesonide  80 MICROgram(s)/formoterol 4.5 MICROgram(s) Inhaler 1 Puff(s) Inhalation every 12 hours  heparin  Infusion 1000 Unit(s)/Hr (10 mL/Hr) IV Continuous <Continuous>  insulin glargine Injectable (LANTUS) 30 Unit(s) SubCutaneous at bedtime  insulin lispro (ADMELOG) corrective regimen sliding scale   SubCutaneous three times a day before meals  insulin lispro Injectable (ADMELOG) 6 Unit(s) SubCutaneous three times a day before meals  levothyroxine 25 MICROGram(s) Oral daily  metoprolol tartrate 25 milliGRAM(s) Oral two times a day  sodium chloride 0.9% lock flush 3 milliLiter(s) IV Push every 8 hours    MEDICATIONS  (PRN):  acetaminophen     Tablet .. 650 milliGRAM(s) Oral every 6 hours PRN Temp greater or equal to 38.5C (101.3F), Mild Pain (1 - 3)  albuterol    90 MICROgram(s) HFA Inhaler 2 Puff(s) Inhalation every 6 hours PRN Wheezing            REVIEW OF SYSTEMS:  Constitutional: [ ] fever, [ ]weight loss,  [x ]fatigue [ ]weight gain  Eyes: [ ] visual changes  Respiratory: [ ]shortness of breath;  [ ] cough, [ ]wheezing, [ ]chills, [ ]hemoptysis  Cardiovascular: [ ] chest pain, [ ]palpitations, [ ]dizziness,  [ ]leg swelling[ ]orthopnea[ ]PND  Gastrointestinal: [ ] abdominal pain, [ ]nausea, [ ]vomiting,  [ ]diarrhea [ ]Constipation [ ]Melena  Genitourinary: [ ] dysuria, [ ] hematuria [ ]Aguilar  Neurologic: [ ] headaches [ ] tremors[ ]weakness [ ]Paralysis Right[ ] Left[ ]  Skin: [ ] itching, [ ]burning, [ ] rashes  Endocrine: [ ] heat or cold intolerance  Musculoskeletal: [ ] joint pain or swelling; [ ] muscle, back, or extremity pain  Psychiatric: [ ] depression, [ ]anxiety, [ ]mood swings, or [ ]difficulty sleeping  Hematologic: [ ] easy bruising, [ ] bleeding gums    [ ] All remaining systems negative except as per above.   [ ]Unable to obtain.  [x] No change in ROS since admission      Vital Signs Last 24 Hrs  T(C): 36.7 (27 May 2024 05:05), Max: 37 (26 May 2024 19:53)  T(F): 98.1 (27 May 2024 05:05), Max: 98.6 (26 May 2024 19:53)  HR: 79 (27 May 2024 05:05) (62 - 79)  BP: 147/74 (27 May 2024 05:05) (127/72 - 169/76)  RR: 18 (27 May 2024 05:05) (18 - 18)  SpO2: 96% (27 May 2024 05:05) (96% - 99%)    Parameters below as of 27 May 2024 05:05  Patient On (Oxygen Delivery Method): room air      I&O's Summary    25 May 2024 07:01  -  26 May 2024 07:00  --------------------------------------------------------  IN: 1450 mL / OUT: 2000 mL / NET: -550 mL    26 May 2024 07:01  -  27 May 2024 06:39  --------------------------------------------------------  IN: 1190 mL / OUT: 2800 mL / NET: -1610 mL        PHYSICAL EXAM:  General: No acute distress BMI-42.5  HEENT: EOMI, PERRL  Neck: Supple, [ ] JVD  Lungs: Equal air entry bilaterally; [ ] rales [ ] wheezing [ ] rhonchi  Heart: Regular rate and rhythm; [x ] murmur   2/6 [ x] systolic [ ] diastolic [ ] radiation[ ] rubs [ ]  gallops  Abdomen: Nontender, bowel sounds present  Extremities: No clubbing, cyanosis, [ ] edema [ ]Pulses  equal and intact  Nervous system:  Alert & Oriented X3, no focal deficits  Psychiatric: Normal affect  Skin: No rashes or lesions    LABS:  05-27    140  |  105  |  20  ----------------------------<  159<H>  3.9   |  23  |  1.00    Ca    9.8      27 May 2024 05:40      Creatinine Trend: 1.00<--, 0.90<--, 1.10<--, 1.02<--, 0.95<--, 0.99<--                        11.6   11.23 )-----------( 233      ( 27 May 2024 05:40 )             37.5     PT/INR - ( 27 May 2024 05:40 )   PT: 10.9 sec;   INR: 1.04 ratio         PTT - ( 27 May 2024 05:40 )  PTT:62.0 sec      12 Lead ECG (05.25.24 @ 08:54) >   NORMAL SINUS RHYTHM  NORMAL ECG      Cardiac Catheterization (05.24.24 @ 13:50) >  Coronary Angiography   The coronary circulation is right dominant.      LM -Left main artery: There is a 40 % stenosis.      LAD -Proximal left anterior descending: There is a 50 % stenosis. Mid left anterior descending: There is an 80 % stenosis. Instant wave-free ratio was performed with a calculated value of 0.77. Based on the results, the lesion was judged to be significant.  First diagonal: There is a 70 % stenosis.      LCX -Proximal circumflex: The segment is small. There is a 70 % stenosis.      RCA -Proximal right coronary artery: There is a 70 % stenosis. First right posterolateral: There is a 50 % stenosis.    Left Heart Cath   Ejection fraction was visually estimated by LV Gram with a value of-55%.     Diffuse multivessel CAD with intermediate syntax score.    Recommendations: -CTS eval for CABG

## 2024-05-27 NOTE — PROGRESS NOTE ADULT - ASSESSMENT
68 y/o female with PMH of HTN/HLD, DMT2, Asthma. obesity, hoshimoto's, former smoker  who presented to ED to be evaluated for chest pain, now admitted to CTS for surgery eval.     Assessment  DMT2: 69y Female with DM T2 with hyperglycemia, A1C 8.7% , was on oral meds and insulin at home (basaglar) , now on basal bolus insulin with coverage, blood sugars improving.   CAD: on medications, stable, monitored. CTS eval   Hypothyroidism: On Synthroid 25 mcg po daily, compliant with Synthroid intake, asymptomatic. TFTs euthyroid.   HTN: on antihypertensive medications, monitored, asymptomatic.  Obesity: No strict exercise routines, not on any weight loss program, neither on low calorie diet.      Santana Montes MD  Cell: [phone]  Office: [phone]

## 2024-05-27 NOTE — PROGRESS NOTE ADULT - PROBLEM SELECTOR PLAN 1
continue asa and statin  start heparin gttp  start bb- lop 25 mg po bid  ck echo/ carotids/ pft's   ck ekg/ ua/ chest xray  ck mrsa nares  ck p2y12 213  OR with Dr. Goncalves - OR date -Thursday 5/30 continue asa and statin  start heparin gttp  start bb- lop 25 mg po bid  ck echo pending   pft's   ck mrsa nares  ck p2y12 213  OR with Dr. Goncalves - OR date -Thursday 5/30

## 2024-05-28 ENCOUNTER — RESULT REVIEW (OUTPATIENT)
Age: 69
End: 2024-05-28

## 2024-05-28 ENCOUNTER — TRANSCRIPTION ENCOUNTER (OUTPATIENT)
Age: 69
End: 2024-05-28

## 2024-05-28 LAB
ANION GAP SERPL CALC-SCNC: 14 MMOL/L — SIGNIFICANT CHANGE UP (ref 5–17)
APTT BLD: 69.9 SEC — HIGH (ref 24.5–35.6)
BLD GP AB SCN SERPL QL: NEGATIVE — SIGNIFICANT CHANGE UP
BUN SERPL-MCNC: 21 MG/DL — SIGNIFICANT CHANGE UP (ref 7–23)
CALCIUM SERPL-MCNC: 10.2 MG/DL — SIGNIFICANT CHANGE UP (ref 8.4–10.5)
CHLORIDE SERPL-SCNC: 102 MMOL/L — SIGNIFICANT CHANGE UP (ref 96–108)
CO2 SERPL-SCNC: 21 MMOL/L — LOW (ref 22–31)
CREAT SERPL-MCNC: 0.93 MG/DL — SIGNIFICANT CHANGE UP (ref 0.5–1.3)
EGFR: 67 ML/MIN/1.73M2 — SIGNIFICANT CHANGE UP
GLUCOSE BLDC GLUCOMTR-MCNC: 114 MG/DL — HIGH (ref 70–99)
GLUCOSE BLDC GLUCOMTR-MCNC: 151 MG/DL — HIGH (ref 70–99)
GLUCOSE BLDC GLUCOMTR-MCNC: 165 MG/DL — HIGH (ref 70–99)
GLUCOSE BLDC GLUCOMTR-MCNC: 214 MG/DL — HIGH (ref 70–99)
GLUCOSE SERPL-MCNC: 172 MG/DL — HIGH (ref 70–99)
HCT VFR BLD CALC: 39 % — SIGNIFICANT CHANGE UP (ref 34.5–45)
HGB BLD-MCNC: 12.2 G/DL — SIGNIFICANT CHANGE UP (ref 11.5–15.5)
MCHC RBC-ENTMCNC: 26.6 PG — LOW (ref 27–34)
MCHC RBC-ENTMCNC: 31.3 GM/DL — LOW (ref 32–36)
MCV RBC AUTO: 85.2 FL — SIGNIFICANT CHANGE UP (ref 80–100)
NRBC # BLD: 0 /100 WBCS — SIGNIFICANT CHANGE UP (ref 0–0)
PLATELET # BLD AUTO: 249 K/UL — SIGNIFICANT CHANGE UP (ref 150–400)
POTASSIUM SERPL-MCNC: 4 MMOL/L — SIGNIFICANT CHANGE UP (ref 3.5–5.3)
POTASSIUM SERPL-SCNC: 4 MMOL/L — SIGNIFICANT CHANGE UP (ref 3.5–5.3)
RBC # BLD: 4.58 M/UL — SIGNIFICANT CHANGE UP (ref 3.8–5.2)
RBC # FLD: 14.7 % — HIGH (ref 10.3–14.5)
RH IG SCN BLD-IMP: POSITIVE — SIGNIFICANT CHANGE UP
SODIUM SERPL-SCNC: 137 MMOL/L — SIGNIFICANT CHANGE UP (ref 135–145)
WBC # BLD: 12.7 K/UL — HIGH (ref 3.8–10.5)
WBC # FLD AUTO: 12.7 K/UL — HIGH (ref 3.8–10.5)

## 2024-05-28 PROCEDURE — 94010 BREATHING CAPACITY TEST: CPT | Mod: 26

## 2024-05-28 PROCEDURE — 99233 SBSQ HOSP IP/OBS HIGH 50: CPT

## 2024-05-28 PROCEDURE — 93306 TTE W/DOPPLER COMPLETE: CPT | Mod: 26

## 2024-05-28 PROCEDURE — 99232 SBSQ HOSP IP/OBS MODERATE 35: CPT | Mod: FS

## 2024-05-28 RX ORDER — ASCORBIC ACID 60 MG
2000 TABLET,CHEWABLE ORAL ONCE
Refills: 0 | Status: COMPLETED | OUTPATIENT
Start: 2024-05-28 | End: 2024-05-28

## 2024-05-28 RX ORDER — VANCOMYCIN HCL 1 G
1000 VIAL (EA) INTRAVENOUS ONCE
Refills: 0 | Status: DISCONTINUED | OUTPATIENT
Start: 2024-05-29 | End: 2024-05-29

## 2024-05-28 RX ORDER — CHLORHEXIDINE GLUCONATE 213 G/1000ML
1 SOLUTION TOPICAL DAILY
Refills: 0 | Status: COMPLETED | OUTPATIENT
Start: 2024-05-28 | End: 2024-05-29

## 2024-05-28 RX ORDER — DIPHENHYDRAMINE HCL 50 MG
25 CAPSULE ORAL ONCE
Refills: 0 | Status: COMPLETED | OUTPATIENT
Start: 2024-05-28 | End: 2024-05-28

## 2024-05-28 RX ORDER — INSULIN LISPRO 100/ML
10 VIAL (ML) SUBCUTANEOUS
Refills: 0 | Status: DISCONTINUED | OUTPATIENT
Start: 2024-05-28 | End: 2024-05-29

## 2024-05-28 RX ORDER — GABAPENTIN 400 MG/1
300 CAPSULE ORAL ONCE
Refills: 0 | Status: COMPLETED | OUTPATIENT
Start: 2024-05-28 | End: 2024-05-29

## 2024-05-28 RX ORDER — INSULIN GLARGINE 100 [IU]/ML
32 INJECTION, SOLUTION SUBCUTANEOUS AT BEDTIME
Refills: 0 | Status: DISCONTINUED | OUTPATIENT
Start: 2024-05-28 | End: 2024-05-28

## 2024-05-28 RX ORDER — INSULIN GLARGINE 100 [IU]/ML
34 INJECTION, SOLUTION SUBCUTANEOUS AT BEDTIME
Refills: 0 | Status: DISCONTINUED | OUTPATIENT
Start: 2024-05-28 | End: 2024-05-29

## 2024-05-28 RX ORDER — INSULIN LISPRO 100/ML
9 VIAL (ML) SUBCUTANEOUS
Refills: 0 | Status: DISCONTINUED | OUTPATIENT
Start: 2024-05-28 | End: 2024-05-28

## 2024-05-28 RX ADMIN — Medication 1 SPRAY(S): at 17:07

## 2024-05-28 RX ADMIN — ATORVASTATIN CALCIUM 80 MILLIGRAM(S): 80 TABLET, FILM COATED ORAL at 21:40

## 2024-05-28 RX ADMIN — Medication 1: at 07:54

## 2024-05-28 RX ADMIN — SODIUM CHLORIDE 3 MILLILITER(S): 9 INJECTION INTRAMUSCULAR; INTRAVENOUS; SUBCUTANEOUS at 12:17

## 2024-05-28 RX ADMIN — Medication 2000 MILLIGRAM(S): at 21:40

## 2024-05-28 RX ADMIN — AMLODIPINE BESYLATE 10 MILLIGRAM(S): 2.5 TABLET ORAL at 04:54

## 2024-05-28 RX ADMIN — Medication 25 MILLIGRAM(S): at 17:07

## 2024-05-28 RX ADMIN — Medication 10 UNIT(S): at 12:12

## 2024-05-28 RX ADMIN — Medication 2: at 12:11

## 2024-05-28 RX ADMIN — Medication 25 MICROGRAM(S): at 04:55

## 2024-05-28 RX ADMIN — INSULIN GLARGINE 34 UNIT(S): 100 INJECTION, SOLUTION SUBCUTANEOUS at 21:40

## 2024-05-28 RX ADMIN — Medication 81 MILLIGRAM(S): at 17:07

## 2024-05-28 RX ADMIN — Medication 10 UNIT(S): at 17:09

## 2024-05-28 RX ADMIN — BUDESONIDE AND FORMOTEROL FUMARATE DIHYDRATE 1 PUFF(S): 160; 4.5 AEROSOL RESPIRATORY (INHALATION) at 04:54

## 2024-05-28 RX ADMIN — Medication 8 UNIT(S): at 07:54

## 2024-05-28 RX ADMIN — Medication 1 INCH(S): at 08:55

## 2024-05-28 RX ADMIN — Medication 1 SPRAY(S): at 04:54

## 2024-05-28 RX ADMIN — BUDESONIDE AND FORMOTEROL FUMARATE DIHYDRATE 1 PUFF(S): 160; 4.5 AEROSOL RESPIRATORY (INHALATION) at 17:07

## 2024-05-28 RX ADMIN — Medication 650 MILLIGRAM(S): at 17:37

## 2024-05-28 RX ADMIN — Medication 25 MILLIGRAM(S): at 12:39

## 2024-05-28 RX ADMIN — SODIUM CHLORIDE 3 MILLILITER(S): 9 INJECTION INTRAMUSCULAR; INTRAVENOUS; SUBCUTANEOUS at 05:49

## 2024-05-28 RX ADMIN — Medication 25 MILLIGRAM(S): at 04:54

## 2024-05-28 RX ADMIN — Medication 650 MILLIGRAM(S): at 18:07

## 2024-05-28 RX ADMIN — SODIUM CHLORIDE 3 MILLILITER(S): 9 INJECTION INTRAMUSCULAR; INTRAVENOUS; SUBCUTANEOUS at 22:16

## 2024-05-28 NOTE — PROGRESS NOTE ADULT - SUBJECTIVE AND OBJECTIVE BOX
VITAL SIGNS    Telemetry:    Vital Signs Last 24 Hrs  T(C): 36.6 (24 @ 04:19), Max: 36.7 (24 @ 20:08)  T(F): 97.9 (24 @ 04:19), Max: 98.1 (24 @ 20:08)  HR: 66 (24 @ 04:19) (60 - 72)  BP: 128/71 (24 @ 04:19) (115/73 - 155/70)  RR: 17 (24 @ 04:19) (17 - 18)  SpO2: 95% (24 @ 04:19) (95% - 98%)             @ 07:01  -   @ 07:00  --------------------------------------------------------  IN: 680 mL / OUT: 2300 mL / NET: -1620 mL     @ 07:01  -   @ 09:38  --------------------------------------------------------  IN: 240 mL / OUT: 300 mL / NET: -60 mL       Daily     Daily Weight in k.6 (28 May 2024 07:56)  Admit Wt: Drug Dosing Weight  Height (cm): 154.9 (24 May 2024 19:00)  Weight (kg): 102 (24 May 2024 19:00)  BMI (kg/m2): 42.5 (24 May 2024 19:00)  BSA (m2): 1.99 (24 May 2024 19:00)      CAPILLARY BLOOD GLUCOSE      POCT Blood Glucose.: 165 mg/dL (28 May 2024 07:33)  POCT Blood Glucose.: 167 mg/dL (27 May 2024 21:28)  POCT Blood Glucose.: 177 mg/dL (27 May 2024 16:31)  POCT Blood Glucose.: 153 mg/dL (27 May 2024 11:37)          LABS:     @ 07:01  -   @ 07:00  --------------------------------------------------------  IN: 680 mL / OUT: 2300 mL / NET: -1620 mL     @ 07:  -   @ 09:38  --------------------------------------------------------  IN: 240 mL / OUT: 300 mL / NET: -60 mL    cret                        12.2   12.70 )-----------( 249      ( 28 May 2024 06:05 )             39.0         137  |  102  |  21  ----------------------------<  172<H>  4.0   |  21<L>  |  0.93    Ca    10.2      28 May 2024 06:05      PT/INR - ( 27 May 2024 05:40 )   PT: 10.9 sec;   INR: 1.04 ratio         PTT - ( 28 May 2024 06:05 )  PTT:69.9 sec    acetaminophen     Tablet .. 650 milliGRAM(s) Oral every 6 hours PRN  albuterol    90 MICROgram(s) HFA Inhaler 2 Puff(s) Inhalation every 6 hours PRN  amLODIPine   Tablet 10 milliGRAM(s) Oral daily  aspirin enteric coated 81 milliGRAM(s) Oral daily  atorvastatin 80 milliGRAM(s) Oral at bedtime  budesonide  80 MICROgram(s)/formoterol 4.5 MICROgram(s) Inhaler 1 Puff(s) Inhalation every 12 hours  fluticasone propionate 50 MICROgram(s)/spray Nasal Spray 1 Spray(s) Both Nostrils two times a day  heparin  Infusion 1000 Unit(s)/Hr IV Continuous <Continuous>  insulin glargine Injectable (LANTUS) 34 Unit(s) SubCutaneous at bedtime  insulin lispro (ADMELOG) corrective regimen sliding scale   SubCutaneous three times a day before meals  insulin lispro Injectable (ADMELOG) 10 Unit(s) SubCutaneous three times a day before meals  levothyroxine 25 MICROGram(s) Oral daily  metoprolol tartrate 25 milliGRAM(s) Oral two times a day  nitroglycerin     SubLingual 0.4 milliGRAM(s) SubLingual every 5 minutes PRN  sodium chloride 0.9% lock flush 3 milliLiter(s) IV Push every 8 hours      PHYSICAL EXAM    Subjective: "Hi.   Neurology: alert and oriented x 3, nonfocal, no gross deficits  CV : tele:  RSR  Sternal Wound :  CDI with dressing , Stable  Lungs: clear. RR easy, unlabored   Abdomen: soft, nontender, nondistended, positive bowel sounds, bowel movement   Neg N/V/D   :  pt voiding without difficulty   Extremities:   RUELAS; edema, neg calf tenderness.   PPP bilaterally      PW:  Chest tubes:

## 2024-05-28 NOTE — PROGRESS NOTE ADULT - SUBJECTIVE AND OBJECTIVE BOX
Chief complaint    Patient is a 69y old  Female who presents with a chief complaint of chest pain/shortness of breath (28 May 2024 08:01)   Review of systems  Patient appears comfortable.    Labs and Fingersticks  CAPILLARY BLOOD GLUCOSE      POCT Blood Glucose.: 165 mg/dL (28 May 2024 07:33)  POCT Blood Glucose.: 167 mg/dL (27 May 2024 21:28)  POCT Blood Glucose.: 177 mg/dL (27 May 2024 16:31)  POCT Blood Glucose.: 153 mg/dL (27 May 2024 11:37)      Anion Gap: 14 (05-28 @ 06:05)  Anion Gap: 12 (05-27 @ 05:40)      Calcium: 10.2 (05-28 @ 06:05)  Calcium: 9.8 (05-27 @ 05:40)          05-28    137  |  102  |  21  ----------------------------<  172<H>  4.0   |  21<L>  |  0.93    Ca    10.2      28 May 2024 06:05                          12.2   12.70 )-----------( 249      ( 28 May 2024 06:05 )             39.0     Medications  MEDICATIONS  (STANDING):  amLODIPine   Tablet 10 milliGRAM(s) Oral daily  aspirin enteric coated 81 milliGRAM(s) Oral daily  atorvastatin 80 milliGRAM(s) Oral at bedtime  budesonide  80 MICROgram(s)/formoterol 4.5 MICROgram(s) Inhaler 1 Puff(s) Inhalation every 12 hours  fluticasone propionate 50 MICROgram(s)/spray Nasal Spray 1 Spray(s) Both Nostrils two times a day  heparin  Infusion 1000 Unit(s)/Hr (10 mL/Hr) IV Continuous <Continuous>  insulin glargine Injectable (LANTUS) 32 Unit(s) SubCutaneous at bedtime  insulin lispro (ADMELOG) corrective regimen sliding scale   SubCutaneous three times a day before meals  insulin lispro Injectable (ADMELOG) 9 Unit(s) SubCutaneous three times a day before meals  levothyroxine 25 MICROGram(s) Oral daily  metoprolol tartrate 25 milliGRAM(s) Oral two times a day  sodium chloride 0.9% lock flush 3 milliLiter(s) IV Push every 8 hours      Physical Exam  General: Patient appears comfortable.  Vital Signs Last 12 Hrs  T(F): 97.9 (05-28-24 @ 04:19), Max: 97.9 (05-28-24 @ 04:19)  HR: 66 (05-28-24 @ 04:19) (66 - 66)  BP: 128/71 (05-28-24 @ 04:19) (127/79 - 128/71)  BP(mean): 77 (05-27-24 @ 21:00) (77 - 77)  RR: 17 (05-28-24 @ 04:19) (17 - 17)  SpO2: 95% (05-28-24 @ 04:19) (95% - 97%)  Neck: No palpable thyroid nodules.  CVS: S1S2, No murmurs  Respiratory: No wheezing, no crepitations  GI: Abdomen soft, non tender.    Diagnostics        Radiology:

## 2024-05-28 NOTE — PROGRESS NOTE ADULT - SUBJECTIVE AND OBJECTIVE BOX
[facility]    Toshia Tony, Sandra, Nehemias, Angelo, Juan      [phone]    CHIEF COMPLAINT: Patient is a 69y old  Female who presents with a chief complaint of chest pain/shortness of breath (27 May 2024 11:49)      Follow Up: cad    Interim history: reports chest tightness yesterday, for which she was given ntg sl, and nitropaste, with resolution. no further cp or sob    MEDICATIONS  (STANDING):  amLODIPine   Tablet 10 milliGRAM(s) Oral daily  aspirin enteric coated 81 milliGRAM(s) Oral daily  atorvastatin 80 milliGRAM(s) Oral at bedtime  budesonide  80 MICROgram(s)/formoterol 4.5 MICROgram(s) Inhaler 1 Puff(s) Inhalation every 12 hours  fluticasone propionate 50 MICROgram(s)/spray Nasal Spray 1 Spray(s) Both Nostrils two times a day  heparin  Infusion 1000 Unit(s)/Hr (10 mL/Hr) IV Continuous <Continuous>  insulin glargine Injectable (LANTUS) 32 Unit(s) SubCutaneous at bedtime  insulin lispro (ADMELOG) corrective regimen sliding scale   SubCutaneous three times a day before meals  insulin lispro Injectable (ADMELOG) 9 Unit(s) SubCutaneous three times a day before meals  levothyroxine 25 MICROGram(s) Oral daily  metoprolol tartrate 25 milliGRAM(s) Oral two times a day  sodium chloride 0.9% lock flush 3 milliLiter(s) IV Push every 8 hours    MEDICATIONS  (PRN):  acetaminophen     Tablet .. 650 milliGRAM(s) Oral every 6 hours PRN Temp greater or equal to 38.5C (101.3F), Mild Pain (1 - 3)  albuterol    90 MICROgram(s) HFA Inhaler 2 Puff(s) Inhalation every 6 hours PRN Wheezing  nitroglycerin     SubLingual 0.4 milliGRAM(s) SubLingual every 5 minutes PRN Chest Pain      REVIEW OF SYSTEMS:  eye, ent, GI, , allergic, dermatologic, musculoskeletal and neurologic are negative except as described above    Vital Signs Last 24 Hrs  T(C): 36.6 (28 May 2024 04:19), Max: 36.7 (27 May 2024 20:08)  T(F): 97.9 (28 May 2024 04:19), Max: 98.1 (27 May 2024 20:08)  HR: 66 (28 May 2024 04:19) (60 - 72)  BP: 128/71 (28 May 2024 04:19) (115/73 - 155/70)  BP(mean): 77 (27 May 2024 21:00) (77 - 87)  RR: 17 (28 May 2024 04:19) (17 - 18)  SpO2: 95% (28 May 2024 04:19) (95% - 98%)    Parameters below as of 28 May 2024 04:19  Patient On (Oxygen Delivery Method): room air        I&O's Summary    27 May 2024 07:01  -  28 May 2024 07:00  --------------------------------------------------------  IN: 680 mL / OUT: 2300 mL / NET: -1620 mL        Telemetry past 24h: sr    PHYSICAL EXAM:    Constitutional: well-nourished, well-developed, NAD   HEENT:  MMM, sclerae anicteric, conjunctivae clear, no oral cyanosis.  Pulmonary: Non-labored, breath sounds are clear bilaterally, No wheezing, rales or rhonchi  Cardiovascular: Regular, S1 and S2.  No murmur.  No rubs, gallops or clicks  Gastrointestinal: Bowel Sounds present, soft, nontender.   Lymph: No peripheral edema.   Neurological: Alert, no focal deficits  Skin: No rashes.  Psych:  Mood & affect appropriate    LABS: All Labs Reviewed:                        12.2   12.70 )-----------( 249      ( 28 May 2024 06:05 )             39.0                         11.6   11.23 )-----------( 233      ( 27 May 2024 05:40 )             37.5                         11.7   10.43 )-----------( 221      ( 26 May 2024 06:03 )             37.4     28 May 2024 06:05    137    |  102    |  21     ----------------------------<  172    4.0     |  21     |  0.93   27 May 2024 05:40    140    |  105    |  20     ----------------------------<  159    3.9     |  23     |  1.00   26 May 2024 06:03    141    |  106    |  17     ----------------------------<  163    4.3     |  24     |  0.90     Ca    10.2       28 May 2024 06:05  Ca    9.8        27 May 2024 05:40  Ca    9.6        26 May 2024 06:03      PT/INR - ( 27 May 2024 05:40 )   PT: 10.9 sec;   INR: 1.04 ratio         PTT - ( 28 May 2024 06:05 )  PTT:69.9 sec      Blood Culture:        RADIOLOGY:    EKG:    Echo:

## 2024-05-28 NOTE — PRE PROCEDURE NOTE - PRE PROCEDURE EVALUATION
Cardiac Surgery Pre-op Note:    CC: Patient is a 69y old  Female who presents with a chief complaint of chest pain/shortness of breath (28 May 2024 08:47)      Referring Physician:                                                                                                           Surgeon:    Procedure: (Date) (Procedure)    Allergies    penicillin (Hives)    Intolerances        HPI:  70 y/o female with PMH of HTN/HLD, DMT2, Asthma. obesity, hoshimoto's, former smoker  who presented to ED to be evaluated for chest pain associated with exertional shortness of breath. this afternoon pt was climbing stairs to go from one building to the next and started to develop sharp neck pain radiating to her back and both the shoulders.  pt pain lasted for about 30 sec and dissipated upon rest. pt was prescribed advair which she stopped taking due to thrush.  admits of being compliance to all other  medication at home  in ED cardiology on call was consulted and pt received 324 mg aspirin with 300 mg plavix. recommendation was  not to start on heparin drip  pt is currently asymptomatic. EKG w/o acute ST wave changes; Cath performed at [facility] which revealed multivessel cad; pt transferred for OHS eval with Dr. Goncalves, Pt asymptomatic at this time. pt also developed chest pain after sheath was pulled- nitropaste given and chest pain was resolved.    (24 May 2024 18:26)      PAST MEDICAL & SURGICAL HISTORY:  Hypertension      Diabetes      Hyperlipidemia      High triglycerides      Hypothyroidism      Obesity      Arthritis      Chronic bronchiolitis      Hashimoto's thyroiditis      H/O  section      H/O tubal ligation      H/O eye surgery          MEDICATIONS  (STANDING):  amLODIPine   Tablet 10 milliGRAM(s) Oral daily  aspirin enteric coated 81 milliGRAM(s) Oral daily  atorvastatin 80 milliGRAM(s) Oral at bedtime  budesonide  80 MICROgram(s)/formoterol 4.5 MICROgram(s) Inhaler 1 Puff(s) Inhalation every 12 hours  fluticasone propionate 50 MICROgram(s)/spray Nasal Spray 1 Spray(s) Both Nostrils two times a day  heparin  Infusion 1000 Unit(s)/Hr (10 mL/Hr) IV Continuous <Continuous>  insulin glargine Injectable (LANTUS) 34 Unit(s) SubCutaneous at bedtime  insulin lispro (ADMELOG) corrective regimen sliding scale   SubCutaneous three times a day before meals  insulin lispro Injectable (ADMELOG) 10 Unit(s) SubCutaneous three times a day before meals  levothyroxine 25 MICROGram(s) Oral daily  metoprolol tartrate 25 milliGRAM(s) Oral two times a day  sodium chloride 0.9% lock flush 3 milliLiter(s) IV Push every 8 hours    MEDICATIONS  (PRN):  acetaminophen     Tablet .. 650 milliGRAM(s) Oral every 6 hours PRN Temp greater or equal to 38.5C (101.3F), Mild Pain (1 - 3)  albuterol    90 MICROgram(s) HFA Inhaler 2 Puff(s) Inhalation every 6 hours PRN Wheezing  nitroglycerin     SubLingual 0.4 milliGRAM(s) SubLingual every 5 minutes PRN Chest Pain        Labs:                        12.2   12.70 )-----------( 249      ( 28 May 2024 06:05 )             39.0         137  |  102  |  21  ----------------------------<  172<H>  4.0   |  21<L>  |  0.93    Ca    10.2      28 May 2024 06:05      PT/INR - ( 27 May 2024 05:40 )   PT: 10.9 sec;   INR: 1.04 ratio         PTT - ( 28 May 2024 06:05 )  PTT:69.9 sec    Blood Type: ABO Interpretation: A ( @ 09:07)    HGB A1C:   Prealbumin: Prealbumin, Serum: 22 mg/dL ( @ 22:13)    Pro-BNP:   Thyroid Panel:  @ 22:13/2.94  1.4/--/--    MRSA: MRSA PCR Result.: NotDetec ( @ 21:58)   / MSSA:   Urinalysis Basic - ( 28 May 2024 06:05 )    Color: x / Appearance: x / SG: x / pH: x  Gluc: 172 mg/dL / Ketone: x  / Bili: x / Urobili: x   Blood: x / Protein: x / Nitrite: x   Leuk Esterase: x / RBC: x / WBC x   Sq Epi: x / Non Sq Epi: x / Bacteria: x        CXR:     EKG:    Carotid Duplex:      PFT's:    Echocardiogram:    Cardiac catheterization:    Vein Mapping:    Gen: WN/WD NAD  Neuro: AAOx3, nonfocal  Pulm: CTA B/L  CV: RRR, S1S2  Abd: Soft, NT, ND +BS  Ext: No edema, + peripheral pulses      Pt has AICD/PPM [ ] Yes  [ ] No             Brand Name:  Pre-op Beta Blocker ordered within 24 hrs of surgery (CABG ONLY)?  [ ] Yes  [ ] No  If not, Why?  Type & Cross  [ ] Yes  [ ] No  NPO after Midnight [ ] Yes  [ ] No  Pre-op ABX ordered, to be taped on chart:  [ ] Yes  [ ] No     Hibiclens/Peridex ordered [ ] Yes  [ ] No  Intraop on Hold: PRBCs, CXR, CHIOMA [ ]   Consent obtained  [ ] Yes  [ ] No   Cardiac Surgery Pre-op Note:    CC: Patient is a 69y old  Female who presents with a chief complaint of chest pain/shortness of breath (28 May 2024 08:47)      Referring Physician: Dr. Stephens                                                                                                            Surgeon: DR. Goncalves     Procedure:  cabg     Allergies    penicillin (Hives)/ throat closure- anyphylaxis as per patient     Intolerances        HPI:  70 y/o female with PMH of HTN/HLD, DMT2, Asthma. obesity, hoshimoto's, former smoker  who presented to ED to be evaluated for chest pain associated with exertional shortness of breath. this afternoon pt was climbing stairs to go from one building to the next and started to develop sharp neck pain radiating to her back and both the shoulders.  pt pain lasted for about 30 sec and dissipated upon rest. pt was prescribed advair which she stopped taking due to thrush.  admits of being compliance to all other  medication at home  in ED cardiology on call was consulted and pt received 324 mg aspirin with 300 mg plavix. recommendation was  not to start on heparin drip  pt is currently asymptomatic. EKG w/o acute ST wave changes; Cath performed at [facility] which revealed multivessel cad; pt transferred for OHS eval with Dr. Goncalves, Pt asymptomatic at this time. pt also developed chest pain after sheath was pulled- nitropaste given and chest pain was resolved.    (24 May 2024 18:26)      PAST MEDICAL & SURGICAL HISTORY:  Hypertension      Diabetes      Hyperlipidemia      High triglycerides      Hypothyroidism      Obesity      Arthritis      Chronic bronchiolitis      Hashimoto's thyroiditis      H/O  section      H/O tubal ligation      H/O eye surgery          MEDICATIONS  (STANDING):  amLODIPine   Tablet 10 milliGRAM(s) Oral daily  aspirin enteric coated 81 milliGRAM(s) Oral daily  atorvastatin 80 milliGRAM(s) Oral at bedtime  budesonide  80 MICROgram(s)/formoterol 4.5 MICROgram(s) Inhaler 1 Puff(s) Inhalation every 12 hours  fluticasone propionate 50 MICROgram(s)/spray Nasal Spray 1 Spray(s) Both Nostrils two times a day  heparin  Infusion 1000 Unit(s)/Hr (10 mL/Hr) IV Continuous <Continuous>  insulin glargine Injectable (LANTUS) 34 Unit(s) SubCutaneous at bedtime  insulin lispro (ADMELOG) corrective regimen sliding scale   SubCutaneous three times a day before meals  insulin lispro Injectable (ADMELOG) 10 Unit(s) SubCutaneous three times a day before meals  levothyroxine 25 MICROGram(s) Oral daily  metoprolol tartrate 25 milliGRAM(s) Oral two times a day  sodium chloride 0.9% lock flush 3 milliLiter(s) IV Push every 8 hours    MEDICATIONS  (PRN):  acetaminophen     Tablet .. 650 milliGRAM(s) Oral every 6 hours PRN Temp greater or equal to 38.5C (101.3F), Mild Pain (1 - 3)  albuterol    90 MICROgram(s) HFA Inhaler 2 Puff(s) Inhalation every 6 hours PRN Wheezing  nitroglycerin     SubLingual 0.4 milliGRAM(s) SubLingual every 5 minutes PRN Chest Pain        Labs:                        12.2   12.70 )-----------( 249      ( 28 May 2024 06:05 )             39.0         137  |  102  |  21  ----------------------------<  172<H>  4.0   |  21<L>  |  0.93    Ca    10.2      28 May 2024 06:05      PT/INR - ( 27 May 2024 05:40 )   PT: 10.9 sec;   INR: 1.04 ratio         PTT - ( 28 May 2024 06:05 )  PTT:69.9 sec    Blood Type: ABO Interpretation: A ( @ 09:07)    HGB A1C:   Prealbumin: Prealbumin, Serum: 22 mg/dL ( @ 22:13)    Pro-BNP:   Thyroid Panel:  @ 22:13/2.94  1.4/--/--    MRSA: MRSA PCR Result.: NotDetec ( @ 21:58)   / MSSA:   Urinalysis Basic - ( 28 May 2024 06:05 )    Color: x / Appearance: x / SG: x / pH: x  Gluc: 172 mg/dL / Ketone: x  / Bili: x / Urobili: x   Blood: x / Protein: x / Nitrite: x   Leuk Esterase: x / RBC: x / WBC x   Sq Epi: x / Non Sq Epi: x / Bacteria: x        CXR: < from: Xray Chest 1 View- PORTABLE-Routine (24 @ 19:35) >  ACC: [id number] EXAM:  XR CHEST PORTABLE ROUTINE 1V   ORDERED BY: MATTEO SULLIVAN     PROCEDURE DATE:  2024          INTERPRETATION:  HISTORY: Chest pain    TECHNIQUE: A single AP view of the chest was obtained.    COMPARISON: 2024    FINDINGS:  The cardiac silhouette is normal in size. There are no focal   consolidations or pleural effusions. The hilar and mediastinal structures   appear unremarkable. The osseous structures are intact.    IMPRESSION: Clear lungs.    < end of copied text >      EKG: < from: 12 Lead ECG (24 @ 08:54) >    Ventricular Rate 66 BPM    Atrial Rate 66 BPM    P-R Interval 186 ms    QRS Duration 88 ms    Q-T Interval 434 ms    QTC Calculation(Bazett) 454 ms    P Axis 69 degrees    R Axis 46 degrees    T Axis 71 degrees    Diagnosis Line NORMAL SINUS RHYTHM  NORMAL ECG  NO PREVIOUS ECGS AVAILABLE  Confirmed by ABHIJIT SARAH MD ([id number]) on 2024 6:45:59 AM    < end of copied text >      Carotid Duplex:  < from: VA Duplex Carotid, Bilat (24 @ 20:12) >    ACC: [id number] EXAM:  CAROTID DUPLEX BILATERAL   ORDERED BY: MATTEO SULLIVAN     PROCEDURE DATE:  2024          INTERPRETATION:  CLINICAL INFORMATION: Preop cardiac surgery    COMPARISON: None available.    TECHNIQUE: Grayscale, color and spectral Doppler examination of both   carotid arteries was performed.    FINDINGS:    No abnormal waveforms are encountered. Mild right and moderate left   carotid bulb plaque.    Peak systolic velocities in cm/s are as follows:    RIGHT:  PROX CCA = 60  DIST CCA = 90  PROX ICA = 132  DIST ICA = 77  ECA = 76    LEFT:  PROX CCA = 97  DIST CCA = 76  PROX ICA = 187  DIST ICA = 79  ECA = 239    Antegrade flow is noted within both vertebral arteries.    IMPRESSION:  No significant hemodynamic stenosis of the right carotid artery.  50-69% left internal carotid artery stenosis.  Left external carotid artery stenosis.    Measurement of carotid stenosis is based on velocity parameters that   correlate the residual internal carotid diameter with that ofthe more   distal vessel in accordance with a method such as the [place] American   Symptomatic Carotid Endarterectomy Trial (NASCET).    --- End of Report ---        < end of copied text >      PFT's: done     Echocardiogram:  done today     Cardiac catheterization: < from: Cardiac Catheterization (24 @ 13:50) >  Study Date:     2024   Name:           SARITHA BELTRAN   :            1955   (69 years)   Gender:         female   MR#:            [id number]   [facility]#:           [id number]   Patient Class:  Inpatient     Cath Lab Report    Diagnostic Cardiologist:       Juan Manuel Frazier MD   Referring Physician:           Ajith Del Cid MD     Procedures Performed   Procedures:               1.    Arterial Access - Right Radial     2.    Left Heart Cath   3.    Diagnostic Coronary Angiography   4.    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    Recommendations:     CTS eval for CABG     Acute complication:    No complications     Procedure Narrative:  The risks and alternatives of the procedures and conscious sedation  were explained to the patient and informed consent was  obtained. The patient was brought to the cath lab and placed on the  exam table.  Access   Right radial artery:   The puncture site was infiltrated with 1% Lidocaine. Vascular access  was obtained using modified seldinger technique under  ultrasound imaging guidance and a 6FR PRELUDE KIT was advanced into  the vessel.    Coronary Angiography   Left Coronary System:   A catheter was positioned into the vessel ostia under fluoroscopic  guidance. Angiograms were obtained in multiple views.  Right Coronary System:   A catheter was positioned into the vessel ostia under fluoroscopic  guidance. Angiograms were obtained in multiple views.    Diagnostic Findings:     Coronary Angiography   The coronary circulation is right dominant.           Patient: SARITHA BELTRAN              MRN: [id number]  Study Date: 2024   01:50 PM      Page 1 of 4          Left main artery: There is a 40 % stenosis.      LAD   Proximal left anterior descending: There is a 50 % stenosis. Mid left  anterior descending: There is an 80 % stenosis. Instant  wave-free ratio was performed with a calculated value of 0.77. Based  on the results, the lesion was judged to be significant.  First diagonal: There is a 70 % stenosis.      CX   Proximal circumflex: The segment is small. There is a 70 % stenosis.      RCA   Proximal right coronary artery: There is a 70 % stenosis. First right  posterolateral: There is a 50 % stenosis.    Left Heart Cath   Ejection fraction was visually estimated by LV Gram with a value of  55%. LHC performed: Aortic valve crossed and left  ventricular pressures were obtained.      History and Risk Factors:   Hypertension:                                 Yes   Dyslipidemia:                                 Yes   Tobacco Use:                                  Current - Every Day   Diabetes:                                     Yes     X-Ray:   Diagnostic Flouro time:       9.4 min.                     Exam record  DAP:  Interventional Flouro time:                                Exam fluoro  DAP:            4394.00 cGycm2  Total Flouro Time:            9.4 min.                     Exam total  DAP:             4394.00  cGycm2    Exam Start Time:   01:50 PM   Exam End Time:     02:19 PM   Exam Duration:     29 min     Contrast:   Description                      Dose          Unit        Serial No.   Omnipaque                            57.000     Medication:   Description                   Dose, Unit, Route, Time   midazolam  1 mG/mL            1.0, mg, IV, 13:50:31   Injectable (Versed)   fentaNYL  50 mCg/mL           25.0, mcg, IV, 13:50:37   Injectable (Sublimaze)   lidocaine PF 1% Injectable    5.0, ml, Subcut, 13:51:11   (Xylocaine-PF)   fentaNYL  50 mCg/mL           25.0, mcg, IV, 13:51:57     Patient: SARITHA BELTRAN              MRN: [id number]  Study Date: 2024   01:50 PM      Page 2 of 4          Injectable (Sublimaze)   verapamil 2.5 mG/mL        2.5, mg, IA, 13:53:25   Injectable   heparin (porcine) 1,000       3000.0, units, IA, 13:53:33   Unit(s)/mL Injectable   midazolam  1 mG/mL            1.0, mg, IV, 14:09:56   Injectable (Versed)   heparin (porcine) 1,000       9000.0, units, IV, 14:10:33   Unit(s)/mL Injectable     Inventory:   Description                 Quantity     [employer]#       Reference  No.    Serial No.     Lot No.       CDM  HEPARIN SALINE 500ML      1.000        [id number]   [id number]  [id number]    397  HEPARIN SALINE 500ML      1.000        [id number]   [id number]  [id number]    397   PAD DEFIB RADIOLUCENT     1.000        [id number]   [id number]  [id number]  NO REPL                                676   MEDRAD SYRINGE            1.000  [id number]    SYR  [id number]    604   CATH PACK                 1.000        [id number]   [id number]  [id number]    030   CANNULA CO2 DEL ADLT      1.000        [id number]   [id number]  [id number]  LF NASAL      606   TRANSDUCER KIT BASIC      1.000        [id number]   [id number]  [id number]    366   .035 X 150 GUIDERIGHT     1.000        [id number]   [id number]  [id number]    594   6FR PRELUDE KIT           1.000        [id number]  [id number]                               [id number]    904   IMPLUSE FL3.5             1.000        [id number]   [id number]  [id number]    977   IMPULSE FR4               1.000        [id number]   [id number]  [id number]    657   .035 X 260 STARTER  1.000        [id number]   [id number]  [id number]    368   OMNIWIRE 185CM            1.000        [id number]   [id number]    < end of copied text >      Gen: WN/WD NAD  Neuro: AAOx3, nonfocal  Pulm: CTA B/L  CV: RSR 50-90;  S1S2  Abd: Soft, NT, ND +BS; obese abdomen   Ext: No edema, + peripheral pulses; rt radial cath site cdi janine      Pt has AICD/PPM [ ] Yes  [x ] No             Brand Name:  Pre-op Beta Blocker ordered within 24 hrs of surgery (CABG ONLY)?  [ x] Yes  [ ] No  If not, Why?  Type & Cross  [x ] Yes  [ ] No  NPO after Midnight [x ] Yes  [ ] No  Pre-op ABX ordered, to be taped on chart:  [x ] Yes  [ ] No     Hibiclens/Peridex ordered [x ] Yes  [ ] No  Intraop on Hold: PRBCs, CXR, CHIOMA [x ]   Consent obtained  [x ] Yes  [ ] No      CABG in am  with Dr. Goncalves  npo after midnight  abx on call to OR- vanco on call to OR; no zinacef secondary to pcn allergy- anyphylaxis as per patient   d/c heparin gttp at 0800 as per DR. Goncalves

## 2024-05-28 NOTE — PROGRESS NOTE ADULT - PROBLEM SELECTOR PLAN 1
continue asa and statin  start heparin gttp  start bb- lop 25 mg po bid  ck echo pending   pft's   ck mrsa nares  ck [id number] 213  OR with Dr. Goncalves - OR date -Thursday 5/30

## 2024-05-28 NOTE — PROGRESS NOTE ADULT - ASSESSMENT
70 y/o female with PMH of HTN/HLD, DMT2, Asthma who presented to ED to be evaluated for chest pain associated with exertional shortness of breath. Admitted for cardiac workup.       Unstable Angina -CAD-Multivessel disease  - Patient with apparent worsening dyspnea, chest pain on exertion   - Trop negative x 2   - No acute changes on EKG compared to previous.  - Pt notes normal prior stress test with Dr. Buchanan 2022.   - CT Chest reviewed, w/ calcifications in coronaries   - s/p ASA & Plavix load, cath-Diffuse multivessel CAD with intermediate syntax score.   -5/25 P2y12 213,  -Transferred to NS for CTS evaluation   -Plan for CABG 5/30 Dr Goncalves  - did have some addl sxs 5/27 and was given nitrates with relief  -no ce run and would not send at this point  - if convincing sxs of angina recur, will need to consider iabp and more urgent surgery      - Does not appear volume overloaded on exam.   - ProBNP 74; no previous diagnosis of CHF, no prior TTE   - Non-urgent TTE ordered    - BP well controlled, monitor routine hemodynamics.  - Continue home amlodipine  - Hold home ARB     DM2 (diabetes mellitus, type 2).   - dm diet  -A1c- 8.7%  lantus 30 units qhs  ademelog 6 ac      History of Hashimoto thyroiditis.   -c/w synthroid   - Monitor and replete lytes, keep K>4, Mg>2.  - Other cardiovascular workup will depend on clinical course.  - All other workup per primary team.  - Will continue to follow.

## 2024-05-28 NOTE — PROVIDER CONTACT NOTE (OTHER) - ACTION/TREATMENT ORDERED:
IV benadryl ordered & given. pt placed on 2L NC for comfort. O2 sat 97% on RA. pt in NAD. callbell within reach. will continue to monitor.

## 2024-05-28 NOTE — PROGRESS NOTE ADULT - ASSESSMENT
68 y/o female with PMH of HTN/HLD, DMT2, Asthma. obesity, hoshimoto's, former smoker  who presented to ED to be evaluated for chest pain associated with exertional shortness of breath. this afternoon pt was climbing stairs to go from one building to the next and started to develop sharp neck pain radiating to her back and both the shoulders.  pt pain lasted for about 30 sec and dissipated upon rest. pt was prescribed advair which she stopped taking due to thrush.  admits of being compliance to all other  medication at home  in ED cardiology on call was consulted and pt received 324 mg aspirin with 300 mg plavix. recommendation was  not to start on heparin drip  pt is currently asymptomatic.   5/25 P2y12 213, Carotid doppler pending , echo pending. Endocrinology consulted for assistance in management  5/26 VSS no c/o CP A/C OR thurs 5/30 5/27 VSSmdenies CP /PALPS preop workup underway

## 2024-05-28 NOTE — PROGRESS NOTE ADULT - ASSESSMENT
68 y/o female with PMH of HTN/HLD, DMT2, Asthma. obesity, hoshimoto's, former smoker  who presented to ED to be evaluated for chest pain, now admitted to CTS for surgery eval.     Assessment  DMT2: 69y Female with DM T2 with hyperglycemia, A1C 8.7% , was on oral meds and insulin at home (basaglar) , now on basal bolus insulin with coverage, blood sugars are trending down.  CAD: on medications, stable, monitored. CTS eval   Hypothyroidism: On Synthroid 25 mcg po daily, compliant with Synthroid intake, asymptomatic. TFTs euthyroid.   HTN: on antihypertensive medications, monitored, asymptomatic.  Obesity: No strict exercise routines, not on any weight loss program, neither on low calorie diet.      Santana Montes MD  Cell: [phone]  Office: [phone]

## 2024-05-29 ENCOUNTER — RESULT REVIEW (OUTPATIENT)
Age: 69
End: 2024-05-29

## 2024-05-29 LAB
ALBUMIN SERPL ELPH-MCNC: 3.6 G/DL — SIGNIFICANT CHANGE UP (ref 3.3–5)
ALP SERPL-CCNC: 82 U/L — SIGNIFICANT CHANGE UP (ref 40–120)
ALT FLD-CCNC: 16 U/L — SIGNIFICANT CHANGE UP (ref 10–45)
ANION GAP SERPL CALC-SCNC: 14 MMOL/L — SIGNIFICANT CHANGE UP (ref 5–17)
ANISOCYTOSIS BLD QL: SLIGHT — SIGNIFICANT CHANGE UP
APTT BLD: 26.7 SEC — SIGNIFICANT CHANGE UP (ref 24.5–35.6)
AST SERPL-CCNC: 45 U/L — HIGH (ref 10–40)
BASE EXCESS BLDV CALC-SCNC: -0.8 MMOL/L — SIGNIFICANT CHANGE UP (ref -2–3)
BASE EXCESS BLDV CALC-SCNC: -1.7 MMOL/L — SIGNIFICANT CHANGE UP (ref -2–3)
BASE EXCESS BLDV CALC-SCNC: -2.8 MMOL/L — LOW (ref -2–3)
BASE EXCESS BLDV CALC-SCNC: -4.6 MMOL/L — LOW (ref -2–3)
BASOPHILS # BLD AUTO: 0 K/UL — SIGNIFICANT CHANGE UP (ref 0–0.2)
BASOPHILS NFR BLD AUTO: 0 % — SIGNIFICANT CHANGE UP (ref 0–2)
BILIRUB SERPL-MCNC: 0.6 MG/DL — SIGNIFICANT CHANGE UP (ref 0.2–1.2)
BUN SERPL-MCNC: 13 MG/DL — SIGNIFICANT CHANGE UP (ref 7–23)
CA-I SERPL-SCNC: 0.87 MMOL/L — LOW (ref 1.15–1.33)
CA-I SERPL-SCNC: 0.99 MMOL/L — LOW (ref 1.15–1.33)
CA-I SERPL-SCNC: 1.01 MMOL/L — LOW (ref 1.15–1.33)
CALCIUM SERPL-MCNC: 8.5 MG/DL — SIGNIFICANT CHANGE UP (ref 8.4–10.5)
CHLORIDE BLDV-SCNC: 103 MMOL/L — SIGNIFICANT CHANGE UP (ref 96–108)
CHLORIDE BLDV-SCNC: 105 MMOL/L — SIGNIFICANT CHANGE UP (ref 96–108)
CHLORIDE BLDV-SCNC: 106 MMOL/L — SIGNIFICANT CHANGE UP (ref 96–108)
CHLORIDE SERPL-SCNC: 106 MMOL/L — SIGNIFICANT CHANGE UP (ref 96–108)
CK MB BLD-MCNC: 5.8 % — HIGH (ref 0–3.5)
CK MB CFR SERPL CALC: 28.9 NG/ML — HIGH (ref 0–3.8)
CK SERPL-CCNC: 502 U/L — HIGH (ref 25–170)
CO2 BLDV-SCNC: 21 MMOL/L — LOW (ref 22–26)
CO2 BLDV-SCNC: 23 MMOL/L — SIGNIFICANT CHANGE UP (ref 22–26)
CO2 BLDV-SCNC: 26 MMOL/L — SIGNIFICANT CHANGE UP (ref 22–26)
CO2 BLDV-SCNC: 27 MMOL/L — HIGH (ref 22–26)
CO2 SERPL-SCNC: 21 MMOL/L — LOW (ref 22–31)
CREAT SERPL-MCNC: 0.81 MG/DL — SIGNIFICANT CHANGE UP (ref 0.5–1.3)
EGFR: 80 ML/MIN/1.73M2 — SIGNIFICANT CHANGE UP
EOSINOPHIL # BLD AUTO: 0.56 K/UL — HIGH (ref 0–0.5)
EOSINOPHIL NFR BLD AUTO: 1.8 % — SIGNIFICANT CHANGE UP (ref 0–6)
GAS PNL BLDA: SIGNIFICANT CHANGE UP
GAS PNL BLDV: 136 MMOL/L — SIGNIFICANT CHANGE UP (ref 136–145)
GAS PNL BLDV: 136 MMOL/L — SIGNIFICANT CHANGE UP (ref 136–145)
GAS PNL BLDV: 138 MMOL/L — SIGNIFICANT CHANGE UP (ref 136–145)
GAS PNL BLDV: SIGNIFICANT CHANGE UP
GLUCOSE BLDC GLUCOMTR-MCNC: 116 MG/DL — HIGH (ref 70–99)
GLUCOSE BLDC GLUCOMTR-MCNC: 156 MG/DL — HIGH (ref 70–99)
GLUCOSE BLDC GLUCOMTR-MCNC: 165 MG/DL — HIGH (ref 70–99)
GLUCOSE BLDC GLUCOMTR-MCNC: 172 MG/DL — HIGH (ref 70–99)
GLUCOSE BLDC GLUCOMTR-MCNC: 175 MG/DL — HIGH (ref 70–99)
GLUCOSE BLDV-MCNC: 117 MG/DL — HIGH (ref 70–99)
GLUCOSE BLDV-MCNC: 161 MG/DL — HIGH (ref 70–99)
GLUCOSE BLDV-MCNC: 180 MG/DL — HIGH (ref 70–99)
GLUCOSE SERPL-MCNC: 166 MG/DL — HIGH (ref 70–99)
HCO3 BLDV-SCNC: 20 MMOL/L — LOW (ref 22–29)
HCO3 BLDV-SCNC: 22 MMOL/L — SIGNIFICANT CHANGE UP (ref 22–29)
HCO3 BLDV-SCNC: 24 MMOL/L — SIGNIFICANT CHANGE UP (ref 22–29)
HCO3 BLDV-SCNC: 25 MMOL/L — SIGNIFICANT CHANGE UP (ref 22–29)
HCT VFR BLD CALC: 27.1 % — LOW (ref 34.5–45)
HCT VFR BLDA CALC: 20 % — CRITICAL LOW (ref 34.5–46.5)
HCT VFR BLDA CALC: 24 % — LOW (ref 34.5–46.5)
HCT VFR BLDA CALC: 25 % — LOW (ref 34.5–46.5)
HEPARINASE TEG R TIME: >17 MIN — HIGH (ref 4.3–8.3)
HGB BLD CALC-MCNC: 6.7 G/DL — CRITICAL LOW (ref 11.7–16.1)
HGB BLD CALC-MCNC: 8 G/DL — LOW (ref 11.7–16.1)
HGB BLD CALC-MCNC: 8.3 G/DL — LOW (ref 11.7–16.1)
HGB BLD-MCNC: 8.6 G/DL — LOW (ref 11.5–15.5)
HOROWITZ INDEX BLDV+IHG-RTO: 100 — SIGNIFICANT CHANGE UP
INR BLD: 1.28 RATIO — HIGH (ref 0.85–1.18)
LACTATE BLDV-MCNC: 1 MMOL/L — SIGNIFICANT CHANGE UP (ref 0.5–2)
LACTATE BLDV-MCNC: 1.4 MMOL/L — SIGNIFICANT CHANGE UP (ref 0.5–2)
LACTATE BLDV-MCNC: 1.4 MMOL/L — SIGNIFICANT CHANGE UP (ref 0.5–2)
LYMPHOCYTES # BLD AUTO: 1.38 K/UL — SIGNIFICANT CHANGE UP (ref 1–3.3)
LYMPHOCYTES # BLD AUTO: 4.4 % — LOW (ref 13–44)
MAGNESIUM SERPL-MCNC: 2.8 MG/DL — HIGH (ref 1.6–2.6)
MANUAL SMEAR VERIFICATION: SIGNIFICANT CHANGE UP
MCHC RBC-ENTMCNC: 27.2 PG — SIGNIFICANT CHANGE UP (ref 27–34)
MCHC RBC-ENTMCNC: 31.7 GM/DL — LOW (ref 32–36)
MCV RBC AUTO: 85.8 FL — SIGNIFICANT CHANGE UP (ref 80–100)
METAMYELOCYTES # FLD: 0.9 % — HIGH (ref 0–0)
MICROCYTES BLD QL: SLIGHT — SIGNIFICANT CHANGE UP
MONOCYTES # BLD AUTO: 1.09 K/UL — HIGH (ref 0–0.9)
MONOCYTES NFR BLD AUTO: 3.5 % — SIGNIFICANT CHANGE UP (ref 2–14)
NEUTROPHILS # BLD AUTO: 27.96 K/UL — HIGH (ref 1.8–7.4)
NEUTROPHILS NFR BLD AUTO: 81.4 % — HIGH (ref 43–77)
NEUTS BAND # BLD: 8 % — SIGNIFICANT CHANGE UP (ref 0–8)
PCO2 BLDV: 35 MMHG — LOW (ref 39–42)
PCO2 BLDV: 36 MMHG — LOW (ref 39–42)
PCO2 BLDV: 41 MMHG — SIGNIFICANT CHANGE UP (ref 39–42)
PCO2 BLDV: 50 MMHG — HIGH (ref 39–42)
PH BLDV: 7.31 — LOW (ref 7.32–7.43)
PH BLDV: 7.37 — SIGNIFICANT CHANGE UP (ref 7.32–7.43)
PH BLDV: 7.38 — SIGNIFICANT CHANGE UP (ref 7.32–7.43)
PH BLDV: 7.39 — SIGNIFICANT CHANGE UP (ref 7.32–7.43)
PHOSPHATE SERPL-MCNC: 3.3 MG/DL — SIGNIFICANT CHANGE UP (ref 2.5–4.5)
PLAT MORPH BLD: NORMAL — SIGNIFICANT CHANGE UP
PLATELET # BLD AUTO: 217 K/UL — SIGNIFICANT CHANGE UP (ref 150–400)
PO2 BLDV: 43 MMHG — SIGNIFICANT CHANGE UP (ref 25–45)
PO2 BLDV: 68 MMHG — HIGH (ref 25–45)
PO2 BLDV: 71 MMHG — HIGH (ref 25–45)
PO2 BLDV: 72 MMHG — HIGH (ref 25–45)
POTASSIUM BLDV-SCNC: 5 MMOL/L — SIGNIFICANT CHANGE UP (ref 3.5–5.1)
POTASSIUM BLDV-SCNC: 5.3 MMOL/L — HIGH (ref 3.5–5.1)
POTASSIUM BLDV-SCNC: 5.7 MMOL/L — HIGH (ref 3.5–5.1)
POTASSIUM SERPL-MCNC: 3.9 MMOL/L — SIGNIFICANT CHANGE UP (ref 3.5–5.3)
POTASSIUM SERPL-SCNC: 3.9 MMOL/L — SIGNIFICANT CHANGE UP (ref 3.5–5.3)
PROT SERPL-MCNC: 5.7 G/DL — LOW (ref 6–8.3)
PROTHROM AB SERPL-ACNC: 13.3 SEC — HIGH (ref 9.5–13)
RAPIDTEG MAXIMUM AMPLITUDE: 65.1 MM — SIGNIFICANT CHANGE UP (ref 52–70)
RBC # BLD: 3.16 M/UL — LOW (ref 3.8–5.2)
RBC # FLD: 14.7 % — HIGH (ref 10.3–14.5)
RBC BLD AUTO: SIGNIFICANT CHANGE UP
SAO2 % BLDV: 72.2 % — SIGNIFICANT CHANGE UP (ref 67–88)
SAO2 % BLDV: 93.6 % — HIGH (ref 67–88)
SAO2 % BLDV: 94.6 % — HIGH (ref 67–88)
SAO2 % BLDV: 96 % — HIGH (ref 67–88)
SODIUM SERPL-SCNC: 141 MMOL/L — SIGNIFICANT CHANGE UP (ref 135–145)
TEG FUNCTIONAL FIBRINOGEN: 24.8 MM — SIGNIFICANT CHANGE UP (ref 15–32)
TEG MAXIMUM AMPLITUDE: 67.3 MM — SIGNIFICANT CHANGE UP (ref 52–69)
TEG REACTION TIME: >17 MIN — HIGH (ref 4.6–9.1)
TROPONIN T, HIGH SENSITIVITY RESULT: 310 NG/L — HIGH (ref 0–51)
WBC # BLD: 31.27 K/UL — HIGH (ref 3.8–10.5)
WBC # FLD AUTO: 31.27 K/UL — HIGH (ref 3.8–10.5)

## 2024-05-29 PROCEDURE — 71045 X-RAY EXAM CHEST 1 VIEW: CPT | Mod: 26

## 2024-05-29 PROCEDURE — 99233 SBSQ HOSP IP/OBS HIGH 50: CPT

## 2024-05-29 PROCEDURE — 33268 EXCL LAA OPN OTH PX ANY METH: CPT | Mod: AS

## 2024-05-29 PROCEDURE — 33518 CABG ARTERY-VEIN TWO: CPT

## 2024-05-29 PROCEDURE — 33508 ENDOSCOPIC VEIN HARVEST: CPT | Mod: 59

## 2024-05-29 PROCEDURE — 33533 CABG ARTERIAL SINGLE: CPT | Mod: AS

## 2024-05-29 PROCEDURE — 33533 CABG ARTERIAL SINGLE: CPT

## 2024-05-29 PROCEDURE — 99291 CRITICAL CARE FIRST HOUR: CPT

## 2024-05-29 PROCEDURE — 33268 EXCL LAA OPN OTH PX ANY METH: CPT

## 2024-05-29 PROCEDURE — 33518 CABG ARTERY-VEIN TWO: CPT | Mod: AS

## 2024-05-29 DEVICE — CANNULA VENOUS 2 STAGE THIN FLEX 36/46FR X 1/2" WITH RETURN DIAL: Type: IMPLANTABLE DEVICE | Status: FUNCTIONAL

## 2024-05-29 DEVICE — ATRICLIP LAA EXCLUSION DEVICE 45MM FLEX-V: Type: IMPLANTABLE DEVICE | Status: FUNCTIONAL

## 2024-05-29 DEVICE — KIT CVC 2LUM MAC 9FR CHG: Type: IMPLANTABLE DEVICE | Status: FUNCTIONAL

## 2024-05-29 DEVICE — PACING WIRE WHITE M-25 WINGED WIRE 37MM X 89MM: Type: IMPLANTABLE DEVICE | Status: FUNCTIONAL

## 2024-05-29 DEVICE — LIGATING CLIPS WECK HORIZON MEDIUM (BLUE) 24: Type: IMPLANTABLE DEVICE | Status: FUNCTIONAL

## 2024-05-29 DEVICE — PACING WIRE WHITE M-22 LOOP 89MM: Type: IMPLANTABLE DEVICE | Status: FUNCTIONAL

## 2024-05-29 DEVICE — CHEST DRAIN THORACIC ARGYLE PVC 28FR STRAIGHT: Type: IMPLANTABLE DEVICE | Status: FUNCTIONAL

## 2024-05-29 DEVICE — PACING WIRE ORANGE M-25 WINGED WIRE 37MM X 89MM: Type: IMPLANTABLE DEVICE | Status: FUNCTIONAL

## 2024-05-29 DEVICE — CHEST DRAIN THORACIC ARGYLE PVC 28FR RIGHT ANGLE: Type: IMPLANTABLE DEVICE | Status: FUNCTIONAL

## 2024-05-29 DEVICE — CANNULA ARTERIAL OPTISITE 20FR X 3/8" VENTED: Type: IMPLANTABLE DEVICE | Status: FUNCTIONAL

## 2024-05-29 DEVICE — CANNULA ANTEGRADE W/VENT 12GA: Type: IMPLANTABLE DEVICE | Status: FUNCTIONAL

## 2024-05-29 DEVICE — KIT A-LINE 1LUM 20G X 12CM SAFE KIT: Type: IMPLANTABLE DEVICE | Status: FUNCTIONAL

## 2024-05-29 DEVICE — CANNULA ARTERIAL OPTISITE 22FR X 3/8" VENTED: Type: IMPLANTABLE DEVICE | Status: FUNCTIONAL

## 2024-05-29 DEVICE — LIGATING CLIPS WECK HORIZON SMALL-WIDE (RED) 24: Type: IMPLANTABLE DEVICE | Status: FUNCTIONAL

## 2024-05-29 RX ORDER — DEXTROSE 50 % IN WATER 50 %
50 SYRINGE (ML) INTRAVENOUS
Refills: 0 | Status: DISCONTINUED | OUTPATIENT
Start: 2024-05-29 | End: 2024-06-05

## 2024-05-29 RX ORDER — NOREPINEPHRINE BITARTRATE/D5W 8 MG/250ML
0.05 PLASTIC BAG, INJECTION (ML) INTRAVENOUS
Qty: 8 | Refills: 0 | Status: DISCONTINUED | OUTPATIENT
Start: 2024-05-29 | End: 2024-05-31

## 2024-05-29 RX ORDER — ACETAMINOPHEN 500 MG
1000 TABLET ORAL ONCE
Refills: 0 | Status: COMPLETED | OUTPATIENT
Start: 2024-05-29 | End: 2024-05-29

## 2024-05-29 RX ORDER — CALCIUM GLUCONATE 100 MG/ML
2 VIAL (ML) INTRAVENOUS ONCE
Refills: 0 | Status: COMPLETED | OUTPATIENT
Start: 2024-05-29 | End: 2024-05-29

## 2024-05-29 RX ORDER — POLYETHYLENE GLYCOL 3350 17 G/17G
17 POWDER, FOR SOLUTION ORAL DAILY
Refills: 0 | Status: DISCONTINUED | OUTPATIENT
Start: 2024-05-30 | End: 2024-06-05

## 2024-05-29 RX ORDER — SODIUM CHLORIDE 9 MG/ML
1000 INJECTION INTRAMUSCULAR; INTRAVENOUS; SUBCUTANEOUS
Refills: 0 | Status: DISCONTINUED | OUTPATIENT
Start: 2024-05-29 | End: 2024-06-01

## 2024-05-29 RX ORDER — POTASSIUM CHLORIDE 20 MEQ
10 PACKET (EA) ORAL
Refills: 0 | Status: DISCONTINUED | OUTPATIENT
Start: 2024-05-29 | End: 2024-05-31

## 2024-05-29 RX ORDER — PANTOPRAZOLE SODIUM 20 MG/1
40 TABLET, DELAYED RELEASE ORAL DAILY
Refills: 0 | Status: DISCONTINUED | OUTPATIENT
Start: 2024-05-29 | End: 2024-05-30

## 2024-05-29 RX ORDER — HYDROMORPHONE HYDROCHLORIDE 2 MG/ML
0.5 INJECTION INTRAMUSCULAR; INTRAVENOUS; SUBCUTANEOUS EVERY 6 HOURS
Refills: 0 | Status: DISCONTINUED | OUTPATIENT
Start: 2024-05-29 | End: 2024-05-30

## 2024-05-29 RX ORDER — CHLORHEXIDINE GLUCONATE 213 G/1000ML
15 SOLUTION TOPICAL EVERY 12 HOURS
Refills: 0 | Status: DISCONTINUED | OUTPATIENT
Start: 2024-05-29 | End: 2024-05-30

## 2024-05-29 RX ORDER — ASCORBIC ACID 60 MG
500 TABLET,CHEWABLE ORAL
Refills: 0 | Status: COMPLETED | OUTPATIENT
Start: 2024-05-29 | End: 2024-06-03

## 2024-05-29 RX ORDER — OXYCODONE HYDROCHLORIDE 5 MG/1
10 TABLET ORAL EVERY 4 HOURS
Refills: 0 | Status: DISCONTINUED | OUTPATIENT
Start: 2024-05-29 | End: 2024-06-05

## 2024-05-29 RX ORDER — CHLORHEXIDINE GLUCONATE 213 G/1000ML
15 SOLUTION TOPICAL EVERY 12 HOURS
Refills: 0 | Status: DISCONTINUED | OUTPATIENT
Start: 2024-05-29 | End: 2024-05-31

## 2024-05-29 RX ORDER — CEFUROXIME AXETIL 250 MG
1500 TABLET ORAL EVERY 8 HOURS
Refills: 0 | Status: COMPLETED | OUTPATIENT
Start: 2024-05-29 | End: 2024-05-31

## 2024-05-29 RX ORDER — ALBUMIN HUMAN 25 %
250 VIAL (ML) INTRAVENOUS ONCE
Refills: 0 | Status: COMPLETED | OUTPATIENT
Start: 2024-05-29 | End: 2024-05-29

## 2024-05-29 RX ORDER — AMIODARONE HYDROCHLORIDE 400 MG/1
400 TABLET ORAL
Refills: 0 | Status: COMPLETED | OUTPATIENT
Start: 2024-05-29 | End: 2024-06-01

## 2024-05-29 RX ORDER — OXYCODONE HYDROCHLORIDE 5 MG/1
5 TABLET ORAL EVERY 4 HOURS
Refills: 0 | Status: DISCONTINUED | OUTPATIENT
Start: 2024-05-29 | End: 2024-06-05

## 2024-05-29 RX ORDER — INSULIN HUMAN 100 [IU]/ML
3 INJECTION, SOLUTION SUBCUTANEOUS
Qty: 50 | Refills: 0 | Status: DISCONTINUED | OUTPATIENT
Start: 2024-05-29 | End: 2024-05-29

## 2024-05-29 RX ORDER — ACETAMINOPHEN 500 MG
650 TABLET ORAL EVERY 6 HOURS
Refills: 0 | Status: DISCONTINUED | OUTPATIENT
Start: 2024-06-01 | End: 2024-06-05

## 2024-05-29 RX ORDER — ASPIRIN/CALCIUM CARB/MAGNESIUM 324 MG
300 TABLET ORAL ONCE
Refills: 0 | Status: DISCONTINUED | OUTPATIENT
Start: 2024-05-29 | End: 2024-05-31

## 2024-05-29 RX ORDER — INSULIN HUMAN 100 [IU]/ML
3 INJECTION, SOLUTION SUBCUTANEOUS
Qty: 100 | Refills: 0 | Status: DISCONTINUED | OUTPATIENT
Start: 2024-05-29 | End: 2024-06-02

## 2024-05-29 RX ORDER — DEXTROSE 50 % IN WATER 50 %
25 SYRINGE (ML) INTRAVENOUS
Refills: 0 | Status: DISCONTINUED | OUTPATIENT
Start: 2024-05-29 | End: 2024-05-31

## 2024-05-29 RX ORDER — SENNA PLUS 8.6 MG/1
2 TABLET ORAL AT BEDTIME
Refills: 0 | Status: DISCONTINUED | OUTPATIENT
Start: 2024-05-30 | End: 2024-06-05

## 2024-05-29 RX ORDER — DEXMEDETOMIDINE HYDROCHLORIDE IN 0.9% SODIUM CHLORIDE 4 UG/ML
0.7 INJECTION INTRAVENOUS
Qty: 200 | Refills: 0 | Status: DISCONTINUED | OUTPATIENT
Start: 2024-05-29 | End: 2024-05-31

## 2024-05-29 RX ORDER — SUGAMMADEX 100 MG/ML
200 INJECTION, SOLUTION INTRAVENOUS ONCE
Refills: 0 | Status: COMPLETED | OUTPATIENT
Start: 2024-05-29 | End: 2024-05-29

## 2024-05-29 RX ORDER — ACETAMINOPHEN 500 MG
650 TABLET ORAL EVERY 6 HOURS
Refills: 0 | Status: DISCONTINUED | OUTPATIENT
Start: 2024-05-29 | End: 2024-05-31

## 2024-05-29 RX ORDER — ASPIRIN/CALCIUM CARB/MAGNESIUM 324 MG
81 TABLET ORAL DAILY
Refills: 0 | Status: DISCONTINUED | OUTPATIENT
Start: 2024-05-29 | End: 2024-06-05

## 2024-05-29 RX ORDER — GABAPENTIN 400 MG/1
100 CAPSULE ORAL EVERY 8 HOURS
Refills: 0 | Status: COMPLETED | OUTPATIENT
Start: 2024-05-29 | End: 2024-06-03

## 2024-05-29 RX ORDER — MEPERIDINE HYDROCHLORIDE 50 MG/ML
25 INJECTION INTRAMUSCULAR; INTRAVENOUS; SUBCUTANEOUS ONCE
Refills: 0 | Status: DISCONTINUED | OUTPATIENT
Start: 2024-05-29 | End: 2024-05-31

## 2024-05-29 RX ORDER — CHLORHEXIDINE GLUCONATE 213 G/1000ML
1 SOLUTION TOPICAL DAILY
Refills: 0 | Status: DISCONTINUED | OUTPATIENT
Start: 2024-05-29 | End: 2024-06-05

## 2024-05-29 RX ORDER — DEXTROSE 50 % IN WATER 50 %
50 SYRINGE (ML) INTRAVENOUS
Refills: 0 | Status: DISCONTINUED | OUTPATIENT
Start: 2024-05-29 | End: 2024-05-31

## 2024-05-29 RX ADMIN — SUGAMMADEX 200 MILLIGRAM(S): 100 INJECTION, SOLUTION INTRAVENOUS at 20:30

## 2024-05-29 RX ADMIN — SODIUM CHLORIDE 3 MILLILITER(S): 9 INJECTION INTRAMUSCULAR; INTRAVENOUS; SUBCUTANEOUS at 05:04

## 2024-05-29 RX ADMIN — Medication 400 MILLIGRAM(S): at 23:11

## 2024-05-29 RX ADMIN — CHLORHEXIDINE GLUCONATE 1 APPLICATION(S): 213 SOLUTION TOPICAL at 11:17

## 2024-05-29 RX ADMIN — GABAPENTIN 300 MILLIGRAM(S): 400 CAPSULE ORAL at 05:11

## 2024-05-29 RX ADMIN — Medication 200 GRAM(S): at 22:20

## 2024-05-29 RX ADMIN — Medication 100 MILLIGRAM(S): at 22:30

## 2024-05-29 RX ADMIN — Medication 1 SPRAY(S): at 05:10

## 2024-05-29 RX ADMIN — Medication 1: at 08:27

## 2024-05-29 RX ADMIN — Medication 25 MICROGRAM(S): at 05:11

## 2024-05-29 RX ADMIN — Medication 200 GRAM(S): at 21:53

## 2024-05-29 RX ADMIN — Medication 1000 MILLIGRAM(S): at 23:30

## 2024-05-29 RX ADMIN — AMLODIPINE BESYLATE 10 MILLIGRAM(S): 2.5 TABLET ORAL at 05:11

## 2024-05-29 RX ADMIN — BUDESONIDE AND FORMOTEROL FUMARATE DIHYDRATE 1 PUFF(S): 160; 4.5 AEROSOL RESPIRATORY (INHALATION) at 05:10

## 2024-05-29 RX ADMIN — Medication 25 MILLIGRAM(S): at 05:10

## 2024-05-29 NOTE — PROGRESS NOTE ADULT - SUBJECTIVE AND OBJECTIVE BOX
Chief complaint  Patient is a 69y old  Female who presents with a chief complaint of chest pain/shortness of breath (29 May 2024 09:41)         Labs and Fingersticks  CAPILLARY BLOOD GLUCOSE      POCT Blood Glucose.: 116 mg/dL (29 May 2024 12:19)  POCT Blood Glucose.: 172 mg/dL (29 May 2024 07:47)  POCT Blood Glucose.: 151 mg/dL (28 May 2024 21:15)  POCT Blood Glucose.: 114 mg/dL (28 May 2024 16:40)      Anion Gap: 14 (05-28 @ 06:05)      Calcium: 10.2 (05-28 @ 06:05)          05-28    137  |  102  |  21  ----------------------------<  172<H>  4.0   |  21<L>  |  0.93    Ca    10.2      28 May 2024 06:05                          12.2   12.70 )-----------( 249      ( 28 May 2024 06:05 )             39.0     Medications  MEDICATIONS  (STANDING):  amLODIPine   Tablet 10 milliGRAM(s) Oral daily  aspirin enteric coated 81 milliGRAM(s) Oral daily  atorvastatin 80 milliGRAM(s) Oral at bedtime  budesonide  80 MICROgram(s)/formoterol 4.5 MICROgram(s) Inhaler 1 Puff(s) Inhalation every 12 hours  fluticasone propionate 50 MICROgram(s)/spray Nasal Spray 1 Spray(s) Both Nostrils two times a day  heparin  Infusion 1000 Unit(s)/Hr (10 mL/Hr) IV Continuous <Continuous>  insulin glargine Injectable (LANTUS) 34 Unit(s) SubCutaneous at bedtime  insulin lispro (ADMELOG) corrective regimen sliding scale   SubCutaneous three times a day before meals  insulin lispro Injectable (ADMELOG) 10 Unit(s) SubCutaneous three times a day before meals  levothyroxine 25 MICROGram(s) Oral daily  metoprolol tartrate 25 milliGRAM(s) Oral two times a day  sodium chloride 0.9% lock flush 3 milliLiter(s) IV Push every 8 hours  vancomycin  IVPB 1000 milliGRAM(s) IV Intermittent once      Physical Exam  General: Patient comfortable in bed   Vital Signs Last 12 Hrs  T(F): 98.1 (05-29-24 @ 12:51), Max: 98.1 (05-29-24 @ 12:51)  HR: 63 (05-29-24 @ 12:51) (63 - 72)  BP: 127/74 (05-29-24 @ 12:51) (127/74 - 138/71)  BP(mean): 77 (05-29-24 @ 06:40) (77 - 77)  RR: 17 (05-29-24 @ 12:51) (17 - 18)  SpO2: 97% (05-29-24 @ 12:51) (97% - 100%)    CVS: S1S2   Respiratory: No wheezing, no crepitations  GI: Abdomen soft, bowel sounds positive  Musculoskeletal:  moves all extremities  : Voiding        Chief complaint  Patient is a 69y old  Female who presents with a chief complaint of chest pain/shortness of breath (29 May 2024 09:41)         Labs and Fingersticks  CAPILLARY BLOOD GLUCOSE      POCT Blood Glucose.: 116 mg/dL (29 May 2024 12:19)  POCT Blood Glucose.: 172 mg/dL (29 May 2024 07:47)  POCT Blood Glucose.: 151 mg/dL (28 May 2024 21:15)  POCT Blood Glucose.: 114 mg/dL (28 May 2024 16:40)      Anion Gap: 14 (05-28 @ 06:05)      Calcium: 10.2 (05-28 @ 06:05)          05-28    137  |  102  |  21  ----------------------------<  172<H>  4.0   |  21<L>  |  0.93    Ca    10.2      28 May 2024 06:05                          12.2   12.70 )-----------( 249      ( 28 May 2024 06:05 )             39.0     Medications  MEDICATIONS  (STANDING):  amLODIPine   Tablet 10 milliGRAM(s) Oral daily  aspirin enteric coated 81 milliGRAM(s) Oral daily  atorvastatin 80 milliGRAM(s) Oral at bedtime  budesonide  80 MICROgram(s)/formoterol 4.5 MICROgram(s) Inhaler 1 Puff(s) Inhalation every 12 hours  fluticasone propionate 50 MICROgram(s)/spray Nasal Spray 1 Spray(s) Both Nostrils two times a day  heparin  Infusion 1000 Unit(s)/Hr (10 mL/Hr) IV Continuous <Continuous>  insulin glargine Injectable (LANTUS) 34 Unit(s) SubCutaneous at bedtime  insulin lispro (ADMELOG) corrective regimen sliding scale   SubCutaneous three times a day before meals  insulin lispro Injectable (ADMELOG) 10 Unit(s) SubCutaneous three times a day before meals  levothyroxine 25 MICROGram(s) Oral daily  metoprolol tartrate 25 milliGRAM(s) Oral two times a day  sodium chloride 0.9% lock flush 3 milliLiter(s) IV Push every 8 hours  vancomycin  IVPB 1000 milliGRAM(s) IV Intermittent once      Physical Exam  General: Patient comfortable in bed   Vital Signs Last 12 Hrs  T(F): 98.1 (05-29-24 @ 12:51), Max: 98.1 (05-29-24 @ 12:51)  HR: 63 (05-29-24 @ 12:51) (63 - 72)  BP: 127/74 (05-29-24 @ 12:51) (127/74 - 138/71)  BP(mean): 77 (05-29-24 @ 06:40) (77 - 77)  RR: 17 (05-29-24 @ 12:51) (17 - 18)  SpO2: 97% (05-29-24 @ 12:51) (97% - 100%)    CVS: S1S2   Respiratory: No wheezing, no crepitations  GI: Abdomen soft, bowel sounds positive  Musculoskeletal:  moves all extremities  : Voiding

## 2024-05-29 NOTE — PROGRESS NOTE ADULT - SUBJECTIVE AND OBJECTIVE BOX
[facility] - Toshia Tony, Nehemias Flores, Juan Stephens  Office Number:  [phone]    Patient resting comfortably in bed in NAD.  Laying flat with no respiratory distress.    Feeling well this morning  For the OR today  No chest pain overnight or this morning    ROS: negative unless otherwise mentioned.    Telemetry: SR, SB    MEDICATIONS  (STANDING):  amLODIPine   Tablet 10 milliGRAM(s) Oral daily  aspirin enteric coated 81 milliGRAM(s) Oral daily  atorvastatin 80 milliGRAM(s) Oral at bedtime  budesonide  80 MICROgram(s)/formoterol 4.5 MICROgram(s) Inhaler 1 Puff(s) Inhalation every 12 hours  chlorhexidine 4% Liquid 1 Application(s) Topical daily  fluticasone propionate 50 MICROgram(s)/spray Nasal Spray 1 Spray(s) Both Nostrils two times a day  heparin  Infusion 1000 Unit(s)/Hr (10 mL/Hr) IV Continuous <Continuous>  insulin glargine Injectable (LANTUS) 34 Unit(s) SubCutaneous at bedtime  insulin lispro (ADMELOG) corrective regimen sliding scale   SubCutaneous three times a day before meals  insulin lispro Injectable (ADMELOG) 10 Unit(s) SubCutaneous three times a day before meals  levothyroxine 25 MICROGram(s) Oral daily  metoprolol tartrate 25 milliGRAM(s) Oral two times a day  sodium chloride 0.9% lock flush 3 milliLiter(s) IV Push every 8 hours  vancomycin  IVPB 1000 milliGRAM(s) IV Intermittent once    MEDICATIONS  (PRN):  acetaminophen     Tablet .. 650 milliGRAM(s) Oral every 6 hours PRN Temp greater or equal to 38.5C (101.3F), Mild Pain (1 - 3)  albuterol    90 MICROgram(s) HFA Inhaler 2 Puff(s) Inhalation every 6 hours PRN Wheezing  nitroglycerin     SubLingual 0.4 milliGRAM(s) SubLingual every 5 minutes PRN Chest Pain      Allergies    penicillin (Hives)    Intolerances        Vital Signs Last 24 Hrs  T(C): 36.5 (29 May 2024 06:40), Max: 36.6 (28 May 2024 19:50)  T(F): 97.7 (29 May 2024 05:47), Max: 97.8 (28 May 2024 19:50)  HR: 72 (29 May 2024 06:40) (62 - 72)  BP: 138/71 (29 May 2024 06:40) (113/70 - 138/71)  BP(mean): 77 (29 May 2024 06:40) (77 - 77)  RR: 18 (29 May 2024 06:40) (18 - 18)  SpO2: 100% (29 May 2024 06:40) (94% - 100%)    Parameters below as of 29 May 2024 05:02  Patient On (Oxygen Delivery Method): room air        I&O's Summary    28 May 2024 07:01  -  29 May 2024 07:00  --------------------------------------------------------  IN: 840 mL / OUT: 2200 mL / NET: -1360 mL    29 May 2024 07:01  -  29 May 2024 09:41  --------------------------------------------------------  IN: 0 mL / OUT: 0 mL / NET: 0 mL        ON EXAM:    General: NAD, awake and alert, oriented x 3  HEENT: Mucous membranes are moist, anicteric  Lungs: Non-labored, breath sounds are clear bilaterally, No wheezing, rales or rhonchi  Cardiovascular: Regular, S1 and S2, no murmurs, rubs, or gallops  Gastrointestinal: Bowel Sounds present, soft, nontender.   Lymph: No peripheral edema. No lymphadenopathy.  Skin: No rashes or ulcers  Psych:  Mood & affect appropriate    LABS: All Labs Reviewed:                        12.2   12.70 )-----------( 249      ( 28 May 2024 06:05 )             39.0                         11.6   11.23 )-----------( 233      ( 27 May 2024 05:40 )             37.5     28 May 2024 06:05    137    |  102    |  21     ----------------------------<  172    4.0     |  21     |  0.93   27 May 2024 05:40    140    |  105    |  20     ----------------------------<  159    3.9     |  23     |  1.00     Ca    10.2       28 May 2024 06:05  Ca    9.8        27 May 2024 05:40      PTT - ( 28 May 2024 06:05 )  PTT:69.9 sec      Blood Culture:     TTE 5/28/24:  CONCLUSIONS:      1. Left ventricular cavity is normal in size. Left ventricular wall thickness is normal. Left ventricular systolic function is normal with an ejection fraction of 69 % by Rhodes's method of disks. There are no regional wall motion abnormalities seen.   2. Basal and mid anterior wall is abnormal.   3. There is mild (grade 1) left ventricular diastolic dysfunction, with normal filling pressure.   4. Normal right ventricular cavity size, with normal wall thickness, and normal systolic function.   5. Normal left and right atrial size.   6. No significant valvular disease.   7. No pericardial effusion seen.   8. No prior echocardiogram is available for comparison.    [facility] 5/24/24:    Diagnostic Conclusions:     Diffuse multivessel CAD with intermediate syntax score.    Recommendations:     CTS eval for CABG

## 2024-05-29 NOTE — AIRWAY REMOVAL NOTE  ADULT & PEDS - ARTIFICAL AIRWAY REMOVAL COMMENTS
Written order for extubation verified. The patient was identified by full name and birth date compared to the identification band. Present during the procedure was RT Alli Henry and ISRA Cao

## 2024-05-29 NOTE — BRIEF OPERATIVE NOTE - COMMENTS
*EBL not applicable due to use of cell saver and cardiotomy suction on CPB  No unexpected foreign bodies were apparent on preliminary review of post-op x-ray. Reviewed by Dr. Goncalves

## 2024-05-29 NOTE — BRIEF OPERATIVE NOTE - NSICDXBRIEFPROCEDURE_GEN_ALL_CORE_FT
PROCEDURES:  CABG, with CHIOMA 29-May-2024 19:45:09 CABG x3  LIMA to LAD  SVG to RPDA, SVG to Nikolas Collado  Clipping, left atrial appendage 29-May-2024 19:45:38 45mm Nikolas Gabriel

## 2024-05-29 NOTE — PRE-OP CHECKLIST - ALLERGIES REVIEWED
[facility] Instructions    MEDICATIONS     Medication Note  Continue present medications as prescribed by the [facility]aling Center or other physicians you see. To avoid any problems keep the [facility]aling [facility] informed each visit of any medications changes that occur.     WOUND CARE     Clean Wound with: Saline Solution   Treatment 1   Location: left foot/leg  Dressing: Apply yessi, adaptic, with dry dressing & ace.   Aquacel to medial & lateral wounds.  Use amlactin on dried skin areas   Dressing Care Frequency: r daily  Care Provider: family       Basic Principles      • Wash your hands thoroughly with soap and water and after each dressing change. If someone other than the patient changes the dressing, it’s best to wear disposable gloves.   • Do not get the wound or dressing wet.   • To shower: remove the dressing, shower with soap and water (including washing the wound - do not use a washcloth), air dry, then redress the wound.  • Do not take a tub bath  • Keep all your dressings in a clean, covered container at home to avoid dust and contamination.  • Discard used dressings in a plastic bag or covered trash container.  • Check your wound and the surrounding skin at each dressing change for redness, warmth, swelling, increased pain, foul odor, fever, pus or abnormal drainage or discharge.  • Notify Wound Healing Center if any of these changes occur - Dept: [phone].        Nutrition  • Eat a well, balanced diet with adequate protein to support wound healing.   • Take a multivitamin every day. Adequate nutrition supports healing and new tissue growth.  • All diabetic patients should strive to keep blood sugars within a normal, practical range.   • Elevated blood sugars can delay your wound healing.        ACTIVITY     Elevate legs above level of heart   Normal activity then rest and elevation  May excercise  May walk    MOBILITY     boot    [facility] Healing Center Instructions    MEDICATIONS 
    Medication Note  Continue present medications as prescribed by the [facility]ound [facility]aling Center or other physicians you see. To avoid any problems keep the [facility]aling [facility] informed each visit of any medications changes that occur.     WOUND CARE     Clean Wound with: Saline Solution   Treatment 1   Location: left foot/leg  Dressing: Apply eyssi  with dry dressing & ace to anterior foot.  Apply aquacel pressure dressings to medial & lateral wounds (yessi with lateral wound ) . ABD or 4x4 to lateral ankle/ foot . Use amlactin on dried skin areas   Dressing Care Frequency: every other day  Care Provider: family       Basic Principles      • Wash your hands thoroughly with soap and water and after each dressing change. If someone other than the patient changes the dressing, it’s best to wear disposable gloves.   • Do not get the wound or dressing wet.   • To shower: remove the dressing, shower with soap and water (including washing the wound - do not use a washcloth), air dry, then redress the wound.  • Do not take a tub bath  • Keep all your dressings in a clean, covered container at home to avoid dust and contamination.  • Discard used dressings in a plastic bag or covered trash container.  • Check your wound and the surrounding skin at each dressing change for redness, warmth, swelling, increased pain, foul odor, fever, pus or abnormal drainage or discharge.  • Notify Wound Healing Center if any of these changes occur - Dept: [phone].        Nutrition  • Eat a well, balanced diet with adequate protein to support wound healing.   • Take a multivitamin every day. Adequate nutrition supports healing and new tissue growth.  • All diabetic patients should strive to keep blood sugars within a normal, practical range.   • Elevated blood sugars can delay your wound healing.        ACTIVITY     Elevate legs above level of heart   Normal activity then rest and elevation  May excercise  May walk    MOBILITY 
    boot      
done
done

## 2024-05-29 NOTE — PROGRESS NOTE ADULT - ASSESSMENT
68 y/o female with PMH of HTN/HLD, DMT2, Asthma. obesity, hoshimoto's, former smoker  who presented to ED to be evaluated for chest pain, now admitted to CTS for surgery eval.     Assessment  DMT2: 69y Female with DM T2 with hyperglycemia, A1C 8.7% , was on oral meds and insulin at home (basaglar) , now on basal bolus insulin with coverage, NPO for surgery today.   CAD: on medications, stable, monitored. CTS eval   Hypothyroidism: On Synthroid 25 mcg po daily, compliant with Synthroid intake, asymptomatic. TFTs euthyroid.   HTN: on antihypertensive medications, monitored, asymptomatic.  Obesity: No strict exercise routines, not on any weight loss program, neither on low calorie diet.      Discussed plan and management with Dr Jyoti Sweeney NP - TEAMS  Santana Montes MD  Cell: [phone]  Office: [phone]            70 y/o female with PMH of HTN/HLD, DMT2, Asthma. obesity, hoshimoto's, former smoker  who presented to ED to be evaluated for chest pain, now admitted to CTS for surgery eval.     Assessment  DMT2: 69y Female with DM T2 with hyperglycemia, A1C 8.7% , was on oral meds and insulin at home  (basaglar) , now on basal bolus insulin with coverage, NPO for surgery today.   CAD: on medications, stable, monitored. CTS eval   Hypothyroidism: On Synthroid 25 mcg po daily, compliant with Synthroid intake, asymptomatic. TFTs euthyroid.   HTN: on antihypertensive medications, monitored, asymptomatic.  Obesity: No strict exercise routines, not on any weight loss program, neither on low calorie diet.      Discussed plan and management with Dr Jyoti Sweeney NP - TEAMS  Santana Montes MD  Cell: [phone]  Office: [phone]

## 2024-05-29 NOTE — PROGRESS NOTE ADULT - PROBLEM SELECTOR PLAN 1
Will continue current insulin regimen for now.   IV insulin postop   Will continue monitoring FS, log, and glucose trends, will Follow up.  Patient counseled for compliance with consistent low carb diet and exercise as tolerated outpatient.

## 2024-05-29 NOTE — PRE-ANESTHESIA EVALUATION ADULT - NSANTHTOTALSCORECAL_ENT_A_CORE
----- Message from Magdalena Royal sent at 3/22/2024 11:40 AM CDT -----  Regarding: Reschedule appt  Contact: Ryan at [phone]  Pt's wife Ryan is calling regarding pt appt on 04/23/24. Ryan stated appt needs to be rescheduled to following week. Pt will be getting MRI's at [facility] on 04/23/24. Please call Ryan at [phone]     3

## 2024-05-29 NOTE — PROGRESS NOTE ADULT - SUBJECTIVE AND OBJECTIVE BOX
Patient seen and examined at the bedside.    Remained critically ill on continuous ICU monitoring.    OBJECTIVE:  Vital Signs Last 24 Hrs  T(C): 36.4 (29 May 2024 20:00), Max: 36.7 (29 May 2024 12:51)  T(F): 97.5 (29 May 2024 20:00), Max: 98.1 (29 May 2024 12:51)  HR: 92 (29 May 2024 20:30) (63 - 92)  BP: 127/74 (29 May 2024 12:51) (127/74 - 138/71)  BP(mean): 77 (29 May 2024 06:40) (77 - 77)  RR: 12 (29 May 2024 20:30) (12 - 20)  SpO2: 100% (29 May 2024 20:30) (97% - 100%)    Parameters below as of 29 May 2024 20:00  Patient On (Oxygen Delivery Method): ventilator    O2 Concentration (%): 100      Physical Exam:  General: intubated multiple lines gtt & tubes   Neurology: sedated   Eyes: bilateral pupils equal and reactive   ENT/Neck: +ETT midline, Neck supple, trachea midline, No JVD   Respiratory: Rales noted bilaterally   CV: RRR, S1S2, no murmurs        [x] Sternal dressing, [x] Mediastinal CT x2, [x] L Pleural CT x1        [x] Paced rhythm, [x] Temporary pacing AAI 92  Abdominal: Soft, NT, ND +BS,   Extremities: 1-2+ pedal edema noted, + peripheral pulses   Skin: No Rashes, Hematoma, Ecchymosis                           Assessment:  68 y/o female with PMH of HTN/HLD, DMT2, Asthma. obesity, hoshimoto's, former smoker who presented to ED to be evaluated for chest pain associated with exertional shortness of breath    CAD s/p CABG, with CHIOMA on 05/29  Hypovolemia  Post op respiratory failure   Acute blood loss anemia  Leukocytosis     Plan:   ***Neuro***  [x] Sedated with [x] Precedex    [x] Tylenol, Gabapentin, Meperidine, PRN Oxycodone, and PRN Dilaudid for pain management.   [x] Continue sugammadex  Post operative neuro assessment     ***Cardiovascular***  Invasive hemodynamic monitoring, assess perfusion indices   MD / CVP 6/ MAP 69 / Hct 27.1/ Lactate 1.9  [x] Levophed 0.05 mcg/kg/min  [x] Amiodarone for a fib prophylaxis    Volume: [x] Albumin 250 cc  Reassessment of hemodynamics post resuscitation   Monitor chest tube outputs   [x] ASA   Serial EKG and cardiac enzymes     ***Pulmonary***  Post op vent management  Titration of FiO2 and PEEP, follow SpO2, CXR, blood gases     Mode: AC/ CMV (Assist Control/ Continuous Mandatory Ventilation)  RR (machine): 14  TV (machine): 500  FiO2: 100  PEEP: 5  ITime: 1  MAP: 10  PIP: 24              Will plan to wean & extubate once pt is hemodynamically stable and not bleeding    ***GI***  [x] NPO   [x] Protonix      ***Renal***  GFR 80  Continue to monitor I/Os, BUN/Creatinine.   Replete lytes PRN  Aguilar present [x] positive     ***ID***  Cefuroxime for perioperative antibiotic coverage     ***Endocrine***  [x] DM2: HbA1c 8.7%               - [x] Insulin gtt               - Need tight glycemic control to prevent wound infection.      Patient requires continuous monitoring with bedside rhythm monitoring, pulse oximetry monitoring, and continuous central venous and arterial pressure monitoring; and intermittent blood gas analysis. Care plan discussed with the ICU care team.   Patient remained critical, at risk for life threatening decompensation.    I have spent 65 minutes providing critical care management to this patient.    By signing my name below, I, Marli Grayson, attest that this documentation has been prepared under the direction and in the presence of Joao Best MD   Electronically signed: Jonas Yadav, 05-29-24 @ 21:01    I, Joao Best, personally performed the services described in this documentation. all medical record entries made by the edgaribe were at my direction and in my presence. I have reviewed the chart and agree that the record reflects my personal performance and is accurate and complete  Electronically signed: Joao Best MD  Patient seen and examined at the bedside.    Remained critically ill on continuous ICU monitoring.    OBJECTIVE:  Vital Signs Last 24 Hrs  T(C): 36.4 (29 May 2024 20:00), Max: 36.7 (29 May 2024 12:51)  T(F): 97.5 (29 May 2024 20:00), Max: 98.1 (29 May 2024 12:51)  HR: 92 (29 May 2024 20:30) (63 - 92)  BP: 127/74 (29 May 2024 12:51) (127/74 - 138/71)  BP(mean): 77 (29 May 2024 06:40) (77 - 77)  RR: 12 (29 May 2024 20:30) (12 - 20)  SpO2: 100% (29 May 2024 20:30) (97% - 100%)    Parameters below as of 29 May 2024 20:00  Patient On (Oxygen Delivery Method): ventilator    O2 Concentration (%): 100    _________________________  VITAL SIGNS:  Vital Signs Last 24 Hrs  T(C): 36.4 (30 May 2024 00:00), Max: 36.7 (29 May 2024 12:51)  T(F): 97.5 (30 May 2024 00:00), Max: 98.1 (29 May 2024 12:51)  HR: 80 (30 May 2024 01:00) (63 - 93)  BP: 127/74 (29 May 2024 12:51) (127/74 - 138/71)  BP(mean): 77 (29 May 2024 06:40) (77 - 77)  RR: 23 (30 May 2024 01:00) (12 - 34)  SpO2: 97% (30 May 2024 01:00) (97% - 100%)    Parameters below as of 30 May 2024 00:00  Patient On (Oxygen Delivery Method): mask, aerosol    O2 Concentration (%): 50  I/Os:   I&O's Detail    28 May 2024 07:01  -  29 May 2024 07:00  --------------------------------------------------------  IN:    Heparin: 220 mL    Oral Fluid: 620 mL  Total IN: 840 mL    OUT:    Voided (mL): 2200 mL  Total OUT: 2200 mL    Total NET: -1360 mL      29 May 2024 07:01  -  30 May 2024 01:47  --------------------------------------------------------  IN:    Albumin 5%  - 250 mL: 250 mL    Dexmedetomidine: 40.9 mL    Insulin: 34 mL    IV PiggyBack: 50 mL    IV PiggyBack: 200 mL    Lactated Ringers Bolus: 500 mL    Norepinephrine: 80.2 mL    PRBCs (Packed Red Blood Cells): 300 mL  Total IN: 1455.1 mL    OUT:    Chest Tube (mL): 5 mL    Chest Tube (mL): 0 mL    Chest Tube (mL): 150 mL    Indwelling Catheter - Urethral (mL): 1050 mL    Oral Fluid: 0 mL    Voided (mL): 650 mL  Total OUT: 1855 mL    Total NET: -399.9 mL          Mode: CPAP with PS  FiO2: 40  PEEP: 5  PS: 5  MAP: 7  PIP: 10      MEDICATIONS:  MEDICATIONS  (STANDING):  acetaminophen     Tablet .. 650 milliGRAM(s) Oral every 6 hours  albumin human  5% IVPB 250 milliLiter(s) IV Intermittent once  albuterol/ipratropium for Nebulization 3 milliLiter(s) Nebulizer every 6 hours  aMIOdarone    Tablet 400 milliGRAM(s) Oral two times a day  ascorbic acid 500 milliGRAM(s) Oral two times a day  aspirin enteric coated 81 milliGRAM(s) Oral daily  aspirin Suppository 300 milliGRAM(s) Rectal once  buDESOnide    Inhalation Suspension 0.5 milliGRAM(s) Inhalation every 12 hours  calcium gluconate IVPB 2 Gram(s) IV Intermittent once  cefuroxime  IVPB 1500 milliGRAM(s) IV Intermittent every 8 hours  chlorhexidine 0.12% Liquid 15 milliLiter(s) Oral Mucosa every 12 hours  chlorhexidine 0.12% Liquid 15 milliLiter(s) Oral Mucosa every 12 hours  chlorhexidine 2% Cloths 1 Application(s) Topical daily  dexMEDEtomidine Infusion 0.7 MICROgram(s)/kG/Hr (17.9 mL/Hr) IV Continuous <Continuous>  dextrose 50% Injectable 50 milliLiter(s) IV Push every 15 minutes  dextrose 50% Injectable 25 milliLiter(s) IV Push every 15 minutes  dextrose 50% Injectable 25 milliLiter(s) IV Push every 15 minutes  dextrose 50% Injectable 50 milliLiter(s) IV Push every 15 minutes  gabapentin 100 milliGRAM(s) Oral every 8 hours  insulin regular Infusion 3 Unit(s)/Hr (3 mL/Hr) IV Continuous <Continuous>  levothyroxine 25 MICROGram(s) Oral daily  meperidine     Injectable 25 milliGRAM(s) IV Push once  norepinephrine Infusion 0.05 MICROgram(s)/kG/Min (9.56 mL/Hr) IV Continuous <Continuous>  pantoprazole  Injectable 40 milliGRAM(s) IV Push daily  polyethylene glycol 3350 17 Gram(s) Oral daily  potassium chloride  10 mEq/50 mL IVPB 10 milliEquivalent(s) IV Intermittent every 1 hour  potassium chloride  10 mEq/50 mL IVPB 10 milliEquivalent(s) IV Intermittent every 1 hour  potassium chloride  10 mEq/50 mL IVPB 10 milliEquivalent(s) IV Intermittent every 1 hour  senna 2 Tablet(s) Oral at bedtime  sodium chloride 0.9%. 1000 milliLiter(s) (10 mL/Hr) IV Continuous <Continuous>    MEDICATIONS  (PRN):  HYDROmorphone  Injectable 0.5 milliGRAM(s) IV Push every 6 hours PRN Breakthrough Pain  oxyCODONE    IR 10 milliGRAM(s) Oral every 4 hours PRN Severe Pain (7 - 10)  oxyCODONE    IR 5 milliGRAM(s) Oral every 4 hours PRN Moderate Pain (4 - 6)      LABS:  Laboratory data was independently reviewed by me today.                           9.5    20.27 )-----------( 191      ( 30 May 2024 00:28 )             29.0     05-30    144  |  107  |  12  ----------------------------<  147<H>  4.3   |  21<L>  |  0.79    Ca    9.0      30 May 2024 00:28  Phos  3.2     05-30  Mg     2.3     05-30    TPro  6.2  /  Alb  4.1  /  TBili  1.2  /  DBili  x   /  AST  46<H>  /  ALT  19  /  AlkPhos  69  05-30    LIVER FUNCTIONS - ( 30 May 2024 00:28 )  Alb: 4.1 g/dL / Pro: 6.2 g/dL / ALK PHOS: 69 U/L / ALT: 19 U/L / AST: 46 U/L / GGT: x           PT/INR - ( 29 May 2024 19:54 )   PT: 13.3 sec;   INR: 1.28 ratio         PTT - ( 29 May 2024 19:54 )  PTT:26.7 sec  ABG - ( 30 May 2024 00:07 )  pH, Arterial: 7.37  pH, Blood: x     /  pCO2: 39    /  pO2: 108   / HCO3: 22    / Base Excess: -2.5  /  SaO2: 97.6              Urinalysis Basic - ( 30 May 2024 00:28 )    Color: x / Appearance: x / SG: x / pH: x  Gluc: 147 mg/dL / Ketone: x  / Bili: x / Urobili: x   Blood: x / Protein: x / Nitrite: x   Leuk Esterase: x / RBC: x / WBC x   Sq Epi: x / Non Sq Epi: x / Bacteria: x        RADIOLOGY:           INTERPRETATION:  Portable AP chest radiograph    COMPARISON: 5/29/2022 chest x-ray.    CLINICAL INFORMATION: Chest pain.    FINDINGS:  CATHETERS AND TUBES: None    PULMONARY:  No airspace consolidations or radiographic evidence of pulmonary nodules..  No pleural effusion or pneumothorax.    HEART/VASCULAR: The heart size and mediastinum configuration are within   the limits of normal.    BONES: The visualized osseous thorax is intact.    IMPRESSION:  No radiographic evidence of active chest disease..    --- End of Report ---            ELIZABETH PATTERSON MD; Attending Radiologist  This document has been electronically signed. May 24 2024  7:46PM (05-23-24 @ 19:55)      Physical Exam:  General: intubated multiple lines gtt & tubes   Neurology: sedated   Eyes: bilateral pupils equal and reactive   ENT/Neck: +ETT midline, Neck supple, trachea midline, No JVD   Respiratory: Rales noted bilaterally   CV: RRR, S1S2, no murmurs        [x] Sternal dressing, [x] Mediastinal CT x2, [x] L Pleural CT x1        [x] Paced rhythm, [x] Temporary pacing AAI 92  Abdominal: Soft, NT, ND +BS,   Extremities: 1-2+ pedal edema noted, + peripheral pulses   Skin: No Rashes, Hematoma, Ecchymosis                           Assessment:  70 y/o female with PMH of HTN/HLD, DMT2, Asthma. obesity, Hashimoto's, former smoker who presented to ED to be evaluated for chest pain associated with exertional shortness of breath    CAD s/p CABG, with CHIOMA on 05/29  Hypovolemia, pressors being titrated down with fluid resuscitation  Post op respiratory insufficiency  Acute blood loss anemia  Leukocytosis   Asthma  Stress hyperglycemia  Obesity with BMI 42    Plan:   ***Neuro***  [x] Sedated with [x] Precedex    [x] Tylenol, Gabapentin, Meperidine, PRN Oxycodone, and PRN Dilaudid for pain management.   [x] Continue sugammadex  Post operative neuro assessment     ***Cardiovascular***  Invasive hemodynamic monitoring, assess perfusion indices   RI / CVP 6/ MAP 69 / Hct 27.1/ Lactate 1.9  [x] Levophed 0.05 mcg/kg/min  [x] Amiodarone for a fib prophylaxis    Volume: [x] Albumin 250 cc  Reassessment of hemodynamics post resuscitation   Monitor chest tube outputs   [x] ASA   Serial EKG and cardiac enzymes     ***Pulmonary***  Post op vent management  Titration of FiO2 and PEEP, follow SpO2, CXR, blood gases     Mode: AC/ CMV (Assist Control/ Continuous Mandatory Ventilation)  RR (machine): 14  TV (machine): 500  FiO2: 100  PEEP: 5  ITime: 1  MAP: 10  PIP: 24              Will plan to wean & extubate once pt is hemodynamically stable and not bleeding    ***GI***  [x] NPO   [x] Protonix      ***Renal***  GFR 80  Continue to monitor I/Os, BUN/Creatinine.   Replete lytes PRN  Aguilar present [x] positive     ***ID***  Cefuroxime for perioperative antibiotic coverage     ***Endocrine***  [x] DM2: HbA1c 8.7%               - [x] Insulin gtt               - Need tight glycemic control to prevent wound infection.      Patient requires continuous monitoring with bedside rhythm monitoring, pulse oximetry monitoring, and continuous central venous and arterial pressure monitoring; and intermittent blood gas analysis. Care plan discussed with the ICU care team.   Patient remained critical, at risk for life threatening decompensation.    I have spent 65 minutes providing critical care management to this patient.    By signing my name below, I, Kandacejulisa Renuka, attest that this documentation has been prepared under the direction and in the presence of Joao Best MD   Electronically signed: Marli Grayson Jonas, 05-29-24 @ 21:01    I, Joao Best, personally performed the services described in this documentation. all medical record entries made by the scribe were at my direction and in my presence. I have reviewed the chart and agree that the record reflects my personal performance and is accurate and complete  Electronically signed: Joao Best MD

## 2024-05-29 NOTE — PROGRESS NOTE ADULT - ASSESSMENT
70 y/o female with PMH of HTN/HLD, DMT2, Asthma who presented to ED to be evaluated for chest pain associated with exertional shortness of breath. Admitted for cardiac workup.       Unstable Angina -CAD-Multivessel disease  - Patient with apparent worsening dyspnea, chest pain on exertion   - Trop negative x 2   - No acute changes on EKG compared to previous.  - Pt notes normal prior stress test with Dr. Buchanan 2022.   - CT Chest reviewed, w/ calcifications in coronaries   - s/p ASA & Plavix load, cath-Diffuse multivessel CAD with intermediate syntax score.   - 5/25 P2y12 213,  - Transferred to [facility] for CTS evaluation   - Plan for CABG today with Dr Goncalves  - did have some addl sxs 5/27 and was given nitrates with relief  - No further episodes of chest pain      - Does not appear volume overloaded on exam.   - ProBNP 74; no previous diagnosis of CHF, no prior TTE   - TTE with preserved LV and RV function, no significant valvular disease    - BP well controlled, monitor routine hemodynamics.  - Continue home amlodipine  - Hold home ARB     DM2 (diabetes mellitus, type 2).   - dm diet  -A1c- 8.7%  lantus 30 units qhs  ademelog 6 ac  Would consider initiation of GLP agonist in the future given her DM + CAD      History of Hashimoto thyroiditis.   -c/w synthroid   - Monitor and replete lytes, keep K>4, Mg>2.  - Other cardiovascular workup will depend on clinical course.  - All other workup per primary team.  - Will continue to follow.

## 2024-05-30 LAB
ALBUMIN SERPL ELPH-MCNC: 4.1 G/DL — SIGNIFICANT CHANGE UP (ref 3.3–5)
ALBUMIN SERPL ELPH-MCNC: 4.3 G/DL — SIGNIFICANT CHANGE UP (ref 3.3–5)
ALP SERPL-CCNC: 55 U/L — SIGNIFICANT CHANGE UP (ref 40–120)
ALP SERPL-CCNC: 69 U/L — SIGNIFICANT CHANGE UP (ref 40–120)
ALT FLD-CCNC: 17 U/L — SIGNIFICANT CHANGE UP (ref 10–45)
ALT FLD-CCNC: 19 U/L — SIGNIFICANT CHANGE UP (ref 10–45)
ANION GAP SERPL CALC-SCNC: 15 MMOL/L — SIGNIFICANT CHANGE UP (ref 5–17)
ANION GAP SERPL CALC-SCNC: 16 MMOL/L — SIGNIFICANT CHANGE UP (ref 5–17)
APTT BLD: 26.9 SEC — SIGNIFICANT CHANGE UP (ref 24.5–35.6)
AST SERPL-CCNC: 41 U/L — HIGH (ref 10–40)
AST SERPL-CCNC: 46 U/L — HIGH (ref 10–40)
BASE EXCESS BLDV CALC-SCNC: -1.7 MMOL/L — SIGNIFICANT CHANGE UP (ref -2–3)
BASE EXCESS BLDV CALC-SCNC: -2.5 MMOL/L — LOW (ref -2–3)
BASE EXCESS BLDV CALC-SCNC: -5.5 MMOL/L — LOW (ref -2–3)
BASE EXCESS BLDV CALC-SCNC: -5.8 MMOL/L — LOW (ref -2–3)
BILIRUB SERPL-MCNC: 0.4 MG/DL — SIGNIFICANT CHANGE UP (ref 0.2–1.2)
BILIRUB SERPL-MCNC: 1.2 MG/DL — SIGNIFICANT CHANGE UP (ref 0.2–1.2)
BUN SERPL-MCNC: 12 MG/DL — SIGNIFICANT CHANGE UP (ref 7–23)
BUN SERPL-MCNC: 17 MG/DL — SIGNIFICANT CHANGE UP (ref 7–23)
CA-I SERPL-SCNC: 1.14 MMOL/L — LOW (ref 1.15–1.33)
CA-I SERPL-SCNC: 1.15 MMOL/L — SIGNIFICANT CHANGE UP (ref 1.15–1.33)
CA-I SERPL-SCNC: 1.17 MMOL/L — SIGNIFICANT CHANGE UP (ref 1.15–1.33)
CA-I SERPL-SCNC: 1.2 MMOL/L — SIGNIFICANT CHANGE UP (ref 1.15–1.33)
CALCIUM SERPL-MCNC: 8.6 MG/DL — SIGNIFICANT CHANGE UP (ref 8.4–10.5)
CALCIUM SERPL-MCNC: 9 MG/DL — SIGNIFICANT CHANGE UP (ref 8.4–10.5)
CHLORIDE BLDV-SCNC: 101 MMOL/L — SIGNIFICANT CHANGE UP (ref 96–108)
CHLORIDE BLDV-SCNC: 104 MMOL/L — SIGNIFICANT CHANGE UP (ref 96–108)
CHLORIDE BLDV-SCNC: 105 MMOL/L — SIGNIFICANT CHANGE UP (ref 96–108)
CHLORIDE BLDV-SCNC: 106 MMOL/L — SIGNIFICANT CHANGE UP (ref 96–108)
CHLORIDE SERPL-SCNC: 104 MMOL/L — SIGNIFICANT CHANGE UP (ref 96–108)
CHLORIDE SERPL-SCNC: 107 MMOL/L — SIGNIFICANT CHANGE UP (ref 96–108)
CO2 BLDV-SCNC: 21 MMOL/L — LOW (ref 22–26)
CO2 BLDV-SCNC: 22 MMOL/L — SIGNIFICANT CHANGE UP (ref 22–26)
CO2 BLDV-SCNC: 24 MMOL/L — SIGNIFICANT CHANGE UP (ref 22–26)
CO2 BLDV-SCNC: 26 MMOL/L — SIGNIFICANT CHANGE UP (ref 22–26)
CO2 SERPL-SCNC: 20 MMOL/L — LOW (ref 22–31)
CO2 SERPL-SCNC: 21 MMOL/L — LOW (ref 22–31)
CREAT SERPL-MCNC: 0.79 MG/DL — SIGNIFICANT CHANGE UP (ref 0.5–1.3)
CREAT SERPL-MCNC: 0.97 MG/DL — SIGNIFICANT CHANGE UP (ref 0.5–1.3)
EGFR: 64 ML/MIN/1.73M2 — SIGNIFICANT CHANGE UP
EGFR: 82 ML/MIN/1.73M2 — SIGNIFICANT CHANGE UP
FIBRINOGEN PPP-MCNC: 262 MG/DL — SIGNIFICANT CHANGE UP (ref 200–445)
FIBRINOGEN PPP-MCNC: 276 MG/DL — SIGNIFICANT CHANGE UP (ref 200–445)
GAS PNL BLDA: SIGNIFICANT CHANGE UP
GAS PNL BLDA: SIGNIFICANT CHANGE UP
GAS PNL BLDV: 132 MMOL/L — LOW (ref 136–145)
GAS PNL BLDV: 134 MMOL/L — LOW (ref 136–145)
GAS PNL BLDV: 135 MMOL/L — LOW (ref 136–145)
GAS PNL BLDV: 139 MMOL/L — SIGNIFICANT CHANGE UP (ref 136–145)
GAS PNL BLDV: SIGNIFICANT CHANGE UP
GLUCOSE BLDC GLUCOMTR-MCNC: 101 MG/DL — HIGH (ref 70–99)
GLUCOSE BLDC GLUCOMTR-MCNC: 129 MG/DL — HIGH (ref 70–99)
GLUCOSE BLDC GLUCOMTR-MCNC: 129 MG/DL — HIGH (ref 70–99)
GLUCOSE BLDC GLUCOMTR-MCNC: 133 MG/DL — HIGH (ref 70–99)
GLUCOSE BLDC GLUCOMTR-MCNC: 135 MG/DL — HIGH (ref 70–99)
GLUCOSE BLDC GLUCOMTR-MCNC: 140 MG/DL — HIGH (ref 70–99)
GLUCOSE BLDC GLUCOMTR-MCNC: 143 MG/DL — HIGH (ref 70–99)
GLUCOSE BLDC GLUCOMTR-MCNC: 146 MG/DL — HIGH (ref 70–99)
GLUCOSE BLDC GLUCOMTR-MCNC: 147 MG/DL — HIGH (ref 70–99)
GLUCOSE BLDC GLUCOMTR-MCNC: 153 MG/DL — HIGH (ref 70–99)
GLUCOSE BLDC GLUCOMTR-MCNC: 166 MG/DL — HIGH (ref 70–99)
GLUCOSE BLDC GLUCOMTR-MCNC: 183 MG/DL — HIGH (ref 70–99)
GLUCOSE BLDC GLUCOMTR-MCNC: 192 MG/DL — HIGH (ref 70–99)
GLUCOSE BLDC GLUCOMTR-MCNC: 196 MG/DL — HIGH (ref 70–99)
GLUCOSE BLDC GLUCOMTR-MCNC: 208 MG/DL — HIGH (ref 70–99)
GLUCOSE BLDC GLUCOMTR-MCNC: 225 MG/DL — HIGH (ref 70–99)
GLUCOSE BLDC GLUCOMTR-MCNC: 226 MG/DL — HIGH (ref 70–99)
GLUCOSE BLDC GLUCOMTR-MCNC: 263 MG/DL — HIGH (ref 70–99)
GLUCOSE BLDV-MCNC: 138 MG/DL — HIGH (ref 70–99)
GLUCOSE BLDV-MCNC: 208 MG/DL — HIGH (ref 70–99)
GLUCOSE BLDV-MCNC: 212 MG/DL — HIGH (ref 70–99)
GLUCOSE BLDV-MCNC: 255 MG/DL — HIGH (ref 70–99)
GLUCOSE SERPL-MCNC: 147 MG/DL — HIGH (ref 70–99)
GLUCOSE SERPL-MCNC: 166 MG/DL — HIGH (ref 70–99)
HCO3 BLDV-SCNC: 20 MMOL/L — LOW (ref 22–29)
HCO3 BLDV-SCNC: 20 MMOL/L — LOW (ref 22–29)
HCO3 BLDV-SCNC: 23 MMOL/L — SIGNIFICANT CHANGE UP (ref 22–29)
HCO3 BLDV-SCNC: 24 MMOL/L — SIGNIFICANT CHANGE UP (ref 22–29)
HCT VFR BLD CALC: 29 % — LOW (ref 34.5–45)
HCT VFR BLDA CALC: 23 % — LOW (ref 34.5–46.5)
HCT VFR BLDA CALC: 26 % — LOW (ref 34.5–46.5)
HCT VFR BLDA CALC: 28 % — LOW (ref 34.5–46.5)
HCT VFR BLDA CALC: 29 % — LOW (ref 34.5–46.5)
HGB BLD CALC-MCNC: 7.6 G/DL — LOW (ref 11.7–16.1)
HGB BLD CALC-MCNC: 8.6 G/DL — LOW (ref 11.7–16.1)
HGB BLD CALC-MCNC: 9.4 G/DL — LOW (ref 11.7–16.1)
HGB BLD CALC-MCNC: 9.7 G/DL — LOW (ref 11.7–16.1)
HGB BLD-MCNC: 9.5 G/DL — LOW (ref 11.5–15.5)
HOROWITZ INDEX BLDV+IHG-RTO: 44 — SIGNIFICANT CHANGE UP
HOROWITZ INDEX BLDV+IHG-RTO: 50 — SIGNIFICANT CHANGE UP
INR BLD: 1.22 RATIO — HIGH (ref 0.85–1.18)
LACTATE BLDV-MCNC: 1.4 MMOL/L — SIGNIFICANT CHANGE UP (ref 0.5–2)
LACTATE BLDV-MCNC: 1.6 MMOL/L — SIGNIFICANT CHANGE UP (ref 0.5–2)
LACTATE BLDV-MCNC: 1.9 MMOL/L — SIGNIFICANT CHANGE UP (ref 0.5–2)
LACTATE BLDV-MCNC: 1.9 MMOL/L — SIGNIFICANT CHANGE UP (ref 0.5–2)
MAGNESIUM SERPL-MCNC: 2 MG/DL — SIGNIFICANT CHANGE UP (ref 1.6–2.6)
MAGNESIUM SERPL-MCNC: 2.3 MG/DL — SIGNIFICANT CHANGE UP (ref 1.6–2.6)
MCHC RBC-ENTMCNC: 28 PG — SIGNIFICANT CHANGE UP (ref 27–34)
MCHC RBC-ENTMCNC: 32.8 GM/DL — SIGNIFICANT CHANGE UP (ref 32–36)
MCV RBC AUTO: 85.5 FL — SIGNIFICANT CHANGE UP (ref 80–100)
NRBC # BLD: 0 /100 WBCS — SIGNIFICANT CHANGE UP (ref 0–0)
PCO2 BLDV: 39 MMHG — SIGNIFICANT CHANGE UP (ref 39–42)
PCO2 BLDV: 41 MMHG — SIGNIFICANT CHANGE UP (ref 39–42)
PCO2 BLDV: 43 MMHG — HIGH (ref 39–42)
PCO2 BLDV: 46 MMHG — HIGH (ref 39–42)
PH BLDV: 7.3 — LOW (ref 7.32–7.43)
PH BLDV: 7.32 — SIGNIFICANT CHANGE UP (ref 7.32–7.43)
PH BLDV: 7.33 — SIGNIFICANT CHANGE UP (ref 7.32–7.43)
PH BLDV: 7.34 — SIGNIFICANT CHANGE UP (ref 7.32–7.43)
PHOSPHATE SERPL-MCNC: 3.2 MG/DL — SIGNIFICANT CHANGE UP (ref 2.5–4.5)
PHOSPHATE SERPL-MCNC: 3.9 MG/DL — SIGNIFICANT CHANGE UP (ref 2.5–4.5)
PLATELET # BLD AUTO: 191 K/UL — SIGNIFICANT CHANGE UP (ref 150–400)
PO2 BLDV: 29 MMHG — SIGNIFICANT CHANGE UP (ref 25–45)
PO2 BLDV: 33 MMHG — SIGNIFICANT CHANGE UP (ref 25–45)
PO2 BLDV: 36 MMHG — SIGNIFICANT CHANGE UP (ref 25–45)
PO2 BLDV: 38 MMHG — SIGNIFICANT CHANGE UP (ref 25–45)
POTASSIUM BLDV-SCNC: 4.4 MMOL/L — SIGNIFICANT CHANGE UP (ref 3.5–5.1)
POTASSIUM BLDV-SCNC: 4.5 MMOL/L — SIGNIFICANT CHANGE UP (ref 3.5–5.1)
POTASSIUM BLDV-SCNC: 4.7 MMOL/L — SIGNIFICANT CHANGE UP (ref 3.5–5.1)
POTASSIUM BLDV-SCNC: 5.2 MMOL/L — HIGH (ref 3.5–5.1)
POTASSIUM SERPL-MCNC: 4.2 MMOL/L — SIGNIFICANT CHANGE UP (ref 3.5–5.3)
POTASSIUM SERPL-MCNC: 4.3 MMOL/L — SIGNIFICANT CHANGE UP (ref 3.5–5.3)
POTASSIUM SERPL-SCNC: 4.2 MMOL/L — SIGNIFICANT CHANGE UP (ref 3.5–5.3)
POTASSIUM SERPL-SCNC: 4.3 MMOL/L — SIGNIFICANT CHANGE UP (ref 3.5–5.3)
PROT SERPL-MCNC: 5.8 G/DL — LOW (ref 6–8.3)
PROT SERPL-MCNC: 6.2 G/DL — SIGNIFICANT CHANGE UP (ref 6–8.3)
PROTHROM AB SERPL-ACNC: 12.7 SEC — SIGNIFICANT CHANGE UP (ref 9.5–13)
RBC # BLD: 3.39 M/UL — LOW (ref 3.8–5.2)
RBC # FLD: 14.8 % — HIGH (ref 10.3–14.5)
SAO2 % BLDV: 51.4 % — LOW (ref 67–88)
SAO2 % BLDV: 58.5 % — LOW (ref 67–88)
SAO2 % BLDV: 63.1 % — LOW (ref 67–88)
SAO2 % BLDV: 63.8 % — LOW (ref 67–88)
SODIUM SERPL-SCNC: 139 MMOL/L — SIGNIFICANT CHANGE UP (ref 135–145)
SODIUM SERPL-SCNC: 144 MMOL/L — SIGNIFICANT CHANGE UP (ref 135–145)
WBC # BLD: 20.27 K/UL — HIGH (ref 3.8–10.5)
WBC # FLD AUTO: 20.27 K/UL — HIGH (ref 3.8–10.5)

## 2024-05-30 PROCEDURE — 99291 CRITICAL CARE FIRST HOUR: CPT

## 2024-05-30 PROCEDURE — 71045 X-RAY EXAM CHEST 1 VIEW: CPT | Mod: 26

## 2024-05-30 RX ORDER — INSULIN LISPRO 100/ML
VIAL (ML) SUBCUTANEOUS AT BEDTIME
Refills: 0 | Status: DISCONTINUED | OUTPATIENT
Start: 2024-05-31 | End: 2024-05-31

## 2024-05-30 RX ORDER — ATORVASTATIN CALCIUM 80 MG/1
80 TABLET, FILM COATED ORAL AT BEDTIME
Refills: 0 | Status: DISCONTINUED | OUTPATIENT
Start: 2024-05-30 | End: 2024-06-05

## 2024-05-30 RX ORDER — INSULIN LISPRO 100/ML
10 VIAL (ML) SUBCUTANEOUS
Refills: 0 | Status: DISCONTINUED | OUTPATIENT
Start: 2024-05-31 | End: 2024-05-31

## 2024-05-30 RX ORDER — ENOXAPARIN SODIUM 100 MG/ML
40 INJECTION SUBCUTANEOUS EVERY 24 HOURS
Refills: 0 | Status: DISCONTINUED | OUTPATIENT
Start: 2024-05-30 | End: 2024-06-05

## 2024-05-30 RX ORDER — ONDANSETRON 8 MG/1
4 TABLET, FILM COATED ORAL ONCE
Refills: 0 | Status: COMPLETED | OUTPATIENT
Start: 2024-05-30 | End: 2024-05-30

## 2024-05-30 RX ORDER — METOCLOPRAMIDE HCL 10 MG
10 TABLET ORAL EVERY 8 HOURS
Refills: 0 | Status: COMPLETED | OUTPATIENT
Start: 2024-05-30 | End: 2024-05-31

## 2024-05-30 RX ORDER — SODIUM BICARBONATE 1 MEQ/ML
325 SYRINGE (ML) INTRAVENOUS EVERY 12 HOURS
Refills: 0 | Status: DISCONTINUED | OUTPATIENT
Start: 2024-05-30 | End: 2024-06-03

## 2024-05-30 RX ORDER — INSULIN GLARGINE 100 [IU]/ML
46 INJECTION, SOLUTION SUBCUTANEOUS AT BEDTIME
Refills: 0 | Status: DISCONTINUED | OUTPATIENT
Start: 2024-05-30 | End: 2024-05-31

## 2024-05-30 RX ORDER — LEVOTHYROXINE SODIUM 125 MCG
25 TABLET ORAL DAILY
Refills: 0 | Status: DISCONTINUED | OUTPATIENT
Start: 2024-05-30 | End: 2024-06-05

## 2024-05-30 RX ORDER — BUDESONIDE, MICRONIZED 100 %
0.5 POWDER (GRAM) MISCELLANEOUS EVERY 12 HOURS
Refills: 0 | Status: DISCONTINUED | OUTPATIENT
Start: 2024-05-30 | End: 2024-06-02

## 2024-05-30 RX ORDER — IPRATROPIUM/ALBUTEROL SULFATE 18-103MCG
3 AEROSOL WITH ADAPTER (GRAM) INHALATION EVERY 6 HOURS
Refills: 0 | Status: DISCONTINUED | OUTPATIENT
Start: 2024-05-30 | End: 2024-06-02

## 2024-05-30 RX ORDER — ALBUMIN HUMAN 25 %
250 VIAL (ML) INTRAVENOUS ONCE
Refills: 0 | Status: COMPLETED | OUTPATIENT
Start: 2024-05-30 | End: 2024-05-30

## 2024-05-30 RX ORDER — FUROSEMIDE 40 MG
20 TABLET ORAL ONCE
Refills: 0 | Status: COMPLETED | OUTPATIENT
Start: 2024-05-30 | End: 2024-05-30

## 2024-05-30 RX ORDER — INSULIN LISPRO 100/ML
VIAL (ML) SUBCUTANEOUS
Refills: 0 | Status: DISCONTINUED | OUTPATIENT
Start: 2024-05-31 | End: 2024-05-31

## 2024-05-30 RX ORDER — ASPIRIN/CALCIUM CARB/MAGNESIUM 324 MG
81 TABLET ORAL ONCE
Refills: 0 | Status: COMPLETED | OUTPATIENT
Start: 2024-05-30 | End: 2024-05-30

## 2024-05-30 RX ADMIN — GABAPENTIN 100 MILLIGRAM(S): 400 CAPSULE ORAL at 05:46

## 2024-05-30 RX ADMIN — AMIODARONE HYDROCHLORIDE 400 MILLIGRAM(S): 400 TABLET ORAL at 05:47

## 2024-05-30 RX ADMIN — Medication 3 MILLILITER(S): at 18:16

## 2024-05-30 RX ADMIN — Medication 3 MILLILITER(S): at 06:04

## 2024-05-30 RX ADMIN — Medication 325 MILLIGRAM(S): at 12:35

## 2024-05-30 RX ADMIN — Medication 650 MILLIGRAM(S): at 05:45

## 2024-05-30 RX ADMIN — Medication 500 MILLIGRAM(S): at 05:47

## 2024-05-30 RX ADMIN — Medication 81 MILLIGRAM(S): at 12:35

## 2024-05-30 RX ADMIN — POLYETHYLENE GLYCOL 3350 17 GRAM(S): 17 POWDER, FOR SOLUTION ORAL at 12:35

## 2024-05-30 RX ADMIN — CHLORHEXIDINE GLUCONATE 15 MILLILITER(S): 213 SOLUTION TOPICAL at 17:00

## 2024-05-30 RX ADMIN — GABAPENTIN 100 MILLIGRAM(S): 400 CAPSULE ORAL at 21:02

## 2024-05-30 RX ADMIN — Medication 650 MILLIGRAM(S): at 23:35

## 2024-05-30 RX ADMIN — Medication 10 MILLIGRAM(S): at 05:45

## 2024-05-30 RX ADMIN — Medication 3 MILLILITER(S): at 11:29

## 2024-05-30 RX ADMIN — Medication 20 MILLIGRAM(S): at 18:56

## 2024-05-30 RX ADMIN — GABAPENTIN 100 MILLIGRAM(S): 400 CAPSULE ORAL at 13:21

## 2024-05-30 RX ADMIN — OXYCODONE HYDROCHLORIDE 5 MILLIGRAM(S): 5 TABLET ORAL at 10:36

## 2024-05-30 RX ADMIN — Medication 10 MILLIGRAM(S): at 13:21

## 2024-05-30 RX ADMIN — OXYCODONE HYDROCHLORIDE 5 MILLIGRAM(S): 5 TABLET ORAL at 19:58

## 2024-05-30 RX ADMIN — Medication 100 MILLIGRAM(S): at 22:07

## 2024-05-30 RX ADMIN — INSULIN HUMAN 3 UNIT(S)/HR: 100 INJECTION, SOLUTION SUBCUTANEOUS at 01:40

## 2024-05-30 RX ADMIN — Medication 10 MILLIGRAM(S): at 21:02

## 2024-05-30 RX ADMIN — Medication 325 MILLIGRAM(S): at 17:12

## 2024-05-30 RX ADMIN — Medication 0.5 MILLIGRAM(S): at 06:04

## 2024-05-30 RX ADMIN — OXYCODONE HYDROCHLORIDE 5 MILLIGRAM(S): 5 TABLET ORAL at 09:36

## 2024-05-30 RX ADMIN — Medication 100 MILLIGRAM(S): at 05:47

## 2024-05-30 RX ADMIN — AMIODARONE HYDROCHLORIDE 400 MILLIGRAM(S): 400 TABLET ORAL at 17:13

## 2024-05-30 RX ADMIN — Medication 100 MILLIGRAM(S): at 13:21

## 2024-05-30 RX ADMIN — CHLORHEXIDINE GLUCONATE 15 MILLILITER(S): 213 SOLUTION TOPICAL at 05:45

## 2024-05-30 RX ADMIN — INSULIN HUMAN 3 UNIT(S)/HR: 100 INJECTION, SOLUTION SUBCUTANEOUS at 19:43

## 2024-05-30 RX ADMIN — Medication 9.56 MICROGRAM(S)/KG/MIN: at 01:40

## 2024-05-30 RX ADMIN — HYDROMORPHONE HYDROCHLORIDE 0.5 MILLIGRAM(S): 2 INJECTION INTRAMUSCULAR; INTRAVENOUS; SUBCUTANEOUS at 05:00

## 2024-05-30 RX ADMIN — ATORVASTATIN CALCIUM 80 MILLIGRAM(S): 80 TABLET, FILM COATED ORAL at 21:02

## 2024-05-30 RX ADMIN — Medication 650 MILLIGRAM(S): at 13:15

## 2024-05-30 RX ADMIN — OXYCODONE HYDROCHLORIDE 5 MILLIGRAM(S): 5 TABLET ORAL at 20:30

## 2024-05-30 RX ADMIN — Medication 650 MILLIGRAM(S): at 23:05

## 2024-05-30 RX ADMIN — Medication 650 MILLIGRAM(S): at 06:23

## 2024-05-30 RX ADMIN — ENOXAPARIN SODIUM 40 MILLIGRAM(S): 100 INJECTION SUBCUTANEOUS at 12:35

## 2024-05-30 RX ADMIN — Medication 125 MILLILITER(S): at 09:36

## 2024-05-30 RX ADMIN — Medication 650 MILLIGRAM(S): at 12:35

## 2024-05-30 RX ADMIN — INSULIN GLARGINE 46 UNIT(S): 100 INJECTION, SOLUTION SUBCUTANEOUS at 21:33

## 2024-05-30 RX ADMIN — ONDANSETRON 4 MILLIGRAM(S): 8 TABLET, FILM COATED ORAL at 02:18

## 2024-05-30 RX ADMIN — Medication 3 MILLILITER(S): at 23:14

## 2024-05-30 RX ADMIN — Medication 500 MILLIGRAM(S): at 17:13

## 2024-05-30 RX ADMIN — Medication 650 MILLIGRAM(S): at 17:13

## 2024-05-30 RX ADMIN — HYDROMORPHONE HYDROCHLORIDE 0.5 MILLIGRAM(S): 2 INJECTION INTRAMUSCULAR; INTRAVENOUS; SUBCUTANEOUS at 05:15

## 2024-05-30 RX ADMIN — Medication 0.5 MILLIGRAM(S): at 18:15

## 2024-05-30 RX ADMIN — CHLORHEXIDINE GLUCONATE 1 APPLICATION(S): 213 SOLUTION TOPICAL at 12:30

## 2024-05-30 RX ADMIN — SENNA PLUS 2 TABLET(S): 8.6 TABLET ORAL at 21:02

## 2024-05-30 RX ADMIN — Medication 81 MILLIGRAM(S): at 01:43

## 2024-05-30 RX ADMIN — Medication 650 MILLIGRAM(S): at 18:00

## 2024-05-30 RX ADMIN — Medication 25 MICROGRAM(S): at 05:47

## 2024-05-30 NOTE — PROGRESS NOTE ADULT - SUBJECTIVE AND OBJECTIVE BOX
Patient seen and examined at the bedside.    Remained critically ill on continuous ICU monitoring.      Brief Summary:  70 y/o female with PMH of HTN/HLD, DMT2, Asthma. obesity, Hashimoto's, former smoker who presented to ED to be evaluated for chest pain associated with exertional shortness of breath  CAD s/p CABG, with CHIOMA on 05/29      24 Hour events:  Received 1 pack of RBC overnight   Albumin given overnight   Pain this morning due to which Prns were administered   Plan to ambulate this AM  DC PPI       OBJECTIVE:  Vital Signs Last 24 Hrs  T(C): 37.3 (30 May 2024 08:00), Max: 37.3 (30 May 2024 04:00)  T(F): 99.1 (30 May 2024 08:00), Max: 99.1 (30 May 2024 04:00)  HR: 85 (30 May 2024 09:15) (63 - 93)  BP: 100/49 (30 May 2024 09:00) (94/46 - 127/74)  BP(mean): 70 (30 May 2024 09:00) (67 - 83)  RR: 28 (30 May 2024 09:15) (12 - 34)  SpO2: 93% (30 May 2024 09:15) (93% - 100%)    Parameters below as of 30 May 2024 08:00  Patient On (Oxygen Delivery Method): nasal cannula  O2 Flow (L/min): 4      Mode: CPAP with PS  FiO2: 40  PEEP: 5  PS: 5  MAP: 7  PIP: 10              Physical Exam:   General: awake/sedated  Neurology: intact  ENT: trachea midline  Respiratory: on Nasal Cannula 4L   CV: Sinus Rhythm   Abdominal: Soft, NT  : Aguilar  Extremities: warm, well perfused, moving all extremities   Skin: No Rashes, Hematoma, Ecchymosis         -------------------------------------------------------------------------------------------------------------------------------    Labs:                        9.5    20.27 )-----------( 191      ( 30 May 2024 00:28 )             29.0     05-30    144  |  107  |  12  ----------------------------<  147<H>  4.3   |  21<L>  |  0.79    Ca    9.0      30 May 2024 00:28  Phos  3.2     05-30  Mg     2.3     05-30    TPro  6.2  /  Alb  4.1  /  TBili  1.2  /  DBili  x   /  AST  46<H>  /  ALT  19  /  AlkPhos  69  05-30    LIVER FUNCTIONS - ( 30 May 2024 00:28 )  Alb: 4.1 g/dL / Pro: 6.2 g/dL / ALK PHOS: 69 U/L / ALT: 19 U/L / AST: 46 U/L / GGT: x           PT/INR - ( 30 May 2024 00:28 )   PT: 12.7 sec;   INR: 1.22 ratio         PTT - ( 30 May 2024 00:28 )  PTT:26.9 sec  ABG - ( 30 May 2024 03:43 )  pH, Arterial: 7.40  pH, Blood: x     /  pCO2: 37    /  pO2: 80    / HCO3: 23    / Base Excess: -1.6  /  SaO2: 96.0              ------------------------------------------------------------------------------------------------------------------------------  Assessment:  70 y/o female with PMH of HTN/HLD, DMT2, Asthma. obesity, Hashimoto's, former smoker who presented to ED to be evaluated for chest pain associated with exertional shortness of breath    CAD s/p CABG, with CHIOMA on 05/29  Hypovolemia, pressors being titrated down with fluid resuscitation  Post op respiratory insufficiency  Acute blood loss anemia  Leukocytosis   Asthma  Stress hyperglycemia  Obesity with BMI 42        Plan:   ***Neuro***  Sedated with Precedex   Postoperative analgesia as appropriate   Monitor mental status, optimize sleep/wake schedule     ***Cardiovascular***  Invasive hemodynamic monitoring, assess perfusion indices   At risk for hemodynamic instability and cardiac arrhythmias. Monitor hemodynamics   IVF for perioperative hypovolemia.  Ongoing titration norepinephrine to maintain SBP  Aspirin as appropriate    Monitor chest tube output, DC when appropriate.  Amiodarone for A. Fib prophylaxis   Pacemaker dependent at Pacer rate of 40  Restart home meds when able     ***Pulmonary***  Postoperative acute respiratory insufficiency   Wean supplemental O2 to maintain SPO2   Encourage IS/deep breathing  Continue bronchodilators     ***GI***  Tolerating diet   Stress ulcer prophylaxis as appropriate   Bowel regimen.  Reglan    ***Renal***  Aguilar catheter for strict I/O measurements.  Replete electrolytes as indicated.    ***ID***  Cefuroxime for perioperative antibiotic coverage   Leukocytosis - Trend WBC, monitor for fever.    ***Endocrine***  Stress Hyperglycemia, insulin as needed to maintain euglycemia.   Monitor blood sugars    Synthroid for hypothyroidism     ***Hematology***  At risk for bleeding and coagulopathy s/p cardiac surgery   Normocytic anemia, likely due to acute blood loss, trend H&H and transfuse as needed     ***Misc***  Encourage OOB  PT/OT      Patient requires continuous monitoring with bedside rhythm monitoring, pulse oximetry monitoring, and continuous central venous and arterial pressure monitoring; and intermittent blood gas analysis. Care plan discussed with the ICU care team.   Patient remained critical, at risk for life threatening decompensation.    I have spent 30 minutes providing critical care management to this patient.    By signing my name below, I, Tri Larkin, attest that this documentation has been prepared under the direction and in the presence of Ravi Rodriguez MD  Electronically signed: Tri Larkin, 05-30-24 @ 10:45    I, Ravi Rodriguez MD, personally performed the services described in this documentation. all medical record entries made by the scribe were at my direction and in my presence. I have reviewed the chart and agree that the record reflects my personal performance and is accurate and complete  Electronically signed: Ravi Rodriguez MD Patient seen and examined at the bedside.    Remained critically ill on continuous ICU monitoring.      Brief Summary:  68 y/o female with PMH of HTN/HLD, DMT2, Asthma. obesity, Hashimoto's, former smoker who presented to ED to be evaluated for chest pain associated with exertional shortness of breath  CAD s/p CABG, with CHIOMA on 05/29      24 Hour events:  Received 1 pack of RBC overnight   Extubated postop  Remains on low dose norepinephrine    OBJECTIVE:  Vital Signs Last 24 Hrs  T(C): 37.3 (30 May 2024 08:00), Max: 37.3 (30 May 2024 04:00)  T(F): 99.1 (30 May 2024 08:00), Max: 99.1 (30 May 2024 04:00)  HR: 85 (30 May 2024 09:15) (63 - 93)  BP: 100/49 (30 May 2024 09:00) (94/46 - 127/74)  BP(mean): 70 (30 May 2024 09:00) (67 - 83)  RR: 28 (30 May 2024 09:15) (12 - 34)  SpO2: 93% (30 May 2024 09:15) (93% - 100%)    Parameters below as of 30 May 2024 08:00  Patient On (Oxygen Delivery Method): nasal cannula  O2 Flow (L/min): 4      Mode: CPAP with PS  FiO2: 40  PEEP: 5  PS: 5  MAP: 7  PIP: 10              Physical Exam:   General: awake and alert, no acute distress   Neurology: intact, no focal deficits   ENT: trachea midline   Respiratory: on nasal cannula, non-labored respirations, symmetric expansion, no wheezing   CV: normal rate/rhythm, normal peripheral perfusion, LE edema   Abdominal: Soft, NT   : Aguilar     Extremities: warm, well perfused, moving all extremities    Skin: No Rashes, Hematoma, Ecchymosis          -------------------------------------------------------------------------------------------------------------------------------    Labs:                        9.5    20.27 )-----------( 191      ( 30 May 2024 00:28 )             29.0     05-30    144  |  107  |  12  ----------------------------<  147<H>  4.3   |  21<L>  |  0.79    Ca    9.0      30 May 2024 00:28  Phos  3.2     05-30  Mg     2.3     05-30    TPro  6.2  /  Alb  4.1  /  TBili  1.2  /  DBili  x   /  AST  46<H>  /  ALT  19  /  AlkPhos  69  05-30    LIVER FUNCTIONS - ( 30 May 2024 00:28 )  Alb: 4.1 g/dL / Pro: 6.2 g/dL / ALK PHOS: 69 U/L / ALT: 19 U/L / AST: 46 U/L / GGT: x           PT/INR - ( 30 May 2024 00:28 )   PT: 12.7 sec;   INR: 1.22 ratio         PTT - ( 30 May 2024 00:28 )  PTT:26.9 sec  ABG - ( 30 May 2024 03:43 )  pH, Arterial: 7.40  pH, Blood: x     /  pCO2: 37    /  pO2: 80    / HCO3: 23    / Base Excess: -1.6  /  SaO2: 96.0              ------------------------------------------------------------------------------------------------------------------------------  Assessment:  68 y/o female with PMH of HTN/HLD, DMT2, Asthma. obesity, Hashimoto's, former smoker who presented to ED to be evaluated for chest pain associated with exertional shortness of breath    CAD s/p CABG, with CHIOMA on 05/29  Hypovolemia, pressors being titrated down with fluid resuscitation  Post op respiratory insufficiency  Acute blood loss anemia  Leukocytosis   Asthma  Stress hyperglycemia  Obesity with BMI 42        Plan:   ***Neuro***  Postoperative analgesia as appropriate   Monitor mental status, optimize sleep/wake schedule     ***Cardiovascular***  Invasive hemodynamic monitoring, assess perfusion indices    At risk for hemodynamic instability and cardiac arrhythmias, monitor hemodynamics and telemetry   Ongoing resuscitation, volume/titration of vasopressors to maintain MAP > 65mmHg   Pacer set to pace AAI 90 for hemodynamic support  S/p CABG, aspirin, beta blocker, statin as appropriate   Monitor chest tube output and DC when appropriate   Restart home meds when able     ***Pulmonary***  Postoperative acute respiratory insufficiency   Wean supplemental O2 to maintain SpO2 > 92%   Encourage IS/deep breathing     ***GI***  Tolerating diet   Stress ulcer prophylaxis as appropriate   Bowel regimen    ***Renal***  Aguilar catheter for strict I/O measurements, monitor urine output   Monitor and replete electrolytes as needed   Trend BUN/Cr   Diuresis as appropriate     ***ID***  Perioperative antibiotics   Monitor for fevers and leukocytosis     ***Endocrine***  Stress Hyperglycemia, insulin as needed to maintain euglycemia.   Monitor blood sugars    Synthroid for hx hypothyroidism     ***Hematology***  At risk for bleeding and coagulopathy s/p cardiac surgery   Normocytic anemia, likely due to acute blood loss, trend H&H and transfuse as needed   VTE prophylaxis as appropriate     ***Misc***  Encourage OOB  PT/OT      Patient requires continuous monitoring with bedside rhythm monitoring, pulse oximetry monitoring, and continuous central venous and arterial pressure monitoring; and intermittent blood gas analysis. Care plan discussed with the ICU care team.   Patient remained critical, at risk for life threatening decompensation.    I have spent 45 minutes providing critical care management to this patient.    By signing my name below, I, Tri Larkin, attest that this documentation has been prepared under the direction and in the presence of Ravi Rodriguez MD  Electronically signed: Tri Larkin, 05-30-24 @ 10:45    I, Ravi Rodriguez MD, personally performed the services described in this documentation. all medical record entries made by the scribe were at my direction and in my presence. I have reviewed the chart and agree that the record reflects my personal performance and is accurate and complete  Electronically signed: Ravi Rodriguez MD

## 2024-05-30 NOTE — PROGRESS NOTE ADULT - PROBLEM SELECTOR PLAN 1
IV insulin for today  Basal insulin dose for tonight - can stop IV insulin 2 hours after basal insulin if glucose stable  Sliding scale and premeal Admelog in AM starting with breakfast   Will continue monitoring FS, log, and glucose trends, will Follow up.  Patient counseled for compliance with consistent low carb diet and exercise as tolerated outpatient.

## 2024-05-30 NOTE — PHYSICAL THERAPY INITIAL EVALUATION ADULT - MANUAL MUSCLE TESTING RESULTS, REHAB EVAL
Strength is at least 3/5 through extremities/grossly assessed due to
at least 3+/5 throughout 4 extremities/grossly assessed due to

## 2024-05-30 NOTE — PHYSICAL THERAPY INITIAL EVALUATION ADULT - GENERAL OBSERVATIONS, REHAB EVAL
Pt rec'd supine in bed NAD, +UE IV.
Pt received OOB in chair +ICU monitoring lines, +external pacer, +CT x3, +NC, +lizama, +prevena, ok for PT per RN Alisia.

## 2024-05-30 NOTE — PROGRESS NOTE ADULT - SUBJECTIVE AND OBJECTIVE BOX
Chief complaint  Patient is a 69y old  Female who presents with a chief complaint of chest pain/shortness of breath (30 May 2024 10:45)         Labs and Fingersticks  CAPILLARY BLOOD GLUCOSE  147 (30 May 2024 07:00)  143 (30 May 2024 06:00)  166 (30 May 2024 05:00)  135 (30 May 2024 04:00)  133 (30 May 2024 03:00)  146 (30 May 2024 02:00)  146 (30 May 2024 01:00)  146 (30 May 2024 00:00)  174 (29 May 2024 20:00)      POCT Blood Glucose.: 129 mg/dL (30 May 2024 13:25)  POCT Blood Glucose.: 183 mg/dL (30 May 2024 12:01)  POCT Blood Glucose.: 140 mg/dL (30 May 2024 10:32)  POCT Blood Glucose.: 196 mg/dL (30 May 2024 08:58)  POCT Blood Glucose.: 153 mg/dL (30 May 2024 07:47)  POCT Blood Glucose.: 147 mg/dL (30 May 2024 06:46)  POCT Blood Glucose.: 143 mg/dL (30 May 2024 06:12)  POCT Blood Glucose.: 166 mg/dL (30 May 2024 05:26)  POCT Blood Glucose.: 135 mg/dL (30 May 2024 04:00)  POCT Blood Glucose.: 133 mg/dL (30 May 2024 03:00)  POCT Blood Glucose.: 146 mg/dL (30 May 2024 01:58)  POCT Blood Glucose.: 146 mg/dL (30 May 2024 01:03)  POCT Blood Glucose.: 146 mg/dL (30 May 2024 00:03)  POCT Blood Glucose.: 156 mg/dL (29 May 2024 21:46)  POCT Blood Glucose.: 165 mg/dL (29 May 2024 20:56)  POCT Blood Glucose.: 175 mg/dL (29 May 2024 19:56)      Anion Gap: 15 (05-30 @ 11:11)  Anion Gap: 16 (05-30 @ 00:28)  Anion Gap: 14 (05-29 @ 19:54)      Calcium: 8.6 (05-30 @ 11:11)  Calcium: 9.0 (05-30 @ 00:28)  Calcium: 8.5 (05-29 @ 19:54)  Albumin: 4.3 (05-30 @ 11:11)  Albumin: 4.1 (05-30 @ 00:28)  Albumin: 3.6 (05-29 @ 19:54)    Alanine Aminotransferase (ALT/SGPT): 17 (05-30 @ 11:11)  Alanine Aminotransferase (ALT/SGPT): 19 (05-30 @ 00:28)  Alanine Aminotransferase (ALT/SGPT): 16 (05-29 @ 19:54)  Alkaline Phosphatase: 55 (05-30 @ 11:11)  Alkaline Phosphatase: 69 (05-30 @ 00:28)  Alkaline Phosphatase: 82 (05-29 @ 19:54)  Aspartate Aminotransferase (AST/SGOT): 41 *H* (05-30 @ 11:11)  Aspartate Aminotransferase (AST/SGOT): 46 *H* (05-30 @ 00:28)  Aspartate Aminotransferase (AST/SGOT): 45 *H* (05-29 @ 19:54)        05-30    139  |  104  |  17  ----------------------------<  166<H>  4.2   |  20<L>  |  0.97    Ca    8.6      30 May 2024 11:11  Phos  3.9     05-30  Mg     2.0     05-30    TPro  5.8<L>  /  Alb  4.3  /  TBili  0.4  /  DBili  x   /  AST  41<H>  /  ALT  17  /  AlkPhos  55  05-30                        9.5    20.27 )-----------( 191      ( 30 May 2024 00:28 )             29.0     Medications  MEDICATIONS  (STANDING):  acetaminophen     Tablet .. 650 milliGRAM(s) Oral every 6 hours  albumin human  5% IVPB 250 milliLiter(s) IV Intermittent once  albuterol/ipratropium for Nebulization 3 milliLiter(s) Nebulizer every 6 hours  aMIOdarone    Tablet 400 milliGRAM(s) Oral two times a day  ascorbic acid 500 milliGRAM(s) Oral two times a day  aspirin enteric coated 81 milliGRAM(s) Oral daily  aspirin Suppository 300 milliGRAM(s) Rectal once  atorvastatin 80 milliGRAM(s) Oral at bedtime  buDESOnide    Inhalation Suspension 0.5 milliGRAM(s) Inhalation every 12 hours  cefuroxime  IVPB 1500 milliGRAM(s) IV Intermittent every 8 hours  chlorhexidine 0.12% Liquid 15 milliLiter(s) Oral Mucosa every 12 hours  chlorhexidine 2% Cloths 1 Application(s) Topical daily  dexMEDEtomidine Infusion 0.7 MICROgram(s)/kG/Hr (17.9 mL/Hr) IV Continuous <Continuous>  dextrose 50% Injectable 50 milliLiter(s) IV Push every 15 minutes  dextrose 50% Injectable 25 milliLiter(s) IV Push every 15 minutes  dextrose 50% Injectable 50 milliLiter(s) IV Push every 15 minutes  dextrose 50% Injectable 25 milliLiter(s) IV Push every 15 minutes  enoxaparin Injectable 40 milliGRAM(s) SubCutaneous every 24 hours  gabapentin 100 milliGRAM(s) Oral every 8 hours  insulin regular Infusion 3 Unit(s)/Hr (3 mL/Hr) IV Continuous <Continuous>  levothyroxine 25 MICROGram(s) Oral daily  meperidine     Injectable 25 milliGRAM(s) IV Push once  metoclopramide Injectable 10 milliGRAM(s) IV Push every 8 hours  norepinephrine Infusion 0.05 MICROgram(s)/kG/Min (9.56 mL/Hr) IV Continuous <Continuous>  polyethylene glycol 3350 17 Gram(s) Oral daily  potassium chloride  10 mEq/50 mL IVPB 10 milliEquivalent(s) IV Intermittent every 1 hour  potassium chloride  10 mEq/50 mL IVPB 10 milliEquivalent(s) IV Intermittent every 1 hour  potassium chloride  10 mEq/50 mL IVPB 10 milliEquivalent(s) IV Intermittent every 1 hour  senna 2 Tablet(s) Oral at bedtime  sodium bicarbonate 325 milliGRAM(s) Oral every 12 hours  sodium chloride 0.9%. 1000 milliLiter(s) (10 mL/Hr) IV Continuous <Continuous>      Physical Exam  General: Patient comfortable in bed   Vital Signs Last 12 Hrs  T(F): 99 (05-30-24 @ 12:00), Max: 99.1 (05-30-24 @ 04:00)  HR: 88 (05-30-24 @ 14:15) (80 - 90)  BP: 110/53 (05-30-24 @ 14:15) (94/46 - 125/58)  BP(mean): 76 (05-30-24 @ 14:15) (67 - 84)  RR: 16 (05-30-24 @ 14:15) (14 - 29)  SpO2: 94% (05-30-24 @ 14:15) (89% - 99%)    CVS: S1S2   Respiratory: No wheezing, no crepitations  GI: Abdomen soft, bowel sounds positive  Musculoskeletal:  moves all extremities  : Voiding      Chief complaint  Patient is a 69y old  Female who presents with a chief complaint of chest pain/shortness of breath (30 May 2024 10:45)         Labs and Fingersticks  CAPILLARY BLOOD GLUCOSE  147 (30 May 2024 07:00)  143 (30 May 2024 06:00)  166 (30 May 2024 05:00)  135 (30 May 2024 04:00)  133 (30 May 2024 03:00)  146 (30 May 2024 02:00)  146 (30 May 2024 01:00)  146 (30 May 2024 00:00)  174 (29 May 2024 20:00)      POCT Blood Glucose.: 129 mg/dL (30 May 2024 13:25)  POCT Blood Glucose.: 183 mg/dL (30 May 2024 12:01)  POCT Blood Glucose.: 140 mg/dL (30 May 2024 10:32)  POCT Blood Glucose.: 196 mg/dL (30 May 2024 08:58)  POCT Blood Glucose.: 153 mg/dL (30 May 2024 07:47)  POCT Blood Glucose.: 147 mg/dL (30 May 2024 06:46)  POCT Blood Glucose.: 143 mg/dL (30 May 2024 06:12)  POCT Blood Glucose.: 166 mg/dL (30 May 2024 05:26)  POCT Blood Glucose.: 135 mg/dL (30 May 2024 04:00)  POCT Blood Glucose.: 133 mg/dL (30 May 2024 03:00)  POCT Blood Glucose.: 146 mg/dL (30 May 2024 01:58)  POCT Blood Glucose.: 146 mg/dL (30 May 2024 01:03)  POCT Blood Glucose.: 146 mg/dL (30 May 2024 00:03)  POCT Blood Glucose.: 156 mg/dL (29 May 2024 21:46)  POCT Blood Glucose.: 165 mg/dL (29 May 2024 20:56)  POCT Blood Glucose.: 175 mg/dL (29 May 2024 19:56)      Anion Gap: 15 (05-30 @ 11:11)  Anion Gap: 16 (05-30 @ 00:28)  Anion Gap: 14 (05-29 @ 19:54)      Calcium: 8.6 (05-30 @ 11:11)  Calcium: 9.0 (05-30 @ 00:28)  Calcium: 8.5 (05-29 @ 19:54)  Albumin: 4.3 (05-30 @ 11:11)  Albumin: 4.1 (05-30 @ 00:28)  Albumin: 3.6 (05-29 @ 19:54)    Alanine Aminotransferase (ALT/SGPT): 17 (05-30 @ 11:11)  Alanine Aminotransferase (ALT/SGPT): 19 (05-30 @ 00:28)  Alanine Aminotransferase (ALT/SGPT): 16 (05-29 @ 19:54)  Alkaline Phosphatase: 55 (05-30 @ 11:11)  Alkaline Phosphatase: 69 (05-30 @ 00:28)  Alkaline Phosphatase: 82 (05-29 @ 19:54)  Aspartate Aminotransferase (AST/SGOT): 41 *H* (05-30 @ 11:11)  Aspartate Aminotransferase (AST/SGOT): 46 *H* (05-30 @ 00:28)  Aspartate Aminotransferase (AST/SGOT): 45 *H* (05-29 @ 19:54)        05-30    139  |  104  |  17  ----------------------------<  166<H>  4.2   |  20<L>  |  0.97    Ca    8.6      30 May 2024 11:11  Phos  3.9     05-30  Mg     2.0     05-30    TPro  5.8<L>  /  Alb  4.3  /  TBili  0.4  /  DBili  x   /  AST  41<H>  /  ALT  17  /  AlkPhos  55  05-30                        9.5    20.27 )-----------( 191      ( 30 May 2024 00:28 )             29.0     Medications  MEDICATIONS  (STANDING):  acetaminophen     Tablet .. 650 milliGRAM(s) Oral every 6 hours  albumin human  5% IVPB 250 milliLiter(s) IV Intermittent once  albuterol/ipratropium for Nebulization 3 milliLiter(s) Nebulizer every 6 hours  aMIOdarone    Tablet 400 milliGRAM(s) Oral two times a day  ascorbic acid 500 milliGRAM(s) Oral two times a day  aspirin enteric coated 81 milliGRAM(s) Oral daily  aspirin Suppository 300 milliGRAM(s) Rectal once  atorvastatin 80 milliGRAM(s) Oral at bedtime  buDESOnide    Inhalation Suspension 0.5 milliGRAM(s) Inhalation every 12 hours  cefuroxime  IVPB 1500 milliGRAM(s) IV Intermittent every 8 hours  chlorhexidine 0.12% Liquid 15 milliLiter(s) Oral Mucosa every 12 hours  chlorhexidine 2% Cloths 1 Application(s) Topical daily  dexMEDEtomidine Infusion 0.7 MICROgram(s)/kG/Hr (17.9 mL/Hr) IV Continuous <Continuous>  dextrose 50% Injectable 50 milliLiter(s) IV Push every 15 minutes  dextrose 50% Injectable 25 milliLiter(s) IV Push every 15 minutes  dextrose 50% Injectable 50 milliLiter(s) IV Push every 15 minutes  dextrose 50% Injectable 25 milliLiter(s) IV Push every 15 minutes  enoxaparin Injectable 40 milliGRAM(s) SubCutaneous every 24 hours  gabapentin 100 milliGRAM(s) Oral every 8 hours  insulin regular Infusion 3 Unit(s)/Hr (3 mL/Hr) IV Continuous <Continuous>  levothyroxine 25 MICROGram(s) Oral daily  meperidine     Injectable 25 milliGRAM(s) IV Push once  metoclopramide Injectable 10 milliGRAM(s) IV Push every 8 hours  norepinephrine Infusion 0.05 MICROgram(s)/kG/Min (9.56 mL/Hr) IV Continuous <Continuous>  polyethylene glycol 3350 17 Gram(s) Oral daily  potassium chloride  10 mEq/50 mL IVPB 10 milliEquivalent(s) IV Intermittent every 1 hour  potassium chloride  10 mEq/50 mL IVPB 10 milliEquivalent(s) IV Intermittent every 1 hour  potassium chloride  10 mEq/50 mL IVPB 10 milliEquivalent(s) IV Intermittent every 1 hour  senna 2 Tablet(s) Oral at bedtime  sodium bicarbonate 325 milliGRAM(s) Oral every 12 hours  sodium chloride 0.9%. 1000 milliLiter(s) (10 mL/Hr) IV Continuous <Continuous>      Physical Exam  General: Patient comfortable in bed   Vital Signs Last 12 Hrs  T(F): 99 (05-30-24 @ 12:00), Max: 99.1 (05-30-24 @ 04:00)  HR: 88 (05-30-24 @ 14:15) (80 - 90)  BP: 110/53 (05-30-24 @ 14:15) (94/46 - 125/58)  BP(mean): 76 (05-30-24 @ 14:15) (67 - 84)  RR: 16 (05-30-24 @ 14:15) (14 - 29)  SpO2: 94% (05-30-24 @ 14:15) (89% - 99%)    CVS: S1S2   Respiratory: No wheezing, no crepitations  GI: Abdomen soft, bowel sounds positive  Musculoskeletal:  moves all extremities  : Voiding

## 2024-05-30 NOTE — PROGRESS NOTE ADULT - ASSESSMENT
70 y/o female with PMH of HTN/HLD, DMT2, Asthma who presented to ED to be evaluated for chest pain associated with exertional shortness of breath. Admitted for cardiac workup.       Unstable Angina -CAD-Multivessel disease  - Patient with apparent worsening dyspnea, chest pain on exertion   - Trop negative x 2   - No acute changes on EKG compared to previous.  - Pt notes normal prior stress test with Dr. Buchanan 2022.   - CT Chest reviewed, w/ calcifications in coronaries   - s/p ASA & Plavix load, cath-Diffuse multivessel CAD with intermediate syntax score.   - 5/25 P2y12 213,  - Transferred to [facility] for CTS evaluation   - Now s/p CABG x 3 on 5/29: LIMA to LAD, SVG to Ramus, SVG to RPDA, NEVIN clipping  - did have some addl sxs 5/27 and was given nitrates with relief  - Initiated on Amio as per CTS protocol  - Continue ASA  - High intensity statin on hold, resume when able  - BB when able    - ProBNP 74; no previous diagnosis of CHF, no prior TTE   - TTE with preserved LV and RV function, no significant valvular disease    - On minimal levo at 0.03 this AM, wean as tolerated  - Home Amlodipine on hold  - Hold home ARB     DM2 (diabetes mellitus, type 2).   - dm diet  -A1c- 8.7%  lantus 30 units qhs  ademelog 6 ac  Would consider initiation of GLP agonist in the future given her DM + CAD      History of Hashimoto thyroiditis.   -c/w synthroid   - Monitor and replete lytes, keep K>4, Mg>2.  - Other cardiovascular workup will depend on clinical course.  - All other workup per primary team.  - Will continue to follow.

## 2024-05-30 NOTE — PROGRESS NOTE ADULT - SUBJECTIVE AND OBJECTIVE BOX
[facility] - Toshia Tony Pannella, Patel, Savella, Cohen  Office Number:  [phone]    Interval Events:   - S/p CABG 5/29  - Extubated  - Remains on low dose pressors, levo 0.03  - Minimal O2 NC  - Sitting in the chair this AM, some pain with inspiration    ROS: negative unless otherwise mentioned.    Telemetry: SR    MEDICATIONS  (STANDING):  acetaminophen     Tablet .. 650 milliGRAM(s) Oral every 6 hours  albumin human  5% IVPB 250 milliLiter(s) IV Intermittent once  albuterol/ipratropium for Nebulization 3 milliLiter(s) Nebulizer every 6 hours  aMIOdarone    Tablet 400 milliGRAM(s) Oral two times a day  ascorbic acid 500 milliGRAM(s) Oral two times a day  aspirin enteric coated 81 milliGRAM(s) Oral daily  aspirin Suppository 300 milliGRAM(s) Rectal once  buDESOnide    Inhalation Suspension 0.5 milliGRAM(s) Inhalation every 12 hours  cefuroxime  IVPB 1500 milliGRAM(s) IV Intermittent every 8 hours  chlorhexidine 0.12% Liquid 15 milliLiter(s) Oral Mucosa every 12 hours  chlorhexidine 2% Cloths 1 Application(s) Topical daily  dexMEDEtomidine Infusion 0.7 MICROgram(s)/kG/Hr (17.9 mL/Hr) IV Continuous <Continuous>  dextrose 50% Injectable 25 milliLiter(s) IV Push every 15 minutes  dextrose 50% Injectable 50 milliLiter(s) IV Push every 15 minutes  dextrose 50% Injectable 25 milliLiter(s) IV Push every 15 minutes  dextrose 50% Injectable 50 milliLiter(s) IV Push every 15 minutes  gabapentin 100 milliGRAM(s) Oral every 8 hours  insulin regular Infusion 3 Unit(s)/Hr (3 mL/Hr) IV Continuous <Continuous>  levothyroxine 25 MICROGram(s) Oral daily  meperidine     Injectable 25 milliGRAM(s) IV Push once  metoclopramide Injectable 10 milliGRAM(s) IV Push every 8 hours  norepinephrine Infusion 0.05 MICROgram(s)/kG/Min (9.56 mL/Hr) IV Continuous <Continuous>  pantoprazole  Injectable 40 milliGRAM(s) IV Push daily  polyethylene glycol 3350 17 Gram(s) Oral daily  potassium chloride  10 mEq/50 mL IVPB 10 milliEquivalent(s) IV Intermittent every 1 hour  potassium chloride  10 mEq/50 mL IVPB 10 milliEquivalent(s) IV Intermittent every 1 hour  potassium chloride  10 mEq/50 mL IVPB 10 milliEquivalent(s) IV Intermittent every 1 hour  senna 2 Tablet(s) Oral at bedtime  sodium chloride 0.9%. 1000 milliLiter(s) (10 mL/Hr) IV Continuous <Continuous>    MEDICATIONS  (PRN):  HYDROmorphone  Injectable 0.5 milliGRAM(s) IV Push every 6 hours PRN Breakthrough Pain  oxyCODONE    IR 10 milliGRAM(s) Oral every 4 hours PRN Severe Pain (7 - 10)  oxyCODONE    IR 5 milliGRAM(s) Oral every 4 hours PRN Moderate Pain (4 - 6)      Allergies    penicillin (Hives)    Intolerances        Vital Signs Last 24 Hrs  T(C): 37.3 (30 May 2024 08:00), Max: 37.3 (30 May 2024 04:00)  T(F): 99.1 (30 May 2024 08:00), Max: 99.1 (30 May 2024 04:00)  HR: 85 (30 May 2024 09:15) (63 - 93)  BP: 100/49 (30 May 2024 09:00) (94/46 - 127/74)  BP(mean): 70 (30 May 2024 09:00) (67 - 83)  RR: 28 (30 May 2024 09:15) (12 - 34)  SpO2: 93% (30 May 2024 09:15) (93% - 100%)    Parameters below as of 30 May 2024 08:00  Patient On (Oxygen Delivery Method): nasal cannula  O2 Flow (L/min): 4      I&O's Summary    29 May 2024 07:01  -  30 May 2024 07:00  --------------------------------------------------------  IN: 1782.5 mL / OUT: 2280 mL / NET: -497.5 mL    30 May 2024 07:01  -  30 May 2024 10:22  --------------------------------------------------------  IN: 276.2 mL / OUT: 90 mL / NET: 186.2 mL        ON EXAM:    General: NAD, awake and alert, oriented x 3  HEENT: Mucous membranes are moist, anicteric  Lungs: Non-labored, breath sounds are clear bilaterally, No wheezing, rales or rhonchi  Cardiovascular: Regular, S1 and S2, no murmurs, rubs, or gallops  Gastrointestinal: Bowel Sounds present, soft, nontender.   Lymph: No peripheral edema. No lymphadenopathy.  Skin: No rashes or ulcers  Psych:  Mood & affect appropriate    LABS: All Labs Reviewed:                        9.5    20.27 )-----------( 191      ( 30 May 2024 00:28 )             29.0                         8.6    31.27 )-----------( 217      ( 29 May 2024 19:54 )             27.1                         12.2   12.70 )-----------( 249      ( 28 May 2024 06:05 )             39.0     30 May 2024 00:28    144    |  107    |  12     ----------------------------<  147    4.3     |  21     |  0.79   29 May 2024 19:54    141    |  106    |  13     ----------------------------<  166    3.9     |  21     |  0.81   28 May 2024 06:05    137    |  102    |  21     ----------------------------<  172    4.0     |  21     |  0.93     Ca    9.0        30 May 2024 00:28  Ca    8.5        29 May 2024 19:54  Ca    10.2       28 May 2024 06:05  Phos  3.2       30 May 2024 00:28  Phos  3.3       29 May 2024 19:54  Mg     2.3       30 May 2024 00:28  Mg     2.8       29 May 2024 19:54    TPro  6.2    /  Alb  4.1    /  TBili  1.2    /  DBili  x      /  AST  46     /  ALT  19     /  AlkPhos  69     30 May 2024 00:28  TPro  5.7    /  Alb  3.6    /  TBili  0.6    /  DBili  x      /  AST  45     /  ALT  16     /  AlkPhos  82     29 May 2024 19:54    PT/INR - ( 30 May 2024 00:28 )   PT: 12.7 sec;   INR: 1.22 ratio         PTT - ( 30 May 2024 00:28 )  PTT:26.9 sec  CARDIAC MARKERS ( 29 May 2024 19:54 )  x     / x     / 502 U/L / x     / 28.9 ng/mL      Blood Culture:     TTE 5/28/24:  CONCLUSIONS:      1. Left ventricular cavity is normal in size. Left ventricular wall thickness is normal. Left ventricular systolic function is normal with an ejection fraction of 69 % by Rhodes's method of disks. There are no regional wall motion abnormalities seen.   2. Basal and mid anterior wall is abnormal.   3. There is mild (grade 1) left ventricular diastolic dysfunction, with normal filling pressure.   4. Normal right ventricular cavity size, with normal wall thickness, and normal systolic function.   5. Normal left and right atrial size.   6. No significant valvular disease.   7. No pericardial effusion seen.   8. No prior echocardiogram is available for comparison.    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    Recommendations:     CTS eval for CABG

## 2024-05-30 NOTE — PHYSICAL THERAPY INITIAL EVALUATION ADULT - NSPTDMEREC_GEN_A_CORE
Pt will require a rolling walker at home due to diagnosis of decreased strength causing decreased balance and unsteadiness during ambulation to help complete their MRADLs./rolling walker

## 2024-05-30 NOTE — PROGRESS NOTE ADULT - ASSESSMENT
70 y/o female with PMH of HTN/HLD, DMT2, Asthma. obesity, hoshimoto's, former smoker  who presented to ED to be evaluated for chest pain, now admitted to CTS for surgery eval.     Assessment  DMT2: 69y Female with DM T2 with hyperglycemia, A1C 8.7% , was on oral meds and insulin at home  (basaglar) , now s/p surgery, extubated and starting to eat meals. On IV insulin   CAD: on medications, stable, monitored. CTS eval   Hypothyroidism: On Synthroid 25 mcg po daily, compliant with Synthroid intake, asymptomatic. TFTs euthyroid.   HTN: on antihypertensive medications, monitored, asymptomatic.  Obesity: No strict exercise routines, not on any weight loss program, neither on low calorie diet.      Discussed plan and management with Dr Jyoti Sweeney NP - TEAMS  Santana Montes MD  Cell: [phone]  Office: [phone]            68 y/o female with PMH of HTN/HLD, DMT2, Asthma. obesity, hoshimoto's, former smoker  who presented to ED to be evaluated for chest pain, now admitted to CTS for surgery eval.     Assessment  DMT2: 69y Female with DM T2 with hyperglycemia, A1C 8.7% , was on oral meds and insulin at  home  (basaglar) , now s/p surgery, extubated and starting to eat meals. On IV insulin   CAD: on medications, stable, monitored. CTS eval   Hypothyroidism: On Synthroid 25 mcg po daily, compliant with Synthroid intake, asymptomatic. TFTs euthyroid.   HTN: on antihypertensive medications, monitored, asymptomatic.  Obesity: No strict exercise routines, not on any weight loss program, neither on low calorie diet.      Discussed plan and management with Dr Jyoti Sweeney NP - TEAMS  Santana Montes MD  Cell: [phone]  Office: [phone]

## 2024-05-30 NOTE — PHYSICAL THERAPY INITIAL EVALUATION ADULT - PERTINENT HX OF CURRENT PROBLEM, REHAB EVAL
70 y/o female with PMH of HTN/HLD, DMT2, Asthma. obesity, hoshimoto's, former smoker  who presented to ED to be evaluated for chest pain associated with exertional shortness of breath. this afternoon pt was climbing stairs to go from one building to the next and started to develop sharp neck pain radiating to her back and both the shoulders.  pt pain lasted for about 30 sec and dissipated upon rest. pt was prescribed advair which she stopped taking due to thrush.  admits of being compliance to all other  medication at home in ED cardiology on call was consulted and pt received 324 mg aspirin with 300 mg plavix. recommendation was  not to start on heparin drip pt is currently asymptomatic. EKG w/o acute ST wave changes; Cath performed at [facility] which revealed multivessel cad; pt transferred for [facility] eval with Dr. Goncalves, Pt asymptomatic at this time. pt also developed chest pain after sheath was pulled- nitropaste given and chest pain was resolved.
68 y/o female with PMH of HTN/HLD, DMT2, Asthma. obesity, Hashimoto's, former smoker who presented to ED to be evaluated for chest pain associated with exertional shortness of breath    CAD s/p CABG, with CHIOMA on 05/29

## 2024-05-31 ENCOUNTER — TRANSCRIPTION ENCOUNTER (OUTPATIENT)
Age: 69
End: 2024-05-31

## 2024-05-31 LAB
ALBUMIN SERPL ELPH-MCNC: 4.1 G/DL — SIGNIFICANT CHANGE UP (ref 3.3–5)
ALP SERPL-CCNC: 83 U/L — SIGNIFICANT CHANGE UP (ref 40–120)
ALT FLD-CCNC: 59 U/L — HIGH (ref 10–45)
ANION GAP SERPL CALC-SCNC: 15 MMOL/L — SIGNIFICANT CHANGE UP (ref 5–17)
AST SERPL-CCNC: 145 U/L — HIGH (ref 10–40)
BASOPHILS # BLD AUTO: 0.03 K/UL — SIGNIFICANT CHANGE UP (ref 0–0.2)
BASOPHILS NFR BLD AUTO: 0.2 % — SIGNIFICANT CHANGE UP (ref 0–2)
BILIRUB SERPL-MCNC: 0.7 MG/DL — SIGNIFICANT CHANGE UP (ref 0.2–1.2)
BUN SERPL-MCNC: 25 MG/DL — HIGH (ref 7–23)
CALCIUM SERPL-MCNC: 8.8 MG/DL — SIGNIFICANT CHANGE UP (ref 8.4–10.5)
CHLORIDE SERPL-SCNC: 100 MMOL/L — SIGNIFICANT CHANGE UP (ref 96–108)
CO2 SERPL-SCNC: 21 MMOL/L — LOW (ref 22–31)
CREAT SERPL-MCNC: 1.14 MG/DL — SIGNIFICANT CHANGE UP (ref 0.5–1.3)
EGFR: 53 ML/MIN/1.73M2 — LOW
EOSINOPHIL # BLD AUTO: 0 K/UL — SIGNIFICANT CHANGE UP (ref 0–0.5)
EOSINOPHIL NFR BLD AUTO: 0 % — SIGNIFICANT CHANGE UP (ref 0–6)
GLUCOSE BLDC GLUCOMTR-MCNC: 104 MG/DL — HIGH (ref 70–99)
GLUCOSE BLDC GLUCOMTR-MCNC: 106 MG/DL — HIGH (ref 70–99)
GLUCOSE BLDC GLUCOMTR-MCNC: 110 MG/DL — HIGH (ref 70–99)
GLUCOSE BLDC GLUCOMTR-MCNC: 113 MG/DL — HIGH (ref 70–99)
GLUCOSE BLDC GLUCOMTR-MCNC: 128 MG/DL — HIGH (ref 70–99)
GLUCOSE BLDC GLUCOMTR-MCNC: 145 MG/DL — HIGH (ref 70–99)
GLUCOSE BLDC GLUCOMTR-MCNC: 152 MG/DL — HIGH (ref 70–99)
GLUCOSE BLDC GLUCOMTR-MCNC: 164 MG/DL — HIGH (ref 70–99)
GLUCOSE BLDC GLUCOMTR-MCNC: 185 MG/DL — HIGH (ref 70–99)
GLUCOSE BLDC GLUCOMTR-MCNC: 187 MG/DL — HIGH (ref 70–99)
GLUCOSE BLDC GLUCOMTR-MCNC: 200 MG/DL — HIGH (ref 70–99)
GLUCOSE BLDC GLUCOMTR-MCNC: 210 MG/DL — HIGH (ref 70–99)
GLUCOSE BLDC GLUCOMTR-MCNC: 226 MG/DL — HIGH (ref 70–99)
GLUCOSE BLDC GLUCOMTR-MCNC: 234 MG/DL — HIGH (ref 70–99)
GLUCOSE BLDC GLUCOMTR-MCNC: 235 MG/DL — HIGH (ref 70–99)
GLUCOSE BLDC GLUCOMTR-MCNC: 260 MG/DL — HIGH (ref 70–99)
GLUCOSE BLDC GLUCOMTR-MCNC: 434 MG/DL — HIGH (ref 70–99)
GLUCOSE BLDC GLUCOMTR-MCNC: 81 MG/DL — SIGNIFICANT CHANGE UP (ref 70–99)
GLUCOSE BLDC GLUCOMTR-MCNC: 88 MG/DL — SIGNIFICANT CHANGE UP (ref 70–99)
GLUCOSE SERPL-MCNC: 255 MG/DL — HIGH (ref 70–99)
HCT VFR BLD CALC: 28.4 % — LOW (ref 34.5–45)
HGB BLD-MCNC: 9.1 G/DL — LOW (ref 11.5–15.5)
IMM GRANULOCYTES NFR BLD AUTO: 0.9 % — SIGNIFICANT CHANGE UP (ref 0–0.9)
LYMPHOCYTES # BLD AUTO: 1.71 K/UL — SIGNIFICANT CHANGE UP (ref 1–3.3)
LYMPHOCYTES # BLD AUTO: 9.7 % — LOW (ref 13–44)
MAGNESIUM SERPL-MCNC: 2.5 MG/DL — SIGNIFICANT CHANGE UP (ref 1.6–2.6)
MCHC RBC-ENTMCNC: 27.9 PG — SIGNIFICANT CHANGE UP (ref 27–34)
MCHC RBC-ENTMCNC: 32 GM/DL — SIGNIFICANT CHANGE UP (ref 32–36)
MCV RBC AUTO: 87.1 FL — SIGNIFICANT CHANGE UP (ref 80–100)
MONOCYTES # BLD AUTO: 1.71 K/UL — HIGH (ref 0–0.9)
MONOCYTES NFR BLD AUTO: 9.7 % — SIGNIFICANT CHANGE UP (ref 2–14)
NEUTROPHILS # BLD AUTO: 13.99 K/UL — HIGH (ref 1.8–7.4)
NEUTROPHILS NFR BLD AUTO: 79.5 % — HIGH (ref 43–77)
NRBC # BLD: 0 /100 WBCS — SIGNIFICANT CHANGE UP (ref 0–0)
PHOSPHATE SERPL-MCNC: 3.8 MG/DL — SIGNIFICANT CHANGE UP (ref 2.5–4.5)
PLATELET # BLD AUTO: 129 K/UL — LOW (ref 150–400)
POTASSIUM SERPL-MCNC: 4.6 MMOL/L — SIGNIFICANT CHANGE UP (ref 3.5–5.3)
POTASSIUM SERPL-SCNC: 4.6 MMOL/L — SIGNIFICANT CHANGE UP (ref 3.5–5.3)
PROT SERPL-MCNC: 6.1 G/DL — SIGNIFICANT CHANGE UP (ref 6–8.3)
RBC # BLD: 3.26 M/UL — LOW (ref 3.8–5.2)
RBC # FLD: 15 % — HIGH (ref 10.3–14.5)
SODIUM SERPL-SCNC: 136 MMOL/L — SIGNIFICANT CHANGE UP (ref 135–145)
WBC # BLD: 17.59 K/UL — HIGH (ref 3.8–10.5)
WBC # FLD AUTO: 17.59 K/UL — HIGH (ref 3.8–10.5)

## 2024-05-31 PROCEDURE — 99291 CRITICAL CARE FIRST HOUR: CPT

## 2024-05-31 PROCEDURE — 99233 SBSQ HOSP IP/OBS HIGH 50: CPT

## 2024-05-31 PROCEDURE — 71045 X-RAY EXAM CHEST 1 VIEW: CPT | Mod: 26

## 2024-05-31 RX ORDER — METOPROLOL TARTRATE 50 MG
12.5 TABLET ORAL EVERY 12 HOURS
Refills: 0 | Status: DISCONTINUED | OUTPATIENT
Start: 2024-05-31 | End: 2024-06-04

## 2024-05-31 RX ORDER — FUROSEMIDE 40 MG
20 TABLET ORAL ONCE
Refills: 0 | Status: COMPLETED | OUTPATIENT
Start: 2024-05-31 | End: 2024-05-31

## 2024-05-31 RX ORDER — BENZOCAINE AND MENTHOL 5; 1 G/100ML; G/100ML
1 LIQUID ORAL
Refills: 0 | Status: DISCONTINUED | OUTPATIENT
Start: 2024-05-31 | End: 2024-06-05

## 2024-05-31 RX ADMIN — POLYETHYLENE GLYCOL 3350 17 GRAM(S): 17 POWDER, FOR SOLUTION ORAL at 13:31

## 2024-05-31 RX ADMIN — Medication 650 MILLIGRAM(S): at 05:40

## 2024-05-31 RX ADMIN — OXYCODONE HYDROCHLORIDE 5 MILLIGRAM(S): 5 TABLET ORAL at 21:43

## 2024-05-31 RX ADMIN — OXYCODONE HYDROCHLORIDE 5 MILLIGRAM(S): 5 TABLET ORAL at 10:00

## 2024-05-31 RX ADMIN — Medication 325 MILLIGRAM(S): at 17:23

## 2024-05-31 RX ADMIN — CHLORHEXIDINE GLUCONATE 1 APPLICATION(S): 213 SOLUTION TOPICAL at 12:39

## 2024-05-31 RX ADMIN — OXYCODONE HYDROCHLORIDE 5 MILLIGRAM(S): 5 TABLET ORAL at 22:15

## 2024-05-31 RX ADMIN — Medication 25 MICROGRAM(S): at 05:09

## 2024-05-31 RX ADMIN — Medication 100 MILLIGRAM(S): at 05:18

## 2024-05-31 RX ADMIN — OXYCODONE HYDROCHLORIDE 5 MILLIGRAM(S): 5 TABLET ORAL at 04:50

## 2024-05-31 RX ADMIN — SENNA PLUS 2 TABLET(S): 8.6 TABLET ORAL at 21:43

## 2024-05-31 RX ADMIN — Medication 325 MILLIGRAM(S): at 05:08

## 2024-05-31 RX ADMIN — Medication 3 MILLILITER(S): at 05:39

## 2024-05-31 RX ADMIN — ENOXAPARIN SODIUM 40 MILLIGRAM(S): 100 INJECTION SUBCUTANEOUS at 13:31

## 2024-05-31 RX ADMIN — AMIODARONE HYDROCHLORIDE 400 MILLIGRAM(S): 400 TABLET ORAL at 17:23

## 2024-05-31 RX ADMIN — Medication 10 MILLIGRAM(S): at 13:31

## 2024-05-31 RX ADMIN — BENZOCAINE AND MENTHOL 1 LOZENGE: 5; 1 LIQUID ORAL at 04:07

## 2024-05-31 RX ADMIN — GABAPENTIN 100 MILLIGRAM(S): 400 CAPSULE ORAL at 13:31

## 2024-05-31 RX ADMIN — INSULIN HUMAN 3 UNIT(S)/HR: 100 INJECTION, SOLUTION SUBCUTANEOUS at 20:55

## 2024-05-31 RX ADMIN — ATORVASTATIN CALCIUM 80 MILLIGRAM(S): 80 TABLET, FILM COATED ORAL at 21:43

## 2024-05-31 RX ADMIN — Medication 650 MILLIGRAM(S): at 05:09

## 2024-05-31 RX ADMIN — Medication 10 MILLIGRAM(S): at 05:08

## 2024-05-31 RX ADMIN — Medication 81 MILLIGRAM(S): at 13:31

## 2024-05-31 RX ADMIN — Medication 12.5 MILLIGRAM(S): at 17:23

## 2024-05-31 RX ADMIN — OXYCODONE HYDROCHLORIDE 5 MILLIGRAM(S): 5 TABLET ORAL at 16:34

## 2024-05-31 RX ADMIN — AMIODARONE HYDROCHLORIDE 400 MILLIGRAM(S): 400 TABLET ORAL at 05:08

## 2024-05-31 RX ADMIN — Medication 0.5 MILLIGRAM(S): at 05:39

## 2024-05-31 RX ADMIN — Medication 20 MILLIGRAM(S): at 05:08

## 2024-05-31 RX ADMIN — Medication 500 MILLIGRAM(S): at 05:08

## 2024-05-31 RX ADMIN — OXYCODONE HYDROCHLORIDE 5 MILLIGRAM(S): 5 TABLET ORAL at 04:19

## 2024-05-31 RX ADMIN — Medication 500 MILLIGRAM(S): at 17:23

## 2024-05-31 RX ADMIN — Medication 0.5 MILLIGRAM(S): at 17:28

## 2024-05-31 RX ADMIN — INSULIN HUMAN 3 UNIT(S)/HR: 100 INJECTION, SOLUTION SUBCUTANEOUS at 17:23

## 2024-05-31 RX ADMIN — Medication 3 MILLILITER(S): at 23:03

## 2024-05-31 RX ADMIN — GABAPENTIN 100 MILLIGRAM(S): 400 CAPSULE ORAL at 21:43

## 2024-05-31 RX ADMIN — Medication 10 MILLIGRAM(S): at 21:43

## 2024-05-31 RX ADMIN — GABAPENTIN 100 MILLIGRAM(S): 400 CAPSULE ORAL at 05:09

## 2024-05-31 RX ADMIN — Medication 12.5 MILLIGRAM(S): at 09:16

## 2024-05-31 RX ADMIN — OXYCODONE HYDROCHLORIDE 5 MILLIGRAM(S): 5 TABLET ORAL at 09:16

## 2024-05-31 RX ADMIN — OXYCODONE HYDROCHLORIDE 5 MILLIGRAM(S): 5 TABLET ORAL at 17:45

## 2024-05-31 NOTE — PROGRESS NOTE ADULT - SUBJECTIVE AND OBJECTIVE BOX
CRITICAL CARE ATTENDING - CTICU    MEDICATIONS  (STANDING):  acetaminophen     Tablet .. 650 milliGRAM(s) Oral every 6 hours  albuterol/ipratropium for Nebulization 3 milliLiter(s) Nebulizer every 6 hours  aMIOdarone    Tablet 400 milliGRAM(s) Oral two times a day  ascorbic acid 500 milliGRAM(s) Oral two times a day  aspirin enteric coated 81 milliGRAM(s) Oral daily  atorvastatin 80 milliGRAM(s) Oral at bedtime  bisacodyl Suppository 10 milliGRAM(s) Rectal once  buDESOnide    Inhalation Suspension 0.5 milliGRAM(s) Inhalation every 12 hours  chlorhexidine 2% Cloths 1 Application(s) Topical daily  dextrose 50% Injectable 50 milliLiter(s) IV Push every 15 minutes  enoxaparin Injectable 40 milliGRAM(s) SubCutaneous every 24 hours  gabapentin 100 milliGRAM(s) Oral every 8 hours  insulin glargine Injectable (LANTUS) 46 Unit(s) SubCutaneous at bedtime  insulin lispro (ADMELOG) corrective regimen sliding scale   SubCutaneous three times a day before meals  insulin lispro (ADMELOG) corrective regimen sliding scale   SubCutaneous at bedtime  insulin lispro Injectable (ADMELOG) 10 Unit(s) SubCutaneous three times a day before meals  insulin regular Infusion 3 Unit(s)/Hr (3 mL/Hr) IV Continuous <Continuous>  levothyroxine 25 MICROGram(s) Oral daily  metoclopramide Injectable 10 milliGRAM(s) IV Push every 8 hours  polyethylene glycol 3350 17 Gram(s) Oral daily  senna 2 Tablet(s) Oral at bedtime  sodium bicarbonate 325 milliGRAM(s) Oral every 12 hours  sodium chloride 0.9%. 1000 milliLiter(s) (10 mL/Hr) IV Continuous <Continuous>                                    9.1    17.59 )-----------( 129      ( 31 May 2024 00:21 )             28.4           136  |  100  |  25<H>  ----------------------------<  255<H>  4.6   |  21<L>  |  1.14    Ca    8.8      31 May 2024 00:21  Phos  3.8       Mg     2.5         TPro  6.1  /  Alb  4.1  /  TBili  0.7  /  DBili  x   /  AST  145<H>  /  ALT  59<H>  /  AlkPhos  83        PT/INR - ( 30 May 2024 00:28 )   PT: 12.7 sec;   INR: 1.22 ratio         PTT - ( 30 May 2024 00:28 )  PTT:26.9 sec        Daily     Daily Weight in k.2 (31 May 2024 00:00)       @ 07:01  -   @ 07:00  --------------------------------------------------------  IN: 1003.1 mL / OUT: 3270 mL / NET: -2266.9 mL        Critically Ill patient  : [ ] preoperative ,   [x ] post operative    Requires :  [x ] Arterial Line   [x ] Central Line  [ ] PA catheter  [ ] IABP  [ ] ECMO  [ ] LVAD  [ ] Ventilator  [x ] pacemaker [ ] Impella.                      [x ] ABG's     [x ] Pulse Oxymetry Monitoring  Bedside evaluation , monitoring , treatment of hemodynamics , fluids , IVP/ IVCD meds.        Diagnosis:     POD 1 - C3L LAAC     Hypovolemia     Thrombocytopenia     Hemodynamic lability,  instability. Requires IVCD/PO [ ] vasopressors [ ] inotropes  [ ] vasodilator [x] Amiodarone  [ ]IVSS fluid  to maintain MAP, perfusion, C.I.     Chest Tube Drainage     Temporary pacemaker (TPM) interrogation and setting.     ASA/Statin daily     Requires chest PT, pulmonary toilet, suctioning to maintain SaO2,  patent airway and treat atelectasis.     IVCD Insulin / Sliding Scale / Lantus     Synthroid for hypothyroidism               I, Reyes Duenas, personally performed the services described in this documentation. All medical record entries made by the scribe were at my direction and in my presence. I have reviewed the chart and agree that the record reflects my personal performance and is accurate and complete.   Reyes Duenas MD.       By signing my name below, I, Tri Larkin, attest that this documentation has been prepared under the direction and in the presence of Reyes Duenas MD.   Electronically Signed: Jonas Villareal 24 @ 08:04        Discussed with CT surgeon, Physician Assistant - Nurse Practitioner- Critical care medicine team.   Dicussed at  AM / PM rounds.   Chart, labs , films reviewed.    Cumulative Critical Care Time Given Today:  CRITICAL CARE ATTENDING - CTICU    MEDICATIONS  (STANDING):  acetaminophen     Tablet .. 650 milliGRAM(s) Oral every 6 hours  albuterol/ipratropium for Nebulization 3 milliLiter(s) Nebulizer every 6 hours  aMIOdarone    Tablet 400 milliGRAM(s) Oral two times a day  ascorbic acid 500 milliGRAM(s) Oral two times a day  aspirin enteric coated 81 milliGRAM(s) Oral daily  atorvastatin 80 milliGRAM(s) Oral at bedtime  bisacodyl Suppository 10 milliGRAM(s) Rectal once  buDESOnide    Inhalation Suspension 0.5 milliGRAM(s) Inhalation every 12 hours  chlorhexidine 2% Cloths 1 Application(s) Topical daily  dextrose 50% Injectable 50 milliLiter(s) IV Push every 15 minutes  enoxaparin Injectable 40 milliGRAM(s) SubCutaneous every 24 hours  gabapentin 100 milliGRAM(s) Oral every 8 hours  insulin glargine Injectable (LANTUS) 46 Unit(s) SubCutaneous at bedtime  insulin lispro (ADMELOG) corrective regimen sliding scale   SubCutaneous three times a day before meals  insulin lispro (ADMELOG) corrective regimen sliding scale   SubCutaneous at bedtime  insulin lispro Injectable (ADMELOG) 10 Unit(s) SubCutaneous three times a day before meals  insulin regular Infusion 3 Unit(s)/Hr (3 mL/Hr) IV Continuous <Continuous>  levothyroxine 25 MICROGram(s) Oral daily  metoclopramide Injectable 10 milliGRAM(s) IV Push every 8 hours  polyethylene glycol 3350 17 Gram(s) Oral daily  senna 2 Tablet(s) Oral at bedtime  sodium bicarbonate 325 milliGRAM(s) Oral every 12 hours  sodium chloride 0.9%. 1000 milliLiter(s) (10 mL/Hr) IV Continuous <Continuous>                                    9.1    17.59 )-----------( 129      ( 31 May 2024 00:21 )             28.4           136  |  100  |  25<H>  ----------------------------<  255<H>  4.6   |  21<L>  |  1.14    Ca    8.8      31 May 2024 00:21  Phos  3.8       Mg     2.5         TPro  6.1  /  Alb  4.1  /  TBili  0.7  /  DBili  x   /  AST  145<H>  /  ALT  59<H>  /  AlkPhos  83        PT/INR - ( 30 May 2024 00:28 )   PT: 12.7 sec;   INR: 1.22 ratio         PTT - ( 30 May 2024 00:28 )  PTT:26.9 sec        Daily     Daily Weight in k.2 (31 May 2024 00:00)       @ 07:01  -   @ 07:00  --------------------------------------------------------  IN: 1003.1 mL / OUT: 3270 mL / NET: -2266.9 mL        Critically Ill patient  : [ ] preoperative ,   [x ] post operative    Requires :  [x ] Arterial Line   [x ] Central Line  [ ] PA catheter  [ ] IABP  [ ] ECMO  [ ] LVAD  [ ] Ventilator  [x ] pacemaker [ ] Impella.                      [x ] ABG's     [x ] Pulse Oxymetry Monitoring  Bedside evaluation , monitoring , treatment of hemodynamics , fluids , IVP/ IVCD meds.        Diagnosis:     POD 2  - CABG X 3 L  /  LAAC     Hypotension a/w Hypertension, requiring intravenous medication.     Hypovolemia     Thrombocytopenia     Hemodynamic lability,  instability. Requires IVCD/PO [ ] vasopressors [ ] inotropes  [ ] vasodilator [x] Amiodarone  [ x]IVSS fluid  to maintain MAP, perfusion, C.I.     Chest Tube Drainage     Temporary pacemaker (TPM) interrogation and setting.     ASA/Statin daily     Requires chest PT, pulmonary toilet, suctioning to maintain SaO2,  patent airway and treat atelectasis.     Hyperglycemia    IVCD Insulin / Sliding Scale / Lantus     Synthroid for hypothyroidism     Prerenal Azotemia     Metabolic acidosis              I, Reyes Duenas, personally performed the services described in this documentation. All medical record entries made by the scribe were at my direction and in my presence. I have reviewed the chart and agree that the record reflects my personal performance and is accurate and complete.   Reyes Duenas MD.       By signing my name below, I, Tri Larkin, attest that this documentation has been prepared under the direction and in the presence of Reyes Duenas MD.   Electronically Signed: Jonas Villareal 05-31-24 @ 08:04        Discussed with CT surgeon, Physician Assistant - Nurse Practitioner- Critical care medicine team.   Dicussed at  AM / PM rounds.   Chart, labs , films reviewed.    Cumulative Critical Care Time Given Today:  30 min

## 2024-05-31 NOTE — PROGRESS NOTE ADULT - ASSESSMENT
68 y/o female with PMH of HTN/HLD, DMT2, Asthma. obesity, hoshimoto's, former smoker  who presented to ED to be evaluated for chest pain, now admitted to CTS for surgery eval.     Assessment  DMT2: 69y Female with DM T2 with hyperglycemia, A1C 8.7% , was on oral meds and insulin at  home  (basaglar) , now s/p surgery, extubated and eating meals, had hyperglycemias, IV insulin was reinstated, glucose improving now.   CAD: on medications, stable, monitored. s/p surgery   Hypothyroidism: On Synthroid 25 mcg po daily, compliant with Synthroid intake, asymptomatic. TFTs euthyroid.   HTN: on antihypertensive medications, monitored, asymptomatic.  Obesity: No strict exercise routines, not on any weight loss program, neither on low calorie diet.      Discussed plan and management with Dr Jyoti Sweeney NP - TEAMS  Santana Montes MD  Cell: [phone]  Office: [phone]            68 y/o female with PMH of HTN/HLD, DMT2, Asthma. obesity, hoshimoto's, former smoker  who presented to ED to be evaluated for chest pain, now admitted to CTS for surgery eval.     Assessment  DMT2: 69y Female with DM T2 with hyperglycemia, A1C 8.7% , was on oral meds and insulin at  home  (basaglar) , now s/p surgery, extubated and eating meals, had hyperglycemias, IV insulin was reinstated, glucose improving now.   CAD: on medications, stable, monitored. s/p surgery   Hypothyroidism: On Synthroid 25 mcg po daily, compliant with Synthroid intake,  asymptomatic. TFTs euthyroid.   HTN: on antihypertensive medications, monitored, asymptomatic.  Obesity: No strict exercise routines, not on any weight loss program, neither on low calorie diet.      Discussed plan and management with Dr Jyoti Sweeney NP - TEAMS  Santana Montes MD  Cell: [phone]  Office: [phone]

## 2024-05-31 NOTE — PROGRESS NOTE ADULT - ASSESSMENT
68 y/o female with PMH of HTN/HLD, DMT2, Asthma who presented to ED to be evaluated for chest pain associated with exertional shortness of breath. Admitted for cardiac workup.     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   - Transferred to [facility] for CTS evaluation   - Now s/p CABG x 3 on 5/29: LIMA to LAD, SVG to Ramus, SVG to RPDA, NEVIN clipping  - did have some addl sxs 5/27 and was given nitrates with relief  - Initiated on Amio as per CTS protocol  - Continue ASA  - High intensity statin on hold, resume when able  - BB when able    - ProBNP 74; no previous diagnosis of CHF, no prior TTE   - TTE with preserved LV and RV function, no significant valvular disease    - has been on minimal levo, now off  - Home Amlodipine on hold  - Hold home ARB     - DM2 (diabetes mellitus, type 2).   - Would consider initiation of GLP agonist in the future given her DM + CAD    - Other cardiovascular workup will depend on clinical course.  - All other workup per primary team.  - Will continue to follow.

## 2024-05-31 NOTE — PROGRESS NOTE ADULT - PROBLEM SELECTOR PLAN 1
IV insulin for today  Will transitioned to basal bolus when IV insulin requirements decreases  Will continue monitoring FS, log, and glucose trends, will Follow up.  Patient counseled for compliance with consistent low carb diet and exercise as tolerated outpatient.

## 2024-05-31 NOTE — PROGRESS NOTE ADULT - SUBJECTIVE AND OBJECTIVE BOX
Chief complaint  Patient is a 69y old  Female who presents with a chief complaint of chest pain/shortness of breath (31 May 2024 08:59)         Labs and Fingersticks  CAPILLARY BLOOD GLUCOSE  185 (31 May 2024 07:00)  187 (31 May 2024 06:00)  200 (31 May 2024 05:00)  113 (31 May 2024 04:00)  104 (31 May 2024 03:00)  210 (31 May 2024 02:00)  226 (31 May 2024 01:00)  263 (31 May 2024 00:00)  226 (30 May 2024 21:00)  208 (30 May 2024 20:00)      POCT Blood Glucose.: 106 mg/dL (31 May 2024 14:57)  POCT Blood Glucose.: 234 mg/dL (31 May 2024 13:10)  POCT Blood Glucose.: 164 mg/dL (31 May 2024 11:38)  POCT Blood Glucose.: 152 mg/dL (31 May 2024 09:10)  POCT Blood Glucose.: 434 mg/dL (31 May 2024 08:03)  POCT Blood Glucose.: 185 mg/dL (31 May 2024 06:47)  POCT Blood Glucose.: 187 mg/dL (31 May 2024 05:54)  POCT Blood Glucose.: 200 mg/dL (31 May 2024 05:06)  POCT Blood Glucose.: 113 mg/dL (31 May 2024 03:50)  POCT Blood Glucose.: 104 mg/dL (31 May 2024 03:06)  POCT Blood Glucose.: 210 mg/dL (31 May 2024 01:53)  POCT Blood Glucose.: 226 mg/dL (31 May 2024 00:59)  POCT Blood Glucose.: 263 mg/dL (30 May 2024 23:51)  POCT Blood Glucose.: 226 mg/dL (30 May 2024 21:08)  POCT Blood Glucose.: 208 mg/dL (30 May 2024 19:52)  POCT Blood Glucose.: 225 mg/dL (30 May 2024 19:04)  POCT Blood Glucose.: 192 mg/dL (30 May 2024 17:14)  POCT Blood Glucose.: 101 mg/dL (30 May 2024 16:06)      Anion Gap: 15 (05-31 @ 00:21)  Anion Gap: 15 (05-30 @ 11:11)  Anion Gap: 16 (05-30 @ 00:28)  Anion Gap: 14 (05-29 @ 19:54)      Calcium: 8.8 (05-31 @ 00:21)  Calcium: 8.6 (05-30 @ 11:11)  Calcium: 9.0 (05-30 @ 00:28)  Calcium: 8.5 (05-29 @ 19:54)  Albumin: 4.1 (05-31 @ 00:21)  Albumin: 4.3 (05-30 @ 11:11)  Albumin: 4.1 (05-30 @ 00:28)  Albumin: 3.6 (05-29 @ 19:54)    Alanine Aminotransferase (ALT/SGPT): 59 *H* (05-31 @ 00:21)  Alanine Aminotransferase (ALT/SGPT): 17 (05-30 @ 11:11)  Alanine Aminotransferase (ALT/SGPT): 19 (05-30 @ 00:28)  Alanine Aminotransferase (ALT/SGPT): 16 (05-29 @ 19:54)  Alkaline Phosphatase: 83 (05-31 @ 00:21)  Alkaline Phosphatase: 55 (05-30 @ 11:11)  Alkaline Phosphatase: 69 (05-30 @ 00:28)  Alkaline Phosphatase: 82 (05-29 @ 19:54)  Aspartate Aminotransferase (AST/SGOT): 145 *H* (05-31 @ 00:21)  Aspartate Aminotransferase (AST/SGOT): 41 *H* (05-30 @ 11:11)  Aspartate Aminotransferase (AST/SGOT): 46 *H* (05-30 @ 00:28)  Aspartate Aminotransferase (AST/SGOT): 45 *H* (05-29 @ 19:54)        05-31    136  |  100  |  25<H>  ----------------------------<  255<H>  4.6   |  21<L>  |  1.14    Ca    8.8      31 May 2024 00:21  Phos  3.8     05-31  Mg     2.5     05-31    TPro  6.1  /  Alb  4.1  /  TBili  0.7  /  DBili  x   /  AST  145<H>  /  ALT  59<H>  /  AlkPhos  83  05-31                        9.1    17.59 )-----------( 129      ( 31 May 2024 00:21 )             28.4     Medications  MEDICATIONS  (STANDING):  albuterol/ipratropium for Nebulization 3 milliLiter(s) Nebulizer every 6 hours  aMIOdarone    Tablet 400 milliGRAM(s) Oral two times a day  ascorbic acid 500 milliGRAM(s) Oral two times a day  aspirin enteric coated 81 milliGRAM(s) Oral daily  atorvastatin 80 milliGRAM(s) Oral at bedtime  bisacodyl Suppository 10 milliGRAM(s) Rectal once  buDESOnide    Inhalation Suspension 0.5 milliGRAM(s) Inhalation every 12 hours  chlorhexidine 2% Cloths 1 Application(s) Topical daily  dextrose 50% Injectable 50 milliLiter(s) IV Push every 15 minutes  enoxaparin Injectable 40 milliGRAM(s) SubCutaneous every 24 hours  gabapentin 100 milliGRAM(s) Oral every 8 hours  insulin regular Infusion 3 Unit(s)/Hr (3 mL/Hr) IV Continuous <Continuous>  levothyroxine 25 MICROGram(s) Oral daily  metoclopramide Injectable 10 milliGRAM(s) IV Push every 8 hours  metoprolol tartrate 12.5 milliGRAM(s) Oral every 12 hours  polyethylene glycol 3350 17 Gram(s) Oral daily  senna 2 Tablet(s) Oral at bedtime  sodium bicarbonate 325 milliGRAM(s) Oral every 12 hours  sodium chloride 0.9%. 1000 milliLiter(s) (10 mL/Hr) IV Continuous <Continuous>      Physical Exam  General: Patient comfortable in bed   Vital Signs Last 12 Hrs  T(F): 99.3 (05-31-24 @ 08:00), Max: 99.9 (05-31-24 @ 04:00)  HR: 82 (05-31-24 @ 14:00) (77 - 91)  BP: 124/60 (05-31-24 @ 14:00) (105/49 - 136/60)  BP(mean): 86 (05-31-24 @ 14:00) (70 - 86)  RR: 19 (05-31-24 @ 14:00) (15 - 26)  SpO2: 96% (05-31-24 @ 14:00) (88% - 96%)    CVS: S1S2   Respiratory: No wheezing, no crepitations  GI: Abdomen soft, bowel sounds positive  Musculoskeletal:  moves all extremities  : Voiding             Chief complaint  Patient is a 69y old  Female who presents with a chief complaint of chest pain/shortness of breath (31 May 2024 08:59)         Labs and Fingersticks  CAPILLARY BLOOD GLUCOSE  185 (31 May 2024 07:00)  187 (31 May 2024 06:00)  200 (31 May 2024 05:00)  113 (31 May 2024 04:00)  104 (31 May 2024 03:00)  210 (31 May 2024 02:00)  226 (31 May 2024 01:00)  263 (31 May 2024 00:00)  226 (30 May 2024 21:00)  208 (30 May 2024 20:00)      POCT Blood Glucose.: 106 mg/dL (31 May 2024 14:57)  POCT Blood Glucose.: 234 mg/dL (31 May 2024 13:10)  POCT Blood Glucose.: 164 mg/dL (31 May 2024 11:38)  POCT Blood Glucose.: 152 mg/dL (31 May 2024 09:10)  POCT Blood Glucose.: 434 mg/dL (31 May 2024 08:03)  POCT Blood Glucose.: 185 mg/dL (31 May 2024 06:47)  POCT Blood Glucose.: 187 mg/dL (31 May 2024 05:54)  POCT Blood Glucose.: 200 mg/dL (31 May 2024 05:06)  POCT Blood Glucose.: 113 mg/dL (31 May 2024 03:50)  POCT Blood Glucose.: 104 mg/dL (31 May 2024 03:06)  POCT Blood Glucose.: 210 mg/dL (31 May 2024 01:53)  POCT Blood Glucose.: 226 mg/dL (31 May 2024 00:59)  POCT Blood Glucose.: 263 mg/dL (30 May 2024 23:51)  POCT Blood Glucose.: 226 mg/dL (30 May 2024 21:08)  POCT Blood Glucose.: 208 mg/dL (30 May 2024 19:52)  POCT Blood Glucose.: 225 mg/dL (30 May 2024 19:04)  POCT Blood Glucose.: 192 mg/dL (30 May 2024 17:14)  POCT Blood Glucose.: 101 mg/dL (30 May 2024 16:06)      Anion Gap: 15 (05-31 @ 00:21)  Anion Gap: 15 (05-30 @ 11:11)  Anion Gap: 16 (05-30 @ 00:28)  Anion Gap: 14 (05-29 @ 19:54)      Calcium: 8.8 (05-31 @ 00:21)  Calcium: 8.6 (05-30 @ 11:11)  Calcium: 9.0 (05-30 @ 00:28)  Calcium: 8.5 (05-29 @ 19:54)  Albumin: 4.1 (05-31 @ 00:21)  Albumin: 4.3 (05-30 @ 11:11)  Albumin: 4.1 (05-30 @ 00:28)  Albumin: 3.6 (05-29 @ 19:54)    Alanine Aminotransferase (ALT/SGPT): 59 *H* (05-31 @ 00:21)  Alanine Aminotransferase (ALT/SGPT): 17 (05-30 @ 11:11)  Alanine Aminotransferase (ALT/SGPT): 19 (05-30 @ 00:28)  Alanine Aminotransferase (ALT/SGPT): 16 (05-29 @ 19:54)  Alkaline Phosphatase: 83 (05-31 @ 00:21)  Alkaline Phosphatase: 55 (05-30 @ 11:11)  Alkaline Phosphatase: 69 (05-30 @ 00:28)  Alkaline Phosphatase: 82 (05-29 @ 19:54)  Aspartate Aminotransferase (AST/SGOT): 145 *H* (05-31 @ 00:21)  Aspartate Aminotransferase (AST/SGOT): 41 *H* (05-30 @ 11:11)  Aspartate Aminotransferase (AST/SGOT): 46 *H* (05-30 @ 00:28)  Aspartate Aminotransferase (AST/SGOT): 45 *H* (05-29 @ 19:54)        05-31    136  |  100  |  25<H>  ----------------------------<  255<H>  4.6   |  21<L>  |  1.14    Ca    8.8      31 May 2024 00:21  Phos  3.8     05-31  Mg     2.5     05-31    TPro  6.1  /  Alb  4.1  /  TBili  0.7  /  DBili  x   /  AST  145<H>  /  ALT  59<H>  /  AlkPhos  83  05-31                        9.1    17.59 )-----------( 129      ( 31 May 2024 00:21 )             28.4     Medications  MEDICATIONS  (STANDING):  albuterol/ipratropium for Nebulization 3 milliLiter(s) Nebulizer every 6 hours  aMIOdarone    Tablet 400 milliGRAM(s) Oral two times a day  ascorbic acid 500 milliGRAM(s) Oral two times a day  aspirin enteric coated 81 milliGRAM(s) Oral daily  atorvastatin 80 milliGRAM(s) Oral at bedtime  bisacodyl Suppository 10 milliGRAM(s) Rectal once  buDESOnide    Inhalation Suspension 0.5 milliGRAM(s) Inhalation every 12 hours  chlorhexidine 2% Cloths 1 Application(s) Topical daily  dextrose 50% Injectable 50 milliLiter(s) IV Push every 15 minutes  enoxaparin Injectable 40 milliGRAM(s) SubCutaneous every 24 hours  gabapentin 100 milliGRAM(s) Oral every 8 hours  insulin regular Infusion 3 Unit(s)/Hr (3 mL/Hr) IV Continuous <Continuous>  levothyroxine 25 MICROGram(s) Oral daily  metoclopramide Injectable 10 milliGRAM(s) IV Push every 8 hours  metoprolol tartrate 12.5 milliGRAM(s) Oral every 12 hours  polyethylene glycol 3350 17 Gram(s) Oral daily  senna 2 Tablet(s) Oral at bedtime  sodium bicarbonate 325 milliGRAM(s) Oral every 12 hours  sodium chloride 0.9%. 1000 milliLiter(s) (10 mL/Hr) IV Continuous <Continuous>      Physical Exam  General: Patient comfortable in bed   Vital Signs Last 12 Hrs  T(F): 99.3 (05-31-24 @ 08:00), Max: 99.9 (05-31-24 @ 04:00)  HR: 82 (05-31-24 @ 14:00) (77 - 91)  BP: 124/60 (05-31-24 @ 14:00) (105/49 - 136/60)  BP(mean): 86 (05-31-24 @ 14:00) (70 - 86)  RR: 19 (05-31-24 @ 14:00) (15 - 26)  SpO2: 96% (05-31-24 @ 14:00) (88% - 96%)    CVS: S1S2   Respiratory: No wheezing, no crepitations  GI: Abdomen soft, bowel sounds positive  Musculoskeletal:  moves all extremities  : Voiding

## 2024-05-31 NOTE — PROGRESS NOTE ADULT - SUBJECTIVE AND OBJECTIVE BOX
[facility] - Toshia Tony Pannella, Patel, Savella, Cohen  Office Number:  [phone]    Patient resting comfortably in chair in nad  some dyspnea overnight, better this ams    ROS: negative unless otherwise mentioned.    Telemetry:  sr    MEDICATIONS  (STANDING):  acetaminophen     Tablet .. 650 milliGRAM(s) Oral every 6 hours  albuterol/ipratropium for Nebulization 3 milliLiter(s) Nebulizer every 6 hours  aMIOdarone    Tablet 400 milliGRAM(s) Oral two times a day  ascorbic acid 500 milliGRAM(s) Oral two times a day  aspirin enteric coated 81 milliGRAM(s) Oral daily  atorvastatin 80 milliGRAM(s) Oral at bedtime  bisacodyl Suppository 10 milliGRAM(s) Rectal once  buDESOnide    Inhalation Suspension 0.5 milliGRAM(s) Inhalation every 12 hours  chlorhexidine 2% Cloths 1 Application(s) Topical daily  dextrose 50% Injectable 50 milliLiter(s) IV Push every 15 minutes  enoxaparin Injectable 40 milliGRAM(s) SubCutaneous every 24 hours  gabapentin 100 milliGRAM(s) Oral every 8 hours  insulin glargine Injectable (LANTUS) 46 Unit(s) SubCutaneous at bedtime  insulin lispro (ADMELOG) corrective regimen sliding scale   SubCutaneous three times a day before meals  insulin lispro (ADMELOG) corrective regimen sliding scale   SubCutaneous at bedtime  insulin lispro Injectable (ADMELOG) 10 Unit(s) SubCutaneous three times a day before meals  insulin regular Infusion 3 Unit(s)/Hr (3 mL/Hr) IV Continuous <Continuous>  levothyroxine 25 MICROGram(s) Oral daily  metoclopramide Injectable 10 milliGRAM(s) IV Push every 8 hours  polyethylene glycol 3350 17 Gram(s) Oral daily  senna 2 Tablet(s) Oral at bedtime  sodium bicarbonate 325 milliGRAM(s) Oral every 12 hours  sodium chloride 0.9%. 1000 milliLiter(s) (10 mL/Hr) IV Continuous <Continuous>    MEDICATIONS  (PRN):  benzocaine/menthol Lozenge 1 Lozenge Oral every 3 hours PRN Sore Throat  HYDROmorphone  Injectable 0.5 milliGRAM(s) IV Push every 6 hours PRN Breakthrough Pain  oxyCODONE    IR 10 milliGRAM(s) Oral every 4 hours PRN Severe Pain (7 - 10)  oxyCODONE    IR 5 milliGRAM(s) Oral every 4 hours PRN Moderate Pain (4 - 6)      Allergies    penicillin (Hives)    Intolerances        Vital Signs Last 24 Hrs  T(C): 37.4 (31 May 2024 08:00), Max: 37.8 (30 May 2024 16:00)  T(F): 99.3 (31 May 2024 08:00), Max: 100 (30 May 2024 16:00)  HR: 88 (31 May 2024 09:00) (77 - 91)  BP: 107/52 (31 May 2024 08:00) (98/48 - 143/65)  BP(mean): 75 (31 May 2024 08:00) (67 - 93)  RR: 20 (31 May 2024 09:00) (15 - 52)  SpO2: 92% (31 May 2024 09:00) (88% - 98%)    Parameters below as of 31 May 2024 08:00  Patient On (Oxygen Delivery Method): nasal cannula  O2 Flow (L/min): 6      I&O's Summary    30 May 2024 07:01  -  31 May 2024 07:00  --------------------------------------------------------  IN: 1003.1 mL / OUT: 3270 mL / NET: -2266.9 mL        ON EXAM:  General: NAD, awake and alert, oriented x 3, on NC  HEENT: Mucous membranes are moist, anicteric  Lungs: Non-labored, breath sounds are decreased bilaterally, No wheezing, rales or rhonchi  Cardiovascular: Regular, S1 and S2, no murmurs, rubs, or gallops  Gastrointestinal: Bowel Sounds present, soft, nontender.   Lymph: No peripheral edema. No lymphadenopathy.  Skin: No rashes or ulcers  Psych:  Mood & affect appropriate      LABS: All Labs Reviewed:                        9.1    17.59 )-----------( 129      ( 31 May 2024 00:21 )             28.4                         9.5    20.27 )-----------( 191      ( 30 May 2024 00:28 )             29.0                         8.6    31.27 )-----------( 217      ( 29 May 2024 19:54 )             27.1     31 May 2024 00:21    136    |  100    |  25     ----------------------------<  255    4.6     |  21     |  1.14   30 May 2024 11:11    139    |  104    |  17     ----------------------------<  166    4.2     |  20     |  0.97   30 May 2024 00:28    144    |  107    |  12     ----------------------------<  147    4.3     |  21     |  0.79     Ca    8.8        31 May 2024 00:21  Ca    8.6        30 May 2024 11:11  Ca    9.0        30 May 2024 00:28  Phos  3.8       31 May 2024 00:21  Phos  3.9       30 May 2024 11:11  Phos  3.2       30 May 2024 00:28  Mg     2.5       31 May 2024 00:21  Mg     2.0       30 May 2024 11:11  Mg     2.3       30 May 2024 00:28    TPro  6.1    /  Alb  4.1    /  TBili  0.7    /  DBili  x      /  AST  145    /  ALT  59     /  AlkPhos  83     31 May 2024 00:21  TPro  5.8    /  Alb  4.3    /  TBili  0.4    /  DBili  x      /  AST  41     /  ALT  17     /  AlkPhos  55     30 May 2024 11:11  TPro  6.2    /  Alb  4.1    /  TBili  1.2    /  DBili  x      /  AST  46     /  ALT  19     /  AlkPhos  69     30 May 2024 00:28    PT/INR - ( 30 May 2024 00:28 )   PT: 12.7 sec;   INR: 1.22 ratio         PTT - ( 30 May 2024 00:28 )  PTT:26.9 sec  CARDIAC MARKERS ( 29 May 2024 19:54 )  x     / x     / 502 U/L / x     / 28.9 ng/mL      Blood Culture:

## 2024-06-01 LAB
ALBUMIN SERPL ELPH-MCNC: 3.9 G/DL — SIGNIFICANT CHANGE UP (ref 3.3–5)
ALP SERPL-CCNC: 88 U/L — SIGNIFICANT CHANGE UP (ref 40–120)
ALT FLD-CCNC: 51 U/L — HIGH (ref 10–45)
ANION GAP SERPL CALC-SCNC: 13 MMOL/L — SIGNIFICANT CHANGE UP (ref 5–17)
APPEARANCE UR: CLEAR — SIGNIFICANT CHANGE UP
AST SERPL-CCNC: 70 U/L — HIGH (ref 10–40)
BASOPHILS # BLD AUTO: 0.06 K/UL — SIGNIFICANT CHANGE UP (ref 0–0.2)
BASOPHILS NFR BLD AUTO: 0.3 % — SIGNIFICANT CHANGE UP (ref 0–2)
BILIRUB SERPL-MCNC: 0.4 MG/DL — SIGNIFICANT CHANGE UP (ref 0.2–1.2)
BILIRUB UR-MCNC: NEGATIVE — SIGNIFICANT CHANGE UP
BLD GP AB SCN SERPL QL: NEGATIVE — SIGNIFICANT CHANGE UP
BUN SERPL-MCNC: 20 MG/DL — SIGNIFICANT CHANGE UP (ref 7–23)
CALCIUM SERPL-MCNC: 8.9 MG/DL — SIGNIFICANT CHANGE UP (ref 8.4–10.5)
CHLORIDE SERPL-SCNC: 101 MMOL/L — SIGNIFICANT CHANGE UP (ref 96–108)
CO2 SERPL-SCNC: 23 MMOL/L — SIGNIFICANT CHANGE UP (ref 22–31)
COLOR SPEC: YELLOW — SIGNIFICANT CHANGE UP
CREAT SERPL-MCNC: 0.99 MG/DL — SIGNIFICANT CHANGE UP (ref 0.5–1.3)
DIFF PNL FLD: NEGATIVE — SIGNIFICANT CHANGE UP
EGFR: 62 ML/MIN/1.73M2 — SIGNIFICANT CHANGE UP
EOSINOPHIL # BLD AUTO: 0.16 K/UL — SIGNIFICANT CHANGE UP (ref 0–0.5)
EOSINOPHIL NFR BLD AUTO: 0.9 % — SIGNIFICANT CHANGE UP (ref 0–6)
GLUCOSE BLDC GLUCOMTR-MCNC: 115 MG/DL — HIGH (ref 70–99)
GLUCOSE BLDC GLUCOMTR-MCNC: 124 MG/DL — HIGH (ref 70–99)
GLUCOSE BLDC GLUCOMTR-MCNC: 125 MG/DL — HIGH (ref 70–99)
GLUCOSE BLDC GLUCOMTR-MCNC: 127 MG/DL — HIGH (ref 70–99)
GLUCOSE BLDC GLUCOMTR-MCNC: 128 MG/DL — HIGH (ref 70–99)
GLUCOSE BLDC GLUCOMTR-MCNC: 128 MG/DL — HIGH (ref 70–99)
GLUCOSE BLDC GLUCOMTR-MCNC: 129 MG/DL — HIGH (ref 70–99)
GLUCOSE BLDC GLUCOMTR-MCNC: 129 MG/DL — HIGH (ref 70–99)
GLUCOSE BLDC GLUCOMTR-MCNC: 138 MG/DL — HIGH (ref 70–99)
GLUCOSE BLDC GLUCOMTR-MCNC: 138 MG/DL — HIGH (ref 70–99)
GLUCOSE BLDC GLUCOMTR-MCNC: 141 MG/DL — HIGH (ref 70–99)
GLUCOSE BLDC GLUCOMTR-MCNC: 144 MG/DL — HIGH (ref 70–99)
GLUCOSE BLDC GLUCOMTR-MCNC: 145 MG/DL — HIGH (ref 70–99)
GLUCOSE BLDC GLUCOMTR-MCNC: 150 MG/DL — HIGH (ref 70–99)
GLUCOSE BLDC GLUCOMTR-MCNC: 152 MG/DL — HIGH (ref 70–99)
GLUCOSE BLDC GLUCOMTR-MCNC: 153 MG/DL — HIGH (ref 70–99)
GLUCOSE BLDC GLUCOMTR-MCNC: 158 MG/DL — HIGH (ref 70–99)
GLUCOSE BLDC GLUCOMTR-MCNC: 168 MG/DL — HIGH (ref 70–99)
GLUCOSE BLDC GLUCOMTR-MCNC: 194 MG/DL — HIGH (ref 70–99)
GLUCOSE BLDC GLUCOMTR-MCNC: 212 MG/DL — HIGH (ref 70–99)
GLUCOSE BLDC GLUCOMTR-MCNC: 264 MG/DL — HIGH (ref 70–99)
GLUCOSE BLDC GLUCOMTR-MCNC: 275 MG/DL — HIGH (ref 70–99)
GLUCOSE SERPL-MCNC: 108 MG/DL — HIGH (ref 70–99)
GLUCOSE UR QL: NEGATIVE MG/DL — SIGNIFICANT CHANGE UP
HCT VFR BLD CALC: 27.2 % — LOW (ref 34.5–45)
HGB BLD-MCNC: 8.8 G/DL — LOW (ref 11.5–15.5)
IMM GRANULOCYTES NFR BLD AUTO: 1 % — HIGH (ref 0–0.9)
KETONES UR-MCNC: NEGATIVE MG/DL — SIGNIFICANT CHANGE UP
LEUKOCYTE ESTERASE UR-ACNC: NEGATIVE — SIGNIFICANT CHANGE UP
LYMPHOCYTES # BLD AUTO: 19.6 % — SIGNIFICANT CHANGE UP (ref 13–44)
LYMPHOCYTES # BLD AUTO: 3.46 K/UL — HIGH (ref 1–3.3)
MAGNESIUM SERPL-MCNC: 2.4 MG/DL — SIGNIFICANT CHANGE UP (ref 1.6–2.6)
MCHC RBC-ENTMCNC: 27.9 PG — SIGNIFICANT CHANGE UP (ref 27–34)
MCHC RBC-ENTMCNC: 32.4 GM/DL — SIGNIFICANT CHANGE UP (ref 32–36)
MCV RBC AUTO: 86.3 FL — SIGNIFICANT CHANGE UP (ref 80–100)
MONOCYTES # BLD AUTO: 2.04 K/UL — HIGH (ref 0–0.9)
MONOCYTES NFR BLD AUTO: 11.5 % — SIGNIFICANT CHANGE UP (ref 2–14)
NEUTROPHILS # BLD AUTO: 11.79 K/UL — HIGH (ref 1.8–7.4)
NEUTROPHILS NFR BLD AUTO: 66.7 % — SIGNIFICANT CHANGE UP (ref 43–77)
NITRITE UR-MCNC: NEGATIVE — SIGNIFICANT CHANGE UP
NRBC # BLD: 0 /100 WBCS — SIGNIFICANT CHANGE UP (ref 0–0)
PH UR: 7 — SIGNIFICANT CHANGE UP (ref 5–8)
PHOSPHATE SERPL-MCNC: 3 MG/DL — SIGNIFICANT CHANGE UP (ref 2.5–4.5)
PLATELET # BLD AUTO: 149 K/UL — LOW (ref 150–400)
POTASSIUM SERPL-MCNC: 4.8 MMOL/L — SIGNIFICANT CHANGE UP (ref 3.5–5.3)
POTASSIUM SERPL-SCNC: 4.8 MMOL/L — SIGNIFICANT CHANGE UP (ref 3.5–5.3)
PROT SERPL-MCNC: 6.2 G/DL — SIGNIFICANT CHANGE UP (ref 6–8.3)
PROT UR-MCNC: NEGATIVE MG/DL — SIGNIFICANT CHANGE UP
RBC # BLD: 3.15 M/UL — LOW (ref 3.8–5.2)
RBC # FLD: 15.9 % — HIGH (ref 10.3–14.5)
RH IG SCN BLD-IMP: POSITIVE — SIGNIFICANT CHANGE UP
SODIUM SERPL-SCNC: 137 MMOL/L — SIGNIFICANT CHANGE UP (ref 135–145)
SP GR SPEC: 1.01 — SIGNIFICANT CHANGE UP (ref 1–1.03)
UROBILINOGEN FLD QL: 0.2 MG/DL — SIGNIFICANT CHANGE UP (ref 0.2–1)
WBC # BLD: 17.68 K/UL — HIGH (ref 3.8–10.5)
WBC # FLD AUTO: 17.68 K/UL — HIGH (ref 3.8–10.5)

## 2024-06-01 PROCEDURE — 71045 X-RAY EXAM CHEST 1 VIEW: CPT | Mod: 26

## 2024-06-01 RX ORDER — DEXTROSE 50 % IN WATER 50 %
15 SYRINGE (ML) INTRAVENOUS ONCE
Refills: 0 | Status: DISCONTINUED | OUTPATIENT
Start: 2024-06-01 | End: 2024-06-05

## 2024-06-01 RX ORDER — INSULIN LISPRO 100/ML
12 VIAL (ML) SUBCUTANEOUS
Refills: 0 | Status: DISCONTINUED | OUTPATIENT
Start: 2024-06-01 | End: 2024-06-02

## 2024-06-01 RX ORDER — INSULIN GLARGINE 100 [IU]/ML
50 INJECTION, SOLUTION SUBCUTANEOUS AT BEDTIME
Refills: 0 | Status: DISCONTINUED | OUTPATIENT
Start: 2024-06-01 | End: 2024-06-02

## 2024-06-01 RX ORDER — INSULIN GLARGINE 100 [IU]/ML
46 INJECTION, SOLUTION SUBCUTANEOUS AT BEDTIME
Refills: 0 | Status: DISCONTINUED | OUTPATIENT
Start: 2024-06-01 | End: 2024-06-01

## 2024-06-01 RX ORDER — CLOPIDOGREL BISULFATE 75 MG/1
75 TABLET, FILM COATED ORAL DAILY
Refills: 0 | Status: DISCONTINUED | OUTPATIENT
Start: 2024-06-01 | End: 2024-06-05

## 2024-06-01 RX ORDER — FUROSEMIDE 40 MG
40 TABLET ORAL ONCE
Refills: 0 | Status: COMPLETED | OUTPATIENT
Start: 2024-06-01 | End: 2024-06-01

## 2024-06-01 RX ORDER — INSULIN LISPRO 100/ML
10 VIAL (ML) SUBCUTANEOUS
Refills: 0 | Status: DISCONTINUED | OUTPATIENT
Start: 2024-06-01 | End: 2024-06-01

## 2024-06-01 RX ORDER — FUROSEMIDE 40 MG
40 TABLET ORAL DAILY
Refills: 0 | Status: DISCONTINUED | OUTPATIENT
Start: 2024-06-01 | End: 2024-06-01

## 2024-06-01 RX ORDER — GLUCAGON INJECTION, SOLUTION 0.5 MG/.1ML
1 INJECTION, SOLUTION SUBCUTANEOUS ONCE
Refills: 0 | Status: DISCONTINUED | OUTPATIENT
Start: 2024-06-01 | End: 2024-06-05

## 2024-06-01 RX ORDER — INSULIN LISPRO 100/ML
VIAL (ML) SUBCUTANEOUS AT BEDTIME
Refills: 0 | Status: DISCONTINUED | OUTPATIENT
Start: 2024-06-01 | End: 2024-06-05

## 2024-06-01 RX ORDER — MONTELUKAST 4 MG/1
10 TABLET, CHEWABLE ORAL DAILY
Refills: 0 | Status: DISCONTINUED | OUTPATIENT
Start: 2024-06-01 | End: 2024-06-03

## 2024-06-01 RX ORDER — INSULIN LISPRO 100/ML
VIAL (ML) SUBCUTANEOUS
Refills: 0 | Status: DISCONTINUED | OUTPATIENT
Start: 2024-06-01 | End: 2024-06-05

## 2024-06-01 RX ADMIN — CHLORHEXIDINE GLUCONATE 1 APPLICATION(S): 213 SOLUTION TOPICAL at 21:09

## 2024-06-01 RX ADMIN — Medication 500 MILLIGRAM(S): at 17:25

## 2024-06-01 RX ADMIN — INSULIN HUMAN 3 UNIT(S)/HR: 100 INJECTION, SOLUTION SUBCUTANEOUS at 17:26

## 2024-06-01 RX ADMIN — INSULIN GLARGINE 50 UNIT(S): 100 INJECTION, SOLUTION SUBCUTANEOUS at 21:09

## 2024-06-01 RX ADMIN — SENNA PLUS 2 TABLET(S): 8.6 TABLET ORAL at 21:09

## 2024-06-01 RX ADMIN — Medication 0.5 MILLIGRAM(S): at 05:11

## 2024-06-01 RX ADMIN — Medication 25 MICROGRAM(S): at 05:56

## 2024-06-01 RX ADMIN — AMIODARONE HYDROCHLORIDE 400 MILLIGRAM(S): 400 TABLET ORAL at 17:25

## 2024-06-01 RX ADMIN — Medication 3 MILLILITER(S): at 11:29

## 2024-06-01 RX ADMIN — INSULIN HUMAN 3 UNIT(S)/HR: 100 INJECTION, SOLUTION SUBCUTANEOUS at 20:32

## 2024-06-01 RX ADMIN — Medication 3 MILLILITER(S): at 23:17

## 2024-06-01 RX ADMIN — Medication 0.5 MILLIGRAM(S): at 17:29

## 2024-06-01 RX ADMIN — Medication 325 MILLIGRAM(S): at 05:56

## 2024-06-01 RX ADMIN — GABAPENTIN 100 MILLIGRAM(S): 400 CAPSULE ORAL at 13:59

## 2024-06-01 RX ADMIN — GABAPENTIN 100 MILLIGRAM(S): 400 CAPSULE ORAL at 05:57

## 2024-06-01 RX ADMIN — Medication 325 MILLIGRAM(S): at 17:24

## 2024-06-01 RX ADMIN — ATORVASTATIN CALCIUM 80 MILLIGRAM(S): 80 TABLET, FILM COATED ORAL at 21:09

## 2024-06-01 RX ADMIN — Medication 500 MILLIGRAM(S): at 05:57

## 2024-06-01 RX ADMIN — CLOPIDOGREL BISULFATE 75 MILLIGRAM(S): 75 TABLET, FILM COATED ORAL at 11:48

## 2024-06-01 RX ADMIN — POLYETHYLENE GLYCOL 3350 17 GRAM(S): 17 POWDER, FOR SOLUTION ORAL at 11:48

## 2024-06-01 RX ADMIN — MONTELUKAST 10 MILLIGRAM(S): 4 TABLET, CHEWABLE ORAL at 13:59

## 2024-06-01 RX ADMIN — Medication 12.5 MILLIGRAM(S): at 17:26

## 2024-06-01 RX ADMIN — GABAPENTIN 100 MILLIGRAM(S): 400 CAPSULE ORAL at 21:09

## 2024-06-01 RX ADMIN — ENOXAPARIN SODIUM 40 MILLIGRAM(S): 100 INJECTION SUBCUTANEOUS at 11:48

## 2024-06-01 RX ADMIN — Medication 12.5 MILLIGRAM(S): at 05:56

## 2024-06-01 RX ADMIN — Medication 81 MILLIGRAM(S): at 11:53

## 2024-06-01 RX ADMIN — Medication 3 MILLILITER(S): at 17:29

## 2024-06-01 RX ADMIN — Medication 3 MILLILITER(S): at 05:11

## 2024-06-01 RX ADMIN — AMIODARONE HYDROCHLORIDE 400 MILLIGRAM(S): 400 TABLET ORAL at 05:57

## 2024-06-01 NOTE — PROGRESS NOTE ADULT - PROBLEM SELECTOR PLAN 1
Will start Lantus  50 units at bed time.  Will start Admelog 12 units before each meal in addition to Admelog correction scale coverage.  Patient counseled for compliance with consistent low carb diet.  .

## 2024-06-01 NOTE — PROGRESS NOTE ADULT - SUBJECTIVE AND OBJECTIVE BOX
Patient seen and examined at the bedside.    Remained critically ill on continuous ICU monitoring.    OBJECTIVE:  Vital Signs Last 24 Hrs  T(C): 36.4 (01 Jun 2024 08:00), Max: 37.3 (01 Jun 2024 04:00)  T(F): 97.6 (01 Jun 2024 08:00), Max: 99.1 (01 Jun 2024 04:00)  HR: 86 (01 Jun 2024 09:00) (76 - 90)  BP: 121/58 (01 Jun 2024 09:00) (103/51 - 141/65)  BP(mean): 83 (01 Jun 2024 09:00) (70 - 94)  RR: 16 (01 Jun 2024 09:00) (16 - 31)  SpO2: 93% (01 Jun 2024 09:00) (88% - 98%)    Parameters below as of 01 Jun 2024 08:00  Patient On (Oxygen Delivery Method): nasal cannula, high flow  O2 Flow (L/min): 40  O2 Concentration (%): 70      Physical Exam:   General: NAD   Neurology: nonfocal   Eyes: bilateral pupils equal and reactive   ENT/Neck: Neck supple, trachea midline, No JVD   Respiratory: Clear bilaterally   CV: S1S2, no murmurs        [x] Sternal dressing,        [x] Sinus rhythm  Abdominal: Soft, NT, ND +BS   Extremities: 1-2+ pedal edema noted, + peripheral pulses   Skin: No Rashes, Hematoma, Ecchymosis                           Assessment:  68 y/o female with PMH of HTN/HLD, DMT2, Asthma. obesity, hoshimoto's, former smoker  who presented to ED to be evaluated for chest pain associated with exertional shortness of breath.    CAD s/p C3L, with CHIOMA and LAAC on 5/29/24  Hemodynamic instability  Hypovolemia  Post op respiratory insufficiency  Leukocytosis  Acute blood loss anemia  Thrombocytopenia    Plan:   ***Neuro***  [x] Nonfocal  Post operative neuro assessment   Tylenol, Gabapentin, and PRN Oxycodone for pain management.     ***Cardiovascular***  Invasive hemodynamic monitoring, assess perfusion indices   SR / Hct 27.2 / Lactate 1.6  Continuos reassessment of hemodynamics   Amiodarone for rate control  continue low dose betablocker  [x] VTE ppx with Lovenox  [x]  ASA [x]  Plavix start today [x] Statin   Serial EKG and cardiac enzymes     ***Pulmonary***  [x] HFNC 70%/40L  Follow SpO2, CXR, blood gasses   Continue bronchodilators as needed  Encourage incentive spirometry, continue pulse ox monitoring, follow ABGs    ***GI***  [x]  Diet:  Regular  Bowel regimen with Miralax and Senna.     ***Renal***  Continue to monitor I/Os, BUN/Creatinine.   Replete lytes PRN    ***ID***  No active antibiotic coverage      ***Endocrine***  [x] DM2 : HbA1c 8.7%                - [x] Insulin gtt  [x]  ISS  [x]  Lantus             - Need tight glycemic control to prevent wound infection.  [x] Hypothyroidism             - [x] Synthroid          Patient requires continuous monitoring with bedside rhythm monitoring, pulse oximetry monitoring, and continuous central venous and arterial pressure monitoring; and intermittent blood gas analysis. Care plan discussed with the ICU care team.   Patient remained critical, at risk for life threatening decompensation.    I have spent 30 minutes providing critical care management to this patient.    By signing my name below, I, Maddie Leone, attest that this documentation has been prepared under the direction and in the presence of BRIAN Palumbo  Electronically signed: Jonas Aldridge, 06-01-24 @ 09:24    I, BRIAN Palumbo, personally performed the services described in this documentation. all medical record entries made by the scribe were at my direction and in my presence. I have reviewed the chart and agree that the record reflects my personal performance and is accurate and complete  Electronically signed: BRIAN Palumbo Patient seen and examined at the bedside.    Remained critically ill on continuous ICU monitoring.    OBJECTIVE:  Vital Signs Last 24 Hrs  T(C): 36.4 (01 Jun 2024 08:00), Max: 37.3 (01 Jun 2024 04:00)  T(F): 97.6 (01 Jun 2024 08:00), Max: 99.1 (01 Jun 2024 04:00)  HR: 86 (01 Jun 2024 09:00) (76 - 90)  BP: 121/58 (01 Jun 2024 09:00) (103/51 - 141/65)  BP(mean): 83 (01 Jun 2024 09:00) (70 - 94)  RR: 16 (01 Jun 2024 09:00) (16 - 31)  SpO2: 93% (01 Jun 2024 09:00) (88% - 98%)    Parameters below as of 01 Jun 2024 08:00  Patient On (Oxygen Delivery Method): nasal cannula, high flow  O2 Flow (L/min): 40  O2 Concentration (%): 70      Physical Exam:   General: NAD   Neurology: nonfocal   Eyes: bilateral pupils equal and reactive   ENT/Neck: Neck supple, trachea midline, No JVD   Respiratory: Clear bilaterally   CV: S1S2, no murmurs        [x] Sternal dressing,        [x] Sinus rhythm  Abdominal: Soft, NT, ND +BS   Extremities: 1-2+ pedal edema noted, + peripheral pulses   Skin: No Rashes, Hematoma, Ecchymosis                           Assessment:  68 y/o female with PMH of HTN/HLD, DMT2, Asthma. obesity, hoshimoto's, former smoker  who presented to ED to be evaluated for chest pain associated with exertional shortness of breath.    CAD s/p C3L, with CHIOMA and LAAC on 5/29/24  Hemodynamic instability  Hypovolemia  Post op respiratory insufficiency  Leukocytosis  Acute blood loss anemia  Thrombocytopenia    Plan:   ***Neuro***  [x] Nonfocal  Post operative neuro assessment   Tylenol, Gabapentin, and PRN Oxycodone for pain management.   OOBTC/Ambulated    ***Cardiovascular***  Invasive hemodynamic monitoring, assess perfusion indices   SR / Hct 27.2 / Lactate 1.6  Continuos reassessment of hemodynamics   Amiodarone for rate control  continue low dose betablocker  [x] VTE ppx with Lovenox  [x]  ASA [x]  Plavix start today [x] Statin   Serial EKG and cardiac enzymes     ***Pulmonary***  [x] HFNC 70%/40L - wean down settings to 50%/30L  Follow SpO2, CXR, blood gasses   Continue bronchodilators as needed  Encourage incentive spirometry, continue pulse ox monitoring, follow ABGs  Start on Singulair    ***GI***  [x]  Diet:  Regular  Bowel regimen with Miralax and Senna.     ***Renal***  Continue to monitor I/Os, BUN/Creatinine.   Replete lytes PRN    ***ID***  No active antibiotic coverage      ***Endocrine***  [x] DM2 : HbA1c 8.7%                - [x] Insulin gtt  [x]  ISS  [x]  Lantus             - Need tight glycemic control to prevent wound infection.  [x] Hypothyroidism             - [x] Synthroid          Patient requires continuous monitoring with bedside rhythm monitoring, pulse oximetry monitoring, and continuous central venous and arterial pressure monitoring; and intermittent blood gas analysis. Care plan discussed with the ICU care team.   Patient remained critical, at risk for life threatening decompensation.    I have spent 30 minutes providing critical care management to this patient.    By signing my name below, I, Maddie Leone, attest that this documentation has been prepared under the direction and in the presence of BRIAN Palumbo  Electronically signed: Jonas Aldridge, 06-01-24 @ 09:24    I, BRIAN Palumbo, personally performed the services described in this documentation. all medical record entries made by the scribe were at my direction and in my presence. I have reviewed the chart and agree that the record reflects my personal performance and is accurate and complete  Electronically signed: BRIAN Palumbo Patient seen and examined at the bedside.    Remained critically ill on continuous ICU monitoring.    OBJECTIVE:  Vital Signs Last 24 Hrs  T(C): 36.4 (01 Jun 2024 08:00), Max: 37.3 (01 Jun 2024 04:00)  T(F): 97.6 (01 Jun 2024 08:00), Max: 99.1 (01 Jun 2024 04:00)  HR: 86 (01 Jun 2024 09:00) (76 - 90)  BP: 121/58 (01 Jun 2024 09:00) (103/51 - 141/65)  BP(mean): 83 (01 Jun 2024 09:00) (70 - 94)  RR: 16 (01 Jun 2024 09:00) (16 - 31)  SpO2: 93% (01 Jun 2024 09:00) (88% - 98%)    Parameters below as of 01 Jun 2024 08:00  Patient On (Oxygen Delivery Method): nasal cannula, high flow  O2 Flow (L/min): 40  O2 Concentration (%): 70    Physical Exam:   General: NAD   Neurology: nonfocal   Eyes: bilateral pupils equal and reactive   ENT/Neck: Neck supple, trachea midline, No JVD   Respiratory: Clear bilaterally   CV: S1S2, no murmurs        [x] Sternal dressing,        [x] Sinus rhythm  Abdominal: Soft, NT, ND +BS   Extremities: 1-2+ pedal edema noted, + peripheral pulses   Skin: No Rashes, Hematoma, Ecchymosis                           Assessment:  68 y/o female with PMH of HTN/HLD, DMT2, Asthma. obesity, hoshimoto's, former smoker  who presented to ED to be evaluated for chest pain associated with exertional shortness of breath.    CAD s/p C3L, with CHIOMA and LAAC on 5/29/24  Hemodynamic instability  Hypovolemia  Post op respiratory insufficiency  Leukocytosis  Acute blood loss anemia  Thrombocytopenia    Plan:   ***Neuro***  [x] Nonfocal  Post operative neuro assessment   Tylenol, Gabapentin, and PRN Oxycodone for pain management.   OOBTC/Ambulated    ***Cardiovascular***  Invasive hemodynamic monitoring, assess perfusion indices   SR / Hct 27.2 / Lactate 1.6  Continuos reassessment of hemodynamics   Amiodarone for rate control  continue low dose betablocker  [x] VTE ppx with Lovenox  [x]  ASA [x]  Plavix start today [x] Statin   Serial EKG and cardiac enzymes     ***Pulmonary***  [x] HFNC 70%/40L - wean down settings to 50%/30L  Follow SpO2, CXR, blood gasses   Continue bronchodilators as needed  Encourage incentive spirometry, continue pulse ox monitoring, follow ABGs  Start on Singulair  B/l chest sonosite negative for pleural effusion, b/l atelectasis present    ***GI***  [x]  Diet:  Regular  Bowel regimen with Miralax and Senna.     ***Renal***  Continue to monitor I/Os, BUN/Creatinine.   Replete lytes PRN  Diuresis with lasix 40 IV QD    ***ID***  No active antibiotic coverage      ***Endocrine***  [x] DM2 : HbA1c 8.7%                - [x] Insulin gtt  [x]  ISS/Admelog/Lantus             - Need tight glycemic control to prevent wound infection.  [x] Hypothyroidism             - [x] Synthroid    Patient requires continuous monitoring with bedside rhythm monitoring, pulse oximetry monitoring, and continuous central venous and arterial pressure monitoring; and intermittent blood gas analysis. Care plan discussed with the ICU care team.   Patient remained critical, at risk for life threatening decompensation.    I have spent 40 minutes providing critical care management to this patient.    By signing my name below, I, Maddie Leone, attest that this documentation has been prepared under the direction and in the presence of BRIAN Palumbo  Electronically signed: Jonas Aldridge, 06-01-24 @ 09:24    I, BRIAN Palumbo, personally performed the services described in this documentation. all medical record entries made by the edgaribaicha were at my direction and in my presence. I have reviewed the chart and agree that the record reflects my personal performance and is accurate and complete  Electronically signed: BRIAN Palumbo

## 2024-06-01 NOTE — PROGRESS NOTE ADULT - SUBJECTIVE AND OBJECTIVE BOX
Chief complaint    Patient is a 69y old  Female who presents with a chief complaint of chest pain/shortness of breath (31 May 2024 15:35)   Review of systems  Patient appears comfortable.    Labs and Fingersticks  CAPILLARY BLOOD GLUCOSE  129 (01 Jun 2024 07:00)  138 (01 Jun 2024 06:00)  125 (01 Jun 2024 04:00)  158 (01 Jun 2024 03:00)  141 (01 Jun 2024 02:00)  128 (01 Jun 2024 01:00)  127 (01 Jun 2024 00:00)  88 (31 May 2024 23:30)  81 (31 May 2024 23:00)  110 (31 May 2024 22:00)  128 (31 May 2024 20:00)      POCT Blood Glucose.: 145 mg/dL (01 Jun 2024 08:02)  POCT Blood Glucose.: 129 mg/dL (01 Jun 2024 06:44)  POCT Blood Glucose.: 138 mg/dL (01 Jun 2024 05:54)  POCT Blood Glucose.: 125 mg/dL (01 Jun 2024 03:54)  POCT Blood Glucose.: 158 mg/dL (01 Jun 2024 03:00)  POCT Blood Glucose.: 141 mg/dL (01 Jun 2024 01:49)  POCT Blood Glucose.: 128 mg/dL (01 Jun 2024 01:05)  POCT Blood Glucose.: 127 mg/dL (01 Jun 2024 00:26)  POCT Blood Glucose.: 88 mg/dL (31 May 2024 23:28)  POCT Blood Glucose.: 81 mg/dL (31 May 2024 22:54)  POCT Blood Glucose.: 110 mg/dL (31 May 2024 21:47)  POCT Blood Glucose.: 128 mg/dL (31 May 2024 20:50)  POCT Blood Glucose.: 260 mg/dL (31 May 2024 18:47)  POCT Blood Glucose.: 235 mg/dL (31 May 2024 17:20)  POCT Blood Glucose.: 145 mg/dL (31 May 2024 16:11)  POCT Blood Glucose.: 106 mg/dL (31 May 2024 14:57)  POCT Blood Glucose.: 234 mg/dL (31 May 2024 13:10)  POCT Blood Glucose.: 164 mg/dL (31 May 2024 11:38)  POCT Blood Glucose.: 152 mg/dL (31 May 2024 09:10)      Anion Gap: 13 (06-01 @ 00:26)  Anion Gap: 15 (05-31 @ 00:21)  Anion Gap: 15 (05-30 @ 11:11)      Calcium: 8.9 (06-01 @ 00:26)  Calcium: 8.8 (05-31 @ 00:21)  Calcium: 8.6 (05-30 @ 11:11)  Albumin: 3.9 (06-01 @ 00:26)  Albumin: 4.1 (05-31 @ 00:21)  Albumin: 4.3 (05-30 @ 11:11)    Alanine Aminotransferase (ALT/SGPT): 51 *H* (06-01 @ 00:26)  Alanine Aminotransferase (ALT/SGPT): 59 *H* (05-31 @ 00:21)  Alanine Aminotransferase (ALT/SGPT): 17 (05-30 @ 11:11)  Alkaline Phosphatase: 88 (06-01 @ 00:26)  Alkaline Phosphatase: 83 (05-31 @ 00:21)  Alkaline Phosphatase: 55 (05-30 @ 11:11)  Aspartate Aminotransferase (AST/SGOT): 70 *H* (06-01 @ 00:26)  Aspartate Aminotransferase (AST/SGOT): 145 *H* (05-31 @ 00:21)  Aspartate Aminotransferase (AST/SGOT): 41 *H* (05-30 @ 11:11)        06-01    137  |  101  |  20  ----------------------------<  108<H>  4.8   |  23  |  0.99    Ca    8.9      01 Jun 2024 00:26  Phos  3.0     06-01  Mg     2.4     06-01    TPro  6.2  /  Alb  3.9  /  TBili  0.4  /  DBili  x   /  AST  70<H>  /  ALT  51<H>  /  AlkPhos  88  06-01                        8.8    17.68 )-----------( 149      ( 01 Jun 2024 00:26 )             27.2     Medications  MEDICATIONS  (STANDING):  albuterol/ipratropium for Nebulization 3 milliLiter(s) Nebulizer every 6 hours  aMIOdarone    Tablet 400 milliGRAM(s) Oral two times a day  ascorbic acid 500 milliGRAM(s) Oral two times a day  aspirin enteric coated 81 milliGRAM(s) Oral daily  atorvastatin 80 milliGRAM(s) Oral at bedtime  bisacodyl Suppository 10 milliGRAM(s) Rectal once  buDESOnide    Inhalation Suspension 0.5 milliGRAM(s) Inhalation every 12 hours  chlorhexidine 2% Cloths 1 Application(s) Topical daily  clopidogrel Tablet 75 milliGRAM(s) Oral daily  dextrose 50% Injectable 50 milliLiter(s) IV Push every 15 minutes  dextrose Oral Gel 15 Gram(s) Oral once  enoxaparin Injectable 40 milliGRAM(s) SubCutaneous every 24 hours  gabapentin 100 milliGRAM(s) Oral every 8 hours  glucagon  Injectable 1 milliGRAM(s) IntraMuscular once  insulin glargine Injectable (LANTUS) 46 Unit(s) SubCutaneous at bedtime  insulin lispro (ADMELOG) corrective regimen sliding scale   SubCutaneous at bedtime  insulin lispro (ADMELOG) corrective regimen sliding scale   SubCutaneous three times a day before meals  insulin lispro Injectable (ADMELOG) 10 Unit(s) SubCutaneous three times a day before meals  insulin regular Infusion 3 Unit(s)/Hr (3 mL/Hr) IV Continuous <Continuous>  levothyroxine 25 MICROGram(s) Oral daily  metoprolol tartrate 12.5 milliGRAM(s) Oral every 12 hours  polyethylene glycol 3350 17 Gram(s) Oral daily  senna 2 Tablet(s) Oral at bedtime  sodium bicarbonate 325 milliGRAM(s) Oral every 12 hours      Physical Exam  General: Patient appears comfortable.  Vital Signs Last 12 Hrs  T(F): 97.6 (06-01-24 @ 08:00), Max: 99.1 (06-01-24 @ 04:00)  HR: 78 (06-01-24 @ 08:00) (76 - 84)  BP: 121/68 (06-01-24 @ 08:00) (110/53 - 141/65)  BP(mean): 87 (06-01-24 @ 08:00) (76 - 94)  RR: 20 (06-01-24 @ 08:00) (16 - 31)  SpO2: 94% (06-01-24 @ 08:00) (91% - 98%)  Neck: No palpable thyroid nodules.  CVS: S1S2, No murmurs  Respiratory: No wheezing, no crepitations  GI: Abdomen soft, non tender.    Diagnostics        Radiology:

## 2024-06-01 NOTE — PROGRESS NOTE ADULT - ASSESSMENT
68 y/o female with PMH of HTN/HLD, DMT2, Asthma. obesity, hoshimoto's, former smoker  who presented to ED to be evaluated for chest pain, now admitted to CTS for surgery eval.     Assessment  DMT2: 69y Female with DM T2 with hyperglycemia, A1C 8.7% , was on oral meds and insulin at  home  (basaglar) , now s/p surgery, extubated and eating meals, had hyperglycemias, IV insulin was reinstated, glucose improving now, eating meals.   CAD: on medications, stable, monitored. s/p surgery   Hypothyroidism: On Synthroid 25 mcg po daily, compliant with Synthroid intake,  asymptomatic. TFTs euthyroid.   HTN: on antihypertensive medications, monitored, asymptomatic.  Obesity: No strict exercise routines, not on any weight loss program, neither on low calorie diet.      Santana Montes MD  Cell: [phone]  Office: [phone]

## 2024-06-02 LAB
ALBUMIN SERPL ELPH-MCNC: 4 G/DL — SIGNIFICANT CHANGE UP (ref 3.3–5)
ALP SERPL-CCNC: 104 U/L — SIGNIFICANT CHANGE UP (ref 40–120)
ALT FLD-CCNC: 43 U/L — SIGNIFICANT CHANGE UP (ref 10–45)
ANION GAP SERPL CALC-SCNC: 16 MMOL/L — SIGNIFICANT CHANGE UP (ref 5–17)
AST SERPL-CCNC: 44 U/L — HIGH (ref 10–40)
BASOPHILS # BLD AUTO: 0 K/UL — SIGNIFICANT CHANGE UP (ref 0–0.2)
BASOPHILS NFR BLD AUTO: 0 % — SIGNIFICANT CHANGE UP (ref 0–2)
BILIRUB SERPL-MCNC: 0.6 MG/DL — SIGNIFICANT CHANGE UP (ref 0.2–1.2)
BUN SERPL-MCNC: 18 MG/DL — SIGNIFICANT CHANGE UP (ref 7–23)
CALCIUM SERPL-MCNC: 9.5 MG/DL — SIGNIFICANT CHANGE UP (ref 8.4–10.5)
CHLORIDE SERPL-SCNC: 101 MMOL/L — SIGNIFICANT CHANGE UP (ref 96–108)
CO2 SERPL-SCNC: 23 MMOL/L — SIGNIFICANT CHANGE UP (ref 22–31)
CREAT SERPL-MCNC: 0.92 MG/DL — SIGNIFICANT CHANGE UP (ref 0.5–1.3)
EGFR: 68 ML/MIN/1.73M2 — SIGNIFICANT CHANGE UP
EOSINOPHIL # BLD AUTO: 0.26 K/UL — SIGNIFICANT CHANGE UP (ref 0–0.5)
EOSINOPHIL NFR BLD AUTO: 2 % — SIGNIFICANT CHANGE UP (ref 0–6)
GAS PNL BLDA: SIGNIFICANT CHANGE UP
GLUCOSE BLDC GLUCOMTR-MCNC: 139 MG/DL — HIGH (ref 70–99)
GLUCOSE BLDC GLUCOMTR-MCNC: 150 MG/DL — HIGH (ref 70–99)
GLUCOSE BLDC GLUCOMTR-MCNC: 153 MG/DL — HIGH (ref 70–99)
GLUCOSE BLDC GLUCOMTR-MCNC: 178 MG/DL — HIGH (ref 70–99)
GLUCOSE BLDC GLUCOMTR-MCNC: 181 MG/DL — HIGH (ref 70–99)
GLUCOSE BLDC GLUCOMTR-MCNC: 182 MG/DL — HIGH (ref 70–99)
GLUCOSE BLDC GLUCOMTR-MCNC: 90 MG/DL — SIGNIFICANT CHANGE UP (ref 70–99)
GLUCOSE SERPL-MCNC: 148 MG/DL — HIGH (ref 70–99)
HCT VFR BLD CALC: 27.9 % — LOW (ref 34.5–45)
HGB BLD-MCNC: 9.1 G/DL — LOW (ref 11.5–15.5)
LYMPHOCYTES # BLD AUTO: 2.6 K/UL — SIGNIFICANT CHANGE UP (ref 1–3.3)
LYMPHOCYTES # BLD AUTO: 20 % — SIGNIFICANT CHANGE UP (ref 13–44)
MAGNESIUM SERPL-MCNC: 2.1 MG/DL — SIGNIFICANT CHANGE UP (ref 1.6–2.6)
MANUAL SMEAR VERIFICATION: SIGNIFICANT CHANGE UP
MCHC RBC-ENTMCNC: 28.1 PG — SIGNIFICANT CHANGE UP (ref 27–34)
MCHC RBC-ENTMCNC: 32.6 GM/DL — SIGNIFICANT CHANGE UP (ref 32–36)
MCV RBC AUTO: 86.1 FL — SIGNIFICANT CHANGE UP (ref 80–100)
MONOCYTES # BLD AUTO: 0.65 K/UL — SIGNIFICANT CHANGE UP (ref 0–0.9)
MONOCYTES NFR BLD AUTO: 5 % — SIGNIFICANT CHANGE UP (ref 2–14)
NEUTROPHILS # BLD AUTO: 9.48 K/UL — HIGH (ref 1.8–7.4)
NEUTROPHILS NFR BLD AUTO: 73 % — SIGNIFICANT CHANGE UP (ref 43–77)
NRBC # BLD: 0 /100 WBCS — SIGNIFICANT CHANGE UP (ref 0–0)
PHOSPHATE SERPL-MCNC: 2.7 MG/DL — SIGNIFICANT CHANGE UP (ref 2.5–4.5)
PLAT MORPH BLD: NORMAL — SIGNIFICANT CHANGE UP
PLATELET # BLD AUTO: 102 K/UL — LOW (ref 150–400)
POTASSIUM SERPL-MCNC: 4.8 MMOL/L — SIGNIFICANT CHANGE UP (ref 3.5–5.3)
POTASSIUM SERPL-SCNC: 4.8 MMOL/L — SIGNIFICANT CHANGE UP (ref 3.5–5.3)
PROT SERPL-MCNC: 6.6 G/DL — SIGNIFICANT CHANGE UP (ref 6–8.3)
RBC # BLD: 3.24 M/UL — LOW (ref 3.8–5.2)
RBC # FLD: 15.6 % — HIGH (ref 10.3–14.5)
RBC BLD AUTO: NORMAL — SIGNIFICANT CHANGE UP
SODIUM SERPL-SCNC: 140 MMOL/L — SIGNIFICANT CHANGE UP (ref 135–145)
WBC # BLD: 12.99 K/UL — HIGH (ref 3.8–10.5)
WBC # FLD AUTO: 12.99 K/UL — HIGH (ref 3.8–10.5)

## 2024-06-02 PROCEDURE — 99291 CRITICAL CARE FIRST HOUR: CPT | Mod: 24

## 2024-06-02 PROCEDURE — 99223 1ST HOSP IP/OBS HIGH 75: CPT | Mod: GC

## 2024-06-02 PROCEDURE — 71045 X-RAY EXAM CHEST 1 VIEW: CPT | Mod: 26

## 2024-06-02 PROCEDURE — 93970 EXTREMITY STUDY: CPT | Mod: 26

## 2024-06-02 RX ORDER — INSULIN GLARGINE 100 [IU]/ML
58 INJECTION, SOLUTION SUBCUTANEOUS AT BEDTIME
Refills: 0 | Status: DISCONTINUED | OUTPATIENT
Start: 2024-06-02 | End: 2024-06-03

## 2024-06-02 RX ORDER — FUROSEMIDE 40 MG
40 TABLET ORAL ONCE
Refills: 0 | Status: COMPLETED | OUTPATIENT
Start: 2024-06-02 | End: 2024-06-02

## 2024-06-02 RX ORDER — POTASSIUM CHLORIDE 20 MEQ
40 PACKET (EA) ORAL ONCE
Refills: 0 | Status: COMPLETED | OUTPATIENT
Start: 2024-06-02 | End: 2024-06-02

## 2024-06-02 RX ORDER — TIOTROPIUM BROMIDE 18 UG/1
2 CAPSULE ORAL; RESPIRATORY (INHALATION) DAILY
Refills: 0 | Status: DISCONTINUED | OUTPATIENT
Start: 2024-06-02 | End: 2024-06-05

## 2024-06-02 RX ORDER — ALBUTEROL 90 UG/1
2.5 AEROSOL, METERED ORAL EVERY 6 HOURS
Refills: 0 | Status: DISCONTINUED | OUTPATIENT
Start: 2024-06-02 | End: 2024-06-05

## 2024-06-02 RX ORDER — BUDESONIDE AND FORMOTEROL FUMARATE DIHYDRATE 160; 4.5 UG/1; UG/1
2 AEROSOL RESPIRATORY (INHALATION)
Refills: 0 | Status: DISCONTINUED | OUTPATIENT
Start: 2024-06-02 | End: 2024-06-05

## 2024-06-02 RX ORDER — INSULIN LISPRO 100/ML
14 VIAL (ML) SUBCUTANEOUS
Refills: 0 | Status: DISCONTINUED | OUTPATIENT
Start: 2024-06-02 | End: 2024-06-03

## 2024-06-02 RX ADMIN — Medication 40 MILLIGRAM(S): at 12:22

## 2024-06-02 RX ADMIN — Medication 40 MILLIEQUIVALENT(S): at 15:03

## 2024-06-02 RX ADMIN — MONTELUKAST 10 MILLIGRAM(S): 4 TABLET, CHEWABLE ORAL at 12:23

## 2024-06-02 RX ADMIN — INSULIN GLARGINE 58 UNIT(S): 100 INJECTION, SOLUTION SUBCUTANEOUS at 21:23

## 2024-06-02 RX ADMIN — Medication 1: at 07:56

## 2024-06-02 RX ADMIN — Medication 12.5 MILLIGRAM(S): at 17:47

## 2024-06-02 RX ADMIN — ALBUTEROL 2.5 MILLIGRAM(S): 90 AEROSOL, METERED ORAL at 23:21

## 2024-06-02 RX ADMIN — Medication 14 UNIT(S): at 12:15

## 2024-06-02 RX ADMIN — Medication 81 MILLIGRAM(S): at 12:29

## 2024-06-02 RX ADMIN — POLYETHYLENE GLYCOL 3350 17 GRAM(S): 17 POWDER, FOR SOLUTION ORAL at 12:23

## 2024-06-02 RX ADMIN — Medication 500 MILLIGRAM(S): at 05:10

## 2024-06-02 RX ADMIN — ALBUTEROL 2.5 MILLIGRAM(S): 90 AEROSOL, METERED ORAL at 17:44

## 2024-06-02 RX ADMIN — Medication 12 UNIT(S): at 07:56

## 2024-06-02 RX ADMIN — Medication 325 MILLIGRAM(S): at 05:10

## 2024-06-02 RX ADMIN — CLOPIDOGREL BISULFATE 75 MILLIGRAM(S): 75 TABLET, FILM COATED ORAL at 12:23

## 2024-06-02 RX ADMIN — Medication 1: at 16:48

## 2024-06-02 RX ADMIN — Medication 500 MILLIGRAM(S): at 17:47

## 2024-06-02 RX ADMIN — GABAPENTIN 100 MILLIGRAM(S): 400 CAPSULE ORAL at 15:02

## 2024-06-02 RX ADMIN — Medication 3 MILLILITER(S): at 11:12

## 2024-06-02 RX ADMIN — ENOXAPARIN SODIUM 40 MILLIGRAM(S): 100 INJECTION SUBCUTANEOUS at 12:23

## 2024-06-02 RX ADMIN — Medication 325 MILLIGRAM(S): at 17:47

## 2024-06-02 RX ADMIN — GABAPENTIN 100 MILLIGRAM(S): 400 CAPSULE ORAL at 21:23

## 2024-06-02 RX ADMIN — Medication 3 MILLILITER(S): at 05:18

## 2024-06-02 RX ADMIN — GABAPENTIN 100 MILLIGRAM(S): 400 CAPSULE ORAL at 05:10

## 2024-06-02 RX ADMIN — Medication 0.5 MILLIGRAM(S): at 05:19

## 2024-06-02 RX ADMIN — Medication 14 UNIT(S): at 16:48

## 2024-06-02 RX ADMIN — Medication 25 MICROGRAM(S): at 05:10

## 2024-06-02 RX ADMIN — CHLORHEXIDINE GLUCONATE 1 APPLICATION(S): 213 SOLUTION TOPICAL at 21:23

## 2024-06-02 RX ADMIN — ATORVASTATIN CALCIUM 80 MILLIGRAM(S): 80 TABLET, FILM COATED ORAL at 21:23

## 2024-06-02 RX ADMIN — Medication 12.5 MILLIGRAM(S): at 05:10

## 2024-06-02 NOTE — PROGRESS NOTE ADULT - PROBLEM SELECTOR PLAN 1
Will increase Lantus  58 units at bed time.  Will increase  Admelog 14 units before each meal in addition to Admelog correction scale coverage.  Patient counseled for compliance with consistent low carb diet.

## 2024-06-02 NOTE — CONSULT NOTE ADULT - SUBJECTIVE AND OBJECTIVE BOX
CHIEF COMPLAINT: chest pain    HPI:  68 y/o female with PMH of HTN/HLD, DMT2, Asthma. obesity, hoshimoto's, former smoker p/f chest pain now s/p CABG. Currently POD 4. Since the procedure, has been requiring supplemental O2 up to HFNC; today 30L/40% down from 40L/70%.     Pt has findings of emphysema on outpt CT scan . No pleural effusions at that time. She quit smoking 4ya.    CXR c/f bilat pleural effusions, R>L.    Pulm c/f COPD optimization.        PAST MEDICAL & SURGICAL HISTORY:  Hypertension      Diabetes      Hyperlipidemia      High triglycerides      Hypothyroidism      Obesity      Arthritis      Chronic bronchiolitis      Hashimoto's thyroiditis      H/O  section      H/O tubal ligation      H/O eye surgery          FAMILY HISTORY:  FH: coronary artery disease (Sibling)        SOCIAL HISTORY:  Smoking:  Substance Use:  EtOH Use:  Occupation:  Recent Travel:  Country of Birth:  Advance Directives:    Allergies    penicillin (Hives)    Intolerances        HOME MEDICATIONS:    REVIEW OF SYSTEMS:  Constitutional: [x] negative [ ] fevers [ ] chills [ ] weight loss [ ] weight gain  HEENT: [x] negative [ ] dry eyes [ ] eye irritation [ ] postnasal drip [ ] nasal congestion  CV: [x] negative  [ ] chest pain [ ] orthopnea [ ] palpitations [ ] murmur  Resp: [x] negative [ ] cough [ ] shortness of breath [ ] dyspnea [ ] wheezing [ ] sputum [ ] hemoptysis  GI: [x] negative [ ] nausea [ ] vomiting [ ] diarrhea [ ] constipation [ ] abd pain [ ] dysphagia   : [x] negative [ ] dysuria [ ] nocturia [ ] hematuria [ ] increased urinary frequency  Musculoskeletal: [x] negative [ ] back pain [ ] myalgias [ ] arthralgias [ ] fracture  Skin: [x] negative [ ] rash [ ] itch  Neurological: [x] negative [ ] headache [ ] dizziness [ ] syncope [ ] weakness [ ] numbness  Psychiatric: [x] negative [ ] anxiety [ ] depression  Endocrine: [x] negative [ ] diabetes [ ] thyroid problem  Hematologic/Lymphatic: [x] negative [ ] anemia [ ] bleeding problem  Allergic/Immunologic: [x] negative [ ] itchy eyes [ ] nasal discharge [ ] hives [ ] angioedema  [x] All other systems negative  [ ] Unable to assess ROS because ________    OBJECTIVE:  ICU Vital Signs Last 24 Hrs  T(C): 36.6 (2024 08:00), Max: 37.4 (2024 17:00)  T(F): 97.8 (2024 08:00), Max: 99.3 (2024 17:00)  HR: 84 (2024 11:00) (78 - 94)  BP: 143/63 (2024 10:00) (106/51 - 158/68)  BP(mean): 91 (2024 10:00) (73 - 98)  ABP: --  ABP(mean): --  RR: 26 (2024 11:00) (15 - 44)  SpO2: 92% (2024 11:00) (90% - 98%)    O2 Parameters below as of 2024 09:19  Patient On (Oxygen Delivery Method): nasal cannula, high flow, @31c  O2 Flow (L/min): 30  O2 Concentration (%): 40          06-01 @ 07:01  -  02 @ 07:00  --------------------------------------------------------  IN: 691.5 mL / OUT: 3750 mL / NET: -3058.5 mL    0602 @ 07:01  -  0602 @ 11:29  --------------------------------------------------------  IN: 120 mL / OUT: 400 mL / NET: -280 mL      CAPILLARY BLOOD GLUCOSE  153 (2024 07:00)      POCT Blood Glucose.: 182 mg/dL (2024 11:14)      PHYSICAL EXAM:  General:   HEENT: MMM, PERRLA  Respiratory:   Cardiovascular: RRR, no MRG  Abdomen: NTND  Extremities: no LE edema  Neurological: AOx3, no gross deficits    HOSPITAL MEDICATIONS:  aspirin enteric coated 81 milliGRAM(s) Oral daily  clopidogrel Tablet 75 milliGRAM(s) Oral daily  enoxaparin Injectable 40 milliGRAM(s) SubCutaneous every 24 hours      metoprolol tartrate 12.5 milliGRAM(s) Oral every 12 hours    atorvastatin 80 milliGRAM(s) Oral at bedtime  dextrose 50% Injectable 50 milliLiter(s) IV Push every 15 minutes  dextrose Oral Gel 15 Gram(s) Oral once  glucagon  Injectable 1 milliGRAM(s) IntraMuscular once  insulin glargine Injectable (LANTUS) 58 Unit(s) SubCutaneous at bedtime  insulin lispro (ADMELOG) corrective regimen sliding scale   SubCutaneous three times a day before meals  insulin lispro (ADMELOG) corrective regimen sliding scale   SubCutaneous at bedtime  insulin lispro Injectable (ADMELOG) 14 Unit(s) SubCutaneous three times a day before meals  insulin regular Infusion 3 Unit(s)/Hr IV Continuous <Continuous>  levothyroxine 25 MICROGram(s) Oral daily    albuterol/ipratropium for Nebulization 3 milliLiter(s) Nebulizer every 6 hours  buDESOnide    Inhalation Suspension 0.5 milliGRAM(s) Inhalation every 12 hours  montelukast 10 milliGRAM(s) Oral daily    acetaminophen     Tablet .. 650 milliGRAM(s) Oral every 6 hours PRN  gabapentin 100 milliGRAM(s) Oral every 8 hours  oxyCODONE    IR 5 milliGRAM(s) Oral every 4 hours PRN  oxyCODONE    IR 10 milliGRAM(s) Oral every 4 hours PRN    bisacodyl Suppository 10 milliGRAM(s) Rectal once  polyethylene glycol 3350 17 Gram(s) Oral daily  senna 2 Tablet(s) Oral at bedtime        ascorbic acid 500 milliGRAM(s) Oral two times a day  sodium bicarbonate 325 milliGRAM(s) Oral every 12 hours      benzocaine/menthol Lozenge 1 Lozenge Oral every 3 hours PRN  chlorhexidine 2% Cloths 1 Application(s) Topical daily        LABS:                        9.1    12.99 )-----------( 102      ( 2024 00:29 )             27.9     Hgb Trend: 9.1<--, 8.8<--, 9.1<--, 9.5<--, 8.6<--  06-02    140  |  101  |  18  ----------------------------<  148<H>  4.8   |  23  |  0.92    Ca    9.5      2024 00:33  Phos  2.7     06-02  Mg     2.1     06-02    TPro  6.6  /  Alb  4.0  /  TBili  0.6  /  DBili  x   /  AST  44<H>  /  ALT  43  /  AlkPhos  104  06-02    Creatinine Trend: 0.92<--, 0.99<--, 1.14<--, 0.97<--, 0.79<--, 0.81<--    Urinalysis Basic - ( 2024 00:33 )    Color: x / Appearance: x / SG: x / pH: x  Gluc: 148 mg/dL / Ketone: x  / Bili: x / Urobili: x   Blood: x / Protein: x / Nitrite: x   Leuk Esterase: x / RBC: x / WBC x   Sq Epi: x / Non Sq Epi: x / Bacteria: x            MICROBIOLOGY:     RADIOLOGY:  [x] Reviewed and interpreted by me    PULMONARY FUNCTION TESTS:    EKG:   CHIEF COMPLAINT: chest pain    HPI:  70 y/o female with PMH of HTN/HLD, DMT2, Asthma. obesity, hoshimoto's, former smoker p/f chest pain now s/p CABG. Currently POD 4. Since the procedure, has been requiring supplemental O2 up to HFNC; today 30L/40% down from 40L/70%.     Pt has findings of emphysema on outpt CT scan . No pleural effusions at that time. She quit smoking 4ya.    CXR with bilateral lower lobe haziness, R>L.     Pulm c/f hypoxemia recs/COPD optimization.    NOTE INCOMPLETE    SHx:  Quit smoking 4ya.      PAST MEDICAL & SURGICAL HISTORY:  Hypertension      Diabetes      Hyperlipidemia      High triglycerides      Hypothyroidism      Obesity      Arthritis      Chronic bronchiolitis      Hashimoto's thyroiditis      H/O  section      H/O tubal ligation      H/O eye surgery          FAMILY HISTORY:  FH: coronary artery disease (Sibling)        SOCIAL HISTORY:  Smoking:  Substance Use:  EtOH Use:  Occupation:  Recent Travel:  Country of Birth:  Advance Directives:    Allergies    penicillin (Hives)    Intolerances        HOME MEDICATIONS:    REVIEW OF SYSTEMS:  Constitutional: [x] negative [ ] fevers [ ] chills [ ] weight loss [ ] weight gain  HEENT: [x] negative [ ] dry eyes [ ] eye irritation [ ] postnasal drip [ ] nasal congestion  CV: [x] negative  [ ] chest pain [ ] orthopnea [ ] palpitations [ ] murmur  Resp: [x] negative [ ] cough [ ] shortness of breath [ ] dyspnea [ ] wheezing [ ] sputum [ ] hemoptysis  GI: [x] negative [ ] nausea [ ] vomiting [ ] diarrhea [ ] constipation [ ] abd pain [ ] dysphagia   : [x] negative [ ] dysuria [ ] nocturia [ ] hematuria [ ] increased urinary frequency  Musculoskeletal: [x] negative [ ] back pain [ ] myalgias [ ] arthralgias [ ] fracture  Skin: [x] negative [ ] rash [ ] itch  Neurological: [x] negative [ ] headache [ ] dizziness [ ] syncope [ ] weakness [ ] numbness  Psychiatric: [x] negative [ ] anxiety [ ] depression  Endocrine: [x] negative [ ] diabetes [ ] thyroid problem  Hematologic/Lymphatic: [x] negative [ ] anemia [ ] bleeding problem  Allergic/Immunologic: [x] negative [ ] itchy eyes [ ] nasal discharge [ ] hives [ ] angioedema  [x] All other systems negative  [ ] Unable to assess ROS because ________    OBJECTIVE:  ICU Vital Signs Last 24 Hrs  T(C): 36.6 (2024 08:00), Max: 37.4 (2024 17:00)  T(F): 97.8 (2024 08:00), Max: 99.3 (2024 17:00)  HR: 84 (2024 11:00) (78 - 94)  BP: 143/63 (2024 10:00) (106/51 - 158/68)  BP(mean): 91 (2024 10:00) (73 - 98)  ABP: --  ABP(mean): --  RR: 26 (2024 11:00) (15 - 44)  SpO2: 92% (2024 11:00) (90% - 98%)    O2 Parameters below as of 2024 09:19  Patient On (Oxygen Delivery Method): nasal cannula, high flow, @31c  O2 Flow (L/min): 30  O2 Concentration (%): 40          06-01 @ 07:  -  02 @ 07:00  --------------------------------------------------------  IN: 691.5 mL / OUT: 3750 mL / NET: -3058.5 mL     @ 07:01  -  02 @ 11:29  --------------------------------------------------------  IN: 120 mL / OUT: 400 mL / NET: -280 mL      CAPILLARY BLOOD GLUCOSE  153 (2024 07:00)      POCT Blood Glucose.: 182 mg/dL (2024 11:14)      PHYSICAL EXAM:  General:   HEENT: MMM, PERRLA  Respiratory:   Cardiovascular: RRR, no MRG  Abdomen: NTND  Extremities: no LE edema  Neurological: AOx3, no gross deficits    HOSPITAL MEDICATIONS:  aspirin enteric coated 81 milliGRAM(s) Oral daily  clopidogrel Tablet 75 milliGRAM(s) Oral daily  enoxaparin Injectable 40 milliGRAM(s) SubCutaneous every 24 hours      metoprolol tartrate 12.5 milliGRAM(s) Oral every 12 hours    atorvastatin 80 milliGRAM(s) Oral at bedtime  dextrose 50% Injectable 50 milliLiter(s) IV Push every 15 minutes  dextrose Oral Gel 15 Gram(s) Oral once  glucagon  Injectable 1 milliGRAM(s) IntraMuscular once  insulin glargine Injectable (LANTUS) 58 Unit(s) SubCutaneous at bedtime  insulin lispro (ADMELOG) corrective regimen sliding scale   SubCutaneous three times a day before meals  insulin lispro (ADMELOG) corrective regimen sliding scale   SubCutaneous at bedtime  insulin lispro Injectable (ADMELOG) 14 Unit(s) SubCutaneous three times a day before meals  insulin regular Infusion 3 Unit(s)/Hr IV Continuous <Continuous>  levothyroxine 25 MICROGram(s) Oral daily    albuterol/ipratropium for Nebulization 3 milliLiter(s) Nebulizer every 6 hours  buDESOnide    Inhalation Suspension 0.5 milliGRAM(s) Inhalation every 12 hours  montelukast 10 milliGRAM(s) Oral daily    acetaminophen     Tablet .. 650 milliGRAM(s) Oral every 6 hours PRN  gabapentin 100 milliGRAM(s) Oral every 8 hours  oxyCODONE    IR 5 milliGRAM(s) Oral every 4 hours PRN  oxyCODONE    IR 10 milliGRAM(s) Oral every 4 hours PRN    bisacodyl Suppository 10 milliGRAM(s) Rectal once  polyethylene glycol 3350 17 Gram(s) Oral daily  senna 2 Tablet(s) Oral at bedtime        ascorbic acid 500 milliGRAM(s) Oral two times a day  sodium bicarbonate 325 milliGRAM(s) Oral every 12 hours      benzocaine/menthol Lozenge 1 Lozenge Oral every 3 hours PRN  chlorhexidine 2% Cloths 1 Application(s) Topical daily        LABS:                        9.1    12.99 )-----------( 102      ( 2024 00:29 )             27.9     Hgb Trend: 9.1<--, 8.8<--, 9.1<--, 9.5<--, 8.6<--  06-02    140  |  101  |  18  ----------------------------<  148<H>  4.8   |  23  |  0.92    Ca    9.5      2024 00:33  Phos  2.7     06-  Mg     2.1         TPro  6.6  /  Alb  4.0  /  TBili  0.6  /  DBili  x   /  AST  44<H>  /  ALT  43  /  AlkPhos  104      Creatinine Trend: 0.92<--, 0.99<--, 1.14<--, 0.97<--, 0.79<--, 0.81<--    Urinalysis Basic - ( 2024 00:33 )    Color: x / Appearance: x / SG: x / pH: x  Gluc: 148 mg/dL / Ketone: x  / Bili: x / Urobili: x   Blood: x / Protein: x / Nitrite: x   Leuk Esterase: x / RBC: x / WBC x   Sq Epi: x / Non Sq Epi: x / Bacteria: x            MICROBIOLOGY:     RADIOLOGY:  [x] Reviewed and interpreted by me    PULMONARY FUNCTION TESTS:    EKG:   CHIEF COMPLAINT: chest pain    HPI:  68 y/o female with PMH of HTN/HLD, DMT2, Asthma. obesity, hoshimoto's, former smoker p/f chest pain now s/p CABG. Currently POD 4. Since the procedure, has been requiring supplemental O2 up to HFNC; today 30L/40% down from 40L/70%.     She reports that prior to this admission/CABG, she's been having chronic SOB, especially on exertion, with a decrease of her ET to several blocks. It was associated with deep inspiration, and this pleuritic pain has worsened since being in the hospital.    Pt has findings of emphysema on outpt CT scan . No pleural effusions at that time. CXR with bilateral lower lobe haziness, R>L.     Pulm c/f hypoxemia recs/COPD optimization. She reports feeling better today.    SHx:  Quit smoking 4ya; smoked up to 1.5ppd for >40 years. No drugs, alc. Works as a . No pets.       PAST MEDICAL & SURGICAL HISTORY:  Hypertension      Diabetes      Hyperlipidemia      High triglycerides      Hypothyroidism      Obesity      Arthritis      Chronic bronchiolitis      Hashimoto's thyroiditis      H/O  section      H/O tubal ligation      H/O eye surgery          FAMILY HISTORY:  FH: coronary artery disease (Sibling)          Allergies    penicillin (Hives)    Intolerances        HOME MEDICATIONS:    REVIEW OF SYSTEMS:  Constitutional: [x] negative [ ] fevers [ ] chills [ ] weight loss [ ] weight gain  HEENT: [x] negative [ ] dry eyes [ ] eye irritation [ ] postnasal drip [ ] nasal congestion  CV: [x] negative  [ ] chest pain [ ] orthopnea [ ] palpitations [ ] murmur  Resp: [x] negative [ ] cough [ ] shortness of breath [ ] dyspnea [ ] wheezing [ ] sputum [ ] hemoptysis  GI: [x] negative [ ] nausea [ ] vomiting [ ] diarrhea [ ] constipation [ ] abd pain [ ] dysphagia   : [x] negative [ ] dysuria [ ] nocturia [ ] hematuria [ ] increased urinary frequency  Musculoskeletal: [x] negative [ ] back pain [ ] myalgias [ ] arthralgias [ ] fracture  Skin: [x] negative [ ] rash [ ] itch  Neurological: [x] negative [ ] headache [ ] dizziness [ ] syncope [ ] weakness [ ] numbness  Psychiatric: [x] negative [ ] anxiety [ ] depression  Endocrine: [x] negative [ ] diabetes [ ] thyroid problem  Hematologic/Lymphatic: [x] negative [ ] anemia [ ] bleeding problem  Allergic/Immunologic: [x] negative [ ] itchy eyes [ ] nasal discharge [ ] hives [ ] angioedema  [x] All other systems negative  [ ] Unable to assess ROS because ________    OBJECTIVE:  ICU Vital Signs Last 24 Hrs  T(C): 36.6 (2024 08:00), Max: 37.4 (2024 17:00)  T(F): 97.8 (2024 08:00), Max: 99.3 (2024 17:00)  HR: 84 (2024 11:00) (78 - 94)  BP: 143/63 (2024 10:00) (106/51 - 158/68)  BP(mean): 91 (2024 10:00) (73 - 98)  ABP: --  ABP(mean): --  RR: 26 (2024 11:00) (15 - 44)  SpO2: 92% (2024 11:00) (90% - 98%)    O2 Parameters below as of 2024 09:19  Patient On (Oxygen Delivery Method): nasal cannula, high flow, @31c  O2 Flow (L/min): 30  O2 Concentration (%): 40           @ 07:  -  02 @ 07:00  --------------------------------------------------------  IN: 691.5 mL / OUT: 3750 mL / NET: -3058.5 mL     @ 07:01  -  0602 @ 11:29  --------------------------------------------------------  IN: 120 mL / OUT: 400 mL / NET: -280 mL      CAPILLARY BLOOD GLUCOSE  153 (2024 07:00)      POCT Blood Glucose.: 182 mg/dL (2024 11:14)      PHYSICAL EXAM:  General: slight distress, seated at  bedisde, obese  HEENT: MMM, PERRLA  Respiratory: CTAB, comf on HFNC  Cardiovascular: RRR, no MRG  Abdomen: NTND  Extremities: 1+ pitting LE edema  Neurological: AOx3, no gross deficits    HOSPITAL MEDICATIONS:  aspirin enteric coated 81 milliGRAM(s) Oral daily  clopidogrel Tablet 75 milliGRAM(s) Oral daily  enoxaparin Injectable 40 milliGRAM(s) SubCutaneous every 24 hours      metoprolol tartrate 12.5 milliGRAM(s) Oral every 12 hours    atorvastatin 80 milliGRAM(s) Oral at bedtime  dextrose 50% Injectable 50 milliLiter(s) IV Push every 15 minutes  dextrose Oral Gel 15 Gram(s) Oral once  glucagon  Injectable 1 milliGRAM(s) IntraMuscular once  insulin glargine Injectable (LANTUS) 58 Unit(s) SubCutaneous at bedtime  insulin lispro (ADMELOG) corrective regimen sliding scale   SubCutaneous three times a day before meals  insulin lispro (ADMELOG) corrective regimen sliding scale   SubCutaneous at bedtime  insulin lispro Injectable (ADMELOG) 14 Unit(s) SubCutaneous three times a day before meals  insulin regular Infusion 3 Unit(s)/Hr IV Continuous <Continuous>  levothyroxine 25 MICROGram(s) Oral daily    albuterol/ipratropium for Nebulization 3 milliLiter(s) Nebulizer every 6 hours  buDESOnide    Inhalation Suspension 0.5 milliGRAM(s) Inhalation every 12 hours  montelukast 10 milliGRAM(s) Oral daily    acetaminophen     Tablet .. 650 milliGRAM(s) Oral every 6 hours PRN  gabapentin 100 milliGRAM(s) Oral every 8 hours  oxyCODONE    IR 5 milliGRAM(s) Oral every 4 hours PRN  oxyCODONE    IR 10 milliGRAM(s) Oral every 4 hours PRN    bisacodyl Suppository 10 milliGRAM(s) Rectal once  polyethylene glycol 3350 17 Gram(s) Oral daily  senna 2 Tablet(s) Oral at bedtime        ascorbic acid 500 milliGRAM(s) Oral two times a day  sodium bicarbonate 325 milliGRAM(s) Oral every 12 hours      benzocaine/menthol Lozenge 1 Lozenge Oral every 3 hours PRN  chlorhexidine 2% Cloths 1 Application(s) Topical daily        LABS:                        9.1    12.99 )-----------( 102      ( 2024 00:29 )             27.9     Hgb Trend: 9.1<--, 8.8<--, 9.1<--, 9.5<--, 8.6<--  06-02    140  |  101  |  18  ----------------------------<  148<H>  4.8   |  23  |  0.92    Ca    9.5      2024 00:33  Phos  2.7     06-02  Mg     2.1     06-02    TPro  6.6  /  Alb  4.0  /  TBili  0.6  /  DBili  x   /  AST  44<H>  /  ALT  43  /  AlkPhos  104  06-02    Creatinine Trend: 0.92<--, 0.99<--, 1.14<--, 0.97<--, 0.79<--, 0.81<--    Urinalysis Basic - ( 2024 00:33 )    Color: x / Appearance: x / SG: x / pH: x  Gluc: 148 mg/dL / Ketone: x  / Bili: x / Urobili: x   Blood: x / Protein: x / Nitrite: x   Leuk Esterase: x / RBC: x / WBC x   Sq Epi: x / Non Sq Epi: x / Bacteria: x            MICROBIOLOGY:     RADIOLOGY:  [x] Reviewed and interpreted by me    PULMONARY FUNCTION TESTS:    EKG:

## 2024-06-02 NOTE — PROGRESS NOTE ADULT - ASSESSMENT
70 y/o female with PMH of HTN/HLD, DMT2, Asthma. obesity, hoshimoto's, former smoker  who presented to ED to be evaluated for chest pain, now admitted to CTS for surgery eval.     Assessment  DMT2: 69y Female with DM T2 with hyperglycemia, A1C 8.7% , was on oral meds and insulin at  home  (basaglar) , now s/p surgery, extubated and eating meals, insulins adjusted.   CAD: on medications, stable, monitored. s/p surgery   Hypothyroidism: On Synthroid 25 mcg po daily, compliant with Synthroid intake,  asymptomatic. TFTs euthyroid.   HTN: on antihypertensive medications, monitored, asymptomatic.  Obesity: No strict exercise routines, not on any weight loss program, neither on low calorie diet.      Discussed plan and management with Dr Jyoti Sweeney NP - TEAMS  Santana Montes MD  Cell: [phone]  Office: [phone]          70 y/o female with PMH of HTN/HLD, DMT2, Asthma. obesity, hoshimoto's, former smoker  who presented to ED to be evaluated for chest pain, now admitted to CTS for surgery eval.     Assessment  DMT2: 69y Female with DM T2 with hyperglycemia, A1C 8.7% , was on oral meds and insulin at  home  (basaglar), now s/p surgery, extubated and eating meals, insulins adjusted.   CAD: on medications, stable, monitored. s/p surgery   Hypothyroidism: On Synthroid 25 mcg po daily, compliant with Synthroid intake,  asymptomatic. TFTs euthyroid.   HTN: on antihypertensive medications, monitored, asymptomatic.  Obesity: No strict exercise routines, not on any weight loss program, neither on low calorie diet.      Discussed plan and management with Dr Jyoti Sweeney NP - TEAMS  Santana Montes MD  Cell: [phone]  Office: [phone]

## 2024-06-02 NOTE — CONSULT NOTE ADULT - ATTENDING COMMENTS
70 y/o female with PMH of HTN/HLD, DMT2, Asthma. obesity, hoshimoto's, former smoker p/f chest pain now s/p CABG. Currently POD 4.    Had post op hypoxemic respiratory failure. Patient with heavy smoking history. 20+ pack years, CT in the past with emphysema.   CXR compared to admission with worsening basilar opacities, likely atelectasis vs effusions.   POCUS showing both, with small-b/l effusions and large atelectasis    # COPD  # acute hypoxemic respiratory failure   - Post op, likely with atlectassis but POCUS with some effusions.  - Likely with underlying COPD, never had PFTs, + on CT scan.   - Start albuterol q6 hours standing for 2 days. Then PRN, no role for steroids at this time. Add Symbicort, d/c pulmicort and start spriva daily.   - OOB, incentive sprio. Accapella device for airway clearance.   - Would check non-contrast CT. Worse R>L which could indicate diaphragmatic dysfunction, in the setting of CABG.  - Pulmonary will follow.

## 2024-06-02 NOTE — CONSULT NOTE ADULT - ASSESSMENT
NOTE INCOMPLTETE NOTE INCOMPLETE  70 y/o female with PMH of HTN/HLD, DMT2, Asthma. obesity, hoshimoto's, former smoker p/f chest pain now s/p CABG. Currently POD 4. Since the procedure, has been requiring supplemental O2 up to HFNC; today 30L/40% down from 40L/70%. Pulm c/f hypoxemia recs/COPD optimization.    #acute hypoxemic RF  #atelectasis  #s/p CABG    - please obtain ABG  - bedside POCUS: ***  - net negative 12L this hospital stay  - currently on HFNC 30L/40% with improving settings  - outpt CT chest 05/24: radx read as emphysema  - CXR with bilateral lower lobe haziness, R>L.     NOTE INCOMPLETE 70 y/o female with PMH of HTN/HLD, DMT2, Asthma. obesity, hoshimoto's, former smoker p/f chest pain now s/p CABG. Currently POD 4. Since the procedure, has been requiring supplemental O2 up to HFNC; however, SOB has been present for a while per pt prior to presenting. HFNC today 30L/40% down from 40L/70%. Pulm c/f hypoxemia recs/COPD optimization.    #acute hypoxemic RF  #chronic SOB  #atelectasis  #?pleural effusions  #s/p CABG    - please obtain ABG  - bedside POCUS: difficult views, but likely bilat pleural effusions. Could not assess diaphragmatic function.  - net negative 12L this hospital stay  - currently on HFNC 30L/40% with improving settings  - outpt CT chest 05/24 with emphysema  - CXR with bilateral lower lobe haziness, R>L  - please order CT chest noncon to evaluate parenchyma and effusions  - airway clearance therapy with q6h albuterol nebs and aggressive chest PT  - ensure pain control to assist with inspiration and c/w incentive spirometer  - start spiriva and symbicort and continue this outpt (can continue montelukast)  - we will continue to follow    Spencer Villarreal MD  Fellow, Pulmonary-Critical Care

## 2024-06-02 NOTE — PROGRESS NOTE ADULT - SUBJECTIVE AND OBJECTIVE BOX
Chief complaint  Patient is a 69y old  Female who presents with a chief complaint of chest pain/shortness of breath (02 Jun 2024 11:27)         Labs and Fingersticks  CAPILLARY BLOOD GLUCOSE  153 (02 Jun 2024 07:00)  139 (02 Jun 2024 01:00)  152 (01 Jun 2024 23:00)  194 (01 Jun 2024 20:00)      POCT Blood Glucose.: 150 mg/dL (02 Jun 2024 12:21)  POCT Blood Glucose.: 182 mg/dL (02 Jun 2024 11:14)  POCT Blood Glucose.: 178 mg/dL (02 Jun 2024 07:54)  POCT Blood Glucose.: 153 mg/dL (02 Jun 2024 06:28)  POCT Blood Glucose.: 139 mg/dL (02 Jun 2024 00:58)  POCT Blood Glucose.: 152 mg/dL (01 Jun 2024 23:01)  POCT Blood Glucose.: 194 mg/dL (01 Jun 2024 20:34)  POCT Blood Glucose.: 124 mg/dL (01 Jun 2024 18:51)  POCT Blood Glucose.: 128 mg/dL (01 Jun 2024 17:44)  POCT Blood Glucose.: 153 mg/dL (01 Jun 2024 17:11)  POCT Blood Glucose.: 150 mg/dL (01 Jun 2024 15:52)  POCT Blood Glucose.: 138 mg/dL (01 Jun 2024 15:01)  POCT Blood Glucose.: 115 mg/dL (01 Jun 2024 13:58)      Anion Gap: 16 (06-02 @ 00:33)  Anion Gap: 13 (06-01 @ 00:26)      Calcium: 9.5 (06-02 @ 00:33)  Calcium: 8.9 (06-01 @ 00:26)  Albumin: 4.0 (06-02 @ 00:33)  Albumin: 3.9 (06-01 @ 00:26)    Alanine Aminotransferase (ALT/SGPT): 43 (06-02 @ 00:33)  Alanine Aminotransferase (ALT/SGPT): 51 *H* (06-01 @ 00:26)  Alkaline Phosphatase: 104 (06-02 @ 00:33)  Alkaline Phosphatase: 88 (06-01 @ 00:26)  Aspartate Aminotransferase (AST/SGOT): 44 *H* (06-02 @ 00:33)  Aspartate Aminotransferase (AST/SGOT): 70 *H* (06-01 @ 00:26)        06-02    140  |  101  |  18  ----------------------------<  148<H>  4.8   |  23  |  0.92    Ca    9.5      02 Jun 2024 00:33  Phos  2.7     06-02  Mg     2.1     06-02    TPro  6.6  /  Alb  4.0  /  TBili  0.6  /  DBili  x   /  AST  44<H>  /  ALT  43  /  AlkPhos  104  06-02                        9.1    12.99 )-----------( 102      ( 02 Jun 2024 00:29 )             27.9     Medications  MEDICATIONS  (STANDING):  albuterol/ipratropium for Nebulization 3 milliLiter(s) Nebulizer every 6 hours  ascorbic acid 500 milliGRAM(s) Oral two times a day  aspirin enteric coated 81 milliGRAM(s) Oral daily  atorvastatin 80 milliGRAM(s) Oral at bedtime  bisacodyl Suppository 10 milliGRAM(s) Rectal once  buDESOnide    Inhalation Suspension 0.5 milliGRAM(s) Inhalation every 12 hours  chlorhexidine 2% Cloths 1 Application(s) Topical daily  clopidogrel Tablet 75 milliGRAM(s) Oral daily  dextrose 50% Injectable 50 milliLiter(s) IV Push every 15 minutes  dextrose Oral Gel 15 Gram(s) Oral once  enoxaparin Injectable 40 milliGRAM(s) SubCutaneous every 24 hours  gabapentin 100 milliGRAM(s) Oral every 8 hours  glucagon  Injectable 1 milliGRAM(s) IntraMuscular once  insulin glargine Injectable (LANTUS) 58 Unit(s) SubCutaneous at bedtime  insulin lispro (ADMELOG) corrective regimen sliding scale   SubCutaneous three times a day before meals  insulin lispro (ADMELOG) corrective regimen sliding scale   SubCutaneous at bedtime  insulin lispro Injectable (ADMELOG) 14 Unit(s) SubCutaneous three times a day before meals  levothyroxine 25 MICROGram(s) Oral daily  metoprolol tartrate 12.5 milliGRAM(s) Oral every 12 hours  montelukast 10 milliGRAM(s) Oral daily  polyethylene glycol 3350 17 Gram(s) Oral daily  senna 2 Tablet(s) Oral at bedtime  sodium bicarbonate 325 milliGRAM(s) Oral every 12 hours      Physical Exam  General: Patient comfortable in bed   Vital Signs Last 12 Hrs  T(F): 98.6 (06-02-24 @ 12:00), Max: 98.8 (06-02-24 @ 04:00)  HR: 96 (06-02-24 @ 13:00) (78 - 96)  BP: 155/61 (06-02-24 @ 13:00) (100/52 - 155/61)  BP(mean): 88 (06-02-24 @ 13:00) (71 - 91)  RR: 25 (06-02-24 @ 13:00) (15 - 43)  SpO2: 93% (06-02-24 @ 13:00) (90% - 96%)    CVS: S1S2   Respiratory: No wheezing, no crepitations  GI: Abdomen soft, bowel sounds positive  Musculoskeletal:  moves all extremities  : Voiding          Chief complaint  Patient is a 69y old  Female who presents with a chief complaint of chest pain/shortness of breath (02 Jun 2024 11:27)       Labs and Fingersticks  CAPILLARY BLOOD GLUCOSE  153 (02 Jun 2024 07:00)  139 (02 Jun 2024 01:00)  152 (01 Jun 2024 23:00)  194 (01 Jun 2024 20:00)      POCT Blood Glucose.: 150 mg/dL (02 Jun 2024 12:21)  POCT Blood Glucose.: 182 mg/dL (02 Jun 2024 11:14)  POCT Blood Glucose.: 178 mg/dL (02 Jun 2024 07:54)  POCT Blood Glucose.: 153 mg/dL (02 Jun 2024 06:28)  POCT Blood Glucose.: 139 mg/dL (02 Jun 2024 00:58)  POCT Blood Glucose.: 152 mg/dL (01 Jun 2024 23:01)  POCT Blood Glucose.: 194 mg/dL (01 Jun 2024 20:34)  POCT Blood Glucose.: 124 mg/dL (01 Jun 2024 18:51)  POCT Blood Glucose.: 128 mg/dL (01 Jun 2024 17:44)  POCT Blood Glucose.: 153 mg/dL (01 Jun 2024 17:11)  POCT Blood Glucose.: 150 mg/dL (01 Jun 2024 15:52)  POCT Blood Glucose.: 138 mg/dL (01 Jun 2024 15:01)  POCT Blood Glucose.: 115 mg/dL (01 Jun 2024 13:58)      Anion Gap: 16 (06-02 @ 00:33)  Anion Gap: 13 (06-01 @ 00:26)      Calcium: 9.5 (06-02 @ 00:33)  Calcium: 8.9 (06-01 @ 00:26)  Albumin: 4.0 (06-02 @ 00:33)  Albumin: 3.9 (06-01 @ 00:26)    Alanine Aminotransferase (ALT/SGPT): 43 (06-02 @ 00:33)  Alanine Aminotransferase (ALT/SGPT): 51 *H* (06-01 @ 00:26)  Alkaline Phosphatase: 104 (06-02 @ 00:33)  Alkaline Phosphatase: 88 (06-01 @ 00:26)  Aspartate Aminotransferase (AST/SGOT): 44 *H* (06-02 @ 00:33)  Aspartate Aminotransferase (AST/SGOT): 70 *H* (06-01 @ 00:26)        06-02    140  |  101  |  18  ----------------------------<  148<H>  4.8   |  23  |  0.92    Ca    9.5      02 Jun 2024 00:33  Phos  2.7     06-02  Mg     2.1     06-02    TPro  6.6  /  Alb  4.0  /  TBili  0.6  /  DBili  x   /  AST  44<H>  /  ALT  43  /  AlkPhos  104  06-02                        9.1    12.99 )-----------( 102      ( 02 Jun 2024 00:29 )             27.9     Medications  MEDICATIONS  (STANDING):  albuterol/ipratropium for Nebulization 3 milliLiter(s) Nebulizer every 6 hours  ascorbic acid 500 milliGRAM(s) Oral two times a day  aspirin enteric coated 81 milliGRAM(s) Oral daily  atorvastatin 80 milliGRAM(s) Oral at bedtime  bisacodyl Suppository 10 milliGRAM(s) Rectal once  buDESOnide    Inhalation Suspension 0.5 milliGRAM(s) Inhalation every 12 hours  chlorhexidine 2% Cloths 1 Application(s) Topical daily  clopidogrel Tablet 75 milliGRAM(s) Oral daily  dextrose 50% Injectable 50 milliLiter(s) IV Push every 15 minutes  dextrose Oral Gel 15 Gram(s) Oral once  enoxaparin Injectable 40 milliGRAM(s) SubCutaneous every 24 hours  gabapentin 100 milliGRAM(s) Oral every 8 hours  glucagon  Injectable 1 milliGRAM(s) IntraMuscular once  insulin glargine Injectable (LANTUS) 58 Unit(s) SubCutaneous at bedtime  insulin lispro (ADMELOG) corrective regimen sliding scale   SubCutaneous three times a day before meals  insulin lispro (ADMELOG) corrective regimen sliding scale   SubCutaneous at bedtime  insulin lispro Injectable (ADMELOG) 14 Unit(s) SubCutaneous three times a day before meals  levothyroxine 25 MICROGram(s) Oral daily  metoprolol tartrate 12.5 milliGRAM(s) Oral every 12 hours  montelukast 10 milliGRAM(s) Oral daily  polyethylene glycol 3350 17 Gram(s) Oral daily  senna 2 Tablet(s) Oral at bedtime  sodium bicarbonate 325 milliGRAM(s) Oral every 12 hours      Physical Exam  General: Patient comfortable in bed   Vital Signs Last 12 Hrs  T(F): 98.6 (06-02-24 @ 12:00), Max: 98.8 (06-02-24 @ 04:00)  HR: 96 (06-02-24 @ 13:00) (78 - 96)  BP: 155/61 (06-02-24 @ 13:00) (100/52 - 155/61)  BP(mean): 88 (06-02-24 @ 13:00) (71 - 91)  RR: 25 (06-02-24 @ 13:00) (15 - 43)  SpO2: 93% (06-02-24 @ 13:00) (90% - 96%)    CVS: S1S2   Respiratory: No wheezing, no crepitations  GI: Abdomen soft, bowel sounds positive  Musculoskeletal:  moves all extremities  : Voiding

## 2024-06-02 NOTE — PROGRESS NOTE ADULT - SUBJECTIVE AND OBJECTIVE BOX
Patient seen and examined at the bedside.    Remained critically ill on continuous ICU monitoring.    OBJECTIVE:  Vital Signs Last 24 Hrs  T(C): 36.6 (02 Jun 2024 08:00), Max: 37.4 (01 Jun 2024 17:00)  T(F): 97.8 (02 Jun 2024 08:00), Max: 99.3 (01 Jun 2024 17:00)  HR: 81 (02 Jun 2024 08:00) (78 - 94)  BP: 122/58 (02 Jun 2024 08:00) (106/51 - 158/68)  BP(mean): 84 (02 Jun 2024 08:00) (73 - 98)  RR: 30 (02 Jun 2024 08:00) (13 - 44)  SpO2: 92% (02 Jun 2024 08:00) (90% - 98%)    Parameters below as of 02 Jun 2024 08:00  Patient On (Oxygen Delivery Method): nasal cannula, high flow  O2 Flow (L/min): 30  O2 Concentration (%): 40      Physical Exam:   General: NAD   Neurology: nonfocal   Eyes: bilateral pupils equal and reactive   ENT/Neck: Neck supple, trachea midline, No JVD   Respiratory: Clear bilaterally   CV: S1S2, no murmurs        [x] Sternal dressing        [x] Sinus rhythm  Abdominal: Soft, NT, ND +BS   Extremities: 1-2+ pedal edema noted, + peripheral pulses   Skin: No Rashes, Hematoma, Ecchymosis                           Assessment:  70 y/o female with PMH of HTN/HLD, DMT2, Asthma. obesity, hoshimoto's, former smoker  who presented to ED to be evaluated for chest pain associated with exertional shortness of breath.    CAD s/p C3L, with CHIOMA and LAAC on 5/29/24  Hemodynamic instability  Hypovolemia  Post op respiratory insufficiency  Leukocytosis  Acute blood loss anemia  Thrombocytopenia    Plan:   ***Neuro***  [x] Nonfocal  Post operative neuro assessment   Tylenol, Gabapentin, and PRN Oxycodone for pain management.   OOBTC/Ambulated    ***Cardiovascular***  Invasive hemodynamic monitoring, assess perfusion indices   SR / Hct 27.9% / Lactate 1.6  Continuos reassessment of hemodynamics   continue low dose betablocker  [x] VTE ppx with Lovenox  [x]  ASA [x]  Plavix [x] Statin   Serial EKG and cardiac enzymes     ***Pulmonary***  [x] HFNC 70%/40L - weaned down settings to 40%/30L  Follow SpO2, CXR, blood gasses   Continue bronchodilators as needed  Encourage incentive spirometry, continue pulse ox monitoring, follow ABGs  Start on Singulair  B/l chest sonosite negative for pleural effusion, b/l atelectasis present    ***GI***  [x]  Diet:  Regular  Bowel regimen with Miralax and Senna.     ***Renal***  Continue to monitor I/Os, BUN/Creatinine.   Replete lytes PRN  Diuresis with lasix 40 IV QD    ***ID***  No active antibiotic coverage      ***Endocrine***  [x] DM2 : HbA1c 8.7%                - [x] Insulin gtt  [x]  ISS/Admelog/Lantus             - Need tight glycemic control to prevent wound infection.  [x] Hypothyroidism             - [x] Synthroid        Patient requires continuous monitoring with bedside rhythm monitoring, pulse oximetry monitoring, and continuous central venous and arterial pressure monitoring; and intermittent blood gas analysis. Care plan discussed with the ICU care team.   Patient remained critical, at risk for life threatening decompensation.    I have spent 30 minutes providing critical care management to this patient.    By signing my name below, I, Maddie Leone, attest that this documentation has been prepared under the direction and in the presence of Keren Oleary NP  Electronically signed: Jonas Aldridge, 06-02-24 @ 08:38    I, Keren Oleary NP, personally performed the services described in this documentation. all medical record entries made by the scribe were at my direction and in my presence. I have reviewed the chart and agree that the record reflects my personal performance and is accurate and complete  Electronically signed: Keren Oleary NP 24 HOUR EVENTS  - HFNC weaned down to 30L/40%    Patient seen and examined at the bedside.    Remained critically ill on continuous ICU monitoring.    OBJECTIVE:  Vital Signs Last 24 Hrs  T(C): 36.6 (02 Jun 2024 08:00), Max: 37.4 (01 Jun 2024 17:00)  T(F): 97.8 (02 Jun 2024 08:00), Max: 99.3 (01 Jun 2024 17:00)  HR: 81 (02 Jun 2024 08:00) (78 - 94)  BP: 122/58 (02 Jun 2024 08:00) (106/51 - 158/68)  BP(mean): 84 (02 Jun 2024 08:00) (73 - 98)  RR: 30 (02 Jun 2024 08:00) (13 - 44)  SpO2: 92% (02 Jun 2024 08:00) (90% - 98%)    Parameters below as of 02 Jun 2024 08:00  Patient On (Oxygen Delivery Method): nasal cannula, high flow  O2 Flow (L/min): 30  O2 Concentration (%): 40      Physical Exam:   General: NAD   Neurology: nonfocal   Eyes: bilateral pupils equal and reactive   ENT/Neck: Neck supple, trachea midline, No JVD   Respiratory: Clear bilaterally   CV: S1S2, no murmurs        [x] Sternal dressing        [x] Sinus rhythm  Abdominal: Soft, NT, ND +BS   Extremities: 1-2+ pedal edema noted, + peripheral pulses   Skin: No Rashes, Hematoma, Ecchymosis                           Assessment:  70 y/o female with PMH of HTN/HLD, DMT2, Asthma. obesity, hoshimoto's, former smoker  who presented to ED to be evaluated for chest pain associated with exertional shortness of breath.    CAD s/p C3L, with CHIOMA and LAAC on 5/29/24  Hemodynamic instability  Hypovolemia  Post op respiratory insufficiency  Leukocytosis  Acute blood loss anemia  Thrombocytopenia    Plan:   ***Neuro***  [x] Nonfocal  Tylenol, Gabapentin, and PRN Oxycodone for pain management.   OOBTC/Ambulate daily    ***Cardiovascular***  SR / Hct 27.9% / Lactate 1.6  Continue low dose beta blocker  [x] VTE ppx with Lovenox  [x]  ASA [x]  Plavix [x] Statin     ***Pulmonary***  [x] HFNC 70%/40L - weaned down settings to 40%/30L  Continue bronchodilators, Pulmicort, & Singulair  Per overnight team, chest sonosite negative for pleural effusion, b/l atelectasis present  Encourage incentive spirometry, continue pulse ox monitoring  Per pt, has a diagnosis of emphysema on outpatient CT scan either this or last year, pulm c/s placed, recs appreciated    ***GI***  [x]  Diet:  Regular  Bowel regimen with Miralax and Senna.     ***Renal***  Continue to monitor I/Os, BUN/Creatinine.   Replete lytes PRN  Diuresis with lasix 40 IV    ***ID***  No active antibiotic coverage      ***Endocrine***  [x] DM2 : HbA1c 8.7%                - [x] Insulin gtt  [x]  ISS/Admelog/Lantus             - Need tight glycemic control to prevent wound infection.  [x] Hypothyroidism             - [x] Synthroid        Patient requires continuous monitoring with bedside rhythm monitoring, pulse oximetry monitoring, and continuous central venous and arterial pressure monitoring; and intermittent blood gas analysis. Care plan discussed with the ICU care team.   Patient remained critical, at risk for life threatening decompensation.    I have spent 30 minutes providing critical care management to this patient.    By signing my name below, I, Maddie Leone, attest that this documentation has been prepared under the direction and in the presence of Keren Oleary NP  Electronically signed: Jonas Aldridge, 06-02-24 @ 08:38    I, Keren Oleary NP, personally performed the services described in this documentation. all medical record entries made by the edgaribe were at my direction and in my presence. I have reviewed the chart and agree that the record reflects my personal performance and is accurate and complete  Electronically signed: Keren Oleary NP

## 2024-06-03 DIAGNOSIS — Z95.1 PRESENCE OF AORTOCORONARY BYPASS GRAFT: ICD-10-CM

## 2024-06-03 LAB
ALBUMIN SERPL ELPH-MCNC: 3.8 G/DL — SIGNIFICANT CHANGE UP (ref 3.3–5)
ALP SERPL-CCNC: 104 U/L — SIGNIFICANT CHANGE UP (ref 40–120)
ALT FLD-CCNC: 35 U/L — SIGNIFICANT CHANGE UP (ref 10–45)
ANION GAP SERPL CALC-SCNC: 14 MMOL/L — SIGNIFICANT CHANGE UP (ref 5–17)
AST SERPL-CCNC: 34 U/L — SIGNIFICANT CHANGE UP (ref 10–40)
BASOPHILS # BLD AUTO: 0.06 K/UL — SIGNIFICANT CHANGE UP (ref 0–0.2)
BASOPHILS NFR BLD AUTO: 0.4 % — SIGNIFICANT CHANGE UP (ref 0–2)
BILIRUB SERPL-MCNC: 0.6 MG/DL — SIGNIFICANT CHANGE UP (ref 0.2–1.2)
BUN SERPL-MCNC: 20 MG/DL — SIGNIFICANT CHANGE UP (ref 7–23)
CALCIUM SERPL-MCNC: 9.8 MG/DL — SIGNIFICANT CHANGE UP (ref 8.4–10.5)
CHLORIDE SERPL-SCNC: 99 MMOL/L — SIGNIFICANT CHANGE UP (ref 96–108)
CO2 SERPL-SCNC: 25 MMOL/L — SIGNIFICANT CHANGE UP (ref 22–31)
CREAT SERPL-MCNC: 1.07 MG/DL — SIGNIFICANT CHANGE UP (ref 0.5–1.3)
EGFR: 57 ML/MIN/1.73M2 — LOW
EOSINOPHIL # BLD AUTO: 0.82 K/UL — HIGH (ref 0–0.5)
EOSINOPHIL NFR BLD AUTO: 5.2 % — SIGNIFICANT CHANGE UP (ref 0–6)
GLUCOSE BLDC GLUCOMTR-MCNC: 105 MG/DL — HIGH (ref 70–99)
GLUCOSE BLDC GLUCOMTR-MCNC: 126 MG/DL — HIGH (ref 70–99)
GLUCOSE BLDC GLUCOMTR-MCNC: 126 MG/DL — HIGH (ref 70–99)
GLUCOSE BLDC GLUCOMTR-MCNC: 189 MG/DL — HIGH (ref 70–99)
GLUCOSE SERPL-MCNC: 84 MG/DL — SIGNIFICANT CHANGE UP (ref 70–99)
HCT VFR BLD CALC: 32.3 % — LOW (ref 34.5–45)
HGB BLD-MCNC: 10.4 G/DL — LOW (ref 11.5–15.5)
IMM GRANULOCYTES NFR BLD AUTO: 0.8 % — SIGNIFICANT CHANGE UP (ref 0–0.9)
LYMPHOCYTES # BLD AUTO: 21.6 % — SIGNIFICANT CHANGE UP (ref 13–44)
LYMPHOCYTES # BLD AUTO: 3.41 K/UL — HIGH (ref 1–3.3)
MAGNESIUM SERPL-MCNC: 2 MG/DL — SIGNIFICANT CHANGE UP (ref 1.6–2.6)
MCHC RBC-ENTMCNC: 28 PG — SIGNIFICANT CHANGE UP (ref 27–34)
MCHC RBC-ENTMCNC: 32.2 GM/DL — SIGNIFICANT CHANGE UP (ref 32–36)
MCV RBC AUTO: 86.8 FL — SIGNIFICANT CHANGE UP (ref 80–100)
MONOCYTES # BLD AUTO: 1.43 K/UL — HIGH (ref 0–0.9)
MONOCYTES NFR BLD AUTO: 9 % — SIGNIFICANT CHANGE UP (ref 2–14)
NEUTROPHILS # BLD AUTO: 9.96 K/UL — HIGH (ref 1.8–7.4)
NEUTROPHILS NFR BLD AUTO: 63 % — SIGNIFICANT CHANGE UP (ref 43–77)
NRBC # BLD: 0 /100 WBCS — SIGNIFICANT CHANGE UP (ref 0–0)
PHOSPHATE SERPL-MCNC: 4.3 MG/DL — SIGNIFICANT CHANGE UP (ref 2.5–4.5)
PLATELET # BLD AUTO: 223 K/UL — SIGNIFICANT CHANGE UP (ref 150–400)
POTASSIUM SERPL-MCNC: 4.2 MMOL/L — SIGNIFICANT CHANGE UP (ref 3.5–5.3)
POTASSIUM SERPL-SCNC: 4.2 MMOL/L — SIGNIFICANT CHANGE UP (ref 3.5–5.3)
PROT SERPL-MCNC: 6.6 G/DL — SIGNIFICANT CHANGE UP (ref 6–8.3)
RBC # BLD: 3.72 M/UL — LOW (ref 3.8–5.2)
RBC # FLD: 15.5 % — HIGH (ref 10.3–14.5)
SODIUM SERPL-SCNC: 138 MMOL/L — SIGNIFICANT CHANGE UP (ref 135–145)
WBC # BLD: 15.81 K/UL — HIGH (ref 3.8–10.5)
WBC # FLD AUTO: 15.81 K/UL — HIGH (ref 3.8–10.5)

## 2024-06-03 PROCEDURE — 71045 X-RAY EXAM CHEST 1 VIEW: CPT | Mod: 26

## 2024-06-03 PROCEDURE — 99291 CRITICAL CARE FIRST HOUR: CPT

## 2024-06-03 PROCEDURE — 99233 SBSQ HOSP IP/OBS HIGH 50: CPT

## 2024-06-03 PROCEDURE — 99233 SBSQ HOSP IP/OBS HIGH 50: CPT | Mod: GC

## 2024-06-03 RX ORDER — ACETAMINOPHEN 500 MG
650 TABLET ORAL ONCE
Refills: 0 | Status: COMPLETED | OUTPATIENT
Start: 2024-06-03 | End: 2024-06-03

## 2024-06-03 RX ORDER — INSULIN GLARGINE 100 [IU]/ML
50 INJECTION, SOLUTION SUBCUTANEOUS AT BEDTIME
Refills: 0 | Status: DISCONTINUED | OUTPATIENT
Start: 2024-06-03 | End: 2024-06-04

## 2024-06-03 RX ORDER — ONDANSETRON 8 MG/1
4 TABLET, FILM COATED ORAL ONCE
Refills: 0 | Status: COMPLETED | OUTPATIENT
Start: 2024-06-03 | End: 2024-06-03

## 2024-06-03 RX ORDER — PANTOPRAZOLE SODIUM 20 MG/1
40 TABLET, DELAYED RELEASE ORAL
Refills: 0 | Status: DISCONTINUED | OUTPATIENT
Start: 2024-06-03 | End: 2024-06-05

## 2024-06-03 RX ORDER — INSULIN LISPRO 100/ML
12 VIAL (ML) SUBCUTANEOUS
Refills: 0 | Status: DISCONTINUED | OUTPATIENT
Start: 2024-06-03 | End: 2024-06-04

## 2024-06-03 RX ADMIN — ENOXAPARIN SODIUM 40 MILLIGRAM(S): 100 INJECTION SUBCUTANEOUS at 12:15

## 2024-06-03 RX ADMIN — Medication 650 MILLIGRAM(S): at 06:38

## 2024-06-03 RX ADMIN — ALBUTEROL 2.5 MILLIGRAM(S): 90 AEROSOL, METERED ORAL at 05:42

## 2024-06-03 RX ADMIN — OXYCODONE HYDROCHLORIDE 5 MILLIGRAM(S): 5 TABLET ORAL at 18:21

## 2024-06-03 RX ADMIN — INSULIN GLARGINE 50 UNIT(S): 100 INJECTION, SOLUTION SUBCUTANEOUS at 22:18

## 2024-06-03 RX ADMIN — ALBUTEROL 2.5 MILLIGRAM(S): 90 AEROSOL, METERED ORAL at 17:45

## 2024-06-03 RX ADMIN — Medication 12 UNIT(S): at 17:46

## 2024-06-03 RX ADMIN — ATORVASTATIN CALCIUM 80 MILLIGRAM(S): 80 TABLET, FILM COATED ORAL at 22:18

## 2024-06-03 RX ADMIN — Medication 25 MICROGRAM(S): at 06:09

## 2024-06-03 RX ADMIN — OXYCODONE HYDROCHLORIDE 5 MILLIGRAM(S): 5 TABLET ORAL at 17:51

## 2024-06-03 RX ADMIN — PANTOPRAZOLE SODIUM 40 MILLIGRAM(S): 20 TABLET, DELAYED RELEASE ORAL at 06:08

## 2024-06-03 RX ADMIN — Medication 650 MILLIGRAM(S): at 06:08

## 2024-06-03 RX ADMIN — BUDESONIDE AND FORMOTEROL FUMARATE DIHYDRATE 2 PUFF(S): 160; 4.5 AEROSOL RESPIRATORY (INHALATION) at 17:45

## 2024-06-03 RX ADMIN — ALBUTEROL 2.5 MILLIGRAM(S): 90 AEROSOL, METERED ORAL at 12:15

## 2024-06-03 RX ADMIN — Medication 12.5 MILLIGRAM(S): at 17:44

## 2024-06-03 RX ADMIN — Medication 81 MILLIGRAM(S): at 12:14

## 2024-06-03 RX ADMIN — POLYETHYLENE GLYCOL 3350 17 GRAM(S): 17 POWDER, FOR SOLUTION ORAL at 12:15

## 2024-06-03 RX ADMIN — SENNA PLUS 2 TABLET(S): 8.6 TABLET ORAL at 22:18

## 2024-06-03 RX ADMIN — GABAPENTIN 100 MILLIGRAM(S): 400 CAPSULE ORAL at 06:09

## 2024-06-03 RX ADMIN — Medication 14 UNIT(S): at 12:20

## 2024-06-03 RX ADMIN — Medication 500 MILLIGRAM(S): at 06:09

## 2024-06-03 RX ADMIN — Medication 325 MILLIGRAM(S): at 06:09

## 2024-06-03 RX ADMIN — CHLORHEXIDINE GLUCONATE 1 APPLICATION(S): 213 SOLUTION TOPICAL at 11:01

## 2024-06-03 RX ADMIN — CLOPIDOGREL BISULFATE 75 MILLIGRAM(S): 75 TABLET, FILM COATED ORAL at 12:14

## 2024-06-03 RX ADMIN — Medication 500 MILLIGRAM(S): at 17:44

## 2024-06-03 RX ADMIN — Medication 12.5 MILLIGRAM(S): at 06:09

## 2024-06-03 RX ADMIN — GABAPENTIN 100 MILLIGRAM(S): 400 CAPSULE ORAL at 13:34

## 2024-06-03 RX ADMIN — ALBUTEROL 2.5 MILLIGRAM(S): 90 AEROSOL, METERED ORAL at 23:38

## 2024-06-03 RX ADMIN — TIOTROPIUM BROMIDE 2 PUFF(S): 18 CAPSULE ORAL; RESPIRATORY (INHALATION) at 12:15

## 2024-06-03 RX ADMIN — ONDANSETRON 4 MILLIGRAM(S): 8 TABLET, FILM COATED ORAL at 10:08

## 2024-06-03 RX ADMIN — Medication 14 UNIT(S): at 07:39

## 2024-06-03 NOTE — PROGRESS NOTE ADULT - SUBJECTIVE AND OBJECTIVE BOX
SUBJECTIVE: "Hi." Denies acute chest pain, palpitations, or shortness of breath. Patient's  at bedside.     TELEMETRY:      Vital Signs Last 24 Hrs  T(C): 36.7 (24 @ 10:45), Max: 37.2 (24 @ 16:00)  HR: 87 (24 @ 10:45) (79 - 98)  BP: 113/75 (24 @ 10:45) (87/50 - 155/61)  RR: 18 (24 @ 10:45) (5 - 30)  SpO2: 93% (24 @ 10:45) (93% - 96%)         INPUT/OUTPUT:   @ 07:01  -   @ 07:00  --------------------------------------------------------  IN: 510 mL / OUT: 2485 mL / NET: -1975 mL   @ 07:01  -   @ 11:20  --------------------------------------------------------  IN: 240 mL / OUT: 0 mL / NET: 240 mL      LABS:    138  |  99  |  20  ----------------------------<  84  4.2   |  25  |  1.07  Ca    9.8      2024 00:23  Phos  4.3       Mg     2.0       TPro  6.6  /  Alb  3.8  /  TBili  0.6  /  DBili  x   /  AST  34  /  ALT  35  /  AlkPhos  104  03                      10.4   15.81 )-----------( 223      ( 2024 00:23 )             32.3          Daily Weight in k.7 (2024 00:00)    CAPILLARY BLOOD GLUCOSE  105 (2024 07:00)  90 (2024 22:00)  POCT Blood Glucose.: 105 mg/dL (2024 06:42)  POCT Blood Glucose.: 90 mg/dL (2024 21:21)  POCT Blood Glucose.: 181 mg/dL (2024 16:47)  POCT Blood Glucose.: 150 mg/dL (2024 12:21)          PHYSICAL EXAM:  NEURO: alert and oriented; no gross, focal neurological deficits appreciated at time of evaluation  HEART: s1, s2  STERNAL WOUND: MSI -->prevena---> negative suction  LUNG: diminished breath sounds R>L; on  via 3L NC; however, nonlabored respirations with no use of accessory muscles  ABD: soft, (+) bowel sounds in all quadrants, +flatus, +BM            EXT: range of motion intact, peripheral pulses intact bilaterally, +B/L LE edema      ACTIVE MEDICATIONS:  acetaminophen Tablet . 650 milliGRAM(s) Oral every 6 hours PRN  albuterol 0.083% 2.5 milliGRAM(s) Nebulizer every 6 hours  ascorbic acid 500 milliGRAM(s) Oral two times a day  aspirin enteric coated 81 milliGRAM(s) Oral daily  atorvastatin 80 milliGRAM(s) Oral at bedtime  benzocaine/menthol Lozenge 1 Lozenge Oral every 3 hours PRN  bisacodyl Suppository 10 milliGRAM(s) Rectal once  budesonide  80 MICROgram(s)/formoterol 4.5 MICROgram(s) Inhaler 2 Puff(s) Inhalation two times a day  chlorhexidine 2% Cloths 1 Application(s) Topical daily  clopidogrel Tablet 75 milliGRAM(s) Oral daily  dextrose 50% Injectable 50 milliLiter(s) IV Push every 15 minutes  dextrose Oral Gel 15 Gram(s) Oral once  enoxaparin Injectable 40 milliGRAM(s) SubCutaneous every 24 hours  gabapentin 100 milliGRAM(s) Oral every 8 hours  glucagon  Injectable 1 milliGRAM(s) IntraMuscular once  insulin glargine Injectable (LANTUS) 58 Unit(s) SubCutaneous at bedtime  insulin lispro (ADMELOG) corrective regimen sliding scale   SubCutaneous three times a day before meals  insulin lispro (ADMELOG) corrective regimen sliding scale   SubCutaneous at bedtime  insulin lispro Injectable (ADMELOG) 14 Unit(s) SubCutaneous three times a day before meals  levothyroxine 25 MICROGram(s) Oral daily  metoprolol tartrate 12.5 milliGRAM(s) Oral every 12 hours  oxyCODONE IR 5 milliGRAM(s) Oral every 4 hours PRN  oxyCODONE IR 10 milliGRAM(s) Oral every 4 hours PRN  pantoprazole Tablet 40 milliGRAM(s) Oral before breakfast  polyethylene glycol 3350 17 Gram(s) Oral daily  senna 2 Tablet(s) Oral at bedtime  tiotropium 2.5 MICROgram(s) Inhaler 2 Puff(s) Inhalation daily    Case discussed in detail with Cardiothoracic Team and Attending. Plan below as per discussion. SUBJECTIVE: "Hi." Denies acute chest pain, palpitations, or shortness of breath. Able to speak in complete sentences, on  via 3L NC. Patient's  at bedside.     TELEMETRY:  SR 80    Vital Signs Last 24 Hrs  T(C): 36.7 (24 @ 10:45), Max: 37.2 (24 @ 16:00)  HR: 87 (24 @ 10:45) (79 - 98)  BP: 113/75 (24 @ 10:45) (87/50 - 155/61)  RR: 18 (24 @ 10:45) (5 - 30)  SpO2: 93% (24 @ 10:45) (93% - 96%)         INPUT/OUTPUT:   @ 07:01  -   @ 07:00  --------------------------------------------------------  IN: 510 mL / OUT: 2485 mL / NET: -1975 mL   @ 07:01  -   @ 11:20  --------------------------------------------------------  IN: 240 mL / OUT: 0 mL / NET: 240 mL    LABS:    138  |  99  |  20  ----------------------------<  84  4.2   |  25  |  1.07  Ca    9.8      2024 00:23  Phos  4.3       Mg     2.0       TPro  6.6  /  Alb  3.8  /  TBili  0.6  /  DBili  x   /  AST  34  /  ALT  35  /  AlkPhos  104  03                      10.4   15.81 )-----------( 223      ( 2024 00:23 )             32.3          Daily Weight in k.7 (2024 00:00)    CAPILLARY BLOOD GLUCOSE  105 (2024 07:00)  90 (2024 22:00)  POCT Blood Glucose.: 105 mg/dL (2024 06:42)  POCT Blood Glucose.: 90 mg/dL (2024 21:21)  POCT Blood Glucose.: 181 mg/dL (2024 16:47)  POCT Blood Glucose.: 150 mg/dL (2024 12:21)    PHYSICAL EXAM:  NEURO: alert and oriented; no gross, focal neurological deficits appreciated at time of evaluation  HEART: s1, s2  STERNAL WOUND: MSI -->prevena---> negative suction  LUNG: diminished breath sounds R>L; on  via 3L NC; however, nonlabored respirations with no use of accessory muscles  ABD: soft, (+) bowel sounds in all quadrants, +flatus, +BM            EXT: range of motion intact, peripheral pulses intact bilaterally, +B/L LE edema, +Left SVG dressing c/d/i    ACTIVE MEDICATIONS:  acetaminophen Tablet . 650 milliGRAM(s) Oral every 6 hours PRN  albuterol 0.083% 2.5 milliGRAM(s) Nebulizer every 6 hours  ascorbic acid 500 milliGRAM(s) Oral two times a day  aspirin enteric coated 81 milliGRAM(s) Oral daily  atorvastatin 80 milliGRAM(s) Oral at bedtime  benzocaine/menthol Lozenge 1 Lozenge Oral every 3 hours PRN  bisacodyl Suppository 10 milliGRAM(s) Rectal once  budesonide  80 MICROgram(s)/formoterol 4.5 MICROgram(s) Inhaler 2 Puff(s) Inhalation two times a day  chlorhexidine 2% Cloths 1 Application(s) Topical daily  clopidogrel Tablet 75 milliGRAM(s) Oral daily  dextrose 50% Injectable 50 milliLiter(s) IV Push every 15 minutes  dextrose Oral Gel 15 Gram(s) Oral once  enoxaparin Injectable 40 milliGRAM(s) SubCutaneous every 24 hours  gabapentin 100 milliGRAM(s) Oral every 8 hours  glucagon  Injectable 1 milliGRAM(s) IntraMuscular once  insulin glargine Injectable (LANTUS) 58 Unit(s) SubCutaneous at bedtime  insulin lispro (ADMELOG) corrective regimen sliding scale   SubCutaneous three times a day before meals  insulin lispro (ADMELOG) corrective regimen sliding scale   SubCutaneous at bedtime  insulin lispro Injectable (ADMELOG) 14 Unit(s) SubCutaneous three times a day before meals  levothyroxine 25 MICROGram(s) Oral daily  metoprolol tartrate 12.5 milliGRAM(s) Oral every 12 hours  oxyCODONE IR 5 milliGRAM(s) Oral every 4 hours PRN  oxyCODONE IR 10 milliGRAM(s) Oral every 4 hours PRN  pantoprazole Tablet 40 milliGRAM(s) Oral before breakfast  polyethylene glycol 3350 17 Gram(s) Oral daily  senna 2 Tablet(s) Oral at bedtime  tiotropium 2.5 MICROgram(s) Inhaler 2 Puff(s) Inhalation daily    Case discussed in detail with Cardiothoracic Team and Attending. Plan below as per discussion. Patient transferred to [facility] from CTU. POD#3.     SUBJECTIVE: "Hi." Denies acute chest pain, palpitations, or shortness of breath. Able to speak in complete sentences, on  via 3L NC. Patient's  at bedside.     TELEMETRY:  SR 80    Vital Signs Last 24 Hrs  T(C): 36.7 (24 @ 10:45), Max: 37.2 (24 @ 16:00)  HR: 87 (24 @ 10:45) (79 - 98)  BP: 113/75 (24 @ 10:45) (87/50 - 155/61)  RR: 18 (24 @ 10:45) (5 - 30)  SpO2: 93% (24 @ 10:45) (93% - 96%)         INPUT/OUTPUT:   @ 07:01  -   @ 07:00  --------------------------------------------------------  IN: 510 mL / OUT: 2485 mL / NET: -1975 mL   @ 07:01  -   @ 11:20  --------------------------------------------------------  IN: 240 mL / OUT: 0 mL / NET: 240 mL    LABS:    138  |  99  |  20  ----------------------------<  84  4.2   |  25  |  1.07  Ca    9.8      2024 00:23  Phos  4.3       Mg     2.0       TPro  6.6  /  Alb  3.8  /  TBili  0.6  /  DBili  x   /  AST  34  /  ALT  35  /  AlkPhos  104  03                      10.4   15.81 )-----------( 223      ( 2024 00:23 )             32.3          Daily Weight in k.7 (2024 00:00)    CAPILLARY BLOOD GLUCOSE  105 (2024 07:00)  90 (2024 22:00)  POCT Blood Glucose.: 105 mg/dL (2024 06:42)  POCT Blood Glucose.: 90 mg/dL (2024 21:21)  POCT Blood Glucose.: 181 mg/dL (2024 16:47)  POCT Blood Glucose.: 150 mg/dL (2024 12:21)    PHYSICAL EXAM:  NEURO: alert and oriented; no gross, focal neurological deficits appreciated at time of evaluation  HEART: s1, s2  STERNAL WOUND: MSI -->prevena---> negative suction  LUNG: diminished breath sounds R>L; on  via 3L NC; however, nonlabored respirations with no use of accessory muscles  ABD: soft, (+) bowel sounds in all quadrants, +flatus, +BM            EXT: range of motion intact, peripheral pulses intact bilaterally, +B/L LE edema, +Left SVG dressing c/d/i    ACTIVE MEDICATIONS:  acetaminophen Tablet . 650 milliGRAM(s) Oral every 6 hours PRN  albuterol 0.083% 2.5 milliGRAM(s) Nebulizer every 6 hours  ascorbic acid 500 milliGRAM(s) Oral two times a day  aspirin enteric coated 81 milliGRAM(s) Oral daily  atorvastatin 80 milliGRAM(s) Oral at bedtime  benzocaine/menthol Lozenge 1 Lozenge Oral every 3 hours PRN  bisacodyl Suppository 10 milliGRAM(s) Rectal once  budesonide  80 MICROgram(s)/formoterol 4.5 MICROgram(s) Inhaler 2 Puff(s) Inhalation two times a day  chlorhexidine 2% Cloths 1 Application(s) Topical daily  clopidogrel Tablet 75 milliGRAM(s) Oral daily  dextrose 50% Injectable 50 milliLiter(s) IV Push every 15 minutes  dextrose Oral Gel 15 Gram(s) Oral once  enoxaparin Injectable 40 milliGRAM(s) SubCutaneous every 24 hours  gabapentin 100 milliGRAM(s) Oral every 8 hours  glucagon  Injectable 1 milliGRAM(s) IntraMuscular once  insulin glargine Injectable (LANTUS) 58 Unit(s) SubCutaneous at bedtime  insulin lispro (ADMELOG) corrective regimen sliding scale   SubCutaneous three times a day before meals  insulin lispro (ADMELOG) corrective regimen sliding scale   SubCutaneous at bedtime  insulin lispro Injectable (ADMELOG) 14 Unit(s) SubCutaneous three times a day before meals  levothyroxine 25 MICROGram(s) Oral daily  metoprolol tartrate 12.5 milliGRAM(s) Oral every 12 hours  oxyCODONE IR 5 milliGRAM(s) Oral every 4 hours PRN  oxyCODONE IR 10 milliGRAM(s) Oral every 4 hours PRN  pantoprazole Tablet 40 milliGRAM(s) Oral before breakfast  polyethylene glycol 3350 17 Gram(s) Oral daily  senna 2 Tablet(s) Oral at bedtime  tiotropium 2.5 MICROgram(s) Inhaler 2 Puff(s) Inhalation daily    Case discussed in detail with Cardiothoracic Team and Attending. Plan below as per discussion.

## 2024-06-03 NOTE — PROGRESS NOTE ADULT - ASSESSMENT
68 y/o female with PMH of HTN/HLD, DMT2, Asthma. obesity, hoshimoto's, former smoker  who presented to ED to be evaluated for chest pain associated with exertional shortness of breath. this afternoon pt was climbing stairs to go from one building to the next and started to develop sharp neck pain radiating to her back and both the shoulders.  pt pain lasted for about 30 sec and dissipated upon rest. pt was prescribed advair which she stopped taking due to thrush.  admits of being compliance to all other  medication at home  in ED cardiology on call was consulted and pt received 324 mg aspirin with 300 mg plavix. recommendation was  not to start on heparin drip  pt is currently asymptomatic.   5/25 P2y12 213, Carotid doppler pending , echo pending. Endocrinology consulted for assistance in management  5/26 VSS no c/o CP A/C OR thurs 5/30 5/27 VSSmdenies CP /PALPS preop workup underway   5/29 S/P C3L LAAC. Extubated POD 0.   Post-OP: On pressors, S/P 1u prbc overnight. Insulin gtt  5/31 PW d/c'ed  68 y/o female with PMH of HTN/HLD, DMT2, Asthma. obesity, hoshimoto's, former smoker  who presented to ED to be evaluated for chest pain associated with exertional shortness of breath. this afternoon pt was climbing stairs to go from one building to the next and started to develop sharp neck pain radiating to her back and both the shoulders.  pt pain lasted for about 30 sec and dissipated upon rest. pt was prescribed advair which she stopped taking due to thrush.  admits of being compliance to all other  medication at home  in ED cardiology on call was consulted and pt received 324 mg aspirin with 300 mg plavix. recommendation was  not to start on heparin drip  pt is currently asymptomatic.   5/25 P2y12 213, Carotid doppler pending , echo pending. Endocrinology consulted for assistance in management  5/26 VSS no c/o CP A/C OR thurs 5/30 5/27 VSSmdenies CP /PALPS preop workup underway   5/29 S/P C3L LAAC. Extubated POD 0  Post-OP: On pressors, S/P 1u prbc overnight. Insulin gtt  5/31 PW d/c'ed   6/1 NC -> HFNC. Bronchodilators PRN. Incentive spirometry. Singulair. Plavix initiated.   6/2 Pulm c/s for hypoxemia. agressive chest PT. nebulizers. spiriva symbicort  6/3 Off HFNC -> 3L NC.  Tx 70 y/o female with PMH of HTN/HLD, DMT2, Asthma. obesity, hoshimoto's, former smoker  who presented to ED to be evaluated for chest pain associated with exertional shortness of breath. this afternoon pt was climbing stairs to go from one building to the next and started to develop sharp neck pain radiating to her back and both the shoulders.  pt pain lasted for about 30 sec and dissipated upon rest. pt was prescribed advair which she stopped taking due to thrush.  admits of being compliance to all other  medication at home  in ED cardiology on call was consulted and pt received 324 mg aspirin with 300 mg plavix. recommendation was  not to start on heparin drip  pt is currently asymptomatic.   5/25 P2y12 213, Carotid doppler pending , echo pending. Endocrinology consulted for assistance in management  5/26 VSS no c/o CP A/C OR thurs 5/30 5/27 VSSmdenies CP /PALPS preop workup underway   5/29 S/P C3L LAAC. Extubated POD 0  Post-OP: On pressors, S/P 1u prbc overnight. Insulin gtt  5/31 PW d/c'ed   6/1 NC -> HFNC. Bronchodilators PRN. Incentive spirometry. Singulair. Plavix initiated.   6/2 Pulm c/s for hypoxemia. aggressive chest PT. nebulizers. spiriva symbicort  6/3 Off HFNC -> 3L NC.  Tx to 2[facility]. Encouraged OOB/ ambulation  68 y/o female with PMH of HTN/HLD, DMT2, Asthma. obesity, hoshimoto's, former smoker  who presented to ED to be evaluated for chest pain associated with exertional shortness of breath. this afternoon pt was climbing stairs to go from one building to the next and started to develop sharp neck pain radiating to her back and both the shoulders.  pt pain lasted for about 30 sec and dissipated upon rest. pt was prescribed advair which she stopped taking due to thrush.  admits of being compliance to all other  medication at home  in ED cardiology on call was consulted and pt received 324 mg aspirin with 300 mg plavix. recommendation was  not to start on heparin drip  pt is currently asymptomatic.   5/25 P2y12 213, Carotid doppler pending , echo pending. Endocrinology consulted for assistance in management  5/26 VSS no c/o CP A/C OR thurs 5/30 5/27 VSSmdenies CP /PALPS preop workup underway   5/29 S/P C3L LAAC. Extubated POD 0  Post-OP: On pressors, S/P 1u prbc overnight. Insulin gtt  5/31 PW d/c'ed   6/1 NC -> HFNC. Bronchodilators PRN. Incentive spirometry. Singulair. Plavix initiated.   6/2 Pulm c/s for hypoxemia. aggressive chest PT. nebulizers. spiriva symbicort  6/3 Off HFNC -> 3L NC.  Tx to 2[facility]. Encouraged OOB/ ambulation. Current medication regimen maintained.

## 2024-06-03 NOTE — PROGRESS NOTE ADULT - SUBJECTIVE AND OBJECTIVE BOX
[facility] - Toshia Tony, Nehemias Flores, Juan Stephens  Office Number:  [phone]     No complaints of chest pain,  Still with some SOB  Sitting in the chair this AM  Off HFNC and on 3L NC this morning       ROS: negative unless otherwise mentioned.      MEDICATIONS  (STANDING):  albuterol    0.083% 2.5 milliGRAM(s) Nebulizer every 6 hours  ascorbic acid 500 milliGRAM(s) Oral two times a day  aspirin enteric coated 81 milliGRAM(s) Oral daily  atorvastatin 80 milliGRAM(s) Oral at bedtime  bisacodyl Suppository 10 milliGRAM(s) Rectal once  budesonide  80 MICROgram(s)/formoterol 4.5 MICROgram(s) Inhaler 2 Puff(s) Inhalation two times a day  chlorhexidine 2% Cloths 1 Application(s) Topical daily  clopidogrel Tablet 75 milliGRAM(s) Oral daily  dextrose 50% Injectable 50 milliLiter(s) IV Push every 15 minutes  dextrose Oral Gel 15 Gram(s) Oral once  enoxaparin Injectable 40 milliGRAM(s) SubCutaneous every 24 hours  gabapentin 100 milliGRAM(s) Oral every 8 hours  glucagon  Injectable 1 milliGRAM(s) IntraMuscular once  insulin glargine Injectable (LANTUS) 58 Unit(s) SubCutaneous at bedtime  insulin lispro (ADMELOG) corrective regimen sliding scale   SubCutaneous three times a day before meals  insulin lispro (ADMELOG) corrective regimen sliding scale   SubCutaneous at bedtime  insulin lispro Injectable (ADMELOG) 14 Unit(s) SubCutaneous three times a day before meals  levothyroxine 25 MICROGram(s) Oral daily  metoprolol tartrate 12.5 milliGRAM(s) Oral every 12 hours  ondansetron Injectable 4 milliGRAM(s) IV Push once  pantoprazole    Tablet 40 milliGRAM(s) Oral before breakfast  polyethylene glycol 3350 17 Gram(s) Oral daily  senna 2 Tablet(s) Oral at bedtime  tiotropium 2.5 MICROgram(s) Inhaler 2 Puff(s) Inhalation daily    MEDICATIONS  (PRN):  acetaminophen     Tablet .. 650 milliGRAM(s) Oral every 6 hours PRN Mild Pain (1 - 3)  benzocaine/menthol Lozenge 1 Lozenge Oral every 3 hours PRN Sore Throat  oxyCODONE    IR 5 milliGRAM(s) Oral every 4 hours PRN Moderate Pain (4 - 6)  oxyCODONE    IR 10 milliGRAM(s) Oral every 4 hours PRN Severe Pain (7 - 10)      Allergies    penicillin (Hives)    Intolerances        Vital Signs Last 24 Hrs  T(C): 36.5 (03 Jun 2024 08:00), Max: 37.2 (02 Jun 2024 16:00)  T(F): 97.7 (03 Jun 2024 08:00), Max: 98.9 (02 Jun 2024 16:00)  HR: 81 (03 Jun 2024 09:00) (79 - 98)  BP: 112/55 (03 Jun 2024 09:00) (87/50 - 155/61)  BP(mean): 79 (03 Jun 2024 09:00) (64 - 91)  RR: 20 (03 Jun 2024 09:00) (5 - 30)  SpO2: 94% (03 Jun 2024 09:00) (92% - 96%)    Parameters below as of 03 Jun 2024 08:00  Patient On (Oxygen Delivery Method): nasal cannula  O2 Flow (L/min): 6      I&O's Summary    02 Jun 2024 07:01  -  03 Jun 2024 07:00  --------------------------------------------------------  IN: 510 mL / OUT: 2485 mL / NET: -1975 mL    03 Jun 2024 07:01  -  03 Jun 2024 10:03  --------------------------------------------------------  IN: 240 mL / OUT: 0 mL / NET: 240 mL        ON EXAM:    General: NAD, awake and alert, oriented x 3  HEENT: Mucous membranes are moist, anicteric  Lungs: Non-labored, crackles at the bases  Cardiovascular: Regular, S1 and S2, no murmurs, rubs, or gallops  Gastrointestinal: Bowel Sounds present, soft, nontender.   Lymph: trace peripheral edema. No lymphadenopathy.  Skin: No rashes or ulcers  Psych:  Mood & affect appropriate    LABS: All Labs Reviewed:                        10.4   15.81 )-----------( 223      ( 03 Jun 2024 00:23 )             32.3                         9.1    12.99 )-----------( 102      ( 02 Jun 2024 00:29 )             27.9                         8.8    17.68 )-----------( 149      ( 01 Jun 2024 00:26 )             27.2     03 Jun 2024 00:23    138    |  99     |  20     ----------------------------<  84     4.2     |  25     |  1.07   02 Jun 2024 00:33    140    |  101    |  18     ----------------------------<  148    4.8     |  23     |  0.92   01 Jun 2024 00:26    137    |  101    |  20     ----------------------------<  108    4.8     |  23     |  0.99     Ca    9.8        03 Jun 2024 00:23  Ca    9.5        02 Jun 2024 00:33  Ca    8.9        01 Jun 2024 00:26  Phos  4.3       03 Jun 2024 00:23  Phos  2.7       02 Jun 2024 00:33  Phos  3.0       01 Jun 2024 00:26  Mg     2.0       03 Jun 2024 00:23  Mg     2.1       02 Jun 2024 00:33  Mg     2.4       01 Jun 2024 00:26    TPro  6.6    /  Alb  3.8    /  TBili  0.6    /  DBili  x      /  AST  34     /  ALT  35     /  AlkPhos  104    03 Jun 2024 00:23  TPro  6.6    /  Alb  4.0    /  TBili  0.6    /  DBili  x      /  AST  44     /  ALT  43     /  AlkPhos  104    02 Jun 2024 00:33  TPro  6.2    /  Alb  3.9    /  TBili  0.4    /  DBili  x      /  AST  70     /  ALT  51     /  AlkPhos  88     01 Jun 2024 00:26          Blood Culture:

## 2024-06-03 NOTE — PROGRESS NOTE ADULT - SUBJECTIVE AND OBJECTIVE BOX
CRITICAL CARE ATTENDING - CTICU    MEDICATIONS  (STANDING):  albuterol    0.083% 2.5 milliGRAM(s) Nebulizer every 6 hours  ascorbic acid 500 milliGRAM(s) Oral two times a day  aspirin enteric coated 81 milliGRAM(s) Oral daily  atorvastatin 80 milliGRAM(s) Oral at bedtime  bisacodyl Suppository 10 milliGRAM(s) Rectal once  budesonide  80 MICROgram(s)/formoterol 4.5 MICROgram(s) Inhaler 2 Puff(s) Inhalation two times a day  chlorhexidine 2% Cloths 1 Application(s) Topical daily  clopidogrel Tablet 75 milliGRAM(s) Oral daily  dextrose 50% Injectable 50 milliLiter(s) IV Push every 15 minutes  dextrose Oral Gel 15 Gram(s) Oral once  enoxaparin Injectable 40 milliGRAM(s) SubCutaneous every 24 hours  gabapentin 100 milliGRAM(s) Oral every 8 hours  glucagon  Injectable 1 milliGRAM(s) IntraMuscular once  insulin glargine Injectable (LANTUS) 58 Unit(s) SubCutaneous at bedtime  insulin lispro (ADMELOG) corrective regimen sliding scale   SubCutaneous three times a day before meals  insulin lispro (ADMELOG) corrective regimen sliding scale   SubCutaneous at bedtime  insulin lispro Injectable (ADMELOG) 14 Unit(s) SubCutaneous three times a day before meals  levothyroxine 25 MICROGram(s) Oral daily  metoprolol tartrate 12.5 milliGRAM(s) Oral every 12 hours  montelukast 10 milliGRAM(s) Oral daily  pantoprazole    Tablet 40 milliGRAM(s) Oral before breakfast  polyethylene glycol 3350 17 Gram(s) Oral daily  senna 2 Tablet(s) Oral at bedtime  sodium bicarbonate 325 milliGRAM(s) Oral every 12 hours  tiotropium 2.5 MICROgram(s) Inhaler 2 Puff(s) Inhalation daily                                    10.4   15.81 )-----------( 223      ( 2024 00:23 )             32.3       06-03    138  |  99  |  20  ----------------------------<  84  4.2   |  25  |  1.07    Ca    9.8      2024 00:23  Phos  4.3     06-03  Mg     2.0     06-03    TPro  6.6  /  Alb  3.8  /  TBili  0.6  /  DBili  x   /  AST  34  /  ALT  35  /  AlkPhos  104                Daily     Daily Weight in k.7 (2024 00:00)       @ 07:01  -   @ 07:00  --------------------------------------------------------  IN: 510 mL / OUT: 2485 mL / NET: -1975 mL        Critically Ill patient  : [ ] preoperative ,   [x ] post operative    Requires :  [x ] Arterial Line   [x ] Central Line  [ ] PA catheter  [ ] IABP  [ ] ECMO  [ ] LVAD  [ ] Ventilator  [x ] pacemaker [ ] Impella.                      [x ] ABG's     [x ] Pulse Oxymetry Monitoring  Bedside evaluation , monitoring , treatment of hemodynamics , fluids , IVP/ IVCD meds.        Diagnosis:     POD 5  - CABG X 3 L  /  LAAC     Hypotension a/w Hypertension, requiring intravenous medication.     Hypovolemia     Thrombocytopenia     Temporary pacemaker (TPM) interrogation and setting.     ASA/Lopressor     Requires chest PT, pulmonary toilet, suctioning to maintain SaO2,  patent airway and treat atelectasis.     Hyperglycemia    Insulin Sliding Scale / Lantus     Synthroid for hypothyroidism     Prerenal Azotemia     Metabolic acidosis    Obesity OHS / FILIPPO         I, Reyes Duenas, personally performed the services described in this documentation. All medical record entries made by the scribe were at my direction and in my presence. I have reviewed the chart and agree that the record reflects my personal performance and is accurate and complete.   Reyes Duenas MD.       By signing my name below, I, Michele Cerrato, attest that this documentation has been prepared under the direction and in the presence of Reyes Duenas MD.   Electronically Signed: Jonas Lopez 24 @ 07:47        Discussed with CT surgeon, Physician Assistant - Nurse Practitioner- Critical care medicine team.   Dicussed at  AM / PM rounds.   Chart, labs , films reviewed.    Cumulative Critical Care Time Given Today:  CRITICAL CARE ATTENDING - CTICU    MEDICATIONS  (STANDING):  albuterol    0.083% 2.5 milliGRAM(s) Nebulizer every 6 hours  ascorbic acid 500 milliGRAM(s) Oral two times a day  aspirin enteric coated 81 milliGRAM(s) Oral daily  atorvastatin 80 milliGRAM(s) Oral at bedtime  bisacodyl Suppository 10 milliGRAM(s) Rectal once  budesonide  80 MICROgram(s)/formoterol 4.5 MICROgram(s) Inhaler 2 Puff(s) Inhalation two times a day  chlorhexidine 2% Cloths 1 Application(s) Topical daily  clopidogrel Tablet 75 milliGRAM(s) Oral daily  dextrose 50% Injectable 50 milliLiter(s) IV Push every 15 minutes  dextrose Oral Gel 15 Gram(s) Oral once  enoxaparin Injectable 40 milliGRAM(s) SubCutaneous every 24 hours  gabapentin 100 milliGRAM(s) Oral every 8 hours  glucagon  Injectable 1 milliGRAM(s) IntraMuscular once  insulin glargine Injectable (LANTUS) 58 Unit(s) SubCutaneous at bedtime  insulin lispro (ADMELOG) corrective regimen sliding scale   SubCutaneous three times a day before meals  insulin lispro (ADMELOG) corrective regimen sliding scale   SubCutaneous at bedtime  insulin lispro Injectable (ADMELOG) 14 Unit(s) SubCutaneous three times a day before meals  levothyroxine 25 MICROGram(s) Oral daily  metoprolol tartrate 12.5 milliGRAM(s) Oral every 12 hours  montelukast 10 milliGRAM(s) Oral daily  pantoprazole    Tablet 40 milliGRAM(s) Oral before breakfast  polyethylene glycol 3350 17 Gram(s) Oral daily  senna 2 Tablet(s) Oral at bedtime  sodium bicarbonate 325 milliGRAM(s) Oral every 12 hours  tiotropium 2.5 MICROgram(s) Inhaler 2 Puff(s) Inhalation daily                                    10.4   15.81 )-----------( 223      ( 2024 00:23 )             32.3       06-03    138  |  99  |  20  ----------------------------<  84  4.2   |  25  |  1.07    Ca    9.8      2024 00:23  Phos  4.3     06-03  Mg     2.0     06-03    TPro  6.6  /  Alb  3.8  /  TBili  0.6  /  DBili  x   /  AST  34  /  ALT  35  /  AlkPhos  104                Daily     Daily Weight in k.7 (2024 00:00)       @ 07:01  -   @ 07:00  --------------------------------------------------------  IN: 510 mL / OUT: 2485 mL / NET: -1975 mL        Critically Ill patient  : [ ] preoperative ,   [x ] post operative    Requires :  [x ] Arterial Line   [x ] Central Line  [ ] PA catheter  [ ] IABP  [ ] ECMO  [ ] LVAD  [ ] Ventilator  [x ] pacemaker - TPM  [ ] Impella.                      [x ] ABG's     [x ] Pulse Oxymetry Monitoring  Bedside evaluation , monitoring , treatment of hemodynamics , fluids , IVP/ IVCD meds.        Diagnosis:     POD 5  - CABG X 3 L  /  LAAC     Hypotension a/w Hypertension, requiring intravenous medication.     Hypovolemia     Respiratory insuffiencey     Hypoxemia - Requires [ ] BIPAP  [ x] HF O2     Emphysema    Supplemental O2     Temporary pacemaker (TPM) interrogation and setting.     ASA/Lopressor     Requires chest PT, pulmonary toilet, suctioning to maintain SaO2,  patent airway and treat atelectasis.     Hyperglycemia    Insulin Sliding Scale / Lantus - converted from IVCD Insulin     Synthroid for hypothyroidism     Obesity OHS / FILIPPO     Requires bedside physical therapy, mobilization and total residential care.         I, Reyes Duenas, personally performed the services described in this documentation. All medical record entries made by the scribe were at my direction and in my presence. I have reviewed the chart and agree that the record reflects my personal performance and is accurate and complete.   Reyes Duenas MD.       By signing my name below, I, Michele Cerrato, attest that this documentation has been prepared under the direction and in the presence of Reyes Duenas MD.   Electronically Signed: Jonas Lopez 24 @ 07:47        Discussed with CT surgeon, Physician Assistant - Nurse Practitioner- Critical care medicine team.   Dicussed at  AM / PM rounds.   Chart, labs , films reviewed.    Cumulative Critical Care Time Given Today:  30 min

## 2024-06-03 NOTE — PROGRESS NOTE ADULT - SUBJECTIVE AND OBJECTIVE BOX
Chief complaint  Patient is a 69y old  Female who presents with a chief complaint of chest pain/shortness of breath (03 Jun 2024 11:20)         Labs and Fingersticks  CAPILLARY BLOOD GLUCOSE  105 (03 Jun 2024 07:00)  90 (02 Jun 2024 22:00)      POCT Blood Glucose.: 126 mg/dL (03 Jun 2024 11:50)  POCT Blood Glucose.: 105 mg/dL (03 Jun 2024 06:42)  POCT Blood Glucose.: 90 mg/dL (02 Jun 2024 21:21)  POCT Blood Glucose.: 181 mg/dL (02 Jun 2024 16:47)      Anion Gap: 14 (06-03 @ 00:23)  Anion Gap: 16 (06-02 @ 00:33)      Calcium: 9.8 (06-03 @ 00:23)  Calcium: 9.5 (06-02 @ 00:33)  Albumin: 3.8 (06-03 @ 00:23)  Albumin: 4.0 (06-02 @ 00:33)    Alanine Aminotransferase (ALT/SGPT): 35 (06-03 @ 00:23)  Alanine Aminotransferase (ALT/SGPT): 43 (06-02 @ 00:33)  Alkaline Phosphatase: 104 (06-03 @ 00:23)  Alkaline Phosphatase: 104 (06-02 @ 00:33)  Aspartate Aminotransferase (AST/SGOT): 34 (06-03 @ 00:23)  Aspartate Aminotransferase (AST/SGOT): 44 *H* (06-02 @ 00:33)        06-03    138  |  99  |  20  ----------------------------<  84  4.2   |  25  |  1.07    Ca    9.8      03 Jun 2024 00:23  Phos  4.3     06-03  Mg     2.0     06-03    TPro  6.6  /  Alb  3.8  /  TBili  0.6  /  DBili  x   /  AST  34  /  ALT  35  /  AlkPhos  104  06-03                        10.4   15.81 )-----------( 223      ( 03 Jun 2024 00:23 )             32.3     Medications  MEDICATIONS  (STANDING):  albuterol    0.083% 2.5 milliGRAM(s) Nebulizer every 6 hours  ascorbic acid 500 milliGRAM(s) Oral two times a day  aspirin enteric coated 81 milliGRAM(s) Oral daily  atorvastatin 80 milliGRAM(s) Oral at bedtime  bisacodyl Suppository 10 milliGRAM(s) Rectal once  budesonide  80 MICROgram(s)/formoterol 4.5 MICROgram(s) Inhaler 2 Puff(s) Inhalation two times a day  chlorhexidine 2% Cloths 1 Application(s) Topical daily  clopidogrel Tablet 75 milliGRAM(s) Oral daily  dextrose 50% Injectable 50 milliLiter(s) IV Push every 15 minutes  dextrose Oral Gel 15 Gram(s) Oral once  enoxaparin Injectable 40 milliGRAM(s) SubCutaneous every 24 hours  glucagon  Injectable 1 milliGRAM(s) IntraMuscular once  insulin glargine Injectable (LANTUS) 50 Unit(s) SubCutaneous at bedtime  insulin lispro (ADMELOG) corrective regimen sliding scale   SubCutaneous three times a day before meals  insulin lispro (ADMELOG) corrective regimen sliding scale   SubCutaneous at bedtime  insulin lispro Injectable (ADMELOG) 12 Unit(s) SubCutaneous three times a day before meals  levothyroxine 25 MICROGram(s) Oral daily  metoprolol tartrate 12.5 milliGRAM(s) Oral every 12 hours  pantoprazole    Tablet 40 milliGRAM(s) Oral before breakfast  polyethylene glycol 3350 17 Gram(s) Oral daily  senna 2 Tablet(s) Oral at bedtime  tiotropium 2.5 MICROgram(s) Inhaler 2 Puff(s) Inhalation daily      Physical Exam  General: Patient comfortable in bed   Vital Signs Last 12 Hrs  T(F): 98.1 (06-03-24 @ 10:45), Max: 98.2 (06-03-24 @ 04:00)  HR: 87 (06-03-24 @ 10:45) (79 - 92)  BP: 113/75 (06-03-24 @ 10:45) (87/50 - 120/57)  BP(mean): 79 (06-03-24 @ 09:00) (64 - 79)  RR: 18 (06-03-24 @ 10:45) (11 - 30)  SpO2: 93% (06-03-24 @ 10:45) (93% - 96%)    CVS: S1S2   Respiratory: No wheezing, no crepitations  GI: Abdomen soft, bowel sounds positive  Musculoskeletal:  moves all extremities  : Voiding         Chief complaint  Patient is a 69y old  Female who presents with a chief complaint  of chest pain/shortness of breath (03 Jun 2024 11:20)         Labs and Fingersticks  CAPILLARY BLOOD GLUCOSE  105 (03 Jun 2024 07:00)  90 (02 Jun 2024 22:00)      POCT Blood Glucose.: 126 mg/dL (03 Jun 2024 11:50)  POCT Blood Glucose.: 105 mg/dL (03 Jun 2024 06:42)  POCT Blood Glucose.: 90 mg/dL (02 Jun 2024 21:21)  POCT Blood Glucose.: 181 mg/dL (02 Jun 2024 16:47)      Anion Gap: 14 (06-03 @ 00:23)  Anion Gap: 16 (06-02 @ 00:33)      Calcium: 9.8 (06-03 @ 00:23)  Calcium: 9.5 (06-02 @ 00:33)  Albumin: 3.8 (06-03 @ 00:23)  Albumin: 4.0 (06-02 @ 00:33)    Alanine Aminotransferase (ALT/SGPT): 35 (06-03 @ 00:23)  Alanine Aminotransferase (ALT/SGPT): 43 (06-02 @ 00:33)  Alkaline Phosphatase: 104 (06-03 @ 00:23)  Alkaline Phosphatase: 104 (06-02 @ 00:33)  Aspartate Aminotransferase (AST/SGOT): 34 (06-03 @ 00:23)  Aspartate Aminotransferase (AST/SGOT): 44 *H* (06-02 @ 00:33)        06-03    138  |  99  |  20  ----------------------------<  84  4.2   |  25  |  1.07    Ca    9.8      03 Jun 2024 00:23  Phos  4.3     06-03  Mg     2.0     06-03    TPro  6.6  /  Alb  3.8  /  TBili  0.6  /  DBili  x   /  AST  34  /  ALT  35  /  AlkPhos  104  06-03                        10.4   15.81 )-----------( 223      ( 03 Jun 2024 00:23 )             32.3     Medications  MEDICATIONS  (STANDING):  albuterol    0.083% 2.5 milliGRAM(s) Nebulizer every 6 hours  ascorbic acid 500 milliGRAM(s) Oral two times a day  aspirin enteric coated 81 milliGRAM(s) Oral daily  atorvastatin 80 milliGRAM(s) Oral at bedtime  bisacodyl Suppository 10 milliGRAM(s) Rectal once  budesonide  80 MICROgram(s)/formoterol 4.5 MICROgram(s) Inhaler 2 Puff(s) Inhalation two times a day  chlorhexidine 2% Cloths 1 Application(s) Topical daily  clopidogrel Tablet 75 milliGRAM(s) Oral daily  dextrose 50% Injectable 50 milliLiter(s) IV Push every 15 minutes  dextrose Oral Gel 15 Gram(s) Oral once  enoxaparin Injectable 40 milliGRAM(s) SubCutaneous every 24 hours  glucagon  Injectable 1 milliGRAM(s) IntraMuscular once  insulin glargine Injectable (LANTUS) 50 Unit(s) SubCutaneous at bedtime  insulin lispro (ADMELOG) corrective regimen sliding scale   SubCutaneous three times a day before meals  insulin lispro (ADMELOG) corrective regimen sliding scale   SubCutaneous at bedtime  insulin lispro Injectable (ADMELOG) 12 Unit(s) SubCutaneous three times a day before meals  levothyroxine 25 MICROGram(s) Oral daily  metoprolol tartrate 12.5 milliGRAM(s) Oral every 12 hours  pantoprazole    Tablet 40 milliGRAM(s) Oral before breakfast  polyethylene glycol 3350 17 Gram(s) Oral daily  senna 2 Tablet(s) Oral at bedtime  tiotropium 2.5 MICROgram(s) Inhaler 2 Puff(s) Inhalation daily      Physical Exam  General: Patient comfortable in bed   Vital Signs Last 12 Hrs  T(F): 98.1 (06-03-24 @ 10:45), Max: 98.2 (06-03-24 @ 04:00)  HR: 87 (06-03-24 @ 10:45) (79 - 92)  BP: 113/75 (06-03-24 @ 10:45) (87/50 - 120/57)  BP(mean): 79 (06-03-24 @ 09:00) (64 - 79)  RR: 18 (06-03-24 @ 10:45) (11 - 30)  SpO2: 93% (06-03-24 @ 10:45) (93% - 96%)    CVS: S1S2   Respiratory: No wheezing, no crepitations  GI: Abdomen soft, bowel sounds positive  Musculoskeletal:  moves all extremities  : Voiding

## 2024-06-03 NOTE — PROGRESS NOTE ADULT - CRITICAL CARE ATTENDING COMMENT
Upon my evaluation, this patient is at high risk for imminent or life threatening deterioration due to CAD, hypoxemic resp failure and other active medical issues which require my direct attention, intervention, and personal management.  I have personally spent >30 minutes  of critical care time exclusive of time spent on separate billing procedures. This includes review of laboratory data, radiology results, discussion with primary team\patient, and monitoring for potential decompensation. Interventions were performed as documented above.
Upon my evaluation, this patient is at high risk for imminent or life threatening deterioration due to ACS, shock and other active medical issues which require my direct attention, intervention, and personal management.  I have personally spent >30 minutes  of critical care time exclusive of time spent on separate billing procedures. This includes review of laboratory data, radiology results, discussion with primary team\patient, and monitoring for potential decompensation. Interventions were performed as documented above.

## 2024-06-03 NOTE — PROGRESS NOTE ADULT - ASSESSMENT
70 y/o female with PMH of HTN/HLD, DMT2, Asthma. obesity, hoshimoto's, former smoker  who presented to ED to be evaluated for chest pain, now admitted to CTS for surgery eval.     Assessment  DMT2: 69y Female with DM T2 with hyperglycemia, A1C 8.7% , was on oral meds and insulin at  home  (basaglar), now s/p surgery, extubated and eating meals, insulins adjusted.   CAD: on medications, stable, monitored. s/p surgery   Hypothyroidism: On Synthroid 25 mcg po daily, compliant with Synthroid intake,  asymptomatic. TFTs euthyroid.   HTN: on antihypertensive medications, monitored, asymptomatic.  Obesity: No strict exercise routines, not on any weight loss program, neither on low calorie diet.      Discussed plan and management with Dr Jyoti Sweeney NP - TEAMS  Santana Montes MD  Cell: [phone]  Office: [phone]          70 y/o female with PMH of HTN/HLD, DMT2, Asthma. obesity, hoshimoto's, former smoker  who presented to ED to be evaluated for chest pain, now admitted to CTS for surgery eval.     Assessment  DMT2: 69y Female with DM T2 with hyperglycemia, A1C 8.7% , was on oral meds and  insulin at  home  (basaglar), now s/p surgery, extubated and eating meals, insulins adjusted.   CAD: on medications, stable, monitored. s/p surgery   Hypothyroidism: On Synthroid 25 mcg po daily, compliant with Synthroid intake,  asymptomatic. TFTs euthyroid.   HTN: on antihypertensive medications, monitored, asymptomatic.  Obesity: No strict exercise routines, not on any weight loss program, neither on low calorie diet.      Discussed plan and management with Dr Jyoti Sweeney NP - TEAMS  Santana Montes MD  Cell: [phone]  Office: [phone]

## 2024-06-03 NOTE — PROGRESS NOTE ADULT - PROBLEM SELECTOR PLAN 1
Will decrease Lantus  50 units at bed time.  Will decrease Admelog 12 units before each meal in addition to Admelog correction scale coverage.  Patient counseled for compliance with consistent low carb diet.  Was on home insulins- basaglar

## 2024-06-03 NOTE — PROGRESS NOTE ADULT - SUBJECTIVE AND OBJECTIVE BOX
Pulmonary Consult Follow up     Interval Events:          REVIEW OF SYSTEMS:  [x] All other systems negative except per HPI   [ ] Unable to assess ROS because ________    OBJECTIVE:  ICU Vital Signs Last 24 Hrs  T(C): 36.5 (03 Jun 2024 08:00), Max: 37.2 (02 Jun 2024 16:00)  T(F): 97.7 (03 Jun 2024 08:00), Max: 98.9 (02 Jun 2024 16:00)  HR: 79 (03 Jun 2024 07:00) (79 - 98)  BP: 101/51 (03 Jun 2024 07:31) (87/50 - 155/61)  BP(mean): 73 (03 Jun 2024 07:31) (64 - 91)  ABP: --  ABP(mean): --  RR: 16 (03 Jun 2024 07:00) (5 - 30)  SpO2: 94% (03 Jun 2024 07:00) (92% - 96%)    O2 Parameters below as of 03 Jun 2024 08:00  Patient On (Oxygen Delivery Method): nasal cannula  O2 Flow (L/min): 6            06-02 @ 07:01  -  06-03 @ 07:00  --------------------------------------------------------  IN: 510 mL / OUT: 2485 mL / NET: -1975 mL    06-03 @ 07:01  -  06-03 @ 08:14  --------------------------------------------------------  IN: 240 mL / OUT: 0 mL / NET: 240 mL        PHYSICAL EXAM:  GENERAL: NAD, well-groomed, well-developed  HEAD:  Atraumatic, Normocephalic  EYES: EOMI, conjunctiva and sclera clear  ENMT: Moist mucous membranes  CHEST/LUNG: Clear to auscultation bilaterally; No rales, rhonchi, wheezing, or rubs  HEART: Regular rate and rhythm; No murmurs, rubs, or gallops  ABDOMEN: Nondistended  VASCULAR: No  cyanosis, or edema  SKIN: No rashes or lesions  NERVOUS SYSTEM:  Alert & Oriented X3, Good concentration    HOSPITAL MEDICATIONS:  MEDICATIONS  (STANDING):  albuterol    0.083% 2.5 milliGRAM(s) Nebulizer every 6 hours  ascorbic acid 500 milliGRAM(s) Oral two times a day  aspirin enteric coated 81 milliGRAM(s) Oral daily  atorvastatin 80 milliGRAM(s) Oral at bedtime  bisacodyl Suppository 10 milliGRAM(s) Rectal once  budesonide  80 MICROgram(s)/formoterol 4.5 MICROgram(s) Inhaler 2 Puff(s) Inhalation two times a day  chlorhexidine 2% Cloths 1 Application(s) Topical daily  clopidogrel Tablet 75 milliGRAM(s) Oral daily  dextrose 50% Injectable 50 milliLiter(s) IV Push every 15 minutes  dextrose Oral Gel 15 Gram(s) Oral once  enoxaparin Injectable 40 milliGRAM(s) SubCutaneous every 24 hours  gabapentin 100 milliGRAM(s) Oral every 8 hours  glucagon  Injectable 1 milliGRAM(s) IntraMuscular once  insulin glargine Injectable (LANTUS) 58 Unit(s) SubCutaneous at bedtime  insulin lispro (ADMELOG) corrective regimen sliding scale   SubCutaneous three times a day before meals  insulin lispro (ADMELOG) corrective regimen sliding scale   SubCutaneous at bedtime  insulin lispro Injectable (ADMELOG) 14 Unit(s) SubCutaneous three times a day before meals  levothyroxine 25 MICROGram(s) Oral daily  metoprolol tartrate 12.5 milliGRAM(s) Oral every 12 hours  montelukast 10 milliGRAM(s) Oral daily  ondansetron Injectable 4 milliGRAM(s) IV Push once  pantoprazole    Tablet 40 milliGRAM(s) Oral before breakfast  polyethylene glycol 3350 17 Gram(s) Oral daily  senna 2 Tablet(s) Oral at bedtime  tiotropium 2.5 MICROgram(s) Inhaler 2 Puff(s) Inhalation daily    MEDICATIONS  (PRN):  acetaminophen     Tablet .. 650 milliGRAM(s) Oral every 6 hours PRN Mild Pain (1 - 3)  benzocaine/menthol Lozenge 1 Lozenge Oral every 3 hours PRN Sore Throat  oxyCODONE    IR 5 milliGRAM(s) Oral every 4 hours PRN Moderate Pain (4 - 6)  oxyCODONE    IR 10 milliGRAM(s) Oral every 4 hours PRN Severe Pain (7 - 10)      LABS:  06-03    138  |  99  |  20  ----------------------------<  84  4.2   |  25  |  1.07  06-02    140  |  101  |  18  ----------------------------<  148<H>  4.8   |  23  |  0.92  06-01    137  |  101  |  20  ----------------------------<  108<H>  4.8   |  23  |  0.99    Ca    9.8      03 Jun 2024 00:23  Ca    9.5      02 Jun 2024 00:33  Ca    8.9      01 Jun 2024 00:26  Phos  4.3     06-03  Mg     2.0     06-03    TPro  6.6  /  Alb  3.8  /  TBili  0.6  /  DBili  x   /  AST  34  /  ALT  35  /  AlkPhos  104  06-03  TPro  6.6  /  Alb  4.0  /  TBili  0.6  /  DBili  x   /  AST  44<H>  /  ALT  43  /  AlkPhos  104  06-02  TPro  6.2  /  Alb  3.9  /  TBili  0.4  /  DBili  x   /  AST  70<H>  /  ALT  51<H>  /  AlkPhos  88  06-01    Magnesium: 2.0 mg/dL (06-03-24 @ 00:23)  Magnesium: 2.1 mg/dL (06-02-24 @ 00:33)  Magnesium: 2.4 mg/dL (06-01-24 @ 00:26)    Phosphorus: 4.3 mg/dL (06-03-24 @ 00:23)  Phosphorus: 2.7 mg/dL (06-02-24 @ 00:33)  Phosphorus: 3.0 mg/dL (06-01-24 @ 00:26)                    Urinalysis Basic - ( 03 Jun 2024 00:23 )    Color: x / Appearance: x / SG: x / pH: x  Gluc: 84 mg/dL / Ketone: x  / Bili: x / Urobili: x   Blood: x / Protein: x / Nitrite: x   Leuk Esterase: x / RBC: x / WBC x   Sq Epi: x / Non Sq Epi: x / Bacteria: x      ABG - ( 02 Jun 2024 12:43 )  pH, Arterial: 7.53  pH, Blood: x     /  pCO2: 30    /  pO2: 61    / HCO3: 25    / Base Excess: 2.8   /  SaO2: 93.4                                    10.4   15.81 )-----------( 223      ( 03 Jun 2024 00:23 )             32.3                         9.1    12.99 )-----------( 102      ( 02 Jun 2024 00:29 )             27.9                         8.8    17.68 )-----------( 149      ( 01 Jun 2024 00:26 )             27.2     CAPILLARY BLOOD GLUCOSE  105 (03 Jun 2024 07:00)  90 (02 Jun 2024 22:00)      POCT Blood Glucose.: 105 mg/dL (03 Jun 2024 06:42)  POCT Blood Glucose.: 90 mg/dL (02 Jun 2024 21:21)  POCT Blood Glucose.: 181 mg/dL (02 Jun 2024 16:47)  POCT Blood Glucose.: 150 mg/dL (02 Jun 2024 12:21)  POCT Blood Glucose.: 182 mg/dL (02 Jun 2024 11:14)     Pulmonary Consult Follow up     Interval Events:    Doing well. Hypoxia is improving. Still c/o pain with taking breaths and coughing, which is leading her to splint.     REVIEW OF SYSTEMS:  [x] All other systems negative except per HPI   [ ] Unable to assess ROS because ________    OBJECTIVE:  ICU Vital Signs Last 24 Hrs  T(C): 36.5 (03 Jun 2024 08:00), Max: 37.2 (02 Jun 2024 16:00)  T(F): 97.7 (03 Jun 2024 08:00), Max: 98.9 (02 Jun 2024 16:00)  HR: 79 (03 Jun 2024 07:00) (79 - 98)  BP: 101/51 (03 Jun 2024 07:31) (87/50 - 155/61)  BP(mean): 73 (03 Jun 2024 07:31) (64 - 91)  ABP: --  ABP(mean): --  RR: 16 (03 Jun 2024 07:00) (5 - 30)  SpO2: 94% (03 Jun 2024 07:00) (92% - 96%)    O2 Parameters below as of 03 Jun 2024 08:00  Patient On (Oxygen Delivery Method): nasal cannula  O2 Flow (L/min): 6            06-02 @ 07:01  -  06-03 @ 07:00  --------------------------------------------------------  IN: 510 mL / OUT: 2485 mL / NET: -1975 mL    06-03 @ 07:01  -  06-03 @ 08:14  --------------------------------------------------------  IN: 240 mL / OUT: 0 mL / NET: 240 mL        PHYSICAL EXAM:  GENERAL: NAD, well-groomed, well-developed  HEAD:  Atraumatic, Normocephalic  EYES: EOMI, conjunctiva and sclera clear  ENMT: Moist mucous membranes  CHEST/LUNG: Bibasilar Crackles.  HEART: Regular rate and rhythm; No murmurs, rubs, or gallops  ABDOMEN: Nondistended  VASCULAR: No  cyanosis, or edema  SKIN: No rashes or lesions. Midline sternotomy site is clean and dry with dressing.  NERVOUS SYSTEM:  Alert & Oriented X3, Good concentration    HOSPITAL MEDICATIONS:  MEDICATIONS  (STANDING):  albuterol    0.083% 2.5 milliGRAM(s) Nebulizer every 6 hours  ascorbic acid 500 milliGRAM(s) Oral two times a day  aspirin enteric coated 81 milliGRAM(s) Oral daily  atorvastatin 80 milliGRAM(s) Oral at bedtime  bisacodyl Suppository 10 milliGRAM(s) Rectal once  budesonide  80 MICROgram(s)/formoterol 4.5 MICROgram(s) Inhaler 2 Puff(s) Inhalation two times a day  chlorhexidine 2% Cloths 1 Application(s) Topical daily  clopidogrel Tablet 75 milliGRAM(s) Oral daily  dextrose 50% Injectable 50 milliLiter(s) IV Push every 15 minutes  dextrose Oral Gel 15 Gram(s) Oral once  enoxaparin Injectable 40 milliGRAM(s) SubCutaneous every 24 hours  gabapentin 100 milliGRAM(s) Oral every 8 hours  glucagon  Injectable 1 milliGRAM(s) IntraMuscular once  insulin glargine Injectable (LANTUS) 58 Unit(s) SubCutaneous at bedtime  insulin lispro (ADMELOG) corrective regimen sliding scale   SubCutaneous three times a day before meals  insulin lispro (ADMELOG) corrective regimen sliding scale   SubCutaneous at bedtime  insulin lispro Injectable (ADMELOG) 14 Unit(s) SubCutaneous three times a day before meals  levothyroxine 25 MICROGram(s) Oral daily  metoprolol tartrate 12.5 milliGRAM(s) Oral every 12 hours  montelukast 10 milliGRAM(s) Oral daily  ondansetron Injectable 4 milliGRAM(s) IV Push once  pantoprazole    Tablet 40 milliGRAM(s) Oral before breakfast  polyethylene glycol 3350 17 Gram(s) Oral daily  senna 2 Tablet(s) Oral at bedtime  tiotropium 2.5 MICROgram(s) Inhaler 2 Puff(s) Inhalation daily    MEDICATIONS  (PRN):  acetaminophen     Tablet .. 650 milliGRAM(s) Oral every 6 hours PRN Mild Pain (1 - 3)  benzocaine/menthol Lozenge 1 Lozenge Oral every 3 hours PRN Sore Throat  oxyCODONE    IR 5 milliGRAM(s) Oral every 4 hours PRN Moderate Pain (4 - 6)  oxyCODONE    IR 10 milliGRAM(s) Oral every 4 hours PRN Severe Pain (7 - 10)      LABS:  06-03    138  |  99  |  20  ----------------------------<  84  4.2   |  25  |  1.07  06-02    140  |  101  |  18  ----------------------------<  148<H>  4.8   |  23  |  0.92  06-01    137  |  101  |  20  ----------------------------<  108<H>  4.8   |  23  |  0.99    Ca    9.8      03 Jun 2024 00:23  Ca    9.5      02 Jun 2024 00:33  Ca    8.9      01 Jun 2024 00:26  Phos  4.3     06-03  Mg     2.0     06-03    TPro  6.6  /  Alb  3.8  /  TBili  0.6  /  DBili  x   /  AST  34  /  ALT  35  /  AlkPhos  104  06-03  TPro  6.6  /  Alb  4.0  /  TBili  0.6  /  DBili  x   /  AST  44<H>  /  ALT  43  /  AlkPhos  104  06-02  TPro  6.2  /  Alb  3.9  /  TBili  0.4  /  DBili  x   /  AST  70<H>  /  ALT  51<H>  /  AlkPhos  88  06-01    Magnesium: 2.0 mg/dL (06-03-24 @ 00:23)  Magnesium: 2.1 mg/dL (06-02-24 @ 00:33)  Magnesium: 2.4 mg/dL (06-01-24 @ 00:26)    Phosphorus: 4.3 mg/dL (06-03-24 @ 00:23)  Phosphorus: 2.7 mg/dL (06-02-24 @ 00:33)  Phosphorus: 3.0 mg/dL (06-01-24 @ 00:26)                    Urinalysis Basic - ( 03 Jun 2024 00:23 )    Color: x / Appearance: x / SG: x / pH: x  Gluc: 84 mg/dL / Ketone: x  / Bili: x / Urobili: x   Blood: x / Protein: x / Nitrite: x   Leuk Esterase: x / RBC: x / WBC x   Sq Epi: x / Non Sq Epi: x / Bacteria: x      ABG - ( 02 Jun 2024 12:43 )  pH, Arterial: 7.53  pH, Blood: x     /  pCO2: 30    /  pO2: 61    / HCO3: 25    / Base Excess: 2.8   /  SaO2: 93.4                                    10.4   15.81 )-----------( 223      ( 03 Jun 2024 00:23 )             32.3                         9.1    12.99 )-----------( 102      ( 02 Jun 2024 00:29 )             27.9                         8.8    17.68 )-----------( 149      ( 01 Jun 2024 00:26 )             27.2     CAPILLARY BLOOD GLUCOSE  105 (03 Jun 2024 07:00)  90 (02 Jun 2024 22:00)      POCT Blood Glucose.: 105 mg/dL (03 Jun 2024 06:42)  POCT Blood Glucose.: 90 mg/dL (02 Jun 2024 21:21)  POCT Blood Glucose.: 181 mg/dL (02 Jun 2024 16:47)  POCT Blood Glucose.: 150 mg/dL (02 Jun 2024 12:21)  POCT Blood Glucose.: 182 mg/dL (02 Jun 2024 11:14)

## 2024-06-03 NOTE — PROGRESS NOTE ADULT - ASSESSMENT
70 y/o female with PMH of HTN/HLD, DMT2, Asthma. obesity, hoshimoto's, former smoker p/f chest pain now s/p CABG. Currently POD 4. Since the procedure, has been requiring supplemental O2 up to HFNC; however, SOB has been present for a while per pt prior to presenting. HFNC today 30L/40% down from 40L/70%. Pulm c/f hypoxemia recs/COPD optimization.    #acute hypoxemic RF  #chronic SOB  #atelectasis  #?pleural effusions  #s/p CABG  - bedside POCUS: difficult views, but likely bilat pleural effusions. Could not assess diaphragmatic function.  - net negative 12L this hospital stay  - currently on HFNC 30L/40% with improving settings  - outpt CT chest 05/24 with emphysema  - CXR with bilateral lower lobe haziness, R>L  - Await CT chest noncon to evaluate parenchyma and effusions  - airway clearance therapy with q6h albuterol nebs and aggressive chest PT  - ensure pain control to assist with inspiration and c/w incentive spirometer  - start spiriva and symbicort and continue this outpt (can continue montelukast)  - we will continue to follow    68 y/o female with PMH of HTN/HLD, DMT2, Asthma. obesity, hoshimoto's, former smoker p/f chest pain now s/p CABG. Currently POD 4. Since the procedure, has been requiring supplemental O2 up to HFNC; however, SOB has been present for a while per pt prior to presenting. HFNC today 30L/40% down from 40L/70%. Pulm c/f hypoxemia recs/COPD optimization.    #acute hypoxemic RF  #chronic SOB  #Post Operative atelectasis  #?pleural effusions  #s/p CABG  - Suspect hypoxia is due to atelectasis given recent thoracic surgery and splinting, with evidence of bibasilar crackles.  - currently on 3L NC and continues to improve.   - outpt CT chest 05/24 with emphysema  - CXR with bilateral lower lobe haziness, R>L  - Await CT chest noncon to evaluate parenchyma and effusions  - airway clearance therapy with q6h albuterol nebs and aggressive chest PT  - ensure pain control to assist with inspiration and c/w incentive spirometer  - c/wspiriva and symbicort and continue this outpt (can continue montelukast)  - we will continue to follow

## 2024-06-03 NOTE — PROGRESS NOTE ADULT - ASSESSMENT
70 y/o female with PMH of HTN/HLD, DMT2, Asthma who presented to ED to be evaluated for chest pain associated with exertional shortness of breath. Admitted for cardiac workup.     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   - Transferred to [facility] for CTS evaluation   - Now s/p CABG x 3 on 5/29: LIMA to LAD, SVG to Ramus, SVG to RPDA, NEVIN clipping  - did have some addl sxs 5/27 and was given nitrates with relief  - Initiated on Amio as per CTS protocol, now off  - Continue ASA, Plavix  - Lipitor 80mg resumed  - On Lopressor 12.5mg BID    - ProBNP 74; no previous diagnosis of CHF, no prior TTE   - TTE with preserved LV and RV function, no significant valvular disease  - Does appear mildly volume up. For CT chest today  - Would give one time dose of IV lasix today    - has been on minimal levo, now off  - Home Amlodipine on hold  - Hold home ARB     - DM2 (diabetes mellitus, type 2).   - Would consider initiation of GLP agonist in the future given her DM + CAD    - Other cardiovascular workup will depend on clinical course.  - All other workup per primary team.  - Will continue to follow.

## 2024-06-04 LAB
ANION GAP SERPL CALC-SCNC: 14 MMOL/L — SIGNIFICANT CHANGE UP (ref 5–17)
BUN SERPL-MCNC: 22 MG/DL — SIGNIFICANT CHANGE UP (ref 7–23)
CALCIUM SERPL-MCNC: 9.1 MG/DL — SIGNIFICANT CHANGE UP (ref 8.4–10.5)
CHLORIDE SERPL-SCNC: 100 MMOL/L — SIGNIFICANT CHANGE UP (ref 96–108)
CO2 SERPL-SCNC: 24 MMOL/L — SIGNIFICANT CHANGE UP (ref 22–31)
CREAT SERPL-MCNC: 1.13 MG/DL — SIGNIFICANT CHANGE UP (ref 0.5–1.3)
EGFR: 53 ML/MIN/1.73M2 — LOW
GLUCOSE BLDC GLUCOMTR-MCNC: 121 MG/DL — HIGH (ref 70–99)
GLUCOSE BLDC GLUCOMTR-MCNC: 136 MG/DL — HIGH (ref 70–99)
GLUCOSE BLDC GLUCOMTR-MCNC: 159 MG/DL — HIGH (ref 70–99)
GLUCOSE BLDC GLUCOMTR-MCNC: 203 MG/DL — HIGH (ref 70–99)
GLUCOSE BLDC GLUCOMTR-MCNC: 289 MG/DL — HIGH (ref 70–99)
GLUCOSE BLDC GLUCOMTR-MCNC: 311 MG/DL — HIGH (ref 70–99)
GLUCOSE BLDC GLUCOMTR-MCNC: 327 MG/DL — HIGH (ref 70–99)
GLUCOSE SERPL-MCNC: 101 MG/DL — HIGH (ref 70–99)
HCT VFR BLD CALC: 30.3 % — LOW (ref 34.5–45)
HGB BLD-MCNC: 9.7 G/DL — LOW (ref 11.5–15.5)
MAGNESIUM SERPL-MCNC: 2.1 MG/DL — SIGNIFICANT CHANGE UP (ref 1.6–2.6)
MCHC RBC-ENTMCNC: 28 PG — SIGNIFICANT CHANGE UP (ref 27–34)
MCHC RBC-ENTMCNC: 32 GM/DL — SIGNIFICANT CHANGE UP (ref 32–36)
MCV RBC AUTO: 87.3 FL — SIGNIFICANT CHANGE UP (ref 80–100)
NRBC # BLD: 0 /100 WBCS — SIGNIFICANT CHANGE UP (ref 0–0)
PHOSPHATE SERPL-MCNC: 4.3 MG/DL — SIGNIFICANT CHANGE UP (ref 2.5–4.5)
PLATELET # BLD AUTO: 254 K/UL — SIGNIFICANT CHANGE UP (ref 150–400)
POTASSIUM SERPL-MCNC: 4.1 MMOL/L — SIGNIFICANT CHANGE UP (ref 3.5–5.3)
POTASSIUM SERPL-SCNC: 4.1 MMOL/L — SIGNIFICANT CHANGE UP (ref 3.5–5.3)
RBC # BLD: 3.47 M/UL — LOW (ref 3.8–5.2)
RBC # FLD: 15.9 % — HIGH (ref 10.3–14.5)
SODIUM SERPL-SCNC: 138 MMOL/L — SIGNIFICANT CHANGE UP (ref 135–145)
WBC # BLD: 15.56 K/UL — HIGH (ref 3.8–10.5)
WBC # FLD AUTO: 15.56 K/UL — HIGH (ref 3.8–10.5)

## 2024-06-04 PROCEDURE — 99233 SBSQ HOSP IP/OBS HIGH 50: CPT

## 2024-06-04 PROCEDURE — 71045 X-RAY EXAM CHEST 1 VIEW: CPT | Mod: 26

## 2024-06-04 PROCEDURE — 99221 1ST HOSP IP/OBS SF/LOW 40: CPT

## 2024-06-04 PROCEDURE — 99233 SBSQ HOSP IP/OBS HIGH 50: CPT | Mod: GC

## 2024-06-04 PROCEDURE — 71250 CT THORAX DX C-: CPT | Mod: 26

## 2024-06-04 RX ORDER — INSULIN LISPRO 100/ML
6 VIAL (ML) SUBCUTANEOUS ONCE
Refills: 0 | Status: COMPLETED | OUTPATIENT
Start: 2024-06-04 | End: 2024-06-04

## 2024-06-04 RX ORDER — INSULIN GLARGINE 100 [IU]/ML
45 INJECTION, SOLUTION SUBCUTANEOUS AT BEDTIME
Refills: 0 | Status: DISCONTINUED | OUTPATIENT
Start: 2024-06-04 | End: 2024-06-05

## 2024-06-04 RX ORDER — METOPROLOL TARTRATE 50 MG
12.5 TABLET ORAL ONCE
Refills: 0 | Status: COMPLETED | OUTPATIENT
Start: 2024-06-04 | End: 2024-06-04

## 2024-06-04 RX ORDER — INSULIN LISPRO 100/ML
14 VIAL (ML) SUBCUTANEOUS
Refills: 0 | Status: DISCONTINUED | OUTPATIENT
Start: 2024-06-04 | End: 2024-06-05

## 2024-06-04 RX ORDER — FUROSEMIDE 40 MG
40 TABLET ORAL DAILY
Refills: 0 | Status: DISCONTINUED | OUTPATIENT
Start: 2024-06-04 | End: 2024-06-05

## 2024-06-04 RX ORDER — SPIRONOLACTONE 25 MG/1
25 TABLET, FILM COATED ORAL DAILY
Refills: 0 | Status: DISCONTINUED | OUTPATIENT
Start: 2024-06-04 | End: 2024-06-05

## 2024-06-04 RX ORDER — METOPROLOL TARTRATE 50 MG
25 TABLET ORAL
Refills: 0 | Status: DISCONTINUED | OUTPATIENT
Start: 2024-06-04 | End: 2024-06-05

## 2024-06-04 RX ADMIN — ALBUTEROL 2.5 MILLIGRAM(S): 90 AEROSOL, METERED ORAL at 06:08

## 2024-06-04 RX ADMIN — Medication 40 MILLIGRAM(S): at 10:24

## 2024-06-04 RX ADMIN — Medication 81 MILLIGRAM(S): at 12:48

## 2024-06-04 RX ADMIN — Medication 6 UNIT(S): at 10:53

## 2024-06-04 RX ADMIN — ATORVASTATIN CALCIUM 80 MILLIGRAM(S): 80 TABLET, FILM COATED ORAL at 21:17

## 2024-06-04 RX ADMIN — OXYCODONE HYDROCHLORIDE 5 MILLIGRAM(S): 5 TABLET ORAL at 06:08

## 2024-06-04 RX ADMIN — TIOTROPIUM BROMIDE 2 PUFF(S): 18 CAPSULE ORAL; RESPIRATORY (INHALATION) at 12:48

## 2024-06-04 RX ADMIN — Medication 12.5 MILLIGRAM(S): at 06:05

## 2024-06-04 RX ADMIN — CLOPIDOGREL BISULFATE 75 MILLIGRAM(S): 75 TABLET, FILM COATED ORAL at 12:47

## 2024-06-04 RX ADMIN — Medication 12 UNIT(S): at 08:12

## 2024-06-04 RX ADMIN — Medication 12.5 MILLIGRAM(S): at 10:24

## 2024-06-04 RX ADMIN — OXYCODONE HYDROCHLORIDE 5 MILLIGRAM(S): 5 TABLET ORAL at 23:00

## 2024-06-04 RX ADMIN — INSULIN GLARGINE 45 UNIT(S): 100 INJECTION, SOLUTION SUBCUTANEOUS at 22:22

## 2024-06-04 RX ADMIN — Medication 14 UNIT(S): at 17:04

## 2024-06-04 RX ADMIN — SENNA PLUS 2 TABLET(S): 8.6 TABLET ORAL at 21:19

## 2024-06-04 RX ADMIN — OXYCODONE HYDROCHLORIDE 5 MILLIGRAM(S): 5 TABLET ORAL at 22:10

## 2024-06-04 RX ADMIN — ALBUTEROL 2.5 MILLIGRAM(S): 90 AEROSOL, METERED ORAL at 12:47

## 2024-06-04 RX ADMIN — Medication 14 UNIT(S): at 12:07

## 2024-06-04 RX ADMIN — OXYCODONE HYDROCHLORIDE 5 MILLIGRAM(S): 5 TABLET ORAL at 06:50

## 2024-06-04 RX ADMIN — POLYETHYLENE GLYCOL 3350 17 GRAM(S): 17 POWDER, FOR SOLUTION ORAL at 12:48

## 2024-06-04 RX ADMIN — Medication 25 MILLIGRAM(S): at 17:03

## 2024-06-04 RX ADMIN — ENOXAPARIN SODIUM 40 MILLIGRAM(S): 100 INJECTION SUBCUTANEOUS at 12:47

## 2024-06-04 RX ADMIN — ALBUTEROL 2.5 MILLIGRAM(S): 90 AEROSOL, METERED ORAL at 17:03

## 2024-06-04 RX ADMIN — Medication 25 MICROGRAM(S): at 06:06

## 2024-06-04 RX ADMIN — ALBUTEROL 2.5 MILLIGRAM(S): 90 AEROSOL, METERED ORAL at 23:50

## 2024-06-04 RX ADMIN — CHLORHEXIDINE GLUCONATE 1 APPLICATION(S): 213 SOLUTION TOPICAL at 11:56

## 2024-06-04 RX ADMIN — BUDESONIDE AND FORMOTEROL FUMARATE DIHYDRATE 2 PUFF(S): 160; 4.5 AEROSOL RESPIRATORY (INHALATION) at 17:02

## 2024-06-04 RX ADMIN — SPIRONOLACTONE 25 MILLIGRAM(S): 25 TABLET, FILM COATED ORAL at 10:24

## 2024-06-04 RX ADMIN — Medication 1: at 17:04

## 2024-06-04 RX ADMIN — Medication 2: at 12:08

## 2024-06-04 RX ADMIN — BUDESONIDE AND FORMOTEROL FUMARATE DIHYDRATE 2 PUFF(S): 160; 4.5 AEROSOL RESPIRATORY (INHALATION) at 06:04

## 2024-06-04 RX ADMIN — PANTOPRAZOLE SODIUM 40 MILLIGRAM(S): 20 TABLET, DELAYED RELEASE ORAL at 06:05

## 2024-06-04 NOTE — PROVIDER CONTACT NOTE (OTHER) - SITUATION
Pt stated feeling lightheaded and nauseous
pt c/o dysphagia after eating tuna sandwich, states "throat feels tight, like it's closing". pt denies any pain, nausea, or sob and denies any known allergy to seafood. pt otherwise asymptomatic.

## 2024-06-04 NOTE — PROGRESS NOTE ADULT - ATTENDING COMMENTS
68 y/o female with PMH of HTN/HLD, DMT2, Asthma. obesity, hoshimoto's, former smoker p/f chest pain now s/p CABG. Currently POD 4. Since the procedure, has been requiring supplemental O2 up to HFNC; however, Yesterday noted to have tubular breath sounds bilaterally.   Today sitting up in chair, on 6 L NC oxygen with sats 97%.  Pain seems better managed today.  On exam has decreased bs in both lung bases.    CT Chest reviewed with patient, shows bilateral small pleural effusions and bibasilar areas of opacity consistent with pneumonia/atelectasis.  Remaining lung fields are clear.  OFficial CT read is pending.  REviewed most recent Lung cancer screening CT from April was reviewed and does not show any nodules or opacities.   Would continue incentive spirometry, OOB to chair, encouraged to cough and clear secretions. Would decrease oxygen as tolerated.   AGree with plan as outlined above.
70 y/o female with PMH of HTN/HLD, DMT2, Asthma. obesity, hoshimoto's, former smoker p/f chest pain now s/p CABG. Currently POD 4. Since the procedure, has been requiring supplemental O2 up to HFNC; however, SOB has been present for a while per pt prior to presenting. HFNC today 30L/40% down from 40L/70%. Pulm c/f hypoxemia recs/COPD optimization.   Today sitting up in chair, on 3 L NC oxygen with sats 95%.  Reports pain with deep inspiration and cough.  ON exam has tubular BS in both lung bases consistent with atelectasis.  She is not hypercapneic and is afebrile.    Awaiting CT Chest.  Would continue incentive spirometry, OOB to chair, encouraged to cough and clear secretions.  AGree with plan as outlined above.

## 2024-06-04 NOTE — PROGRESS NOTE ADULT - ASSESSMENT
68 y/o female with PMH of HTN/HLD, DMT2, Asthma who presented to ED to be evaluated for chest pain associated with exertional shortness of breath. Admitted for cardiac workup.     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   - Transferred to [facility] for CTS evaluation   - Now s/p CABG x 3 on 5/29: LIMA to LAD, SVG to Ramus, SVG to RPDA, NEVIN clipping  - did have some addl sxs 5/27 and was given nitrates with relief  - Initiated on Amio as per CTS protocol, now off  - Continue ASA, Plavix  - Lipitor 80mg resumed  - On Lopressor 12.5mg BID    - ProBNP 74; no previous diagnosis of CHF, no prior TTE   - TTE with preserved LV and RV function, no significant valvular disease  - Does appear mildly volume up. For CT chest today  - Would give one time dose of IV lasix today    - has been on minimal levo, now off  - Home Amlodipine on hold  - Hold home ARB     - DM2 (diabetes mellitus, type 2).   - Would consider initiation of GLP agonist in the future given her DM + CAD    - Other cardiovascular workup will depend on clinical course.  - All other workup per primary team.  - Will continue to follow.     70 y/o female with PMH of HTN/HLD, DMT2, Asthma who presented to ED to be evaluated for chest pain associated with exertional shortness of breath. Admitted for cardiac workup.     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   - Transferred to [facility] for CTS evaluation   - Now s/p CABG x 3 on 5/29: LIMA to LAD, SVG to Ramus, SVG to RPDA, NEVIN clipping  - did have some addl sxs 5/27 and was given nitrates with relief  - Initiated on Amio as per CTS protocol, now off  - Continue ASA, Plavix  - Lipitor 80mg resumed  - On Lopressor 12.5mg BID, would increase to 25 BID    - ProBNP 74; no previous diagnosis of CHF, no prior TTE   - TTE with preserved LV and RV function, no significant valvular disease  - Does appear mildly volume up. For CT chest today  - Would give one time dose of IV lasix today  - For CT chest today  - On 3L NC    - has been on minimal levo, now off  - Home Amlodipine on hold  - Hold home ARB     - DM2 (diabetes mellitus, type 2).   - Would consider initiation of GLP agonist in the future given her DM + CAD    - Other cardiovascular workup will depend on clinical course.  - All other workup per primary team.  - Will continue to follow.

## 2024-06-04 NOTE — CONSULT NOTE ADULT - SUBJECTIVE AND OBJECTIVE BOX
Wound SURGERY CONSULT NOTE    HPI:  70 y/o female with PMH of HTN/HLD, DMT2, Asthma. obesity, hoshimoto's, former smoker  who presented to ED to be evaluated for chest pain associated with exertional shortness of breath. this afternoon pt was climbing stairs to go from one building to the next and started to develop sharp neck pain radiating to her back and both the shoulders.  pt pain lasted for about 30 sec and dissipated upon rest. pt was prescribed advair which she stopped taking due to thrush.  admits of being compliance to all other  medication at home  in ED cardiology on call was consulted and pt received 324 mg aspirin with 300 mg plavix. recommendation was  not to start on heparin drip  pt is currently asymptomatic. EKG w/o acute ST wave changes; Cath performed at [facility] which revealed multivessel cad; pt transferred for OHS eval with Dr. Goncalves, Pt asymptomatic at this time. pt also developed chest pain after sheath was pulled- nitropaste given and chest pain was resolved.    (24 May 2024 18:26)      Wound consult requested by team to assist w/ management of  sacral/BL buttocks skin injury.   Pt w/o  c/o pain, drainage, odor, color change,  or worsening swelling. Offloading and pericare initiated upon admission as pt Increasingly sedentary 2/2 to illness. Pt is continent of urine & stool.   Appetite good. All questions asked and answered to pt's expressed understanding and satisfaction.    Current Diet: Diet, Regular:   Consistent Carbohydrate No Snacks (CSTCHO)  Lactose Restricted (Milk Sugar Intoler.) (24 @ 01:17)      PAST MEDICAL & SURGICAL HISTORY:  Hypertension      Diabetes      Hyperlipidemia      High triglycerides      Hypothyroidism      Obesity      Arthritis      Chronic bronchiolitis      Hashimoto's thyroiditis      H/O  section      H/O tubal ligation      H/O eye surgery          REVIEW OF SYSTEMS:   General/ Breast/ Skin/Vasc/ Neuro/ MSK: see HPI  All other systems negative    MEDICATIONS  (STANDING):  albuterol    0.083% 2.5 milliGRAM(s) Nebulizer every 6 hours  aspirin enteric coated 81 milliGRAM(s) Oral daily  atorvastatin 80 milliGRAM(s) Oral at bedtime  bisacodyl Suppository 10 milliGRAM(s) Rectal once  budesonide  80 MICROgram(s)/formoterol 4.5 MICROgram(s) Inhaler 2 Puff(s) Inhalation two times a day  chlorhexidine 2% Cloths 1 Application(s) Topical daily  clopidogrel Tablet 75 milliGRAM(s) Oral daily  dextrose 50% Injectable 50 milliLiter(s) IV Push every 15 minutes  dextrose Oral Gel 15 Gram(s) Oral once  enoxaparin Injectable 40 milliGRAM(s) SubCutaneous every 24 hours  furosemide    Tablet 40 milliGRAM(s) Oral daily  glucagon  Injectable 1 milliGRAM(s) IntraMuscular once  insulin glargine Injectable (LANTUS) 45 Unit(s) SubCutaneous at bedtime  insulin lispro (ADMELOG) corrective regimen sliding scale   SubCutaneous three times a day before meals  insulin lispro (ADMELOG) corrective regimen sliding scale   SubCutaneous at bedtime  insulin lispro Injectable (ADMELOG) 14 Unit(s) SubCutaneous three times a day before meals  levothyroxine 25 MICROGram(s) Oral daily  metoprolol tartrate 25 milliGRAM(s) Oral two times a day  pantoprazole    Tablet 40 milliGRAM(s) Oral before breakfast  polyethylene glycol 3350 17 Gram(s) Oral daily  senna 2 Tablet(s) Oral at bedtime  spironolactone 25 milliGRAM(s) Oral daily  tiotropium 2.5 MICROgram(s) Inhaler 2 Puff(s) Inhalation daily    MEDICATIONS  (PRN):  acetaminophen     Tablet .. 650 milliGRAM(s) Oral every 6 hours PRN Mild Pain (1 - 3)  benzocaine/menthol Lozenge 1 Lozenge Oral every 3 hours PRN Sore Throat  oxyCODONE    IR 5 milliGRAM(s) Oral every 4 hours PRN Moderate Pain (4 - 6)  oxyCODONE    IR 10 milliGRAM(s) Oral every 4 hours PRN Severe Pain (7 - 10)      Allergies    penicillin (Hives)    Intolerances        SOCIAL HISTORY:  , Former smoker    FAMILY HISTORY:  FH: coronary artery disease (Sibling)      PHYSICAL EXAM:  Vital Signs Last 24 Hrs  T(C): 36.9 (2024 11:20), Max: 36.9 (2024 04:00)  T(F): 98.4 (2024 11:20), Max: 98.4 (2024 04:00)  HR: 82 (2024 11:20) (81 - 92)  BP: 125/80 (2024 11:20) (103/65 - 129/82)  BP(mean): 98 (2024 17:00) (98 - 98)  RR: 18 (2024 11:20) (18 - 18)  SpO2: 97% (2024 11:20) (92% - 97%)    Parameters below as of 2024 11:20  Patient On (Oxygen Delivery Method): nasal cannula        NAD, A&Ox3/ MO  (+) low air loss bed     HEENT:  sclera clear, mucosa moist, throat clear, trachea midline, neck supple  Respiratory: nonlabored w/ equal chest rise  Gastrointestinal: soft NT/ND  Neurology: verbal,  follows commands  Psych: calm/ appropriate  Musculoskeletal:  no deformities/ contractures  Vascular: BLE equally warm, no cyanosis, clubbing,  nor acute ischemia                BLE edema equal           BLE DP/PT pulses faint   Skin:  moist w/ good turgor      slight erythema intergluteal fold, excoriations BL buttocks, staples at cx wall and left lateral knee            there is no blistering, increased warmth, tenderness, induration, fluctuance, nor crepitus    LABS/ CULTURES/ RADIOLOGY:                        9.7    15.56 )-----------( 254      ( 2024 07:21 )             30.3       138  |  100  |  22  ----------------------------<  101      [24 @ 07:21]  4.1   |  24  |  1.13        Ca     9.1     [24 @ 07:21]      Mg     2.1     [24 @ 07:21]      Phos  4.3     [24 @ 07:21]    TPro  6.6  /  Alb  3.8  /  TBili  0.6  /  DBili  x   /  AST  34  /  ALT  35  /  AlkPhos  104  [24 @ 00:23]            A1C with Estimated Average Glucose Result: 8.7 % (24 @ 22:13)  A1C with Estimated Average Glucose Result: 8.8 % (24 @ 07:20)

## 2024-06-04 NOTE — PROGRESS NOTE ADULT - ASSESSMENT
68 y/o female with PMH of HTN/HLD, DMT2, Asthma. obesity, hoshimoto's, former smoker  who presented to ED to be evaluated for chest pain associated with exertional shortness of breath. this afternoon pt was climbing stairs to go from one building to the next and started to develop sharp neck pain radiating to her back and both the shoulders.  pt pain lasted for about 30 sec and dissipated upon rest. pt was prescribed advair which she stopped taking due to thrush.  admits of being compliance to all other  medication at home  in ED cardiology on call was consulted and pt received 324 mg aspirin with 300 mg plavix. recommendation was  not to start on heparin drip  pt is currently asymptomatic.   5/25 P2y12 213, Carotid doppler pending , echo pending. Endocrinology consulted for assistance in management  5/26 VSS no c/o CP A/C OR thurs 5/30 5/27 VSSmdenies CP /PALPS preop workup underway   5/29 S/P C3L LAAC. Extubated POD 0  Post-OP: On pressors, S/P 1u prbc overnight. Insulin gtt  5/31 PW d/c'ed   6/1 NC -> HFNC. Bronchodilators PRN. Incentive spirometry. Singulair. Plavix initiated.   6/2 Pulm c/s for hypoxemia. aggressive chest PT. nebulizers. spiriva symbicort  6/3 Off HFNC -> 3L NC.  Tx to 2COH. Encouraged OOB/ ambulation. Current medication regimen maintained.   6/4 VSS 3lnc Chest ct today weigh tup 2 kg  lasix  20 bid  increase metoprolol 25 bid d/c prevena 68 y/o female with PMH of HTN/HLD, DMT2, Asthma. obesity, hoshimoto's, former smoker  who presented to ED to be evaluated for chest pain associated with exertional shortness of breath. this afternoon pt was climbing stairs to go from one building to the next and started to develop sharp neck pain radiating to her back and both the shoulders.  pt pain lasted for about 30 sec and dissipated upon rest. pt was prescribed advair which she stopped taking due to thrush.  admits of being compliance to all other  medication at home  in ED cardiology on call was consulted and pt received 324 mg aspirin with 300 mg plavix. recommendation was  not to start on heparin drip  pt is currently asymptomatic.   5/25 P2y12 213, Carotid doppler pending , echo pending. Endocrinology consulted for assistance in management  5/26 VSS no c/o CP A/C OR thurs 5/30 5/27 VSSmdenies CP /PALPS preop workup underway   5/29 S/P C3L LAAC. Extubated POD 0  Post-OP: On pressors, S/P 1u prbc overnight. Insulin gtt  5/31 PW d/c'ed   6/1 NC -> HFNC. Bronchodilators PRN. Incentive spirometry. Singulair. Plavix initiated.   6/2 Pulm c/s for hypoxemia. aggressive chest PT. nebulizers. spiriva symbicort  6/3 Off HFNC -> 3L NC.  Tx to 2COH. Encouraged OOB/ ambulation. Current medication regimen maintained.   6/4 VSS 3lnc Chest ct today weigh up 2 kg  lasix  20 bid  increase metoprolol 25 bid d/c prevena

## 2024-06-04 NOTE — OCCUPATIONAL THERAPY INITIAL EVALUATION ADULT - PERTINENT HX OF CURRENT PROBLEM, REHAB EVAL
70 y/o female with PMH of HTN/HLD, DMT2, Asthma. obesity, hoshimoto's, former smoker  who presented to ED to be evaluated for chest pain associated with exertional shortness of breath. this afternoon pt was climbing stairs to go from one building to the next and started to develop sharp neck pain radiating to her back and both the shoulders.  pt pain lasted for about 30 sec and dissipated upon rest. pt was prescribed advair which she stopped taking due to thrush.  admits of being compliance to all other  medication at home  in ED cardiology on call was consulted and pt received 324 mg aspirin with 300 mg plavix. recommendation was  not to start on heparin drip pt is currently asymptomatic. EKG w/o acute ST wave changes; Cath performed at [facility] which revealed multivessel cad; pt transferred for OHS eval with Dr. Goncalves, Pt asymptomatic at this time. pt also developed chest pain after sheath was pulled- nitropaste given and chest pain was resolved. Pt now  S/P CABG x 3.   va duppler: No evidence of deep venous thrombosis in either lower extremity.   xray chest: Bilateral pleural effusions and atelectasis, right greater than left. No   significant change

## 2024-06-04 NOTE — OCCUPATIONAL THERAPY INITIAL EVALUATION ADULT - LIVES WITH, PROFILE
Pt reports living with spouse in a [facility] [address] [facility] with 1st floor set-up. Reports being independent PTA with all ADLs and mobility PTA , No AD./spouse

## 2024-06-04 NOTE — PROGRESS NOTE ADULT - SUBJECTIVE AND OBJECTIVE BOX
Chief complaint  Patient is a 69y old  Female who presents with a chief complaint of chest pain/shortness of breath (04 Jun 2024 10:52)         Labs and Fingersticks  CAPILLARY BLOOD GLUCOSE      POCT Blood Glucose.: 203 mg/dL (04 Jun 2024 11:16)  POCT Blood Glucose.: 289 mg/dL (04 Jun 2024 10:27)  POCT Blood Glucose.: 311 mg/dL (04 Jun 2024 09:59)  POCT Blood Glucose.: 327 mg/dL (04 Jun 2024 09:58)  POCT Blood Glucose.: 136 mg/dL (04 Jun 2024 07:31)  POCT Blood Glucose.: 189 mg/dL (03 Jun 2024 21:39)  POCT Blood Glucose.: 126 mg/dL (03 Jun 2024 17:12)      Anion Gap: 14 (06-04 @ 07:21)  Anion Gap: 14 (06-03 @ 00:23)      Calcium: 9.1 (06-04 @ 07:21)  Calcium: 9.8 (06-03 @ 00:23)  Albumin: 3.8 (06-03 @ 00:23)    Alanine Aminotransferase (ALT/SGPT): 35 (06-03 @ 00:23)  Alkaline Phosphatase: 104 (06-03 @ 00:23)  Aspartate Aminotransferase (AST/SGOT): 34 (06-03 @ 00:23)        06-04    138  |  100  |  22  ----------------------------<  101<H>  4.1   |  24  |  1.13    Ca    9.1      04 Jun 2024 07:21  Phos  4.3     06-04  Mg     2.1     06-04    TPro  6.6  /  Alb  3.8  /  TBili  0.6  /  DBili  x   /  AST  34  /  ALT  35  /  AlkPhos  104  06-03                        9.7    15.56 )-----------( 254      ( 04 Jun 2024 07:21 )             30.3     Medications  MEDICATIONS  (STANDING):  albuterol    0.083% 2.5 milliGRAM(s) Nebulizer every 6 hours  aspirin enteric coated 81 milliGRAM(s) Oral daily  atorvastatin 80 milliGRAM(s) Oral at bedtime  bisacodyl Suppository 10 milliGRAM(s) Rectal once  budesonide  80 MICROgram(s)/formoterol 4.5 MICROgram(s) Inhaler 2 Puff(s) Inhalation two times a day  chlorhexidine 2% Cloths 1 Application(s) Topical daily  clopidogrel Tablet 75 milliGRAM(s) Oral daily  dextrose 50% Injectable 50 milliLiter(s) IV Push every 15 minutes  dextrose Oral Gel 15 Gram(s) Oral once  enoxaparin Injectable 40 milliGRAM(s) SubCutaneous every 24 hours  furosemide    Tablet 40 milliGRAM(s) Oral daily  glucagon  Injectable 1 milliGRAM(s) IntraMuscular once  insulin glargine Injectable (LANTUS) 45 Unit(s) SubCutaneous at bedtime  insulin lispro (ADMELOG) corrective regimen sliding scale   SubCutaneous three times a day before meals  insulin lispro (ADMELOG) corrective regimen sliding scale   SubCutaneous at bedtime  insulin lispro Injectable (ADMELOG) 14 Unit(s) SubCutaneous three times a day before meals  levothyroxine 25 MICROGram(s) Oral daily  metoprolol tartrate 25 milliGRAM(s) Oral two times a day  pantoprazole    Tablet 40 milliGRAM(s) Oral before breakfast  polyethylene glycol 3350 17 Gram(s) Oral daily  senna 2 Tablet(s) Oral at bedtime  spironolactone 25 milliGRAM(s) Oral daily  tiotropium 2.5 MICROgram(s) Inhaler 2 Puff(s) Inhalation daily      Physical Exam  General: Patient comfortable in bed   Vital Signs Last 12 Hrs  T(F): 98.4 (06-04-24 @ 11:20), Max: 98.4 (06-04-24 @ 04:00)  HR: 82 (06-04-24 @ 11:20) (81 - 90)  BP: 125/80 (06-04-24 @ 11:20) (103/65 - 125/80)  BP(mean): --  RR: 18 (06-04-24 @ 11:20) (18 - 18)  SpO2: 97% (06-04-24 @ 11:20) (92% - 97%)    CVS: S1S2   Respiratory: No wheezing, no crepitations  GI: Abdomen soft, bowel sounds positive  Musculoskeletal:  moves all extremities  : Voiding          Chief complaint  Patient is a 69y old  Female who presents with a chief complaint of chest  pain/shortness of breath (04 Jun 2024 10:52)         Labs and Fingersticks  CAPILLARY BLOOD GLUCOSE      POCT Blood Glucose.: 203 mg/dL (04 Jun 2024 11:16)  POCT Blood Glucose.: 289 mg/dL (04 Jun 2024 10:27)  POCT Blood Glucose.: 311 mg/dL (04 Jun 2024 09:59)  POCT Blood Glucose.: 327 mg/dL (04 Jun 2024 09:58)  POCT Blood Glucose.: 136 mg/dL (04 Jun 2024 07:31)  POCT Blood Glucose.: 189 mg/dL (03 Jun 2024 21:39)  POCT Blood Glucose.: 126 mg/dL (03 Jun 2024 17:12)      Anion Gap: 14 (06-04 @ 07:21)  Anion Gap: 14 (06-03 @ 00:23)      Calcium: 9.1 (06-04 @ 07:21)  Calcium: 9.8 (06-03 @ 00:23)  Albumin: 3.8 (06-03 @ 00:23)    Alanine Aminotransferase (ALT/SGPT): 35 (06-03 @ 00:23)  Alkaline Phosphatase: 104 (06-03 @ 00:23)  Aspartate Aminotransferase (AST/SGOT): 34 (06-03 @ 00:23)        06-04    138  |  100  |  22  ----------------------------<  101<H>  4.1   |  24  |  1.13    Ca    9.1      04 Jun 2024 07:21  Phos  4.3     06-04  Mg     2.1     06-04    TPro  6.6  /  Alb  3.8  /  TBili  0.6  /  DBili  x   /  AST  34  /  ALT  35  /  AlkPhos  104  06-03                        9.7    15.56 )-----------( 254      ( 04 Jun 2024 07:21 )             30.3     Medications  MEDICATIONS  (STANDING):  albuterol    0.083% 2.5 milliGRAM(s) Nebulizer every 6 hours  aspirin enteric coated 81 milliGRAM(s) Oral daily  atorvastatin 80 milliGRAM(s) Oral at bedtime  bisacodyl Suppository 10 milliGRAM(s) Rectal once  budesonide  80 MICROgram(s)/formoterol 4.5 MICROgram(s) Inhaler 2 Puff(s) Inhalation two times a day  chlorhexidine 2% Cloths 1 Application(s) Topical daily  clopidogrel Tablet 75 milliGRAM(s) Oral daily  dextrose 50% Injectable 50 milliLiter(s) IV Push every 15 minutes  dextrose Oral Gel 15 Gram(s) Oral once  enoxaparin Injectable 40 milliGRAM(s) SubCutaneous every 24 hours  furosemide    Tablet 40 milliGRAM(s) Oral daily  glucagon  Injectable 1 milliGRAM(s) IntraMuscular once  insulin glargine Injectable (LANTUS) 45 Unit(s) SubCutaneous at bedtime  insulin lispro (ADMELOG) corrective regimen sliding scale   SubCutaneous three times a day before meals  insulin lispro (ADMELOG) corrective regimen sliding scale   SubCutaneous at bedtime  insulin lispro Injectable (ADMELOG) 14 Unit(s) SubCutaneous three times a day before meals  levothyroxine 25 MICROGram(s) Oral daily  metoprolol tartrate 25 milliGRAM(s) Oral two times a day  pantoprazole    Tablet 40 milliGRAM(s) Oral before breakfast  polyethylene glycol 3350 17 Gram(s) Oral daily  senna 2 Tablet(s) Oral at bedtime  spironolactone 25 milliGRAM(s) Oral daily  tiotropium 2.5 MICROgram(s) Inhaler 2 Puff(s) Inhalation daily      Physical Exam  General: Patient comfortable in bed   Vital Signs Last 12 Hrs  T(F): 98.4 (06-04-24 @ 11:20), Max: 98.4 (06-04-24 @ 04:00)  HR: 82 (06-04-24 @ 11:20) (81 - 90)  BP: 125/80 (06-04-24 @ 11:20) (103/65 - 125/80)  BP(mean): --  RR: 18 (06-04-24 @ 11:20) (18 - 18)  SpO2: 97% (06-04-24 @ 11:20) (92% - 97%)    CVS: S1S2   Respiratory: No wheezing, no crepitations  GI: Abdomen soft, bowel sounds positive  Musculoskeletal:  moves all extremities  : Voiding

## 2024-06-04 NOTE — PROGRESS NOTE ADULT - SUBJECTIVE AND OBJECTIVE BOX
Pulmonary Consult Follow up     Interval Events:    Doing well. Discussed CT findings with patient at length at the bedside.       REVIEW OF SYSTEMS:  [x] All other systems negative except per HPI   [ ] Unable to assess ROS because ________    OBJECTIVE:  ICU Vital Signs Last 24 Hrs  T(C): 36.9 (04 Jun 2024 11:20), Max: 36.9 (04 Jun 2024 04:00)  T(F): 98.4 (04 Jun 2024 11:20), Max: 98.4 (04 Jun 2024 04:00)  HR: 99 (04 Jun 2024 17:00) (81 - 101)  BP: 116/73 (04 Jun 2024 17:00) (103/65 - 125/80)  BP(mean): --  ABP: --  ABP(mean): --  RR: 18 (04 Jun 2024 17:00) (18 - 18)  SpO2: 97% (04 Jun 2024 17:00) (92% - 100%)    O2 Parameters below as of 04 Jun 2024 17:00  Patient On (Oxygen Delivery Method): nasal cannula              06-03 @ 07:01  -  06-04 @ 07:00  --------------------------------------------------------  IN: 1140 mL / OUT: 1500 mL / NET: -360 mL    06-04 @ 07:01  -  06-04 @ 19:19  --------------------------------------------------------  IN: 665 mL / OUT: 800 mL / NET: -135 mL        PHYSICAL EXAM:  GENERAL: NAD, well-groomed, well-developed  HEAD:  Atraumatic, Normocephalic  EYES: EOMI, conjunctiva and sclera clear  ENMT: Moist mucous membranes  CHEST/LUNG: Bibasilar rales with decreased breath sounds at the bases.  HEART: Regular rate and rhythm; No murmurs, rubs, or gallops  ABDOMEN: Nondistended  VASCULAR: No  cyanosis, or edema  SKIN: No rashes or lesions  NERVOUS SYSTEM:  Alert & Oriented X3, Good concentration    HOSPITAL MEDICATIONS:  MEDICATIONS  (STANDING):  albuterol    0.083% 2.5 milliGRAM(s) Nebulizer every 6 hours  aspirin enteric coated 81 milliGRAM(s) Oral daily  atorvastatin 80 milliGRAM(s) Oral at bedtime  bisacodyl Suppository 10 milliGRAM(s) Rectal once  budesonide  80 MICROgram(s)/formoterol 4.5 MICROgram(s) Inhaler 2 Puff(s) Inhalation two times a day  chlorhexidine 2% Cloths 1 Application(s) Topical daily  clopidogrel Tablet 75 milliGRAM(s) Oral daily  dextrose 50% Injectable 50 milliLiter(s) IV Push every 15 minutes  dextrose Oral Gel 15 Gram(s) Oral once  enoxaparin Injectable 40 milliGRAM(s) SubCutaneous every 24 hours  furosemide    Tablet 40 milliGRAM(s) Oral daily  glucagon  Injectable 1 milliGRAM(s) IntraMuscular once  insulin glargine Injectable (LANTUS) 45 Unit(s) SubCutaneous at bedtime  insulin lispro (ADMELOG) corrective regimen sliding scale   SubCutaneous three times a day before meals  insulin lispro (ADMELOG) corrective regimen sliding scale   SubCutaneous at bedtime  insulin lispro Injectable (ADMELOG) 14 Unit(s) SubCutaneous three times a day before meals  levothyroxine 25 MICROGram(s) Oral daily  metoprolol tartrate 25 milliGRAM(s) Oral two times a day  pantoprazole    Tablet 40 milliGRAM(s) Oral before breakfast  polyethylene glycol 3350 17 Gram(s) Oral daily  senna 2 Tablet(s) Oral at bedtime  spironolactone 25 milliGRAM(s) Oral daily  tiotropium 2.5 MICROgram(s) Inhaler 2 Puff(s) Inhalation daily    MEDICATIONS  (PRN):  acetaminophen     Tablet .. 650 milliGRAM(s) Oral every 6 hours PRN Mild Pain (1 - 3)  benzocaine/menthol Lozenge 1 Lozenge Oral every 3 hours PRN Sore Throat  oxyCODONE    IR 5 milliGRAM(s) Oral every 4 hours PRN Moderate Pain (4 - 6)  oxyCODONE    IR 10 milliGRAM(s) Oral every 4 hours PRN Severe Pain (7 - 10)      LABS:  06-04    138  |  100  |  22  ----------------------------<  101<H>  4.1   |  24  |  1.13  06-03    138  |  99  |  20  ----------------------------<  84  4.2   |  25  |  1.07  06-02    140  |  101  |  18  ----------------------------<  148<H>  4.8   |  23  |  0.92    Ca    9.1      04 Jun 2024 07:21  Ca    9.8      03 Jun 2024 00:23  Ca    9.5      02 Jun 2024 00:33  Phos  4.3     06-04  Mg     2.1     06-04    TPro  6.6  /  Alb  3.8  /  TBili  0.6  /  DBili  x   /  AST  34  /  ALT  35  /  AlkPhos  104  06-03  TPro  6.6  /  Alb  4.0  /  TBili  0.6  /  DBili  x   /  AST  44<H>  /  ALT  43  /  AlkPhos  104  06-02    Magnesium: 2.1 mg/dL (06-04-24 @ 07:21)  Magnesium: 2.0 mg/dL (06-03-24 @ 00:23)  Magnesium: 2.1 mg/dL (06-02-24 @ 00:33)    Phosphorus: 4.3 mg/dL (06-04-24 @ 07:21)  Phosphorus: 4.3 mg/dL (06-03-24 @ 00:23)  Phosphorus: 2.7 mg/dL (06-02-24 @ 00:33)                    Urinalysis Basic - ( 04 Jun 2024 07:21 )    Color: x / Appearance: x / SG: x / pH: x  Gluc: 101 mg/dL / Ketone: x  / Bili: x / Urobili: x   Blood: x / Protein: x / Nitrite: x   Leuk Esterase: x / RBC: x / WBC x   Sq Epi: x / Non Sq Epi: x / Bacteria: x                              9.7    15.56 )-----------( 254      ( 04 Jun 2024 07:21 )             30.3                         10.4   15.81 )-----------( 223      ( 03 Jun 2024 00:23 )             32.3                         9.1    12.99 )-----------( 102      ( 02 Jun 2024 00:29 )             27.9     CAPILLARY BLOOD GLUCOSE      POCT Blood Glucose.: 159 mg/dL (04 Jun 2024 16:40)  POCT Blood Glucose.: 203 mg/dL (04 Jun 2024 11:16)  POCT Blood Glucose.: 289 mg/dL (04 Jun 2024 10:27)  POCT Blood Glucose.: 311 mg/dL (04 Jun 2024 09:59)  POCT Blood Glucose.: 327 mg/dL (04 Jun 2024 09:58)  POCT Blood Glucose.: 136 mg/dL (04 Jun 2024 07:31)  POCT Blood Glucose.: 189 mg/dL (03 Jun 2024 21:39)

## 2024-06-04 NOTE — PROGRESS NOTE ADULT - SUBJECTIVE AND OBJECTIVE BOX
[facility] - Toshia Tony, Nehemias Flores, Juan Stephens  Office Number:  [phone]    Patient resting comfortably in bed in NAD.  Laying flat with no respiratory distress.  No complaints of chest pain, dyspnea, palpitations, PND, or orthopnea.    ROS: negative unless otherwise mentioned.    Telemetry:      MEDICATIONS  (STANDING):  albuterol    0.083% 2.5 milliGRAM(s) Nebulizer every 6 hours  aspirin enteric coated 81 milliGRAM(s) Oral daily  atorvastatin 80 milliGRAM(s) Oral at bedtime  bisacodyl Suppository 10 milliGRAM(s) Rectal once  budesonide  80 MICROgram(s)/formoterol 4.5 MICROgram(s) Inhaler 2 Puff(s) Inhalation two times a day  chlorhexidine 2% Cloths 1 Application(s) Topical daily  clopidogrel Tablet 75 milliGRAM(s) Oral daily  dextrose 50% Injectable 50 milliLiter(s) IV Push every 15 minutes  dextrose Oral Gel 15 Gram(s) Oral once  enoxaparin Injectable 40 milliGRAM(s) SubCutaneous every 24 hours  glucagon  Injectable 1 milliGRAM(s) IntraMuscular once  insulin glargine Injectable (LANTUS) 50 Unit(s) SubCutaneous at bedtime  insulin lispro (ADMELOG) corrective regimen sliding scale   SubCutaneous three times a day before meals  insulin lispro (ADMELOG) corrective regimen sliding scale   SubCutaneous at bedtime  insulin lispro Injectable (ADMELOG) 12 Unit(s) SubCutaneous three times a day before meals  levothyroxine 25 MICROGram(s) Oral daily  metoprolol tartrate 12.5 milliGRAM(s) Oral every 12 hours  pantoprazole    Tablet 40 milliGRAM(s) Oral before breakfast  polyethylene glycol 3350 17 Gram(s) Oral daily  senna 2 Tablet(s) Oral at bedtime  tiotropium 2.5 MICROgram(s) Inhaler 2 Puff(s) Inhalation daily    MEDICATIONS  (PRN):  acetaminophen     Tablet .. 650 milliGRAM(s) Oral every 6 hours PRN Mild Pain (1 - 3)  benzocaine/menthol Lozenge 1 Lozenge Oral every 3 hours PRN Sore Throat  oxyCODONE    IR 5 milliGRAM(s) Oral every 4 hours PRN Moderate Pain (4 - 6)  oxyCODONE    IR 10 milliGRAM(s) Oral every 4 hours PRN Severe Pain (7 - 10)      Allergies    penicillin (Hives)    Intolerances        Vital Signs Last 24 Hrs  T(C): 36.9 (04 Jun 2024 04:00), Max: 36.9 (04 Jun 2024 04:00)  T(F): 98.4 (04 Jun 2024 04:00), Max: 98.4 (04 Jun 2024 04:00)  HR: 90 (04 Jun 2024 04:00) (81 - 92)  BP: 103/65 (04 Jun 2024 04:00) (103/65 - 129/82)  BP(mean): 98 (03 Jun 2024 17:00) (79 - 98)  RR: 18 (04 Jun 2024 04:00) (18 - 20)  SpO2: 92% (04 Jun 2024 04:00) (92% - 95%)    Parameters below as of 04 Jun 2024 04:00  Patient On (Oxygen Delivery Method): room air        I&O's Summary    03 Jun 2024 07:01  -  04 Jun 2024 07:00  --------------------------------------------------------  IN: 1140 mL / OUT: 1500 mL / NET: -360 mL        ON EXAM:    General: NAD, awake and alert, oriented x 3  HEENT: Mucous membranes are moist, anicteric  Lungs: Non-labored, breath sounds are clear bilaterally, No wheezing, rales or rhonchi  Cardiovascular: Regular, S1 and S2, no murmurs, rubs, or gallops  Gastrointestinal: Bowel Sounds present, soft, nontender.   Lymph: No peripheral edema. No lymphadenopathy.  Skin: No rashes or ulcers  Psych:  Mood & affect appropriate    LABS: All Labs Reviewed:                        10.4   15.81 )-----------( 223      ( 03 Jun 2024 00:23 )             32.3                         9.1    12.99 )-----------( 102      ( 02 Jun 2024 00:29 )             27.9     03 Jun 2024 00:23    138    |  99     |  20     ----------------------------<  84     4.2     |  25     |  1.07   02 Jun 2024 00:33    140    |  101    |  18     ----------------------------<  148    4.8     |  23     |  0.92     Ca    9.8        03 Jun 2024 00:23  Ca    9.5        02 Jun 2024 00:33  Phos  4.3       03 Jun 2024 00:23  Phos  2.7       02 Jun 2024 00:33  Mg     2.0       03 Jun 2024 00:23  Mg     2.1       02 Jun 2024 00:33    TPro  6.6    /  Alb  3.8    /  TBili  0.6    /  DBili  x      /  AST  34     /  ALT  35     /  AlkPhos  104    03 Jun 2024 00:23  TPro  6.6    /  Alb  4.0    /  TBili  0.6    /  DBili  x      /  AST  44     /  ALT  43     /  AlkPhos  104    02 Jun 2024 00:33          Blood Culture:        [facility] - Toshia Tony, Sandra, Nehemias, Juan Stephens  Office Number:  [phone]    Patient resting comfortably in bed in NAD.  Laying flat with no respiratory distress.  No complaints of chest pain, dyspnea, palpitations, PND, or orthopnea.    ROS: negative unless otherwise mentioned.    Telemetry: SR, ST    MEDICATIONS  (STANDING):  albuterol    0.083% 2.5 milliGRAM(s) Nebulizer every 6 hours  aspirin enteric coated 81 milliGRAM(s) Oral daily  atorvastatin 80 milliGRAM(s) Oral at bedtime  bisacodyl Suppository 10 milliGRAM(s) Rectal once  budesonide  80 MICROgram(s)/formoterol 4.5 MICROgram(s) Inhaler 2 Puff(s) Inhalation two times a day  chlorhexidine 2% Cloths 1 Application(s) Topical daily  clopidogrel Tablet 75 milliGRAM(s) Oral daily  dextrose 50% Injectable 50 milliLiter(s) IV Push every 15 minutes  dextrose Oral Gel 15 Gram(s) Oral once  enoxaparin Injectable 40 milliGRAM(s) SubCutaneous every 24 hours  glucagon  Injectable 1 milliGRAM(s) IntraMuscular once  insulin glargine Injectable (LANTUS) 50 Unit(s) SubCutaneous at bedtime  insulin lispro (ADMELOG) corrective regimen sliding scale   SubCutaneous three times a day before meals  insulin lispro (ADMELOG) corrective regimen sliding scale   SubCutaneous at bedtime  insulin lispro Injectable (ADMELOG) 12 Unit(s) SubCutaneous three times a day before meals  levothyroxine 25 MICROGram(s) Oral daily  metoprolol tartrate 12.5 milliGRAM(s) Oral every 12 hours  pantoprazole    Tablet 40 milliGRAM(s) Oral before breakfast  polyethylene glycol 3350 17 Gram(s) Oral daily  senna 2 Tablet(s) Oral at bedtime  tiotropium 2.5 MICROgram(s) Inhaler 2 Puff(s) Inhalation daily    MEDICATIONS  (PRN):  acetaminophen     Tablet .. 650 milliGRAM(s) Oral every 6 hours PRN Mild Pain (1 - 3)  benzocaine/menthol Lozenge 1 Lozenge Oral every 3 hours PRN Sore Throat  oxyCODONE    IR 5 milliGRAM(s) Oral every 4 hours PRN Moderate Pain (4 - 6)  oxyCODONE    IR 10 milliGRAM(s) Oral every 4 hours PRN Severe Pain (7 - 10)      Allergies    penicillin (Hives)    Intolerances        Vital Signs Last 24 Hrs  T(C): 36.9 (04 Jun 2024 04:00), Max: 36.9 (04 Jun 2024 04:00)  T(F): 98.4 (04 Jun 2024 04:00), Max: 98.4 (04 Jun 2024 04:00)  HR: 90 (04 Jun 2024 04:00) (81 - 92)  BP: 103/65 (04 Jun 2024 04:00) (103/65 - 129/82)  BP(mean): 98 (03 Jun 2024 17:00) (79 - 98)  RR: 18 (04 Jun 2024 04:00) (18 - 20)  SpO2: 92% (04 Jun 2024 04:00) (92% - 95%)    Parameters below as of 04 Jun 2024 04:00  Patient On (Oxygen Delivery Method): room air        I&O's Summary    03 Jun 2024 07:01  -  04 Jun 2024 07:00  --------------------------------------------------------  IN: 1140 mL / OUT: 1500 mL / NET: -360 mL        ON EXAM:    General: NAD, awake and alert, oriented x 3  HEENT: Mucous membranes are moist, anicteric  Lungs: Non-labored, breath sounds are clear bilaterally, No wheezing, rales or rhonchi  Cardiovascular: Regular, S1 and S2, no murmurs, rubs, or gallops  Gastrointestinal: Bowel Sounds present, soft, nontender.   Lymph: No peripheral edema. No lymphadenopathy.  Skin: No rashes or ulcers  Psych:  Mood & affect appropriate    LABS: All Labs Reviewed:                        10.4   15.81 )-----------( 223      ( 03 Jun 2024 00:23 )             32.3                         9.1    12.99 )-----------( 102      ( 02 Jun 2024 00:29 )             27.9     03 Jun 2024 00:23    138    |  99     |  20     ----------------------------<  84     4.2     |  25     |  1.07   02 Jun 2024 00:33    140    |  101    |  18     ----------------------------<  148    4.8     |  23     |  0.92     Ca    9.8        03 Jun 2024 00:23  Ca    9.5        02 Jun 2024 00:33  Phos  4.3       03 Jun 2024 00:23  Phos  2.7       02 Jun 2024 00:33  Mg     2.0       03 Jun 2024 00:23  Mg     2.1       02 Jun 2024 00:33    TPro  6.6    /  Alb  3.8    /  TBili  0.6    /  DBili  x      /  AST  34     /  ALT  35     /  AlkPhos  104    03 Jun 2024 00:23  TPro  6.6    /  Alb  4.0    /  TBili  0.6    /  DBili  x      /  AST  44     /  ALT  43     /  AlkPhos  104    02 Jun 2024 00:33          Blood Culture:

## 2024-06-04 NOTE — CONSULT NOTE ADULT - ASSESSMENT
A/P:70 y/o female with PMH of HTN/HLD, DMT2, Asthma. obesity, hoshimoto's, former smoker  who presented to ED to be evaluated for chest pain associated with exertional shortness of breath. this afternoon pt was climbing stairs to go from one building to the next and started to develop sharp neck pain radiating to her back and both the shoulders.  pt pain lasted for about 30 sec and dissipated upon rest.     Wound Consult requested to assist w/ management of  Dermatitis moisture associated    Recommendations:    Consider KRISS/PVR  Moisturize intact skin w/ SWEEN cream BID  Nutrition Consult for optimization in pt w/ Increased nutritional needs            encourage high quality protein, juan/ prosource, MVI & Vit C to promote wound healing  Continue turning and positioning w/ offloading assistive devices as per protocol  Buttocks/ Sacrum Chau BID /CAVILON daily and prn soiling        Continue w/ attends under pads and Pericare  as per protocol  Waffle Cushion to chair when oob to chair  Continue w/ low air loss pressure redistribution bed surface     Care as per medicine, will remain available as requested  If needed upon discharge f/u as outpatient at [facility] [address] [place] [address] [phone]  Seen w/ RN   Thank you for this consult,  Jenniffer Bird, DASHAWN-BC, [facility]  [phone]  Nights/ Weekends/ Holidays please call:  General Surgery Consult pager [phone]) for emergencies  Wound PT for multilayer leg wrapping or VAC issues (x [phone])

## 2024-06-04 NOTE — PROGRESS NOTE ADULT - PROBLEM SELECTOR PLAN 1
- Continue with ASA, Lipitor, Lopressor  - Continue plavix  - continue inhalers/nebulizers  - on 02 via NC, wean off 02 supplement as tolerated.  - Pulmonology following, pending noncontrast CT chest  - Hx DM. Endocrine following. on DM regimen per Endo  - Increase activity as tolerated.   - Encourage Chest PT / Pulmonary toileting and Incentive spirometry every 1 hour x 10 while awake.   - Continue with PUD and DVT prophylaxis.   - D/C plan HOME with PT once medically cleared, as recommended by Physical Therapy   - Plan of care discussed with CTS Attending - Continue with ASA, Lipitor,   increase metorpol to 25 bid Lopressor  lasix 20 bid gentle diuresis  - Continue plavix  - continue inhalers/nebulizers  - on 02 via NC, wean off 02 supplement as tolerated.  - Pulmonology following, pending noncontrast CT chest- today  - Hx DM. Endocrine following. on DM regimen per Endo  - Increase activity as tolerated.   - Encourage Chest PT / Pulmonary toileting and Incentive spirometry every 1 hour x 10 while awake.   - Continue with PUD and DVT prophylaxis.   - D/C plan HOME with PT once medically cleared, as recommended by Physical Therapy   - Plan of care discussed with CTS Attending

## 2024-06-04 NOTE — PROGRESS NOTE ADULT - SUBJECTIVE AND OBJECTIVE BOX
Subjective ' hello what is next?"    VITAL SIGNS    Telemetry:  NSR    Vital Signs Last 24 Hrs  T(C): 36.9 (24 @ 04:00), Max: 36.9 (24 @ 04:00)  T(F): 98.4 (24 @ 04:00), Max: 98.4 (24 @ 04:00)  HR: 90 (24 @ 04:00) (81 - 92)  BP: 103/65 (24 @ 04:00) (103/65 - 129/82)  RR: 18 (24 @ 04:00) (18 - 20)  SpO2: 92% (24 @ 04:00) (92% - 95%)            @ 07:01  -   @ 07:00  --------------------------------------------------------  IN: 1140 mL / OUT: 1500 mL / NET: -360 mL      Daily Weight in k.2 (2024 10:45)                          9.7    15.56 )-----------( 254      ( 2024 07:21 )             30.3     -    138  |  100  |  22  ----------------------------<  101<H>  4.1   |  24  |  1.13    Ca    9.1      2024 07:21  Phos  4.3     -  Mg     2.1     -    TPro  6.6  /  Alb  3.8  /  TBili  0.6  /  DBili  x   /  AST  34  /  ALT  35  /  AlkPhos  104  06-03      MEDICATIONS  (STANDING):  albuterol    0.083% 2.5 milliGRAM(s) Nebulizer every 6 hours  aspirin enteric coated 81 milliGRAM(s) Oral daily  atorvastatin 80 milliGRAM(s) Oral at bedtime  bisacodyl Suppository 10 milliGRAM(s) Rectal once  budesonide  80 MICROgram(s)/formoterol 4.5 MICROgram(s) Inhaler 2 Puff(s) Inhalation two times a day  chlorhexidine 2% Cloths 1 Application(s) Topical daily  clopidogrel Tablet 75 milliGRAM(s) Oral daily  dextrose 50% Injectable 50 milliLiter(s) IV Push every 15 minutes  dextrose Oral Gel 15 Gram(s) Oral once  enoxaparin Injectable 40 milliGRAM(s) SubCutaneous every 24 hours  glucagon  Injectable 1 milliGRAM(s) IntraMuscular once  insulin glargine Injectable (LANTUS) 50 Unit(s) SubCutaneous at bedtime  insulin lispro (ADMELOG) corrective regimen sliding scale   SubCutaneous three times a day before meals  insulin lispro (ADMELOG) corrective regimen sliding scale   SubCutaneous at bedtime  insulin lispro Injectable (ADMELOG) 12 Unit(s) SubCutaneous three times a day before meals  levothyroxine 25 MICROGram(s) Oral daily  metoprolol tartrate 12.5 milliGRAM(s) Oral every 12 hours  pantoprazole    Tablet 40 milliGRAM(s) Oral before breakfast  polyethylene glycol 3350 17 Gram(s) Oral daily  senna 2 Tablet(s) Oral at bedtime  tiotropium 2.5 MICROgram(s) Inhaler 2 Puff(s) Inhalation daily    MEDICATIONS  (PRN):  acetaminophen     Tablet .. 650 milliGRAM(s) Oral every 6 hours PRN Mild Pain (1 - 3)  benzocaine/menthol Lozenge 1 Lozenge Oral every 3 hours PRN Sore Throat  oxyCODONE    IR 5 milliGRAM(s) Oral every 4 hours PRN Moderate Pain (4 - 6)  oxyCODONE    IR 10 milliGRAM(s) Oral every 4 hours PRN Severe Pain (7 - 10)        CAPILLARY BLOOD GLUCOSE      POCT Blood Glucose.: 136 mg/dL (2024 07:31)  POCT Blood Glucose.: 189 mg/dL (2024 21:39)  POCT Blood Glucose.: 126 mg/dL (2024 17:12)  POCT Blood Glucose.: 126 mg/dL (2024 11:50)                                    PHYSICAL EXAM        Neurology: alert and oriented x 3, nonfocal, no gross deficits    CV : [id number]    Sternal Wound :   Prevena patent CDI , Stable    Lungs: B.l breath  sound s3l NC    Abdomen: soft, nontender, nondistended, positive bowel sounds, last bowel movement 6/2    :voids               Extremities: LLE svg benign    equal strength throughout  b/lle + DP trace edema                                          Physical Therapy Rec:   Home  [  ]   Home w/ PT  [  x]  Rehab  [  ]    Discussed with Cardiothoracic Team at AM rounds. Subjective ' hello what is next?"    VITAL SIGNS    Telemetry:  NSR    Vital Signs Last 24 Hrs  T(C): 36.9 (24 @ 04:00), Max: 36.9 (24 @ 04:00)  T(F): 98.4 (24 @ 04:00), Max: 98.4 (24 @ 04:00)  HR: 90 (24 @ 04:00) (81 - 92)  BP: 103/65 (24 @ 04:00) (103/65 - 129/82)  RR: 18 (24 @ 04:00) (18 - 20)  SpO2: 92% (24 @ 04:00) (92% - 95%)            @ 07:01  -   @ 07:00  --------------------------------------------------------  IN: 1140 mL / OUT: 1500 mL / NET: -360 mL      Daily Weight in k.2 (2024 10:45)                          9.7    15.56 )-----------( 254      ( 2024 07:21 )             30.3     -    138  |  100  |  22  ----------------------------<  101<H>  4.1   |  24  |  1.13    Ca    9.1      2024 07:21  Phos  4.3     -  Mg     2.1     -    TPro  6.6  /  Alb  3.8  /  TBili  0.6  /  DBili  x   /  AST  34  /  ALT  35  /  AlkPhos  104  06-03      MEDICATIONS  (STANDING):  albuterol    0.083% 2.5 milliGRAM(s) Nebulizer every 6 hours  aspirin enteric coated 81 milliGRAM(s) Oral daily  atorvastatin 80 milliGRAM(s) Oral at bedtime  bisacodyl Suppository 10 milliGRAM(s) Rectal once  budesonide  80 MICROgram(s)/formoterol 4.5 MICROgram(s) Inhaler 2 Puff(s) Inhalation two times a day  chlorhexidine 2% Cloths 1 Application(s) Topical daily  clopidogrel Tablet 75 milliGRAM(s) Oral daily  dextrose 50% Injectable 50 milliLiter(s) IV Push every 15 minutes  dextrose Oral Gel 15 Gram(s) Oral once  enoxaparin Injectable 40 milliGRAM(s) SubCutaneous every 24 hours  glucagon  Injectable 1 milliGRAM(s) IntraMuscular once  insulin glargine Injectable (LANTUS) 50 Unit(s) SubCutaneous at bedtime  insulin lispro (ADMELOG) corrective regimen sliding scale   SubCutaneous three times a day before meals  insulin lispro (ADMELOG) corrective regimen sliding scale   SubCutaneous at bedtime  insulin lispro Injectable (ADMELOG) 12 Unit(s) SubCutaneous three times a day before meals  levothyroxine 25 MICROGram(s) Oral daily  metoprolol tartrate 12.5 milliGRAM(s) Oral every 12 hours  pantoprazole    Tablet 40 milliGRAM(s) Oral before breakfast  polyethylene glycol 3350 17 Gram(s) Oral daily  senna 2 Tablet(s) Oral at bedtime  tiotropium 2.5 MICROgram(s) Inhaler 2 Puff(s) Inhalation daily    MEDICATIONS  (PRN):  acetaminophen     Tablet .. 650 milliGRAM(s) Oral every 6 hours PRN Mild Pain (1 - 3)  benzocaine/menthol Lozenge 1 Lozenge Oral every 3 hours PRN Sore Throat  oxyCODONE    IR 5 milliGRAM(s) Oral every 4 hours PRN Moderate Pain (4 - 6)  oxyCODONE    IR 10 milliGRAM(s) Oral every 4 hours PRN Severe Pain (7 - 10)        CAPILLARY BLOOD GLUCOSE      POCT Blood Glucose.: 136 mg/dL (2024 07:31)  POCT Blood Glucose.: 189 mg/dL (2024 21:39)  POCT Blood Glucose.: 126 mg/dL (2024 17:12)  POCT Blood Glucose.: 126 mg/dL (2024 11:50)                                    PHYSICAL EXAM        Neurology: alert and oriented x 3, nonfocal, no gross deficits    CV : [id number]    Sternal Wound :   Prevena patent CDI , Stable    Lungs: B.l breath  sound s3l NC    Abdomen: soft, nontender, nondistended, positive bowel sounds, last bowel movement 6/2    : voids               Extremities: LLE svg benign    equal strength throughout    b/lle + DP trace edema                                          Physical Therapy Rec:   Home  [  ]   Home w/ PT  [  x]  Rehab  [  ]    Discussed with Cardiothoracic Team at AM rounds.

## 2024-06-04 NOTE — PROGRESS NOTE ADULT - PROBLEM SELECTOR PLAN 1
Will decrease Lantus  50 units at bed time.  Will increase Admelog 14 units before each meal in addition to Admelog correction scale coverage.  Patient counseled for compliance with consistent low carb diet.  Was on home insulins- basaglar  Plan to DC on basal bolus insulins Will decrease Lantus  50 units at bed time.  Will increase Admelog 14 units before each meal in addition to Admelog correction scale coverage.  Patient counseled for compliance with consistent low carb diet.  Was on home insulins- basaglar + Glipizide   Plan to DC on basal bolus insulins  Advised patient that she will benefit from basal bolus insulins  Pt adamant that she does not want to take short acting insulin  Wants to continue home DM regimen and  follow up with her outpatient endocrine

## 2024-06-04 NOTE — SBIRT NOTE ADULT - NSSBIRTDRGPOSREINDET_GEN_A_CORE
Patient denies use of substances other than alcohol (within normal limits). Positive reinforcement provided.

## 2024-06-04 NOTE — PROVIDER CONTACT NOTE (OTHER) - ACTION/TREATMENT ORDERED:
Pauline Hazel NP made aware. NP assessed pt at bedside. 6 units of insulin given FS rechecked 289. another 6 units ordered.

## 2024-06-04 NOTE — PROGRESS NOTE ADULT - TIME BILLING
- Review of records, telemetry, vital signs and daily labs.   - General and cardiovascular physical examination.  - Generation of cardiovascular treatment plan.  - Coordination of care.      Patient was seen and examined by me on  05/27/2024,interim events noted,labs and radiology studies reviewed.  Timothy Ferguson MD,FACC.  [address] [place], [place] [id number].  [phone]
Personal review of data, imaging and discussion with medical team.
Personal review of data, imaging and discussion with medical team.

## 2024-06-04 NOTE — PROGRESS NOTE ADULT - ASSESSMENT
70 y/o female with PMH of HTN/HLD, DMT2, Asthma. obesity, hoshimoto's, former smoker p/f chest pain now s/p CABG. Currently POD 4. Since the procedure, has been requiring supplemental O2 up to HFNC; however, SOB has been present for a while per pt prior to presenting. HFNC today 30L/40% down from 40L/70%. Pulm c/f hypoxemia recs/COPD optimization.    #acute hypoxemic RF  #chronic SOB  #Post Operative atelectasis  #s/p CABG  - Suspect hypoxia is due to atelectasis given recent thoracic surgery and splinting, with evidence of bibasilar crackles.  - currently on 3L NC and continues to improve.   - CXR with bilateral lower lobe haziness, R>L  - CT Chest with bibasilar atelectasis which is consistent with post operative atelectasis.   - Small pleural effusion likely due to atelectasis vs post operative and is not concerning.   - airway clearance therapy with q6h albuterol nebs and aggressive chest PT  - ensure pain control to assist with inspiration and c/w incentive spirometer  - c/w spiriva and symbicort and continue this outpt (can continue montelukast)  - we will continue to follow

## 2024-06-04 NOTE — PROGRESS NOTE ADULT - ASSESSMENT
70 y/o female with PMH of HTN/HLD, DMT2, Asthma. obesity, hoshimoto's, former smoker  who presented to ED to be evaluated for chest pain, now admitted to CTS for surgery eval.     Assessment  DMT2: 69y Female with DM T2 with hyperglycemia, A1C 8.7% , was on oral meds and  insulin at  home  (basaglar), now s/p surgery, extubated and eating meals, glucose trending up,  insulins adjusted.   CAD: on medications, stable, monitored. s/p surgery   Hypothyroidism: On Synthroid 25 mcg po daily, compliant with Synthroid intake,  asymptomatic. TFTs euthyroid.   HTN: on antihypertensive medications, monitored, asymptomatic.  Obesity: No strict exercise routines, not on any weight loss program, neither on low calorie diet.      Discussed plan and management with Dr Jyoti Sweeney NP - TEAMS  Santana Montes MD  Cell: [phone]  Office: [phone]          68 y/o female with PMH of HTN/HLD, DMT2, Asthma. obesity, hoshimoto's, former smoker  who presented to ED to be evaluated for chest pain, now admitted to CTS for surgery eval.     Assessment  DMT2: 69y Female with DM T2 with hyperglycemia, A1C 8.7% , was on oral  meds and  insulin at  home  (basaglar), now s/p surgery, extubated and eating meals, glucose trending up,  insulins adjusted.   CAD: on medications, stable, monitored. s/p surgery   Hypothyroidism: On Synthroid 25 mcg po daily, compliant with Synthroid intake,  asymptomatic. TFTs euthyroid.   HTN: on antihypertensive medications, monitored, asymptomatic.  Obesity: No strict exercise routines, not on any weight loss program, neither on low calorie diet.      Discussed plan and management with Dr Jyoti Sweeney NP - TEAMS  Santana Montes MD  Cell: [phone]  Office: [phone]

## 2024-06-05 ENCOUNTER — TRANSCRIPTION ENCOUNTER (OUTPATIENT)
Age: 69
End: 2024-06-05

## 2024-06-05 VITALS — OXYGEN SATURATION: 93 % | RESPIRATION RATE: 18 BRPM

## 2024-06-05 PROBLEM — E06.3 AUTOIMMUNE THYROIDITIS: Chronic | Status: ACTIVE | Noted: 2024-05-24

## 2024-06-05 LAB
ANION GAP SERPL CALC-SCNC: 15 MMOL/L — SIGNIFICANT CHANGE UP (ref 5–17)
BUN SERPL-MCNC: 21 MG/DL — SIGNIFICANT CHANGE UP (ref 7–23)
CALCIUM SERPL-MCNC: 9.4 MG/DL — SIGNIFICANT CHANGE UP (ref 8.4–10.5)
CHLORIDE SERPL-SCNC: 99 MMOL/L — SIGNIFICANT CHANGE UP (ref 96–108)
CO2 SERPL-SCNC: 23 MMOL/L — SIGNIFICANT CHANGE UP (ref 22–31)
CREAT SERPL-MCNC: 1.14 MG/DL — SIGNIFICANT CHANGE UP (ref 0.5–1.3)
EGFR: 53 ML/MIN/1.73M2 — LOW
GLUCOSE BLDC GLUCOMTR-MCNC: 120 MG/DL — HIGH (ref 70–99)
GLUCOSE BLDC GLUCOMTR-MCNC: 198 MG/DL — HIGH (ref 70–99)
GLUCOSE SERPL-MCNC: 78 MG/DL — SIGNIFICANT CHANGE UP (ref 70–99)
HCT VFR BLD CALC: 31.2 % — LOW (ref 34.5–45)
HGB BLD-MCNC: 10.2 G/DL — LOW (ref 11.5–15.5)
MCHC RBC-ENTMCNC: 28.7 PG — SIGNIFICANT CHANGE UP (ref 27–34)
MCHC RBC-ENTMCNC: 32.7 GM/DL — SIGNIFICANT CHANGE UP (ref 32–36)
MCV RBC AUTO: 87.6 FL — SIGNIFICANT CHANGE UP (ref 80–100)
NRBC # BLD: 0 /100 WBCS — SIGNIFICANT CHANGE UP (ref 0–0)
PLATELET # BLD AUTO: 301 K/UL — SIGNIFICANT CHANGE UP (ref 150–400)
POTASSIUM SERPL-MCNC: 4 MMOL/L — SIGNIFICANT CHANGE UP (ref 3.5–5.3)
POTASSIUM SERPL-SCNC: 4 MMOL/L — SIGNIFICANT CHANGE UP (ref 3.5–5.3)
RBC # BLD: 3.56 M/UL — LOW (ref 3.8–5.2)
RBC # FLD: 15.8 % — HIGH (ref 10.3–14.5)
SODIUM SERPL-SCNC: 137 MMOL/L — SIGNIFICANT CHANGE UP (ref 135–145)
WBC # BLD: 19.92 K/UL — HIGH (ref 3.8–10.5)
WBC # FLD AUTO: 19.92 K/UL — HIGH (ref 3.8–10.5)

## 2024-06-05 PROCEDURE — 82803 BLOOD GASES ANY COMBINATION: CPT

## 2024-06-05 PROCEDURE — 85027 COMPLETE CBC AUTOMATED: CPT

## 2024-06-05 PROCEDURE — 93320 DOPPLER ECHO COMPLETE: CPT

## 2024-06-05 PROCEDURE — 71045 X-RAY EXAM CHEST 1 VIEW: CPT

## 2024-06-05 PROCEDURE — 93970 EXTREMITY STUDY: CPT

## 2024-06-05 PROCEDURE — 85576 BLOOD PLATELET AGGREGATION: CPT

## 2024-06-05 PROCEDURE — 85730 THROMBOPLASTIN TIME PARTIAL: CPT

## 2024-06-05 PROCEDURE — 86923 COMPATIBILITY TEST ELECTRIC: CPT

## 2024-06-05 PROCEDURE — 83735 ASSAY OF MAGNESIUM: CPT

## 2024-06-05 PROCEDURE — 84295 ASSAY OF SERUM SODIUM: CPT

## 2024-06-05 PROCEDURE — C9399: CPT

## 2024-06-05 PROCEDURE — 71045 X-RAY EXAM CHEST 1 VIEW: CPT | Mod: 26

## 2024-06-05 PROCEDURE — 84134 ASSAY OF PREALBUMIN: CPT

## 2024-06-05 PROCEDURE — 85384 FIBRINOGEN ACTIVITY: CPT

## 2024-06-05 PROCEDURE — 85520 HEPARIN ASSAY: CPT

## 2024-06-05 PROCEDURE — P9045: CPT

## 2024-06-05 PROCEDURE — 80053 COMPREHEN METABOLIC PANEL: CPT

## 2024-06-05 PROCEDURE — 97165 OT EVAL LOW COMPLEX 30 MIN: CPT

## 2024-06-05 PROCEDURE — C1751: CPT

## 2024-06-05 PROCEDURE — 97164 PT RE-EVAL EST PLAN CARE: CPT

## 2024-06-05 PROCEDURE — 85396 CLOTTING ASSAY WHOLE BLOOD: CPT

## 2024-06-05 PROCEDURE — 71250 CT THORAX DX C-: CPT | Mod: MC

## 2024-06-05 PROCEDURE — 84132 ASSAY OF SERUM POTASSIUM: CPT

## 2024-06-05 PROCEDURE — 97530 THERAPEUTIC ACTIVITIES: CPT

## 2024-06-05 PROCEDURE — 82947 ASSAY GLUCOSE BLOOD QUANT: CPT

## 2024-06-05 PROCEDURE — 83036 HEMOGLOBIN GLYCOSYLATED A1C: CPT

## 2024-06-05 PROCEDURE — 82550 ASSAY OF CK (CPK): CPT

## 2024-06-05 PROCEDURE — 84484 ASSAY OF TROPONIN QUANT: CPT

## 2024-06-05 PROCEDURE — 86900 BLOOD TYPING SEROLOGIC ABO: CPT

## 2024-06-05 PROCEDURE — 85025 COMPLETE CBC W/AUTO DIFF WBC: CPT

## 2024-06-05 PROCEDURE — 97116 GAIT TRAINING THERAPY: CPT

## 2024-06-05 PROCEDURE — 87640 STAPH A DNA AMP PROBE: CPT

## 2024-06-05 PROCEDURE — 97161 PT EVAL LOW COMPLEX 20 MIN: CPT

## 2024-06-05 PROCEDURE — 93325 DOPPLER ECHO COLOR FLOW MAPG: CPT

## 2024-06-05 PROCEDURE — P9016: CPT

## 2024-06-05 PROCEDURE — 86891 AUTOLOGOUS BLOOD OP SALVAGE: CPT

## 2024-06-05 PROCEDURE — 36430 TRANSFUSION BLD/BLD COMPNT: CPT

## 2024-06-05 PROCEDURE — 84443 ASSAY THYROID STIM HORMONE: CPT

## 2024-06-05 PROCEDURE — 87641 MR-STAPH DNA AMP PROBE: CPT

## 2024-06-05 PROCEDURE — 85014 HEMATOCRIT: CPT

## 2024-06-05 PROCEDURE — 82553 CREATINE MB FRACTION: CPT

## 2024-06-05 PROCEDURE — C1889: CPT

## 2024-06-05 PROCEDURE — 84439 ASSAY OF FREE THYROXINE: CPT

## 2024-06-05 PROCEDURE — 82962 GLUCOSE BLOOD TEST: CPT

## 2024-06-05 PROCEDURE — 82330 ASSAY OF CALCIUM: CPT

## 2024-06-05 PROCEDURE — 94640 AIRWAY INHALATION TREATMENT: CPT

## 2024-06-05 PROCEDURE — 82435 ASSAY OF BLOOD CHLORIDE: CPT

## 2024-06-05 PROCEDURE — 83880 ASSAY OF NATRIURETIC PEPTIDE: CPT

## 2024-06-05 PROCEDURE — 93880 EXTRACRANIAL BILAT STUDY: CPT

## 2024-06-05 PROCEDURE — 86850 RBC ANTIBODY SCREEN: CPT

## 2024-06-05 PROCEDURE — 80048 BASIC METABOLIC PNL TOTAL CA: CPT

## 2024-06-05 PROCEDURE — 86901 BLOOD TYPING SEROLOGIC RH(D): CPT

## 2024-06-05 PROCEDURE — 93005 ELECTROCARDIOGRAM TRACING: CPT

## 2024-06-05 PROCEDURE — 99233 SBSQ HOSP IP/OBS HIGH 50: CPT

## 2024-06-05 PROCEDURE — 81003 URINALYSIS AUTO W/O SCOPE: CPT

## 2024-06-05 PROCEDURE — 36415 COLL VENOUS BLD VENIPUNCTURE: CPT

## 2024-06-05 PROCEDURE — 85610 PROTHROMBIN TIME: CPT

## 2024-06-05 PROCEDURE — 94002 VENT MGMT INPAT INIT DAY: CPT

## 2024-06-05 PROCEDURE — 93306 TTE W/DOPPLER COMPLETE: CPT

## 2024-06-05 PROCEDURE — 84100 ASSAY OF PHOSPHORUS: CPT

## 2024-06-05 PROCEDURE — 36600 WITHDRAWAL OF ARTERIAL BLOOD: CPT

## 2024-06-05 PROCEDURE — 85018 HEMOGLOBIN: CPT

## 2024-06-05 PROCEDURE — C1769: CPT

## 2024-06-05 PROCEDURE — 94010 BREATHING CAPACITY TEST: CPT

## 2024-06-05 PROCEDURE — 83605 ASSAY OF LACTIC ACID: CPT

## 2024-06-05 RX ORDER — FUROSEMIDE 40 MG
1 TABLET ORAL
Qty: 14 | Refills: 0
Start: 2024-06-05 | End: 2024-06-18

## 2024-06-05 RX ORDER — LEVOTHYROXINE SODIUM 125 MCG
1 TABLET ORAL
Qty: 0 | Refills: 0 | DISCHARGE

## 2024-06-05 RX ORDER — LOSARTAN POTASSIUM 100 MG/1
1 TABLET, FILM COATED ORAL
Qty: 0 | Refills: 0 | DISCHARGE

## 2024-06-05 RX ORDER — TIOTROPIUM BROMIDE 18 UG/1
2 CAPSULE ORAL; RESPIRATORY (INHALATION)
Qty: 1 | Refills: 1
Start: 2024-06-05 | End: 2024-08-03

## 2024-06-05 RX ORDER — INSULIN GLARGINE 100 [IU]/ML
40 INJECTION, SOLUTION SUBCUTANEOUS AT BEDTIME
Refills: 0 | Status: DISCONTINUED | OUTPATIENT
Start: 2024-06-05 | End: 2024-06-05

## 2024-06-05 RX ORDER — SPIRONOLACTONE 25 MG/1
1 TABLET, FILM COATED ORAL
Qty: 14 | Refills: 0
Start: 2024-06-05 | End: 2024-06-18

## 2024-06-05 RX ORDER — ATORVASTATIN CALCIUM 80 MG/1
1 TABLET, FILM COATED ORAL
Qty: 0 | Refills: 0 | DISCHARGE
Start: 2024-06-05

## 2024-06-05 RX ORDER — METOPROLOL TARTRATE 50 MG
1 TABLET ORAL
Qty: 30 | Refills: 1
Start: 2024-06-05 | End: 2024-08-03

## 2024-06-05 RX ORDER — ALBUTEROL 90 UG/1
2 AEROSOL, METERED ORAL
Qty: 0 | Refills: 0 | DISCHARGE

## 2024-06-05 RX ORDER — AMLODIPINE BESYLATE 2.5 MG/1
1 TABLET ORAL
Refills: 0 | DISCHARGE

## 2024-06-05 RX ORDER — INSULIN GLARGINE 100 [IU]/ML
45 INJECTION, SOLUTION SUBCUTANEOUS
Qty: 0 | Refills: 0 | DISCHARGE
Start: 2024-06-05

## 2024-06-05 RX ORDER — BUDESONIDE AND FORMOTEROL FUMARATE DIHYDRATE 160; 4.5 UG/1; UG/1
1 AEROSOL RESPIRATORY (INHALATION)
Qty: 1 | Refills: 1
Start: 2024-06-05 | End: 2024-08-03

## 2024-06-05 RX ORDER — INSULIN LISPRO 100/ML
12 VIAL (ML) SUBCUTANEOUS
Refills: 0 | Status: DISCONTINUED | OUTPATIENT
Start: 2024-06-05 | End: 2024-06-05

## 2024-06-05 RX ORDER — MIRABEGRON 50 MG/1
1 TABLET, EXTENDED RELEASE ORAL
Refills: 0 | DISCHARGE

## 2024-06-05 RX ORDER — ASPIRIN/CALCIUM CARB/MAGNESIUM 324 MG
1 TABLET ORAL
Qty: 0 | Refills: 0 | DISCHARGE
Start: 2024-06-05

## 2024-06-05 RX ORDER — FUROSEMIDE 40 MG
40 TABLET ORAL ONCE
Refills: 0 | Status: COMPLETED | OUTPATIENT
Start: 2024-06-05 | End: 2024-06-05

## 2024-06-05 RX ORDER — CLOPIDOGREL BISULFATE 75 MG/1
1 TABLET, FILM COATED ORAL
Qty: 30 | Refills: 1
Start: 2024-06-05 | End: 2024-08-03

## 2024-06-05 RX ORDER — SODIUM CHLORIDE 0.65 %
1 AEROSOL, SPRAY (ML) NASAL
Refills: 0 | Status: DISCONTINUED | OUTPATIENT
Start: 2024-06-05 | End: 2024-06-05

## 2024-06-05 RX ORDER — OXYCODONE HYDROCHLORIDE 5 MG/1
1 TABLET ORAL
Qty: 18 | Refills: 0
Start: 2024-06-05 | End: 2024-06-10

## 2024-06-05 RX ADMIN — Medication 25 MICROGRAM(S): at 05:43

## 2024-06-05 RX ADMIN — Medication 1: at 11:34

## 2024-06-05 RX ADMIN — Medication 25 MILLIGRAM(S): at 05:43

## 2024-06-05 RX ADMIN — CLOPIDOGREL BISULFATE 75 MILLIGRAM(S): 75 TABLET, FILM COATED ORAL at 11:35

## 2024-06-05 RX ADMIN — Medication 40 MILLIGRAM(S): at 05:42

## 2024-06-05 RX ADMIN — Medication 81 MILLIGRAM(S): at 11:35

## 2024-06-05 RX ADMIN — Medication 12 UNIT(S): at 11:34

## 2024-06-05 RX ADMIN — Medication 14 UNIT(S): at 08:06

## 2024-06-05 RX ADMIN — ALBUTEROL 2.5 MILLIGRAM(S): 90 AEROSOL, METERED ORAL at 05:42

## 2024-06-05 RX ADMIN — PANTOPRAZOLE SODIUM 40 MILLIGRAM(S): 20 TABLET, DELAYED RELEASE ORAL at 06:06

## 2024-06-05 RX ADMIN — SPIRONOLACTONE 25 MILLIGRAM(S): 25 TABLET, FILM COATED ORAL at 05:43

## 2024-06-05 RX ADMIN — BUDESONIDE AND FORMOTEROL FUMARATE DIHYDRATE 2 PUFF(S): 160; 4.5 AEROSOL RESPIRATORY (INHALATION) at 05:44

## 2024-06-05 RX ADMIN — TIOTROPIUM BROMIDE 2 PUFF(S): 18 CAPSULE ORAL; RESPIRATORY (INHALATION) at 11:36

## 2024-06-05 RX ADMIN — ALBUTEROL 2.5 MILLIGRAM(S): 90 AEROSOL, METERED ORAL at 11:35

## 2024-06-05 NOTE — PROGRESS NOTE ADULT - REASON FOR ADMISSION
chest pain/shortness of breath
[id number]
chest pain/shortness of breath

## 2024-06-05 NOTE — PROGRESS NOTE ADULT - SUBJECTIVE AND OBJECTIVE BOX
Subjective:      Tele:             V/S:                    T(F): 98.7 (24 @ 04:40), Max: 99 (24 @ 19:00)  HR: 90 (24 @ 04:40) (82 - 101)  BP: 107/68 (24 @ 04:40) (107/68 - 125/80)  RR: 18 (24 @ 10:10) (18 - 18)  SpO2: 93% (24 @ 10:10) (91% - 100%)  Wt(kg): --      LV EF:      Labs:      137  |  99  |  21  ----------------------------<  78  4.0   |  23  |  1.14    Ca    9.4      2024 06:30  Phos  4.3     -  Mg     2.1                                      10.2   19.92 )-----------( 301      ( 2024 06:31 )             31.2             CAPILLARY BLOOD GLUCOSE      POCT Blood Glucose.: 120 mg/dL (2024 07:51)  POCT Blood Glucose.: 121 mg/dL (2024 21:53)  POCT Blood Glucose.: 159 mg/dL (2024 16:40)  POCT Blood Glucose.: 203 mg/dL (2024 11:16)  POCT Blood Glucose.: 289 mg/dL (2024 10:27)           CXR:    I&O's Detail    2024 07:01  -  2024 07:00  --------------------------------------------------------  IN:    Oral Fluid: 1515 mL  Total IN: 1515 mL    OUT:    Voided (mL): 2300 mL  Total OUT: 2300 mL    Total NET: -785 mL      2024 07:01  -  2024 10:16  --------------------------------------------------------  IN:    Oral Fluid: 240 mL  Total IN: 240 mL    OUT:    Voided (mL): 500 mL  Total OUT: 500 mL    Total NET: -260 mL      BOWEL MOVEMENT:  [x ] YES [ ] NO      Daily     Daily Weight in k.9 (2024 04:40)  Medications:  acetaminophen     Tablet .. 650 milliGRAM(s) Oral every 6 hours PRN  albuterol    0.083% 2.5 milliGRAM(s) Nebulizer every 6 hours  aspirin enteric coated 81 milliGRAM(s) Oral daily  atorvastatin 80 milliGRAM(s) Oral at bedtime  benzocaine/menthol Lozenge 1 Lozenge Oral every 3 hours PRN  bisacodyl Suppository 10 milliGRAM(s) Rectal once  budesonide  80 MICROgram(s)/formoterol 4.5 MICROgram(s) Inhaler 2 Puff(s) Inhalation two times a day  clopidogrel Tablet 75 milliGRAM(s) Oral daily  dextrose 50% Injectable 50 milliLiter(s) IV Push every 15 minutes  dextrose Oral Gel 15 Gram(s) Oral once  furosemide    Tablet 40 milliGRAM(s) Oral daily  glucagon  Injectable 1 milliGRAM(s) IntraMuscular once  insulin glargine Injectable (LANTUS) 45 Unit(s) SubCutaneous at bedtime  insulin lispro (ADMELOG) corrective regimen sliding scale   SubCutaneous at bedtime  insulin lispro (ADMELOG) corrective regimen sliding scale   SubCutaneous three times a day before meals  insulin lispro Injectable (ADMELOG) 14 Unit(s) SubCutaneous three times a day before meals  levothyroxine 25 MICROGram(s) Oral daily  metoprolol tartrate 25 milliGRAM(s) Oral two times a day  oxyCODONE    IR 10 milliGRAM(s) Oral every 4 hours PRN  oxyCODONE    IR 5 milliGRAM(s) Oral every 4 hours PRN  pantoprazole    Tablet 40 milliGRAM(s) Oral before breakfast  polyethylene glycol 3350 17 Gram(s) Oral daily  senna 2 Tablet(s) Oral at bedtime  sodium chloride 0.65% Nasal 1 Spray(s) Both Nostrils four times a day PRN  spironolactone 25 milliGRAM(s) Oral daily  tiotropium 2.5 MICROgram(s) Inhaler 2 Puff(s) Inhalation daily        Physical Exam:    Neurology: alert and oriented x 3    CV : [id number]    Sternal Wound :   Incision clean, edges well proximated , Stable    Lungs: B.l breath  sound s3l NC    Abdomen: soft, nontender, nondistended, positive bowel sounds, last bowel movement 6/2    : voids               Extremities: LLE svg benign    equal strength throughout    b/lle + DP trace edema                   PAST MEDICAL & SURGICAL HISTORY:  Hypertension      Diabetes      Hyperlipidemia      High triglycerides      Hypothyroidism      Obesity      Arthritis      Chronic bronchiolitis      Hashimoto's thyroiditis      H/O  section      H/O tubal ligation      H/O eye surgery

## 2024-06-05 NOTE — DISCHARGE NOTE PROVIDER - NSDCPNSUBOBJ_GEN_ALL_CORE
Progress Note:   · Provider Specialty  CT Surgery    Reason for Admission:   Reason for Admission:  · Reason for Admission  C3LIMA      · Subjective and Objective:   Subjective:  "Hello"  OOB chair      Tele:         SR  70s    V/S:                    T(F): 98.7 (24 @ 04:40), Max: 99 (24 @ 19:00)  HR: 90 (24 @ 04:40) (82 - 101)  BP: 107/68 (24 @ 04:40) (107/68 - 125/80)  RR: 18 (24 @ 10:10) (18 - 18)  SpO2: 93% (24 @ 10:10) (91% - 100%)  Wt(kg): --      LV EF:      Labs:      137  |  99  |  21  ----------------------------<  78  4.0   |  23  |  1.14    Ca    9.4      2024 06:30  Phos  4.3       Mg     2.1                                      10.2   19.92 )-----------( 301      ( 2024 06:31 )             31.2             CAPILLARY BLOOD GLUCOSE      POCT Blood Glucose.: 120 mg/dL (2024 07:51)  POCT Blood Glucose.: 121 mg/dL (2024 21:53)  POCT Blood Glucose.: 159 mg/dL (2024 16:40)  POCT Blood Glucose.: 203 mg/dL (2024 11:16)  POCT Blood Glucose.: 289 mg/dL (2024 10:27)           CXR:    I&O's Detail    2024 07:01  -  2024 07:00  --------------------------------------------------------  IN:    Oral Fluid: 1515 mL  Total IN: 1515 mL    OUT:    Voided (mL): 2300 mL  Total OUT: 2300 mL    Total NET: -785 mL      2024 07:01  -  2024 10:16  --------------------------------------------------------  IN:    Oral Fluid: 240 mL  Total IN: 240 mL    OUT:    Voided (mL): 500 mL  Total OUT: 500 mL    Total NET: -260 mL      BOWEL MOVEMENT:  [x ] YES [ ] NO      Daily     Daily Weight in k.9 (2024 04:40)  Medications:  acetaminophen     Tablet .. 650 milliGRAM(s) Oral every 6 hours PRN  albuterol    0.083% 2.5 milliGRAM(s) Nebulizer every 6 hours  aspirin enteric coated 81 milliGRAM(s) Oral daily  atorvastatin 80 milliGRAM(s) Oral at bedtime  benzocaine/menthol Lozenge 1 Lozenge Oral every 3 hours PRN  bisacodyl Suppository 10 milliGRAM(s) Rectal once  budesonide  80 MICROgram(s)/formoterol 4.5 MICROgram(s) Inhaler 2 Puff(s) Inhalation two times a day  clopidogrel Tablet 75 milliGRAM(s) Oral daily  dextrose 50% Injectable 50 milliLiter(s) IV Push every 15 minutes  dextrose Oral Gel 15 Gram(s) Oral once  furosemide    Tablet 40 milliGRAM(s) Oral daily  glucagon  Injectable 1 milliGRAM(s) IntraMuscular once  insulin glargine Injectable (LANTUS) 45 Unit(s) SubCutaneous at bedtime  insulin lispro (ADMELOG) corrective regimen sliding scale   SubCutaneous at bedtime  insulin lispro (ADMELOG) corrective regimen sliding scale   SubCutaneous three times a day before meals  insulin lispro Injectable (ADMELOG) 14 Unit(s) SubCutaneous three times a day before meals  levothyroxine 25 MICROGram(s) Oral daily  metoprolol tartrate 25 milliGRAM(s) Oral two times a day  oxyCODONE    IR 10 milliGRAM(s) Oral every 4 hours PRN  oxyCODONE    IR 5 milliGRAM(s) Oral every 4 hours PRN  pantoprazole    Tablet 40 milliGRAM(s) Oral before breakfast  polyethylene glycol 3350 17 Gram(s) Oral daily  senna 2 Tablet(s) Oral at bedtime  sodium chloride 0.65% Nasal 1 Spray(s) Both Nostrils four times a day PRN  spironolactone 25 milliGRAM(s) Oral daily  tiotropium 2.5 MICROgram(s) Inhaler 2 Puff(s) Inhalation daily        Physical Exam:    Neurology: alert and oriented x 3    CV : [id number]    Sternal Wound :   Incision clean, edges well proximated , Stable    Lungs: B.l breath  sound s3l NC    Abdomen: soft, nontender, nondistended, positive bowel sounds, last bowel movement     : voids               Extremities: LLE svg benign    equal strength throughout    b/lle + DP trace edema                   PAST MEDICAL & SURGICAL HISTORY:  Hypertension      Diabetes      Hyperlipidemia      High triglycerides      Hypothyroidism      Obesity      Arthritis      Chronic bronchiolitis      Hashimoto's thyroiditis      H/O  section      H/O tubal ligation      H/O eye surgery                      Assessment and Plan:   · Assessment    70 y/o female with PMH of HTN /HLD, DMT2, Asthma. obesity, Hoshimoto's, former smoker  who presented to ED to be evaluated for chest pain associated with exertional shortness of breath. this afternoon pt was climbing stairs to go from one building to the next and started to develop sharp neck pain radiating to her back and both the shoulders.  pt pain lasted for about 30 sec and dissipated upon rest. pt was prescribed advair which she stopped taking due to thrush.  admits of being compliance to all other  medication at home  in ED cardiology on call was consulted and pt received 324 mg aspirin with 300 mg plavix. recommendation was  not to start on heparin drip  pt is currently asymptomatic.    P2y12 213, Carotid doppler pending , echo pending. Endocrinology consulted for assistance in management   VSS no c/o CP A/C OR thurs  VSS denies CP /PALPS preop workup underway    S/P C3L LAAC. Extubated POD 0  Post-OP: On pressors, S/P 1u prbc overnight. Insulin gtt   PW d/c'ed    NC -> HFNC. Bronchodilators PRN. Incentive spirometry. Singulair. Plavix initiated.    Pulm c/s for hypoxemia. aggressive chest PT. nebulizers. spiriva symbicort  6/3 Off HFNC -> 3L NC.  Tx to 2COH. Encouraged OOB/ ambulation. Current medication regimen maintained.    VSS 3lnc Chest ct today weigh up 2 kg  lasix  20 bid  increase metoprolol 25 bid d/c prevena  6/5 DC home      Problem/Plan - 1:  ·  Problem: S/P CABG x 3.   ·  Plan: - Continue with ASA, Lipitor,    metorpol to 25 bid Lopressor  lasix 20 bid gentle diuresis  - Continue plavix  - continue inhalers/nebulizers  - on 02 via NC, wean off 02 supplement as tolerated.  - Pulmonology following,  CT  noncontrast CT chest c/w atelectasis  - Hx DM. Endocrine following. on DM regimen per Endo  - Increase activity as tolerated.   - Encourage Chest PT / Pulmonary toileting and Incentive spirometry every 1 hour x 10 while awake.   - Continue with PUD and DVT prophylaxis.   - D/C plan HOME  - Plan of care discussed with CTS Attending.    Problem/Plan - 2:  ·  Problem: DM2 (diabetes mellitus, type 2).   ·  Plan: endo following aic 8.8   DC on home basilglar  Endo followup this week.      Electronic Signatures:  Karina Arteaga (NP)  (Signed 2024 10:21)  	Authored: Progress Note, Reason for Admission, Subjective and Objective, Assessment and Plan      Last Updated: 2024 10:21 by Karina Arteaga ([email or web address])

## 2024-06-05 NOTE — DISCHARGE NOTE PROVIDER - NSDCFUADDAPPT_GEN_ALL_CORE_FT
Follow up appointment with Ivanna Valera 1 week June 18 at 1 pm> Call assistant Aparna  any questions regardiang post op visit  Cardiac surgery office at [facility] entrance 3  first floor on left after entering [facility]  Office telephone     Your Care Navigator Nurse Practitioner will be in touch to see you in your home within a few days from discharge. The Follow Your Heart program can help ensure you understand your medications, discharge instructions and answer any questions you may have at that time. They are also a great source to address concerns during the day and may be reached at [phone].

## 2024-06-05 NOTE — PROGRESS NOTE ADULT - PROBLEM SELECTOR PLAN 1
- Continue with ASA, Lipitor,    metorpol to 25 bid Lopressor  lasix 20 bid gentle diuresis  - Continue plavix  - continue inhalers/nebulizers  - on 02 via NC, wean off 02 supplement as tolerated.  - Pulmonology following,  CT  noncontrast CT chest c/w atelectasis  - Hx DM. Endocrine following. on DM regimen per Endo  - Increase activity as tolerated.   - Encourage Chest PT / Pulmonary toileting and Incentive spirometry every 1 hour x 10 while awake.   - Continue with PUD and DVT prophylaxis.   - D/C plan HOME  - Plan of care discussed with CTS Attending

## 2024-06-05 NOTE — PROGRESS NOTE ADULT - PROBLEM SELECTOR PROBLEM 1
CAD (coronary artery disease)
CAD (coronary artery disease)
DM2 (diabetes mellitus, type 2)
S/P CABG x 3
DM2 (diabetes mellitus, type 2)
S/P CABG x 3
DM2 (diabetes mellitus, type 2)
S/P CABG x 3
CAD (coronary artery disease)
DM2 (diabetes mellitus, type 2)
DM2 (diabetes mellitus, type 2)
CAD (coronary artery disease)
DM2 (diabetes mellitus, type 2)
DM2 (diabetes mellitus, type 2)

## 2024-06-05 NOTE — DISCHARGE NOTE NURSING/CASE MANAGEMENT/SOCIAL WORK - PATIENT PORTAL LINK FT
You can access the FollowMyHealth Patient Portal offered by [facility] by registering at the following website: [email or web address]. By joining [facility]’s FollowMyHealth portal, you will also be able to view your health information using other applications (apps) compatible with our system.

## 2024-06-05 NOTE — DISCHARGE NOTE PROVIDER - NSDCMRMEDTOKEN_GEN_ALL_CORE_FT
aspirin 81 mg oral delayed release tablet: 1 tab(s) orally once a day  atorvastatin 80 mg oral tablet: 1 tab(s) orally once a day (at bedtime)  Basaglar KwikPen 100 units/mL subcutaneous solution: 45 unit(s) subcutaneous once a day (at bedtime)  clopidogrel 75 mg oral tablet: 1 tab(s) orally once a day  furosemide 40 mg oral tablet: 1 tab(s) orally once a day  glipiZIDE 10 mg oral tablet, extended release: 1 tab(s) orally once a day  levothyroxine 25 mcg (0.025 mg) oral tablet: 1 tab(s) orally once a day  metoprolol succinate 50 mg oral capsule, extended release: 1 cap(s) orally once a day  Myrbetriq 25 mg oral tablet, extended release: 1 tab(s) orally once a day  spironolactone 25 mg oral tablet: 1 tab(s) orally once a day  Symbicort 80 mcg-4.5 mcg/inh inhalation aerosol: 1 puff(s) inhaled once a day inhaled  tiotropium 2.5 mcg/inh inhalation aerosol: 2 puff(s) inhaled once a day

## 2024-06-05 NOTE — PROGRESS NOTE ADULT - PROBLEM SELECTOR PROBLEM 2
DM2 (diabetes mellitus, type 2)
CAD (coronary artery disease)
DM2 (diabetes mellitus, type 2)
CAD (coronary artery disease)
DM2 (diabetes mellitus, type 2)
CAD (coronary artery disease)
CAD (coronary artery disease)
DM2 (diabetes mellitus, type 2)
CAD (coronary artery disease)

## 2024-06-05 NOTE — PROGRESS NOTE ADULT - ASSESSMENT
68 y/o female with PMH of HTN /HLD, DMT2, Asthma. obesity, Hoshimoto's, former smoker  who presented to ED to be evaluated for chest pain associated with exertional shortness of breath. this afternoon pt was climbing stairs to go from one building to the next and started to develop sharp neck pain radiating to her back and both the shoulders.  pt pain lasted for about 30 sec and dissipated upon rest. pt was prescribed advair which she stopped taking due to thrush.  admits of being compliance to all other  medication at home  in ED cardiology on call was consulted and pt received 324 mg aspirin with 300 mg plavix. recommendation was  not to start on heparin drip  pt is currently asymptomatic.   5/25 P2y12 213, Carotid doppler pending , echo pending. Endocrinology consulted for assistance in management  5/26 VSS no c/o CP A/C OR thurs 5/30 5/27 VSS denies CP /PALPS preop workup underway   5/29 S/P C3L LAAC. Extubated POD 0  Post-OP: On pressors, S/P 1u prbc overnight. Insulin gtt  5/31 PW d/c'ed   6/1 NC -> HFNC. Bronchodilators PRN. Incentive spirometry. Singulair. Plavix initiated.   6/2 Pulm c/s for hypoxemia. aggressive chest PT. nebulizers. spiriva symbicort  6/3 Off HFNC -> 3L NC.  Tx to 2COH. Encouraged OOB/ ambulation. Current medication regimen maintained.   6/4 VSS 3lnc Chest ct today weigh up 2 kg  lasix  20 bid  increase metoprolol 25 bid d/c prevena  6/5 DC home

## 2024-06-05 NOTE — PROGRESS NOTE ADULT - ASSESSMENT
68 y/o female with PMH of HTN/HLD, DMT2, Asthma who presented to ED to be evaluated for chest pain associated with exertional shortness of breath. Admitted for cardiac workup.     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   - Transferred to NS for CTS evaluation   - Now s/p CABG x 3 on 5/29: LIMA to LAD, SVG to Ramus, SVG to RPDA, NEVIN clipping  - did have some addl sxs 5/27 and was given nitrates with relief  - Initiated on Amio as per CTS protocol, now off  - Continue ASA, Plavix  - Lipitor 80mg resumed  - On Lopressor 25mg BID, would cont    - ProBNP 74; no previous diagnosis of CHF, no prior TTE   - TTE with preserved LV and RV function, no significant valvular disease  - remains with rales and edema, ct findings of mild vol ol noted from 6/4  -would give addl lasix 40 iv x 1 this am, d/w cts   - On 2L NC    - has been on minimal levo, now off  - Home Amlodipine on hold  - Hold home ARB     - DM2 (diabetes mellitus, type 2).   - Would consider initiation of GLP agonist in the future given her DM + CAD    - Other cardiovascular workup will depend on clinical course.  - All other workup per primary team.  - Will continue to follow.

## 2024-06-05 NOTE — DISCHARGE NOTE NURSING/CASE MANAGEMENT/SOCIAL WORK - NSDCFUADDAPPT_GEN_ALL_CORE_FT
Follow up appointment with Ivanna Valera 1 week June 18 at 1 pm> Call assistant Aparna  any questions regardiang post op visit  Cardiac surgery office at [facility] entrance 3  first floor on left after entering [facility]  Office telephone     Your Care Navigator Nurse Practitioner will be in touch to see you in your home within a few days from discharge. The Follow Your Heart program can help ensure you understand your medications, discharge instructions and answer any questions you may have at that time. They are also a great source to address concerns during the day and may be reached at [phone].

## 2024-06-05 NOTE — PROGRESS NOTE ADULT - SUBJECTIVE AND OBJECTIVE BOX
Chief complaint  Patient is a 69y old  Female who presents with a chief complaint of C3LIMA (05 Jun 2024 11:09)         Labs and Fingersticks  CAPILLARY BLOOD GLUCOSE      POCT Blood Glucose.: 198 mg/dL (05 Jun 2024 11:29)  POCT Blood Glucose.: 120 mg/dL (05 Jun 2024 07:51)  POCT Blood Glucose.: 121 mg/dL (04 Jun 2024 21:53)  POCT Blood Glucose.: 159 mg/dL (04 Jun 2024 16:40)      Anion Gap: 15 (06-05 @ 06:30)  Anion Gap: 14 (06-04 @ 07:21)      Calcium: 9.4 (06-05 @ 06:30)  Calcium: 9.1 (06-04 @ 07:21)          06-05    137  |  99  |  21  ----------------------------<  78  4.0   |  23  |  1.14    Ca    9.4      05 Jun 2024 06:30  Phos  4.3     06-04  Mg     2.1     06-04                          10.2   19.92 )-----------( 301      ( 05 Jun 2024 06:31 )             31.2     Medications  MEDICATIONS  (STANDING):  albuterol    0.083% 2.5 milliGRAM(s) Nebulizer every 6 hours  aspirin enteric coated 81 milliGRAM(s) Oral daily  atorvastatin 80 milliGRAM(s) Oral at bedtime  bisacodyl Suppository 10 milliGRAM(s) Rectal once  budesonide  80 MICROgram(s)/formoterol 4.5 MICROgram(s) Inhaler 2 Puff(s) Inhalation two times a day  clopidogrel Tablet 75 milliGRAM(s) Oral daily  dextrose 50% Injectable 50 milliLiter(s) IV Push every 15 minutes  dextrose Oral Gel 15 Gram(s) Oral once  furosemide    Tablet 40 milliGRAM(s) Oral daily  glucagon  Injectable 1 milliGRAM(s) IntraMuscular once  insulin glargine Injectable (LANTUS) 40 Unit(s) SubCutaneous at bedtime  insulin lispro (ADMELOG) corrective regimen sliding scale   SubCutaneous at bedtime  insulin lispro (ADMELOG) corrective regimen sliding scale   SubCutaneous three times a day before meals  insulin lispro Injectable (ADMELOG) 12 Unit(s) SubCutaneous three times a day before meals  levothyroxine 25 MICROGram(s) Oral daily  metoprolol tartrate 25 milliGRAM(s) Oral two times a day  pantoprazole    Tablet 40 milliGRAM(s) Oral before breakfast  polyethylene glycol 3350 17 Gram(s) Oral daily  senna 2 Tablet(s) Oral at bedtime  spironolactone 25 milliGRAM(s) Oral daily  tiotropium 2.5 MICROgram(s) Inhaler 2 Puff(s) Inhalation daily      Physical Exam  General: Patient comfortable in bed   Vital Signs Last 12 Hrs  T(F): 98.7 (06-05-24 @ 04:40), Max: 98.7 (06-05-24 @ 04:40)  HR: 90 (06-05-24 @ 04:40) (90 - 90)  BP: 107/68 (06-05-24 @ 04:40) (107/68 - 107/68)  BP(mean): --  RR: 18 (06-05-24 @ 10:10) (18 - 18)  SpO2: 93% (06-05-24 @ 10:10) (91% - 93%)    CVS: S1S2   Respiratory: No wheezing, no crepitations  GI: Abdomen soft, bowel sounds positive  Musculoskeletal:  moves all extremities  : Voiding          Chief complaint  Patient is a 69y old  Female who presents with a chief complaint of C3LIMA (05 Jun 2024 11:09)         Labs and Fingersticks  CAPILLARY BLOOD GLUCOSE      POCT Blood Glucose.: 198 mg/dL  (05 Jun 2024 11:29)  POCT Blood Glucose.: 120 mg/dL (05 Jun 2024 07:51)  POCT Blood Glucose.: 121 mg/dL (04 Jun 2024 21:53)  POCT Blood Glucose.: 159 mg/dL (04 Jun 2024 16:40)      Anion Gap: 15 (06-05 @ 06:30)  Anion Gap: 14 (06-04 @ 07:21)      Calcium: 9.4 (06-05 @ 06:30)  Calcium: 9.1 (06-04 @ 07:21)          06-05    137  |  99  |  21  ----------------------------<  78  4.0   |  23  |  1.14    Ca    9.4      05 Jun 2024 06:30  Phos  4.3     06-04  Mg     2.1     06-04                          10.2   19.92 )-----------( 301      ( 05 Jun 2024 06:31 )             31.2     Medications  MEDICATIONS  (STANDING):  albuterol    0.083% 2.5 milliGRAM(s) Nebulizer every 6 hours  aspirin enteric coated 81 milliGRAM(s) Oral daily  atorvastatin 80 milliGRAM(s) Oral at bedtime  bisacodyl Suppository 10 milliGRAM(s) Rectal once  budesonide  80 MICROgram(s)/formoterol 4.5 MICROgram(s) Inhaler 2 Puff(s) Inhalation two times a day  clopidogrel Tablet 75 milliGRAM(s) Oral daily  dextrose 50% Injectable 50 milliLiter(s) IV Push every 15 minutes  dextrose Oral Gel 15 Gram(s) Oral once  furosemide    Tablet 40 milliGRAM(s) Oral daily  glucagon  Injectable 1 milliGRAM(s) IntraMuscular once  insulin glargine Injectable (LANTUS) 40 Unit(s) SubCutaneous at bedtime  insulin lispro (ADMELOG) corrective regimen sliding scale   SubCutaneous at bedtime  insulin lispro (ADMELOG) corrective regimen sliding scale   SubCutaneous three times a day before meals  insulin lispro Injectable (ADMELOG) 12 Unit(s) SubCutaneous three times a day before meals  levothyroxine 25 MICROGram(s) Oral daily  metoprolol tartrate 25 milliGRAM(s) Oral two times a day  pantoprazole    Tablet 40 milliGRAM(s) Oral before breakfast  polyethylene glycol 3350 17 Gram(s) Oral daily  senna 2 Tablet(s) Oral at bedtime  spironolactone 25 milliGRAM(s) Oral daily  tiotropium 2.5 MICROgram(s) Inhaler 2 Puff(s) Inhalation daily      Physical Exam  General: Patient comfortable in bed   Vital Signs Last 12 Hrs  T(F): 98.7 (06-05-24 @ 04:40), Max: 98.7 (06-05-24 @ 04:40)  HR: 90 (06-05-24 @ 04:40) (90 - 90)  BP: 107/68 (06-05-24 @ 04:40) (107/68 - 107/68)  BP(mean): --  RR: 18 (06-05-24 @ 10:10) (18 - 18)  SpO2: 93% (06-05-24 @ 10:10) (91% - 93%)    CVS: S1S2   Respiratory: No wheezing, no crepitations  GI: Abdomen soft, bowel sounds positive  Musculoskeletal:  moves all extremities  : Voiding

## 2024-06-05 NOTE — DISCHARGE NOTE PROVIDER - CARE PROVIDER_API CALL
Joselo Goncalves  Thoracic and Cardiac Surgery  [address] [place] [id number]  Phone: [phone]  Fax: [phone]  Scheduled Appointment: 06/18/2024 01:00 PM

## 2024-06-05 NOTE — DISCHARGE NOTE PROVIDER - NSDCFUADDINST_GEN_ALL_CORE_FT
Wash incisions with soap and water using washcloth in shower daily,do not apply any lotion,powder,oil,sunscreen to any surgical incision  Please come to ED or Call Cardio thoracic office at [phone] if Chest pain, Shortness of Breath, persistant Nausea & vomiting, oozing from wounds,palpitations or pain not relieved with medication  Weigh yourself daily>notify surgeons office if  2 lb increase in weight in 24 hours.  1. Take ALL of your medications as ordered. Fill your prescriptions the day you are discharged and take according to the schedule you were given. Continue to take a stool softener if you are taking narcotic pain medications. AVOID medications such as ibuprofen or naproxen if you have had bypass surgery. If you have any questions or are unable to fill the prescriptions, please call the office right away at [phone].  2. Shower daily. Clean all incisions daily while showering with warm water and mild soap, pat dry with a clean towel and do not cover with any dressings unless instructed to. No bathing, swimming in a pool or the ocean until instructed by MD.  DO NOT use creams or lotions on the wound.  3. We advise that you do not drive until instructed by MD. Use your red pillow between chest and seatbelt to avoid injury in the event of a motor vehicle accident until you see the surgeon again in the office.  4. You may not return to work until instructed by MD.   5. Please eat a low fat, low cholesterol, low salt diet. (No added/extra salt). A nutritional supplement like Ensure or Glucerna (for diabetics) may help you reach your nutritional goals after surgery and aid in your recovery.  6. Weigh yourself every day in the morning and record it in the weight log in your red folder. Notify the office of any weight gain more than 2-3 pounds in 24 hours.  **Please LIMIT YOUR FLUID INTAKE TO ABOUT 4 8 OUNCE GLASSES OF BEVERAGE DAILY.**  7. Continue breathing exercises several times a day. Continue to use your heart pillow when coughing.  8. No heavy lifting nothing greater than 5 pounds until cleared by MD. We recommend that you sleep on your back and not your side or stomach for the next 4-6 weeks.  9. Call / Notify MD any fever greater than 101.0, any drainage from incisions or if they become red, hot or very tender to the touch.  10. Increase activity as tolerated. Walk indoors and/or outdoors at least 3 times a day.

## 2024-06-05 NOTE — PROGRESS NOTE ADULT - PROBLEM SELECTOR PLAN 3
Will continue current Synthroid dose, will FU.  Suggest to repeat thyroid function test in 4-6 weeks. Outpatient follow up.
c/w synthroid   Endocrinologist
Will continue current Synthroid dose, will FU.  Suggest to repeat thyroid function test in 4-6 weeks. Outpatient follow up.
c/w synthroid   Endocrinologist

## 2024-06-05 NOTE — PROGRESS NOTE ADULT - PROBLEM SELECTOR PLAN 1
Will decrease Lantus  50 units at bed time.  Will increase Admelog 14 units before each meal in addition to Admelog correction scale coverage.  Patient counseled for compliance with consistent low carb diet.  Was on home insulins- basaglar + Glipizide   was advised  DC on basal bolus insulins  Advised patient that she will benefit from basal bolus insulins  Pt adamant that she does not want to take short acting insulin  Wants to continue home DM regimen and  follow up with her outpatient endocrine Will decrease Lantus  40 units at bed time.  Will increase Admelog 12 units before each meal in addition to Admelog correction scale coverage.  Patient counseled for compliance with consistent low carb diet.  Was on home insulins- basaglar + Glipizide   was advised  DC on basal bolus insulins  Advised patient that she will benefit from basal bolus insulins  Pt adamant that she does not want to take short acting insulin  Wants to continue home DM regimen and  follow up with her outpatient endocrine

## 2024-06-05 NOTE — PROGRESS NOTE ADULT - NS ATTEND AMEND GEN_ALL_CORE FT
Chart, labs, vitals, radiology reviewed. Above H&P reviewed and edited where appropriate. Agree with history and physical exam. Agree with assessment and plan. I reviewed the overnight course of events and discussed the care with the patient/ family. All the decisions in assessment and plan are made by me.

## 2024-06-05 NOTE — PROGRESS NOTE ADULT - ASSESSMENT
68 y/o female with PMH of HTN/HLD, DMT2, Asthma. obesity, hoshimoto's, former smoker  who presented to ED to be evaluated for chest pain, now admitted to CTS for surgery eval.     Assessment  DMT2: 69y Female with DM T2 with hyperglycemia, A1C 8.7% , was on oral  meds and  insulin at  home  (basaglar), now s/p surgery, extubated and eating meals, glucose trending up,  insulins adjusted.   DC planning   CAD: on medications, stable, monitored. s/p surgery   Hypothyroidism: On Synthroid 25 mcg po daily, compliant with Synthroid intake,  asymptomatic. TFTs euthyroid.   HTN: on antihypertensive medications, monitored, asymptomatic.  Obesity: No strict exercise routines, not on any weight loss program, neither on low calorie diet.      Discussed plan and management with Dr Jyoti Sweeney NP - TEAMS  Santana Montes MD  Cell: [phone]  Office: [phone]          68 y/o female with PMH of HTN/HLD, DMT2, Asthma. obesity, hoshimoto's, former smoker  who presented to ED to be evaluated for chest pain, now admitted to CTS for surgery eval.     Assessment  DMT2: 69y Female with DM T2 with hyperglycemia, A1C 8.7% , was on oral  meds and  insulin at  home (basaglar), now s/p surgery, extubated and eating meals, glucose trending up,  insulins adjusted.   DC planning   CAD: on medications, stable, monitored. s/p surgery   Hypothyroidism: On Synthroid 25 mcg po daily, compliant with Synthroid intake,  asymptomatic. TFTs euthyroid.   HTN: on antihypertensive medications, monitored, asymptomatic.  Obesity: No strict exercise routines, not on any weight loss program, neither on low calorie diet.      Discussed plan and management with Dr Jyoti Sweeney NP - TEAMS  Santana Montes MD  Cell: [phone]  Office: [phone]

## 2024-06-05 NOTE — PROGRESS NOTE ADULT - PROBLEM SELECTOR PROBLEM 3
Hypothyroid
History of Hashimoto thyroiditis
History of Hashimoto thyroiditis
Hypothyroid
Hypothyroid

## 2024-06-05 NOTE — PROGRESS NOTE ADULT - SUBJECTIVE AND OBJECTIVE BOX
[facility]    Toshia Tony, Sandra, Nehemias, Angelo, Juan      [phone]    CHIEF COMPLAINT: Patient is a 69y old  Female who presents with a chief complaint of chest pain/shortness of breath (04 Jun 2024 13:28)      Follow Up: cad, s/p cabg vol ol    Interim history: The patient reports no new symptoms.  Denies chest discomfort. Somewhat improved shortness of breath.  No abdominal pain.  No new neurologic symptoms.      MEDICATIONS  (STANDING):  albuterol    0.083% 2.5 milliGRAM(s) Nebulizer every 6 hours  aspirin enteric coated 81 milliGRAM(s) Oral daily  atorvastatin 80 milliGRAM(s) Oral at bedtime  bisacodyl Suppository 10 milliGRAM(s) Rectal once  budesonide  80 MICROgram(s)/formoterol 4.5 MICROgram(s) Inhaler 2 Puff(s) Inhalation two times a day  chlorhexidine 2% Cloths 1 Application(s) Topical daily  clopidogrel Tablet 75 milliGRAM(s) Oral daily  dextrose 50% Injectable 50 milliLiter(s) IV Push every 15 minutes  dextrose Oral Gel 15 Gram(s) Oral once  enoxaparin Injectable 40 milliGRAM(s) SubCutaneous every 24 hours  furosemide    Tablet 40 milliGRAM(s) Oral daily  glucagon  Injectable 1 milliGRAM(s) IntraMuscular once  insulin glargine Injectable (LANTUS) 45 Unit(s) SubCutaneous at bedtime  insulin lispro (ADMELOG) corrective regimen sliding scale   SubCutaneous three times a day before meals  insulin lispro (ADMELOG) corrective regimen sliding scale   SubCutaneous at bedtime  insulin lispro Injectable (ADMELOG) 14 Unit(s) SubCutaneous three times a day before meals  levothyroxine 25 MICROGram(s) Oral daily  metoprolol tartrate 25 milliGRAM(s) Oral two times a day  pantoprazole    Tablet 40 milliGRAM(s) Oral before breakfast  polyethylene glycol 3350 17 Gram(s) Oral daily  senna 2 Tablet(s) Oral at bedtime  spironolactone 25 milliGRAM(s) Oral daily  tiotropium 2.5 MICROgram(s) Inhaler 2 Puff(s) Inhalation daily    MEDICATIONS  (PRN):  acetaminophen     Tablet .. 650 milliGRAM(s) Oral every 6 hours PRN Mild Pain (1 - 3)  benzocaine/menthol Lozenge 1 Lozenge Oral every 3 hours PRN Sore Throat  oxyCODONE    IR 10 milliGRAM(s) Oral every 4 hours PRN Severe Pain (7 - 10)  oxyCODONE    IR 5 milliGRAM(s) Oral every 4 hours PRN Moderate Pain (4 - 6)  sodium chloride 0.65% Nasal 1 Spray(s) Both Nostrils four times a day PRN Nasal Congestion      REVIEW OF SYSTEMS:  eye, ent, GI, , allergic, dermatologic, musculoskeletal and neurologic are negative except as described above    Vital Signs Last 24 Hrs  T(C): 37.2 (04 Jun 2024 19:00), Max: 37.2 (04 Jun 2024 19:00)  T(F): 99 (04 Jun 2024 19:00), Max: 99 (04 Jun 2024 19:00)  HR: 83 (04 Jun 2024 19:00) (81 - 101)  BP: 109/56 (04 Jun 2024 19:00) (109/56 - 125/80)  BP(mean): 74 (04 Jun 2024 19:00) (74 - 74)  RR: 18 (04 Jun 2024 19:00) (18 - 18)  SpO2: 91% (04 Jun 2024 19:00) (91% - 100%)    Parameters below as of 04 Jun 2024 19:00  Patient On (Oxygen Delivery Method): nasal cannula        I&O's Summary    03 Jun 2024 07:01  -  04 Jun 2024 07:00  --------------------------------------------------------  IN: 1140 mL / OUT: 1500 mL / NET: -360 mL    04 Jun 2024 07:01  -  05 Jun 2024 06:21  --------------------------------------------------------  IN: 1415 mL / OUT: 1200 mL / NET: 215 mL        Telemetry past 24h: sr    PHYSICAL EXAM:    Constitutional: well-nourished, well-developed, NAD   HEENT:  MMM, sclerae anicteric, conjunctivae clear, no oral cyanosis.  Pulmonary: Non-labored, breath sounds are decr at bases with some rales bilaterally   Cardiovascular: Regular, S1 and S2.  No murmur.  No rubs, gallops or clicks  Gastrointestinal: Bowel Sounds present, soft, nontender.   Lymph: mild lle peripheral edema.   Neurological: Alert, no focal deficits  Skin: No rashes.  Psych:  Mood & affect appropriate    LABS: All Labs Reviewed:                        9.7    15.56 )-----------( 254      ( 04 Jun 2024 07:21 )             30.3                         10.4   15.81 )-----------( 223      ( 03 Jun 2024 00:23 )             32.3     04 Jun 2024 07:21    138    |  100    |  22     ----------------------------<  101    4.1     |  24     |  1.13   03 Jun 2024 00:23    138    |  99     |  20     ----------------------------<  84     4.2     |  25     |  1.07     Ca    9.1        04 Jun 2024 07:21  Ca    9.8        03 Jun 2024 00:23  Phos  4.3       04 Jun 2024 07:21  Phos  4.3       03 Jun 2024 00:23  Mg     2.1       04 Jun 2024 07:21  Mg     2.0       03 Jun 2024 00:23    TPro  6.6    /  Alb  3.8    /  TBili  0.6    /  DBili  x      /  AST  34     /  ALT  35     /  AlkPhos  104    03 Jun 2024 00:23          Blood Culture:        RADIOLOGY:    EKG:    Echo:

## 2024-06-06 ENCOUNTER — APPOINTMENT (OUTPATIENT)
Dept: CARE COORDINATION | Facility: HOME HEALTH | Age: 69
End: 2024-06-06
Payer: MEDICARE

## 2024-06-06 ENCOUNTER — APPOINTMENT (OUTPATIENT)
Dept: NEUROLOGY | Facility: CLINIC | Age: 69
End: 2024-06-06

## 2024-06-06 VITALS — RESPIRATION RATE: 14 BRPM | WEIGHT: 215 LBS | BODY MASS INDEX: 40.62 KG/M2

## 2024-06-06 PROCEDURE — 99024 POSTOP FOLLOW-UP VISIT: CPT

## 2024-06-06 RX ORDER — SULFAMETHOXAZOLE AND TRIMETHOPRIM 800; 160 MG/1; MG/1
800-160 TABLET ORAL TWICE DAILY
Qty: 20 | Refills: 0 | Status: DISCONTINUED | COMMUNITY
Start: 2024-04-15 | End: 2024-06-06

## 2024-06-06 RX ORDER — TIOTROPIUM BROMIDE INHALATION SPRAY 3.12 UG/1
2.5 SPRAY, METERED RESPIRATORY (INHALATION) DAILY
Qty: 1 | Refills: 0 | Status: ACTIVE | COMMUNITY
Start: 2024-06-06

## 2024-06-06 RX ORDER — FLUTICASONE PROPIONATE AND SALMETEROL 100; 50 UG/1; UG/1
100-50 POWDER RESPIRATORY (INHALATION)
Qty: 3 | Refills: 0 | Status: DISCONTINUED | COMMUNITY
Start: 2023-03-22 | End: 2024-06-06

## 2024-06-06 RX ORDER — MIRABEGRON 50 MG/1
50 TABLET, FILM COATED, EXTENDED RELEASE ORAL DAILY
Qty: 1 | Refills: 0 | Status: DISCONTINUED | COMMUNITY
Start: 2022-11-29 | End: 2024-06-06

## 2024-06-06 RX ORDER — INSULIN GLARGINE 100 [IU]/ML
100 INJECTION, SOLUTION SUBCUTANEOUS AT BEDTIME
Qty: 1 | Refills: 0 | Status: DISCONTINUED | COMMUNITY
Start: 2024-01-31 | End: 2024-06-06

## 2024-06-06 RX ORDER — HYALURONATE SODIUM 20 MG/2 ML
20 SYRINGE (ML) INTRAARTICULAR
Qty: 3 | Refills: 0 | Status: DISCONTINUED | COMMUNITY
Start: 2023-09-29 | End: 2024-06-06

## 2024-06-06 RX ORDER — AMLODIPINE BESYLATE 10 MG/1
10 TABLET ORAL
Qty: 90 | Refills: 1 | Status: DISCONTINUED | COMMUNITY
Start: 2019-04-26 | End: 2024-06-06

## 2024-06-06 RX ORDER — ALBUTEROL SULFATE 90 UG/1
108 (90 BASE) INHALANT RESPIRATORY (INHALATION)
Qty: 1 | Refills: 2 | Status: DISCONTINUED | COMMUNITY
Start: 2021-10-30 | End: 2024-06-06

## 2024-06-06 RX ORDER — GLIPIZIDE 10 MG/1
10 TABLET, FILM COATED, EXTENDED RELEASE ORAL DAILY
Qty: 180 | Refills: 3 | Status: DISCONTINUED | COMMUNITY
Start: 2021-03-22 | End: 2024-06-06

## 2024-06-06 RX ORDER — HYALURONATE SODIUM 20 MG/2 ML
20 SYRINGE (ML) INTRAARTICULAR
Qty: 3 | Refills: 0 | Status: DISCONTINUED | COMMUNITY
Start: 2022-09-22 | End: 2024-06-06

## 2024-06-06 RX ORDER — INSULIN GLARGINE-YFGN 100 [IU]/ML
100 INJECTION, SOLUTION SUBCUTANEOUS AT BEDTIME
Qty: 1 | Refills: 0 | Status: DISCONTINUED | COMMUNITY
Start: 2023-09-05 | End: 2024-06-06

## 2024-06-06 RX ORDER — LOSARTAN POTASSIUM 100 MG/1
100 TABLET, FILM COATED ORAL
Qty: 90 | Refills: 1 | Status: DISCONTINUED | COMMUNITY
Start: 2019-04-08 | End: 2024-06-06

## 2024-06-06 RX ORDER — BUDESONIDE AND FORMOTEROL FUMARATE DIHYDRATE 80; 4.5 UG/1; UG/1
80-4.5 AEROSOL RESPIRATORY (INHALATION) DAILY
Refills: 0 | Status: ACTIVE | COMMUNITY
Start: 2024-06-06

## 2024-06-06 NOTE — REASON FOR VISIT
[Home] : at home, [unfilled] , at the time of the visit. [Other Location: e.g. Home (Enter Location, City,State)___] : at [unfilled] [Patient] : the patient [Post Hospitalization] : a post hospitalization visit [FreeTextEntry1] : FOLLOW YOUR HEART - Transitional Care Management Program - [facility]

## 2024-06-06 NOTE — PHYSICAL EXAM
[Neck Appearance] : the appearance of the neck was normal [] : no respiratory distress [Respiration, Rhythm And Depth] : normal respiratory rhythm and effort [Exaggerated Use Of Accessory Muscles For Inspiration] : no accessory muscle use [FreeTextEntry1] : MSI, CT sites and LLE SVG sites without erythema, drainage or warmth, with edges well approximated. [Examination Of The Chest] : the chest was normal in appearance [Chest Visual Inspection Thoracic Asymmetry] : no chest asymmetry [Diminished Respiratory Excursion] : normal chest expansion [Breast Appearance] : normal in appearance [Skin Color & Pigmentation] : normal skin color and pigmentation [No Focal Deficits] : no focal deficits [Oriented To Time, Place, And Person] : oriented to person, place, and time [Impaired Insight] : insight and judgment were intact [Affect] : the affect was normal [Mood] : the mood was normal

## 2024-06-06 NOTE — PLAN
[TextEntry] : 1) Weigh yourself in the AM prior to eating & drinking. Contact FY team if you note a wt gain of 1-2 lbs overnight or 5 lbs within 1 week. Continue current meds. Keep your legs elevated & ambulate as tolerated. Continue incentive spirometry 10x/hr while awake. Shower using mild soap daily. Your diet should be low salt, low fat, high protein. 2) Call FY team 24/7 w/ any questions, issues, or concerns. My number [phone] was provided to the pt. Explained to pt I personally work from Mon to Fri from 8a to 4p but before or after those hours this number still functions 24/7 and pt will get in touch w/ a team member of CT Surgeon at all times. 3) Report to your FY NP any signs of infection such as redness, swelling, warmth, drainage, or pain at an incision site. Additionally, report to your [facility] NP if you develop a fever. 4) Do not lift anything more than 5 lbs. 5) Do not drive until you are cleared to do so by your surgeon. 6) Please walk 3x/day.  7) Remove staples located at MSI & LLE incison upon CTSx office f/u appt. 8) Continue monitoring glucose levels. Keep a diary of your FS results to bring to your endocrinologist.  FOLLOW UP APPOINTMENTS: CTSx:  on 6-18-24 CARDIOLOGIST: Dr. Martinez- Pt advised to call to schedule appt within 2 weeks PCP: Dr. Segura- Pt advised to call to schedule appt within 1 month of discharge. ENDO: Dr. Garduno - F/U within 1-2 weeks

## 2024-06-06 NOTE — HISTORY OF PRESENT ILLNESS
[FreeTextEntry1] : 70 y/o female with PMH of HTN /HLD, DMT2, Asthma. obesity, Hoshimoto's, former smoker who presented to ED to be evaluated for chest pain associated with exertional shortness of breath. this afternoon pt was climbing stairs to go from one building to the next and started to develop sharp neck pain radiating to her back and both the shoulders. pt pain lasted for about 30 sec and dissipated upon rest. pt was prescribed advair which she stopped taking due to thrush. admits of being compliance to all other medication at home in ED cardiology on call was consulted and pt received 324 mg aspirin with 300 mg plavix. recommendation was not to start on heparin drip pt is currently asymptomatic. 5/25 P2y12 213, Carotid doppler pending , echo pending. Endocrinology consulted for assistance in management 5/26 VSS no c/o CP A/C OR thurs 5/30 5/27 VSS denies CP /PALPS preop workup underway 5/29 S/P C3L LAAC. Extubated POD 0 Post-OP: On pressors, S/P 1u prbc overnight. Insulin gtt 5/31 PW d/c'ed 6/1 NC -> HFNC. Bronchodilators PRN. Incentive spirometry. Singulair. Plavix initiated. 6/2 Pulm c/s for hypoxemia. aggressive chest PT. nebulizers. spiriva symbicort 6/3 Off HFNC -> 3L NC. Tx to 2COH. Encouraged OOB/ ambulation. Current medication regimen maintained. 6/4 VSS 3lnc Chest ct today weigh up 2 kg lasix 20 bid increase metoprolol 25 bid d/c prevena 6/5 DC home 6/6- Seen by [facility] NP for a f/u teleHealth visit. Emotional support and education provided.  All questions answered. Pt overall is recovering well w/o complaints.

## 2024-06-06 NOTE — ASSESSMENT
[FreeTextEntry1] : Pt recovering well at home s/p cardiac surgery. Pt lives w/ . Pt has all medications in home (except for Atorvastatin 80mg) and is taking as prescribed. Rx for atorvastatin sent to pt's Pharmacy. Pain controlled with current medication regimen. Pt is aware of F/U appts scheduled and that need to be scheduled as listed below.

## 2024-06-09 ENCOUNTER — TRANSCRIPTION ENCOUNTER (OUTPATIENT)
Age: 69
End: 2024-06-09

## 2024-06-10 ENCOUNTER — NON-APPOINTMENT (OUTPATIENT)
Age: 69
End: 2024-06-10

## 2024-06-11 ENCOUNTER — TRANSCRIPTION ENCOUNTER (OUTPATIENT)
Age: 69
End: 2024-06-11

## 2024-06-11 ENCOUNTER — NON-APPOINTMENT (OUTPATIENT)
Age: 69
End: 2024-06-11

## 2024-06-13 ENCOUNTER — APPOINTMENT (OUTPATIENT)
Dept: INTERNAL MEDICINE | Facility: CLINIC | Age: 69
End: 2024-06-13
Payer: MEDICARE

## 2024-06-13 VITALS
TEMPERATURE: 98.2 F | RESPIRATION RATE: 14 BRPM | HEART RATE: 99 BPM | OXYGEN SATURATION: 97 % | WEIGHT: 210 LBS | BODY MASS INDEX: 39.65 KG/M2 | DIASTOLIC BLOOD PRESSURE: 70 MMHG | SYSTOLIC BLOOD PRESSURE: 118 MMHG | HEIGHT: 61 IN

## 2024-06-13 DIAGNOSIS — J44.9 CHRONIC OBSTRUCTIVE PULMONARY DISEASE, UNSPECIFIED: ICD-10-CM

## 2024-06-13 PROCEDURE — 99204 OFFICE O/P NEW MOD 45 MIN: CPT

## 2024-06-13 RX ORDER — GLIMEPIRIDE 1 MG/1
1 TABLET ORAL DAILY
Refills: 0 | Status: DISCONTINUED | COMMUNITY
Start: 2024-06-06 | End: 2024-06-13

## 2024-06-13 NOTE — HEALTH RISK ASSESSMENT
[Yes] : Yes [2 - 4 times a month (2 pts)] : 2-4 times a month (2 points) [1 or 2 (0 pts)] : 1 or 2 (0 points) [Little interest or pleasure doing things] : 1) Little interest or pleasure doing things [Feeling down, depressed, or hopeless] : 2) Feeling down, depressed, or hopeless [0] : 2) Feeling down, depressed, or hopeless: Not at all (0) [PHQ-2 Negative - No further assessment needed] : PHQ-2 Negative - No further assessment needed [PMN7Ncrmy] : 0 [Former] : Former [20 or more] : 20 or more [< 15 Years] : < 15 Years [de-identified] : quit 2020 for good, also quit 1997 - 2000

## 2024-06-13 NOTE — HISTORY OF PRESENT ILLNESS
[FreeTextEntry1] : s/p heart surgery [de-identified] : Pt was admitted on 5/23/24 when she thought she had symptoms of a heart attack. Went to [place] and cardiac cath showed triple vessel disease. She was transferred to [place] for surgery. She was discharged on 6/5/24. Has congestion in her lower lungs and it hurts to cough. Pt had triple bypass surgery on 5/29/24. She is a former smoker. She was getting lung ct's regularly.  She is taking oxycodone prn.  She has a cardiology appt with Dr Yareli Martinez.

## 2024-06-13 NOTE — ASSESSMENT
[FreeTextEntry1] : DM she continues on basaglar 45 q hs glucose at home 100 - 155  continue glipizide  CAD s/p triple bypass continue clopidogrel  hyperlipidemia continue atorvastatin  copd former smoker continue spiriva and symbicort

## 2024-06-14 ENCOUNTER — TRANSCRIPTION ENCOUNTER (OUTPATIENT)
Age: 69
End: 2024-06-14

## 2024-06-15 ENCOUNTER — TRANSCRIPTION ENCOUNTER (OUTPATIENT)
Age: 69
End: 2024-06-15

## 2024-06-16 ENCOUNTER — TRANSCRIPTION ENCOUNTER (OUTPATIENT)
Age: 69
End: 2024-06-16

## 2024-06-18 ENCOUNTER — APPOINTMENT (OUTPATIENT)
Dept: CARDIOTHORACIC SURGERY | Facility: CLINIC | Age: 69
End: 2024-06-18
Payer: MEDICARE

## 2024-06-18 VITALS
DIASTOLIC BLOOD PRESSURE: 79 MMHG | HEART RATE: 86 BPM | BODY MASS INDEX: 39.08 KG/M2 | OXYGEN SATURATION: 98 % | HEIGHT: 61 IN | SYSTOLIC BLOOD PRESSURE: 122 MMHG | RESPIRATION RATE: 16 BRPM | TEMPERATURE: 97.8 F | WEIGHT: 207 LBS

## 2024-06-18 PROCEDURE — 99024 POSTOP FOLLOW-UP VISIT: CPT

## 2024-06-18 NOTE — PHYSICAL EXAM
[] : no respiratory distress [Respiration, Rhythm And Depth] : normal respiratory rhythm and effort [Auscultation Breath Sounds / Voice Sounds] : lungs were clear to auscultation bilaterally [Exaggerated Use Of Accessory Muscles For Inspiration] : no accessory muscle use [Apical Impulse] : the apical impulse was normal [Heart Rate And Rhythm] : heart rate was normal and rhythm regular [Heart Sounds] : normal S1 and S2 [Clean] : clean [Dry] : dry [Healing Well] : healing well [No Edema] : no edema [Bleeding] : no active bleeding [Foul Odor] : no foul smell [Purulent Drainage] : no purulent drainage [Serosanguinous Drainage] : no serosanguinous drainage [Erythema] : not erythematous [Warm] : not warm [Tender] : not tender [FreeTextEntry5] : Left LE SVG

## 2024-06-18 NOTE — ASSESSMENT
[FreeTextEntry1] : 70 y/o female with PMH of HTN /HLD, DMT2, Asthma. obesity, Hoshimoto's, former smoker who presented to ED to be evaluated for chest pain associated with exertional shortness of breath.  5/29 S/P C3L LAAC. Extubated POD 0 Post-OP: 1u prbc overnight. Insulin gtt, HFNC. Bronchodilators PRN. Incentive spirometry. Singulair. Plavix initiated, Pulm c/s for hypoxemia. aggressive chest PT. nebulizers. spiriva symbicort, HFNC -> 3L NC. Tx to 2COH.  All questions answered. Pt overall is recovering well w/o complaints.  Presents today with her daughter abd reports doing ok and coming along, "Im alive and that's what matters".  Reports her weight is down to 207lbs, down from 220 at DC.  Feels better compared to DC overall and walking more.  Daughter reports she is "so much better" since DC.  Walking more everyday. Eating and drinking with +BM. Denies chest pain, SOB, swelling, weight gain, palpitations, cough, fever or chills.  Today on exam patient's lungs clear bilaterally, normal sinus rhythm, sternum stable, incision clean, dry and intact. Left LE SVG site is clean, dry and intact. No peripheral edema noted. Instructed patient on importance of optimal glycemic control, daily showering, daily weights, incentive spirometer use, and increase ambulation as tolerated. Instructed to call office with any signs or symptoms of infection or weight gain of 2 or more pounds in 1 day or 3 or more pounds in 1 week.   Plan:  - Stop Furosemide and Spironolactone today (weight down with preserved EF)  - Continue rest of current medication regimen - Follow up with cardiologist Dr. Martinez later this week as scheduled - Follow up with PCP  - Continue to walk and increase as tolerated - Low salt diet and fluids discussed in detail - Tight glucose control discussed in detail for wound healing - Return to office PRN - Call with any questions or concerns

## 2024-06-18 NOTE — END OF VISIT
[FreeTextEntry3] : Written by Steve Potts NP, acting as a scribe for Dr. Goncalves The documentation recorded by the scribe accurately reflects the service I personally performed and the decisions made by me. Signature Joselo Goncalves MD.

## 2024-06-21 ENCOUNTER — NON-APPOINTMENT (OUTPATIENT)
Age: 69
End: 2024-06-21

## 2024-06-21 ENCOUNTER — TRANSCRIPTION ENCOUNTER (OUTPATIENT)
Age: 69
End: 2024-06-21

## 2024-06-21 ENCOUNTER — APPOINTMENT (OUTPATIENT)
Dept: CARDIOLOGY | Facility: CLINIC | Age: 69
End: 2024-06-21
Payer: MEDICARE

## 2024-06-21 VITALS
WEIGHT: 211 LBS | HEIGHT: 61 IN | SYSTOLIC BLOOD PRESSURE: 110 MMHG | DIASTOLIC BLOOD PRESSURE: 69 MMHG | OXYGEN SATURATION: 97 % | BODY MASS INDEX: 39.84 KG/M2 | HEART RATE: 85 BPM

## 2024-06-21 DIAGNOSIS — I25.10 ATHEROSCLEROTIC HEART DISEASE OF NATIVE CORONARY ARTERY W/OUT ANGINA PECTORIS: ICD-10-CM

## 2024-06-21 DIAGNOSIS — Z95.1 PRESENCE OF AORTOCORONARY BYPASS GRAFT: ICD-10-CM

## 2024-06-21 DIAGNOSIS — I10 ESSENTIAL (PRIMARY) HYPERTENSION: ICD-10-CM

## 2024-06-21 DIAGNOSIS — E78.5 HYPERLIPIDEMIA, UNSPECIFIED: ICD-10-CM

## 2024-06-21 DIAGNOSIS — E11.9 TYPE 2 DIABETES MELLITUS W/OUT COMPLICATIONS: ICD-10-CM

## 2024-06-21 PROCEDURE — G2211 COMPLEX E/M VISIT ADD ON: CPT

## 2024-06-21 PROCEDURE — 93000 ELECTROCARDIOGRAM COMPLETE: CPT

## 2024-06-21 PROCEDURE — 99215 OFFICE O/P EST HI 40 MIN: CPT

## 2024-06-21 RX ORDER — FUROSEMIDE 40 MG/1
40 TABLET ORAL DAILY
Refills: 0 | Status: DISCONTINUED | COMMUNITY
Start: 2024-06-06 | End: 2024-06-21

## 2024-06-21 RX ORDER — SPIRONOLACTONE 25 MG/1
25 TABLET ORAL DAILY
Qty: 5 | Refills: 0 | Status: DISCONTINUED | COMMUNITY
Start: 2024-06-06 | End: 2024-06-21

## 2024-06-21 RX ORDER — DULAGLUTIDE 3 MG/.5ML
3 INJECTION, SOLUTION SUBCUTANEOUS WEEKLY
Qty: 4 | Refills: 3 | Status: ACTIVE | COMMUNITY
Start: 2024-06-21 | End: 1900-01-01

## 2024-06-21 RX ORDER — ASPIRIN ENTERIC COATED TABLETS 81 MG 81 MG/1
81 TABLET, DELAYED RELEASE ORAL DAILY
Qty: 90 | Refills: 3 | Status: ACTIVE | COMMUNITY
Start: 2024-06-06 | End: 1900-01-01

## 2024-06-21 RX ORDER — GLIPIZIDE 10 MG/1
10 TABLET, EXTENDED RELEASE ORAL
Refills: 0 | Status: ACTIVE | COMMUNITY
Start: 2024-06-13

## 2024-06-21 RX ORDER — LEVOTHYROXINE SODIUM 0.03 MG/1
25 TABLET ORAL
Qty: 90 | Refills: 1 | Status: ACTIVE | COMMUNITY
Start: 2022-05-09

## 2024-06-21 RX ORDER — CLOPIDOGREL BISULFATE 75 MG/1
75 TABLET, FILM COATED ORAL DAILY
Qty: 90 | Refills: 3 | Status: ACTIVE | COMMUNITY
Start: 2024-06-06 | End: 1900-01-01

## 2024-06-21 NOTE — DISCUSSION/SUMMARY
[EKG obtained to assist in diagnosis and management of assessed problem(s)] : EKG obtained to assist in diagnosis and management of assessed problem(s) [FreeTextEntry1] : Isabell is a 69yoF PMH CAD s/p CABG x 3 (LIMA to LAD, SVG to Ramus, SVG to RPDA, NEVIN clipping) (5/29/24), HTN, HLD, DM for presents for follow up  I have personally reviewed patient's hospitalization including all available labs, imaging and discharge summary.  She is doing well from a cardiac standpoint and overall feels well.  She will be referred to cardiac rehab at this time and start as soon as possible.   She will continue ASA, Plavix, Lipitor 80mg, Metoprolol 50mg XL daily. DAPT to be maintained for at least 6-12 months for graft patency.   For her DM, we will resume Trulicity 3mg weekly. She had been on this previously and did very well with this. She did not tolerate Ozempic. Ideally, she would be off her Glipizide.   To follow up closely in 3 months. At that time, repeat lipids will be done as well.

## 2024-06-21 NOTE — HISTORY OF PRESENT ILLNESS
[FreeTextEntry1] : Isabell is a 69yoF PMH CAD s/p CABG x 3 (LIMA to LAD, SVG to Ramus, SVG to RPDA, NEVIN clipping) (5/29/24), HTN, HLD, DM (8.7%) for presents for follow up  She has been seen by Dr. Goncalves on 6/18. Lasix and Aldactone were discontinued.  s/p CABG x 3 on 5/29: LIMA to LAD, SVG to Ramus, SVG to RPDA, NEVIN clipping - Continue ASA, Plavix - Lipitor 80mg resumed - On Lopressor 12.5mg BID, would increase to 25 BID - TTE with preserved LV and RV function, no significant valvular disease  LDL c 127 5/2024  she is doing well from a cardiac standpoint. Denies any SOB. Has intermittent twinges.   She was previously on a GLP1 agonist. She was on Trulicity and Ozempic. Ozempic made her nauseous and sick.  Does not want Jardiance or Farxiga.  metformin gave her an elevated lactate but then was told in the ER everything was normal. Does not want to go back to Metformin.

## 2024-06-22 ENCOUNTER — TRANSCRIPTION ENCOUNTER (OUTPATIENT)
Age: 69
End: 2024-06-22

## 2024-06-23 ENCOUNTER — TRANSCRIPTION ENCOUNTER (OUTPATIENT)
Age: 69
End: 2024-06-23

## 2024-06-25 ENCOUNTER — TRANSCRIPTION ENCOUNTER (OUTPATIENT)
Age: 69
End: 2024-06-25

## 2024-06-25 RX ORDER — DULAGLUTIDE 1.5 MG/.5ML
1.5 INJECTION, SOLUTION SUBCUTANEOUS WEEKLY
Qty: 4 | Refills: 3 | Status: ACTIVE | COMMUNITY
Start: 2024-06-25 | End: 1900-01-01

## 2024-06-26 ENCOUNTER — TRANSCRIPTION ENCOUNTER (OUTPATIENT)
Age: 69
End: 2024-06-26

## 2024-06-26 RX ORDER — MIRABEGRON 25 MG/1
25 TABLET, FILM COATED, EXTENDED RELEASE ORAL
Qty: 90 | Refills: 1 | Status: ACTIVE | COMMUNITY
Start: 2024-06-06 | End: 1900-01-01

## 2024-06-27 ENCOUNTER — TRANSCRIPTION ENCOUNTER (OUTPATIENT)
Age: 69
End: 2024-06-27

## 2024-06-27 ENCOUNTER — NON-APPOINTMENT (OUTPATIENT)
Age: 69
End: 2024-06-27

## 2024-06-28 ENCOUNTER — TRANSCRIPTION ENCOUNTER (OUTPATIENT)
Age: 69
End: 2024-06-28

## 2024-07-01 ENCOUNTER — TRANSCRIPTION ENCOUNTER (OUTPATIENT)
Age: 69
End: 2024-07-01

## 2024-07-02 ENCOUNTER — TRANSCRIPTION ENCOUNTER (OUTPATIENT)
Age: 69
End: 2024-07-02

## 2024-07-03 ENCOUNTER — TRANSCRIPTION ENCOUNTER (OUTPATIENT)
Age: 69
End: 2024-07-03

## 2024-07-03 ENCOUNTER — NON-APPOINTMENT (OUTPATIENT)
Age: 69
End: 2024-07-03

## 2024-07-05 ENCOUNTER — TRANSCRIPTION ENCOUNTER (OUTPATIENT)
Age: 69
End: 2024-07-05

## 2024-07-16 ENCOUNTER — NON-APPOINTMENT (OUTPATIENT)
Age: 69
End: 2024-07-16

## 2024-07-16 ENCOUNTER — APPOINTMENT (OUTPATIENT)
Dept: CARDIOLOGY | Facility: CLINIC | Age: 69
End: 2024-07-16

## 2024-07-17 ENCOUNTER — TRANSCRIPTION ENCOUNTER (OUTPATIENT)
Age: 69
End: 2024-07-17

## 2024-07-17 RX ORDER — TIOTROPIUM BROMIDE 18 UG/1
18 CAPSULE ORAL; RESPIRATORY (INHALATION)
Qty: 30 | Refills: 2 | Status: ACTIVE | COMMUNITY
Start: 2024-07-17 | End: 1900-01-01

## 2024-07-18 ENCOUNTER — TRANSCRIPTION ENCOUNTER (OUTPATIENT)
Age: 69
End: 2024-07-18

## 2024-07-23 ENCOUNTER — APPOINTMENT (OUTPATIENT)
Dept: CARDIOLOGY | Facility: CLINIC | Age: 69
End: 2024-07-23

## 2024-07-23 ENCOUNTER — NON-APPOINTMENT (OUTPATIENT)
Age: 69
End: 2024-07-23

## 2024-07-23 VITALS
HEART RATE: 85 BPM | OXYGEN SATURATION: 97 % | DIASTOLIC BLOOD PRESSURE: 80 MMHG | BODY MASS INDEX: 40.4 KG/M2 | SYSTOLIC BLOOD PRESSURE: 140 MMHG | HEIGHT: 61 IN | WEIGHT: 214 LBS

## 2024-07-23 DIAGNOSIS — Z95.1 PRESENCE OF AORTOCORONARY BYPASS GRAFT: ICD-10-CM

## 2024-07-23 DIAGNOSIS — I25.10 ATHEROSCLEROTIC HEART DISEASE OF NATIVE CORONARY ARTERY W/OUT ANGINA PECTORIS: ICD-10-CM

## 2024-07-23 PROCEDURE — 93798 PHYS/QHP OP CAR RHAB W/ECG: CPT

## 2024-07-23 PROCEDURE — 93000 ELECTROCARDIOGRAM COMPLETE: CPT | Mod: 59

## 2024-07-23 PROCEDURE — 99214 OFFICE O/P EST MOD 30 MIN: CPT | Mod: 25

## 2024-07-25 NOTE — ASU DISCHARGE PLAN (ADULT/PEDIATRIC) - NO INTERCOURSE DURATION
Incoming call from patient. [facility] does not have adderall in stock, but [facility] in [place] does. Pended pharmacy change below. Patient is totally out because she had been waiting 5 days for it to come back in stock with [facility] and it never did, so marking high priority.    Clementina Hampton RN    
6 weeks

## 2024-07-30 ENCOUNTER — APPOINTMENT (OUTPATIENT)
Dept: ENDOCRINOLOGY | Facility: CLINIC | Age: 69
End: 2024-07-30
Payer: MEDICARE

## 2024-07-30 VITALS
WEIGHT: 211 LBS | HEART RATE: 89 BPM | OXYGEN SATURATION: 96 % | HEIGHT: 61 IN | BODY MASS INDEX: 39.84 KG/M2 | SYSTOLIC BLOOD PRESSURE: 132 MMHG | DIASTOLIC BLOOD PRESSURE: 80 MMHG

## 2024-07-30 DIAGNOSIS — E03.9 HYPOTHYROIDISM, UNSPECIFIED: ICD-10-CM

## 2024-07-30 DIAGNOSIS — E11.9 TYPE 2 DIABETES MELLITUS W/OUT COMPLICATIONS: ICD-10-CM

## 2024-07-30 PROCEDURE — 82962 GLUCOSE BLOOD TEST: CPT

## 2024-07-30 PROCEDURE — G2211 COMPLEX E/M VISIT ADD ON: CPT

## 2024-07-30 PROCEDURE — 99214 OFFICE O/P EST MOD 30 MIN: CPT

## 2024-07-30 PROCEDURE — 95251 CONT GLUC MNTR ANALYSIS I&R: CPT

## 2024-07-30 RX ORDER — BUDESONIDE AND FORMOTEROL FUMARATE DIHYDRATE 80; 4.5 UG/1; UG/1
80-4.5 AEROSOL RESPIRATORY (INHALATION)
Refills: 0 | Status: ACTIVE | COMMUNITY

## 2024-07-30 RX ORDER — ACETAMINOPHEN 500 MG/1
500 TABLET ORAL
Refills: 0 | Status: ACTIVE | COMMUNITY

## 2024-07-31 NOTE — HISTORY OF PRESENT ILLNESS
[FreeTextEntry1] : 69 year old female with PMH of HLD, hypothyroidism, HTN, HLD, BMI 43, no ischemic CAD, Osteopenia, tooth extraction and bone grafting, type 2 DM since . Pt is s/p CABG. She is in cardiac rehab. She stopped Glipizide.   Reports no microvascular complications, no history of retinopathy, nephropathy. Pt has neuropathy. Reports no history of cardiovascular risk factors, no history of CHF or CVA in the past.  Current DM meds: Basaglar 20 units (at 1am), Trulicity 1.5 mg weekly. Prior DM meds: Trulicity 1.5mg weekly (unable to obtain 3mg dose so was able to double up to 3mg, no side effects). Metformin (possibly had lactic acidosis in ). Ozempic 1.0mg (has nausea),Glipizide ER 10mg daily,  Other pertinent meds:  POCT A1c%: 8.5% 2022 --> 8.1% Oct 2022 --> 8.4% 2023 --> 7.4% 2023 --> 7.0% 2023 POCT B  Dexcom AGP Report:  (24-24) High: 16% Target ():84% Low: 0%  Hypoglycemic episodes:  Diet: Breakfast: samosas Lunch: half sandwich with corn beef and pumpkin soup Dinner: Snacks: No soda or juice. Fruit: Berries.  Exercise: Walks 2 miles 4x/week Urine micro: 2022 wnl ACE: C-peptide: Cr: 0.89 GFR: 72 Lipid profile: LDL 78 TGs 169 Statin: DId not tolerate statin HbA1c%: 7.0% Ophthalmology: Dec 2021 No DR. Neuropathy: B/l feet Podiatry/Diabetic foot exam:  #Hypothyroidism Diagnosed last year. TSH 3. Pt on Levothyroxine 25mcg QD  Interval hx: Pt now trying to be vegan. Pt skipped her semglee and her fasting sugar was 137 mg/dl. Above information updated as needed.

## 2024-07-31 NOTE — ASSESSMENT
[FreeTextEntry1] : 69 year old female with PMH of HLD, hypothyroidism, HTN, HLD, BMI 43, no ischemic CAD, type 2 DM since 2012 is presenting for follow up care for her Diabetes and hypothyroidism.  1. Type 2 diabetes mellitus. HbA1c 8.5% Sept 2022 --> 8.4% Feb 2023 --> 7.4% June 2023 --> 7.0% Sept 2023 -We discussed the cardiovascular and microvascular complications of uncontrolled diabetes. We discussed the importance of diet and exercise and lifestyle modification for glycemic control and weight loss. We discussed referral to our diabetes educator and nutrition. We discussed pharmacologic options for glycemic control and weight loss. -Continue Cont Basaglar 20 units HS  -Continue Trulicity 1.5mg weekly. No h/o MTC, MEN2 or pancreatitis. GI s/e discussed. -Check SMBG 2-3x daily -Urine ACR UTD -Opthal UTD  2. Hypothyroidism C/w LT4 25mcg QD TSH wnl Feb 2024.  Follow-up with  in 3 months

## 2024-08-01 ENCOUNTER — NON-APPOINTMENT (OUTPATIENT)
Age: 69
End: 2024-08-01

## 2024-08-01 ENCOUNTER — TRANSCRIPTION ENCOUNTER (OUTPATIENT)
Age: 69
End: 2024-08-01

## 2024-08-07 ENCOUNTER — APPOINTMENT (OUTPATIENT)
Dept: CARDIOLOGY | Facility: CLINIC | Age: 69
End: 2024-08-07

## 2024-08-07 PROCEDURE — 93797 PHYS/QHP OP CAR RHAB WO ECG: CPT

## 2024-08-08 ENCOUNTER — APPOINTMENT (OUTPATIENT)
Dept: ORTHOPEDIC SURGERY | Facility: CLINIC | Age: 69
End: 2024-08-08

## 2024-08-08 ENCOUNTER — APPOINTMENT (OUTPATIENT)
Dept: CARDIOLOGY | Facility: CLINIC | Age: 69
End: 2024-08-08

## 2024-08-08 PROBLEM — Z86.39 HISTORY OF THYROID DISORDER: Status: RESOLVED | Noted: 2024-08-08 | Resolved: 2024-08-08

## 2024-08-08 PROBLEM — J44.9 COPD (CHRONIC OBSTRUCTIVE PULMONARY DISEASE): Status: RESOLVED | Noted: 2024-08-08 | Resolved: 2024-08-08

## 2024-08-08 PROBLEM — M19.90 ARTHRITIS: Status: RESOLVED | Noted: 2024-08-08 | Resolved: 2024-08-08

## 2024-08-08 PROCEDURE — 73562 X-RAY EXAM OF KNEE 3: CPT | Mod: RT

## 2024-08-08 PROCEDURE — 99203 OFFICE O/P NEW LOW 30 MIN: CPT | Mod: 25

## 2024-08-08 PROCEDURE — 20610 DRAIN/INJ JOINT/BURSA W/O US: CPT | Mod: RT

## 2024-08-08 PROCEDURE — 93797 PHYS/QHP OP CAR RHAB WO ECG: CPT

## 2024-08-08 NOTE — PLAN
[TextEntry] : The natural progression of osteoarthritis was explained to the patient.  We discussed the possible treatment options from conservative to operative.  These included NSAIDs, Glucosamine and Chondroitin sulfate, and physical therapy as well different types of injections.  We also discussed that at some point they may progress to need a TKA.  Information and pamphlets were given.  Given Euflexxa injection #1 rt knee.

## 2024-08-08 NOTE — PHYSICAL EXAM
[Normal Mood and Affect] : normal mood and affect [Oriented] : oriented [Able to Communicate] : able to communicate [Well Developed] : well developed [Well Nourished] : well nourished [Left] : left knee [NL (0)] : extension 0 degrees [5___] : hamstring 5[unfilled]/5 [] : negative Lachmann [Right] : right knee [AP] : anteroposterior [Lateral] : lateral [[place]] : skyline [Moderate tricompartmental OA medial narrowing] : Moderate tricompartmental OA medial narrowing [TWNoteComboBox7] : flexion 130 degrees

## 2024-08-08 NOTE — PROCEDURE
[Large Joint Injection] : Large joint injection [Right] : of the right [Knee] : knee [Pain] : pain [X-ray evidence of Osteoarthritis on this or prior visit] : x-ray evidence of Osteoarthritis on this or prior visit [Betadine] : betadine [Ethyl Chloride sprayed topically] : ethyl chloride sprayed topically [Euflexxa(20mg)] : 20mg of Euflexxa [#1] : series #1 [] : Patient tolerated procedure well [Call if redness, pain or fever occur] : call if redness, pain or fever occur [Apply ice for 15min out of every hour for the next 12-24 hours as tolerated] : apply ice for 15 minutes out of every hour for the next 12-24 hours as tolerated [Risks, benefits, alternatives discussed / Verbal consent obtained] : the risks benefits, and alternatives have been discussed, and verbal consent was obtained

## 2024-08-08 NOTE — HISTORY OF PRESENT ILLNESS
[4] : 4 [0] : 0 [Stabbing] : stabbing [Intermittent] : intermittent [Full time] : Work status: full time [de-identified] : 08/08/24:  Returns today complaining of recurrent right knee pain.  Finished Euflexxa injections almost 10 months ago. PMH: s/p CABG x10 weeks ago. Taking baby ASA and Plavix. Would like to repeat Euflexxa injections rt knee.  10/31/23:  Returns for Euflexxa #3, right knee.   10/24/23:  Here for Euflexxa injection #2 right knee.   10/17/23:  Here today to begin Euflexxa injections for the right knee.   09/28/23:  Returns today complaining of recurrent right knee pain about 10 months after Euflexxa injections which worked well. Would like to repeat the injections.  11/22/22:  Here today for Euflexxa injection #3, right knee.   11/15/22:  Here today for Euflexxa injection #2, right knee.   11/08/22:  Returns today to begin Euflexxa injections #1 right knee.   11/11/21: Returns today for Euflexxa injection #3 right knee.   11/4/21 here for Euflexxa injection #2 rt knee.  10/28/21: Here today to start Euflexxa injections right knee.   10/14/21: Returns today with recurrent bilateral knee pain. Finished Euflexxa injections right knee in December of 2020 with good relief until about three months ago. She has IDDM and would like to repeat the injections as cortisone injections affect her blood sugar levels.  12/17/20 Her for Euflexxa injection #3 rt knee.  12/10/20 Here for Euflexxa injection #2 rt knee.  12/3/20 here to start Euflexxa injections rt knee.  8/24/20 returns today c/o recurring rt knee pain and swelling x last 1 months duration. Pt is working as a census worker, standing and walking all day. last cortisone injection x 7 months ago which helped.Would like a repeat injection. Still awaiting auth for gel injections.  02/07/20: Returns today for right knee MRI results. States she is scheduled to start PT on February 12, but has a high deductible and is not sure she will be able to do many visits due to cost. States she feels she has developed a little bleeding from taking the Aleve.  01/31/20: Returns today for her knees. Had cortisone injections on January 7 which helped minimally. Her left knee is not painful but her right knee is still painful. States she is also experiencing some pain in the back of her right calf because she is not walking properly because of her knee.   1/7/19: Initial visit for Ms. Isabell Koo, a 64-year-old female , presents today for R>L knee pain since 12/5/19 gradually getting worse. No injury. Pain is worse with walking, stairs. Pain walking <.5 mi. Better with lying down. She takes Tylenol PRN. +limping.  Pmhx: DMII - hgba1c 7.7, HTN, Hyperlipidemia. 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

## 2024-08-12 ENCOUNTER — APPOINTMENT (OUTPATIENT)
Dept: CARDIOLOGY | Facility: CLINIC | Age: 69
End: 2024-08-12
Payer: MEDICARE

## 2024-08-12 PROCEDURE — 93797 PHYS/QHP OP CAR RHAB WO ECG: CPT

## 2024-08-14 ENCOUNTER — APPOINTMENT (OUTPATIENT)
Dept: CARDIOLOGY | Facility: CLINIC | Age: 69
End: 2024-08-14
Payer: MEDICARE

## 2024-08-14 PROCEDURE — 93798 PHYS/QHP OP CAR RHAB W/ECG: CPT

## 2024-08-15 ENCOUNTER — APPOINTMENT (OUTPATIENT)
Dept: CARDIOLOGY | Facility: CLINIC | Age: 69
End: 2024-08-15
Payer: MEDICARE

## 2024-08-15 PROCEDURE — 93797 PHYS/QHP OP CAR RHAB WO ECG: CPT

## 2024-08-19 ENCOUNTER — APPOINTMENT (OUTPATIENT)
Dept: CARDIOLOGY | Facility: CLINIC | Age: 69
End: 2024-08-19

## 2024-08-19 ENCOUNTER — APPOINTMENT (OUTPATIENT)
Dept: ORTHOPEDIC SURGERY | Facility: CLINIC | Age: 69
End: 2024-08-19
Payer: MEDICARE

## 2024-08-19 VITALS — WEIGHT: 214 LBS | HEIGHT: 61 IN | BODY MASS INDEX: 40.4 KG/M2

## 2024-08-19 DIAGNOSIS — M17.11 UNILATERAL PRIMARY OSTEOARTHRITIS, RIGHT KNEE: ICD-10-CM

## 2024-08-19 DIAGNOSIS — M25.551 PAIN IN RIGHT HIP: ICD-10-CM

## 2024-08-19 DIAGNOSIS — M25.552 PAIN IN LEFT HIP: ICD-10-CM

## 2024-08-19 PROCEDURE — 20610 DRAIN/INJ JOINT/BURSA W/O US: CPT | Mod: RT

## 2024-08-19 PROCEDURE — 99213 OFFICE O/P EST LOW 20 MIN: CPT | Mod: 25

## 2024-08-19 PROCEDURE — 73522 X-RAY EXAM HIPS BI 3-4 VIEWS: CPT

## 2024-08-19 NOTE — HISTORY OF PRESENT ILLNESS
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

## 2024-08-19 NOTE — PLAN
[TextEntry] : The natural progression of osteoarthritis was explained to the patient.  We discussed the possible treatment options from conservative to operative.  These included NSAIDs, Glucosamine and Chondroitin sulfate, and physical therapy as well different types of injections.  We also discussed that at some point they may progress to need a TKA.  Information and pamphlets were given.  Euflexxa injection #2 given today, right knee.  Apply ice to the area.   Reviewed hip xrays with her an she will speak with her cardiologist regarding her meds before proceeding further.

## 2024-08-19 NOTE — PROCEDURE
[Large Joint Injection] : Large joint injection [Right] : of the right [Knee] : knee [Pain] : pain [X-ray evidence of Osteoarthritis on this or prior visit] : x-ray evidence of Osteoarthritis on this or prior visit [Betadine] : betadine [Ethyl Chloride sprayed topically] : ethyl chloride sprayed topically [Euflexxa(20mg)] : 20mg of Euflexxa [#2] : series #2 [Call if redness, pain or fever occur] : call if redness, pain or fever occur [] : Patient tolerated procedure well [Apply ice for 15min out of every hour for the next 12-24 hours as tolerated] : apply ice for 15 minutes out of every hour for the next 12-24 hours as tolerated [Risks, benefits, alternatives discussed / Verbal consent obtained] : the risks benefits, and alternatives have been discussed, and verbal consent was obtained

## 2024-08-19 NOTE — REASON FOR VISIT
[FreeTextEntry2] : Euflexxa #2 right knee/Bilateral Hip pain continues with pain level active 4 and rest0

## 2024-08-19 NOTE — DISCUSSION/SUMMARY
[de-identified] : "Written by Isabel Jon, acting as Scribe for Steven Alonso MD."  Dr. Alonso -  The documentation recorded by the scribe accurately reflects the service I personally performed and the decisions made by me.

## 2024-08-19 NOTE — PHYSICAL EXAM
[Normal Mood and Affect] : normal mood and affect [Oriented] : oriented [Able to Communicate] : able to communicate [Well Developed] : well developed [Well Nourished] : well nourished [Left] : left knee [NL (0)] : extension 0 degrees [5___] : hamstring 5[unfilled]/5 [Right] : right knee [[place]] : skyline [Moderate tricompartmental OA medial narrowing] : Moderate tricompartmental OA medial narrowing [Bilateral] : hip with pelvis bilaterally [AP] : anteroposterior [Lateral] : lateral [There are no fractures, subluxations or dislocations. No significant abnormalities are seen] : There are no fractures, subluxations or dislocations. No significant abnormalities are seen [] : negative Lachmann [TWNoteComboBox7] : flexion 130 degrees

## 2024-08-21 ENCOUNTER — APPOINTMENT (OUTPATIENT)
Dept: CARDIOLOGY | Facility: CLINIC | Age: 69
End: 2024-08-21

## 2024-08-22 ENCOUNTER — APPOINTMENT (OUTPATIENT)
Dept: CARDIOLOGY | Facility: CLINIC | Age: 69
End: 2024-08-22

## 2024-08-26 ENCOUNTER — APPOINTMENT (OUTPATIENT)
Dept: ORTHOPEDIC SURGERY | Facility: CLINIC | Age: 69
End: 2024-08-26
Payer: MEDICARE

## 2024-08-26 ENCOUNTER — APPOINTMENT (OUTPATIENT)
Dept: CARDIOLOGY | Facility: CLINIC | Age: 69
End: 2024-08-26

## 2024-08-26 VITALS — HEIGHT: 61 IN | BODY MASS INDEX: 40.4 KG/M2 | WEIGHT: 214 LBS

## 2024-08-26 PROCEDURE — 99213 OFFICE O/P EST LOW 20 MIN: CPT | Mod: 25

## 2024-08-26 PROCEDURE — 20610 DRAIN/INJ JOINT/BURSA W/O US: CPT | Mod: RT

## 2024-08-26 NOTE — PHYSICAL EXAM
[Normal Mood and Affect] : normal mood and affect [Oriented] : oriented [Able to Communicate] : able to communicate [Well Developed] : well developed [Well Nourished] : well nourished [Left] : left knee [NL (0)] : extension 0 degrees [5___] : hamstring 5[unfilled]/5 [Right] : right knee [AP] : anteroposterior [Lateral] : lateral [[place]] : skyline [Moderate tricompartmental OA medial narrowing] : Moderate tricompartmental OA medial narrowing [] : negative Lachmann [TWNoteComboBox7] : flexion 130 degrees

## 2024-08-26 NOTE — DISCUSSION/SUMMARY
[de-identified] : "Written by Isabel Jon, acting as Scribe for Steven Alonso MD."  Dr. Alonso -  The documentation recorded by the scribe accurately reflects the service I personally performed and the decisions made by me.

## 2024-08-26 NOTE — DISCUSSION/SUMMARY
[de-identified] : "Written by Isabel Jon, acting as Scribe for Steven Alonso MD."  Dr. Alonso -  The documentation recorded by the scribe accurately reflects the service I personally performed and the decisions made by me.

## 2024-08-26 NOTE — PLAN
[TextEntry] : The natural progression of osteoarthritis was explained to the patient.  We discussed the possible treatment options from conservative to operative.  These included NSAIDs, Glucosamine and Chondroitin sulfate, and physical therapy as well different types of injections.  We also discussed that at some point they may progress to need a TKA.  Information and pamphlets were given.  Euflexxa injection #3 given today, right knee.  Apply ice to the area.

## 2024-08-26 NOTE — HISTORY OF PRESENT ILLNESS
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

## 2024-08-26 NOTE — PHYSICAL EXAM
[Normal Mood and Affect] : normal mood and affect [Oriented] : oriented [Able to Communicate] : able to communicate [Well Developed] : well developed [Well Nourished] : well nourished [Left] : left knee [NL (0)] : extension 0 degrees [5___] : hamstring 5[unfilled]/5 [Right] : right knee [AP] : anteroposterior [Lateral] : lateral [[place]] : skyline [Moderate tricompartmental OA medial narrowing] : Moderate tricompartmental OA medial narrowing [] : negative Lachmann [TWNoteComboBox7] : flexion 130 degrees

## 2024-08-26 NOTE — HISTORY OF PRESENT ILLNESS
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

## 2024-08-26 NOTE — PROCEDURE
[Large Joint Injection] : Large joint injection [Right] : of the right [Knee] : knee [Pain] : pain [X-ray evidence of Osteoarthritis on this or prior visit] : x-ray evidence of Osteoarthritis on this or prior visit [Betadine] : betadine [Ethyl Chloride sprayed topically] : ethyl chloride sprayed topically [Euflexxa(20mg)] : 20mg of Euflexxa [] : Patient tolerated procedure well [Call if redness, pain or fever occur] : call if redness, pain or fever occur [Apply ice for 15min out of every hour for the next 12-24 hours as tolerated] : apply ice for 15 minutes out of every hour for the next 12-24 hours as tolerated [Risks, benefits, alternatives discussed / Verbal consent obtained] : the risks benefits, and alternatives have been discussed, and verbal consent was obtained [#3] : series #3

## 2024-08-28 ENCOUNTER — APPOINTMENT (OUTPATIENT)
Dept: CARDIOLOGY | Facility: CLINIC | Age: 69
End: 2024-08-28

## 2024-08-28 ENCOUNTER — APPOINTMENT (OUTPATIENT)
Dept: CARDIOLOGY | Facility: CLINIC | Age: 69
End: 2024-08-28
Payer: MEDICARE

## 2024-08-28 ENCOUNTER — NON-APPOINTMENT (OUTPATIENT)
Age: 69
End: 2024-08-28

## 2024-08-28 VITALS
WEIGHT: 214 LBS | OXYGEN SATURATION: 94 % | HEIGHT: 61 IN | SYSTOLIC BLOOD PRESSURE: 121 MMHG | BODY MASS INDEX: 40.4 KG/M2 | DIASTOLIC BLOOD PRESSURE: 75 MMHG | HEART RATE: 87 BPM

## 2024-08-28 DIAGNOSIS — I10 ESSENTIAL (PRIMARY) HYPERTENSION: ICD-10-CM

## 2024-08-28 DIAGNOSIS — I25.10 ATHEROSCLEROTIC HEART DISEASE OF NATIVE CORONARY ARTERY W/OUT ANGINA PECTORIS: ICD-10-CM

## 2024-08-28 DIAGNOSIS — E78.5 HYPERLIPIDEMIA, UNSPECIFIED: ICD-10-CM

## 2024-08-28 DIAGNOSIS — E11.9 TYPE 2 DIABETES MELLITUS W/OUT COMPLICATIONS: ICD-10-CM

## 2024-08-28 DIAGNOSIS — R06.02 SHORTNESS OF BREATH: ICD-10-CM

## 2024-08-28 PROCEDURE — G2211 COMPLEX E/M VISIT ADD ON: CPT

## 2024-08-28 PROCEDURE — 99215 OFFICE O/P EST HI 40 MIN: CPT

## 2024-08-28 PROCEDURE — 93000 ELECTROCARDIOGRAM COMPLETE: CPT

## 2024-08-28 RX ORDER — EZETIMIBE 10 MG/1
10 TABLET ORAL
Qty: 90 | Refills: 3 | Status: ACTIVE | COMMUNITY
Start: 2024-08-28 | End: 1900-01-01

## 2024-08-28 RX ORDER — CLOPIDOGREL BISULFATE 75 MG/1
75 TABLET, FILM COATED ORAL
Refills: 0 | Status: ACTIVE | COMMUNITY

## 2024-08-28 NOTE — HISTORY OF PRESENT ILLNESS
[FreeTextEntry1] : Isabell is a 69yoF PMH CAD s/p CABG x 3 (LIMA to LAD, SVG to Ramus, SVG to RPDA, NEVIN clipping) (5/29/24), HTN, HLD, DM (7.3%) for presents for follow up  She has been going to cardiac rehab. During her most recent visit this past month, she starting feeling non radiating throat pain while exercising at 3 METS. She tells me that she always feels this "thyroid" pain while she is exercising. She states that it is non radiating and that it is specifically over her thyroid region. Throbbing in the throat area. It does not radiate to her arm or her chest (as were her symptoms when she first presented to [facility] and was found to have 3v CAD). She then developed a headache. The headache moved to different areas of her head.  she does not some tightness around her bra region and a heaviness in her chest when she lies down only. This is intermittent for the last month. It is non exertional.  She does also note some SOB when going up the stairs and trying to speak to someone else.   She has been seen by Dr. Goncalves on 6/18. Lasix and Aldactone were discontinued. s/p CABG x 3 on 5/29: LIMA to LAD, SVG to Ramus, SVG to RPDA, NEVIN clipping - Continue ASA, Plavix. As per Dr. Goncalves, to continue for 6-12 months for graft patency.  - Lipitor 80mg resumed - TTE with preserved LV and RV function, no significant valvular disease  She was previously on a GLP1 agonist. She was on Trulicity and Ozempic. Ozempic made her nauseous and sick. Does not want Jardiance or Farxiga. metformin gave her an elevated lactate but then was told in the ER everything was normal. Does not want to go back to Metformin.

## 2024-08-28 NOTE — DISCUSSION/SUMMARY
[FreeTextEntry1] : Isabell is a 69yoF PMH CAD s/p CABG x 3 (LIMA to LAD, SVG to Ramus, SVG to RPDA, NEVIN clipping) (5/29/24), HTN, HLD, DM (7.3%) for presents for follow up  As noted above, she has been experiencing some chest pressure with lying down at nighttime and some SOB with exertion when walking up a flight of stairs and talking at the same time. Given her symptoms and previously uncontrolled DM, will proceed with an exercise nuclear stress test and repeat TTE.  She has also been complaining of muscles aches on Lipitor 80mg. She has self decreased the dose to 40mg daily. I have initiated Zetia 10mg. A repeat lipid panel, direct LDL, CMP and TSH will be done as well.   She will continue DAPT for 6-12 months post op for graft patency.   Her DM has improved, she is being followed by Endocrinology.   Her BP is well controlled today, she remains on Metoprolol.   To follow up closely in 4 weeks.  [EKG obtained to assist in diagnosis and management of assessed problem(s)] : EKG obtained to assist in diagnosis and management of assessed problem(s)

## 2024-08-28 NOTE — CARDIOLOGY SUMMARY
[TextEntry] : TTE 5/28/24: CONCLUSIONS: 1. Left ventricular cavity is normal in size. Left ventricular wall thickness is normal. Left ventricular systolic function is normal with an ejection fraction of 69 % by Rhodes's method of disks. There are no regional wall motion abnormalities seen. 2. Basal and mid anterior wall is abnormal. 3. There is mild (grade 1) left ventricular diastolic dysfunction, with normal filling pressure. 4. Normal right ventricular cavity size, with normal wall thickness, and normal systolic function. 5. Normal left and right atrial size. 6. No significant valvular disease. 7. No pericardial effusion seen. 8. No prior echocardiogram is available for comparison.  [facility] 5/24/24: Diagnostic Conclusions: Diffuse multivessel CAD with intermediate syntax score. Recommendations: CTS eval for CABG  EKG 6/21/24: NSR, poor R wave progression EKG 8/28/24:

## 2024-08-29 ENCOUNTER — APPOINTMENT (OUTPATIENT)
Dept: CARDIOLOGY | Facility: CLINIC | Age: 69
End: 2024-08-29

## 2024-09-13 ENCOUNTER — APPOINTMENT (OUTPATIENT)
Dept: INTERNAL MEDICINE | Facility: CLINIC | Age: 69
End: 2024-09-13
Payer: MEDICARE

## 2024-09-13 ENCOUNTER — NON-APPOINTMENT (OUTPATIENT)
Age: 69
End: 2024-09-13

## 2024-09-13 VITALS
WEIGHT: 213 LBS | SYSTOLIC BLOOD PRESSURE: 128 MMHG | DIASTOLIC BLOOD PRESSURE: 76 MMHG | HEART RATE: 87 BPM | TEMPERATURE: 97.7 F | RESPIRATION RATE: 16 BRPM | HEIGHT: 61 IN | BODY MASS INDEX: 40.22 KG/M2 | OXYGEN SATURATION: 96 %

## 2024-09-13 DIAGNOSIS — E11.9 TYPE 2 DIABETES MELLITUS W/OUT COMPLICATIONS: ICD-10-CM

## 2024-09-13 DIAGNOSIS — E78.5 HYPERLIPIDEMIA, UNSPECIFIED: ICD-10-CM

## 2024-09-13 DIAGNOSIS — Z87.891 PERSONAL HISTORY OF NICOTINE DEPENDENCE: ICD-10-CM

## 2024-09-13 DIAGNOSIS — I10 ESSENTIAL (PRIMARY) HYPERTENSION: ICD-10-CM

## 2024-09-13 DIAGNOSIS — E03.9 HYPOTHYROIDISM, UNSPECIFIED: ICD-10-CM

## 2024-09-13 PROCEDURE — G2211 COMPLEX E/M VISIT ADD ON: CPT

## 2024-09-13 PROCEDURE — 93000 ELECTROCARDIOGRAM COMPLETE: CPT

## 2024-09-13 PROCEDURE — 99214 OFFICE O/P EST MOD 30 MIN: CPT

## 2024-09-13 NOTE — HEALTH RISK ASSESSMENT
[Yes] : Yes [2 - 4 times a month (2 pts)] : 2-4 times a month (2 points) [1 or 2 (0 pts)] : 1 or 2 (0 points) [Former] : Former [< 15 Years] : < 15 Years [de-identified] : quit 2021

## 2024-09-13 NOTE — HISTORY OF PRESENT ILLNESS
[FreeTextEntry1] : DM and CAD [de-identified] : Pt has TTE scheduled for 9/20. c/o new onset indigestion in mid chest for the past couple days. She noticed it yesterday bundling boxes in the back yard. Reminds her of what she had in May which sent her to ER but not as bad. Doesn't do much exercise only what she does around the house. The last time she felt indigestion was yesterday and lasted 1/2 hour. Noticed again last night in bed and then again this morning. 1/10 severity and has it right now. Pt doesn't think it has anything to do with exertion. No sob.   Had one hard cider last night.

## 2024-09-13 NOTE — ASSESSMENT
[FreeTextEntry1] : CAD and indigestion sensation check ekg continue clopidogrel and metoprolol  DM recent A1c = 7.3 continue insulin and trulicity diet   hyperlipidemia she is taking atorvastatin 40 mg  EKG = similar to previous I spoke to Dr Martinez and advised ER evaluation for CE's. pt declines going and states she will go if symptoms worsen

## 2024-09-14 DIAGNOSIS — K76.0 FATTY (CHANGE OF) LIVER, NOT ELSEWHERE CLASSIFIED: ICD-10-CM

## 2024-09-14 LAB
ALBUMIN SERPL ELPH-MCNC: 4.2 G/DL
ALP BLD-CCNC: 126 U/L
ALT SERPL-CCNC: 37 U/L
ANION GAP SERPL CALC-SCNC: 14 MMOL/L
AST SERPL-CCNC: 70 U/L
BASOPHILS # BLD AUTO: 0.11 K/UL
BASOPHILS NFR BLD AUTO: 1.2 %
BILIRUB SERPL-MCNC: 0.2 MG/DL
BUN SERPL-MCNC: 20 MG/DL
CALCIUM SERPL-MCNC: 9.8 MG/DL
CHLORIDE SERPL-SCNC: 107 MMOL/L
CO2 SERPL-SCNC: 22 MMOL/L
CREAT SERPL-MCNC: 1.13 MG/DL
EGFR: 53 ML/MIN/1.73M2
EOSINOPHIL # BLD AUTO: 0.7 K/UL
EOSINOPHIL NFR BLD AUTO: 7.5 %
GLUCOSE SERPL-MCNC: 143 MG/DL
HCT VFR BLD CALC: 40.9 %
HGB BLD-MCNC: 12.5 G/DL
IMM GRANULOCYTES NFR BLD AUTO: 0.5 %
LYMPHOCYTES # BLD AUTO: 2.35 K/UL
LYMPHOCYTES NFR BLD AUTO: 25.3 %
MAN DIFF?: NORMAL
MCHC RBC-ENTMCNC: 25.4 PG
MCHC RBC-ENTMCNC: 30.6 GM/DL
MCV RBC AUTO: 83.1 FL
MONOCYTES # BLD AUTO: 0.91 K/UL
MONOCYTES NFR BLD AUTO: 9.8 %
NEUTROPHILS # BLD AUTO: 5.18 K/UL
NEUTROPHILS NFR BLD AUTO: 55.7 %
PLATELET # BLD AUTO: 243 K/UL
POTASSIUM SERPL-SCNC: 4.6 MMOL/L
PROT SERPL-MCNC: 6.7 G/DL
RBC # BLD: 4.92 M/UL
RBC # FLD: 15.8 %
SODIUM SERPL-SCNC: 142 MMOL/L
WBC # FLD AUTO: 9.3 K/UL

## 2024-09-17 ENCOUNTER — APPOINTMENT (OUTPATIENT)
Dept: ULTRASOUND IMAGING | Facility: CLINIC | Age: 69
End: 2024-09-17
Payer: MEDICARE

## 2024-09-17 DIAGNOSIS — R74.8 ABNORMAL LEVELS OF OTHER SERUM ENZYMES: ICD-10-CM

## 2024-09-17 PROCEDURE — 76700 US EXAM ABDOM COMPLETE: CPT

## 2024-09-18 ENCOUNTER — NON-APPOINTMENT (OUTPATIENT)
Age: 69
End: 2024-09-18

## 2024-09-20 ENCOUNTER — APPOINTMENT (OUTPATIENT)
Dept: CARDIOLOGY | Facility: CLINIC | Age: 69
End: 2024-09-20
Payer: MEDICARE

## 2024-09-20 ENCOUNTER — MED ADMIN CHARGE (OUTPATIENT)
Age: 69
End: 2024-09-20

## 2024-09-20 PROCEDURE — A9500: CPT

## 2024-09-20 PROCEDURE — 93015 CV STRESS TEST SUPVJ I&R: CPT

## 2024-09-20 PROCEDURE — 93306 TTE W/DOPPLER COMPLETE: CPT

## 2024-09-20 PROCEDURE — 78452 HT MUSCLE IMAGE SPECT MULT: CPT

## 2024-09-20 RX ORDER — PERFLUTREN 6.52 MG/ML
6.52 INJECTION, SUSPENSION INTRAVENOUS
Qty: 2 | Refills: 0 | Status: COMPLETED | OUTPATIENT
Start: 2024-09-20

## 2024-09-20 RX ADMIN — PERFLUTREN MG/ML: 6.52 INJECTION, SUSPENSION INTRAVENOUS at 00:00

## 2024-09-24 ENCOUNTER — NON-APPOINTMENT (OUTPATIENT)
Age: 69
End: 2024-09-24

## 2024-09-24 ENCOUNTER — APPOINTMENT (OUTPATIENT)
Dept: CARDIOLOGY | Facility: CLINIC | Age: 69
End: 2024-09-24
Payer: MEDICARE

## 2024-09-24 ENCOUNTER — TRANSCRIPTION ENCOUNTER (OUTPATIENT)
Age: 69
End: 2024-09-24

## 2024-09-24 VITALS
HEIGHT: 61 IN | DIASTOLIC BLOOD PRESSURE: 84 MMHG | SYSTOLIC BLOOD PRESSURE: 136 MMHG | OXYGEN SATURATION: 95 % | WEIGHT: 212 LBS | BODY MASS INDEX: 40.02 KG/M2 | HEART RATE: 93 BPM

## 2024-09-24 DIAGNOSIS — E11.9 TYPE 2 DIABETES MELLITUS W/OUT COMPLICATIONS: ICD-10-CM

## 2024-09-24 DIAGNOSIS — E78.5 HYPERLIPIDEMIA, UNSPECIFIED: ICD-10-CM

## 2024-09-24 DIAGNOSIS — I10 ESSENTIAL (PRIMARY) HYPERTENSION: ICD-10-CM

## 2024-09-24 DIAGNOSIS — Z95.1 PRESENCE OF AORTOCORONARY BYPASS GRAFT: ICD-10-CM

## 2024-09-24 DIAGNOSIS — I25.10 ATHEROSCLEROTIC HEART DISEASE OF NATIVE CORONARY ARTERY W/OUT ANGINA PECTORIS: ICD-10-CM

## 2024-09-24 PROCEDURE — G2211 COMPLEX E/M VISIT ADD ON: CPT

## 2024-09-24 PROCEDURE — 93000 ELECTROCARDIOGRAM COMPLETE: CPT

## 2024-09-24 PROCEDURE — 99214 OFFICE O/P EST MOD 30 MIN: CPT

## 2024-09-24 NOTE — DISCUSSION/SUMMARY
[EKG obtained to assist in diagnosis and management of assessed problem(s)] : EKG obtained to assist in diagnosis and management of assessed problem(s) [FreeTextEntry1] : Isabell is a 69yoF PMH CAD s/p CABG x 3 (LIMA to LAD, SVG to Ramus, SVG to RPDA, NEVIN clipping) (5/29/24), HTN, HLD, DM (7.3%) for presents for follow up  She is doing well from a cardiac standpoint. She is asymptomatic at this time. Her throat pain is resolved and has been ongoing for about 15 years since she was diagnosed with Hashimotos. She states that the throat pain is exertional.  A repeat TTE was done which showed normal BiV function and a normal PASP. A NST was done as well which showed a very small sized, mild defect in the apical and distal inferior walls that were reversible.  Given the small area of possible ischemia, we will treat this with medical mgmt. Her beta blocker will be increased to 75mg QD x 1 week then up to 100mg QD.   She will continue DAPT for 6-12 months. She will continue her Lipitor 40mg and Zetia 10mg.  Mild transaminitis noted. Abd sono ordered by patient's PCP. Will repeat a CMP in 2 weeks.   She may return to cardiac rehab.  She is optimized from a cardiac standpoint to return to work.   To follow up in 3 months.

## 2024-09-24 NOTE — CARDIOLOGY SUMMARY
[TextEntry] : TTE 5/28/24: CONCLUSIONS: 1. Left ventricular cavity is normal in size. Left ventricular wall thickness is normal. Left ventricular systolic function is normal with an ejection fraction of 69 % by Rhodes's method of disks. There are no regional wall motion abnormalities seen. 2. Basal and mid anterior wall is abnormal. 3. There is mild (grade 1) left ventricular diastolic dysfunction, with normal filling pressure. 4. Normal right ventricular cavity size, with normal wall thickness, and normal systolic function. 5. Normal left and right atrial size. 6. No significant valvular disease. 7. No pericardial effusion seen. 8. No prior echocardiogram is available for comparison.  [facility] 5/24/24: Diagnostic Conclusions: Diffuse multivessel CAD with intermediate syntax score. Recommendations: CTS eval for CABG  TTE 9/2024: 1. Septal motion is abnormal consistent with previous cardiac surgery. Left ventricular systolic function is normal with an ejection fraction of 57 % by Rhodes's method of disks. 4. Normal right ventricular cavity size and probably normal right ventricular systolic function. 7. Trace mitral regurgitation. 10. Estimated pulmonary artery systolic pressure is 24 mmHg, consistent with normal pulmonary artery pressure.  NST 9/2024:  EKG negative for ischemia small-sized, mild defect(s) in the apical and distal inferior walls that are reversible suggestive of ischemia. 4. The post stress left ventricular EF is 82 %. The stress end diastolic volume is 58 ml and systolic volume is 11 ml.  EKG 6/21/24: NSR, poor R wave progression EKG 9/24/24: NSR, PRWP

## 2024-09-24 NOTE — HISTORY OF PRESENT ILLNESS
[FreeTextEntry1] : Isabell is a 69yoF PMH CAD s/p CABG x 3 (LIMA to LAD, SVG to Ramus, SVG to RPDA, NEVIN clipping) (5/29/24), HTN, HLD, DM (7.3%) for presents for follow up  She was last seen in August of 2024 for follow up. At that time, she continued to complain of atypical throat discomfort while exercising. Given her persistent symptoms and need to return to cardiac rehab after some eval, a repeat TTE and a NST was done.  At that appt, due to muscle aches, her lipitor was decreased to 40mg and Zetia was initiated.  Repeat direct LDL is 49 DAPT to be continued for 6-12 months post op for graft patency as per Dr. Goncalves  In terms of her DM,  She was previously on a GLP1 agonist. She was on Trulicity and Ozempic. Ozempic made her nauseous and sick. Does not want Jardiance or Farxiga. metformin gave her an elevated lactate but then was told in the ER everything was normal. Does not want to go back to Metformin.

## 2024-09-25 ENCOUNTER — TRANSCRIPTION ENCOUNTER (OUTPATIENT)
Age: 69
End: 2024-09-25

## 2024-09-25 ENCOUNTER — APPOINTMENT (OUTPATIENT)
Dept: CARDIOLOGY | Facility: CLINIC | Age: 69
End: 2024-09-25

## 2024-09-26 ENCOUNTER — APPOINTMENT (OUTPATIENT)
Dept: CARDIOLOGY | Facility: CLINIC | Age: 69
End: 2024-09-26
Payer: MEDICARE

## 2024-09-26 ENCOUNTER — TRANSCRIPTION ENCOUNTER (OUTPATIENT)
Age: 69
End: 2024-09-26

## 2024-09-26 PROCEDURE — 93797 PHYS/QHP OP CAR RHAB WO ECG: CPT

## 2024-09-30 ENCOUNTER — APPOINTMENT (OUTPATIENT)
Dept: CARDIOLOGY | Facility: CLINIC | Age: 69
End: 2024-09-30

## 2024-09-30 ENCOUNTER — APPOINTMENT (OUTPATIENT)
Dept: CARDIOLOGY | Facility: CLINIC | Age: 69
End: 2024-09-30
Payer: MEDICARE

## 2024-09-30 PROCEDURE — 93797 PHYS/QHP OP CAR RHAB WO ECG: CPT

## 2024-10-02 ENCOUNTER — APPOINTMENT (OUTPATIENT)
Dept: CARDIOLOGY | Facility: CLINIC | Age: 69
End: 2024-10-02
Payer: MEDICARE

## 2024-10-02 PROCEDURE — 93797 PHYS/QHP OP CAR RHAB WO ECG: CPT

## 2024-10-03 ENCOUNTER — APPOINTMENT (OUTPATIENT)
Dept: CARDIOLOGY | Facility: CLINIC | Age: 69
End: 2024-10-03
Payer: MEDICARE

## 2024-10-03 PROCEDURE — 93797 PHYS/QHP OP CAR RHAB WO ECG: CPT

## 2024-10-07 ENCOUNTER — APPOINTMENT (OUTPATIENT)
Dept: CARDIOLOGY | Facility: CLINIC | Age: 69
End: 2024-10-07
Payer: MEDICARE

## 2024-10-07 PROCEDURE — 93797 PHYS/QHP OP CAR RHAB WO ECG: CPT

## 2024-10-09 ENCOUNTER — APPOINTMENT (OUTPATIENT)
Dept: CARDIOLOGY | Facility: CLINIC | Age: 69
End: 2024-10-09
Payer: MEDICARE

## 2024-10-09 ENCOUNTER — APPOINTMENT (OUTPATIENT)
Dept: CARDIOLOGY | Facility: CLINIC | Age: 69
End: 2024-10-09

## 2024-10-09 PROCEDURE — 93797 PHYS/QHP OP CAR RHAB WO ECG: CPT

## 2024-10-10 ENCOUNTER — APPOINTMENT (OUTPATIENT)
Dept: CARDIOLOGY | Facility: CLINIC | Age: 69
End: 2024-10-10
Payer: MEDICARE

## 2024-10-10 PROCEDURE — 93797 PHYS/QHP OP CAR RHAB WO ECG: CPT

## 2024-10-14 ENCOUNTER — TRANSCRIPTION ENCOUNTER (OUTPATIENT)
Age: 69
End: 2024-10-14

## 2024-10-14 ENCOUNTER — APPOINTMENT (OUTPATIENT)
Dept: CARDIOLOGY | Facility: CLINIC | Age: 69
End: 2024-10-14
Payer: MEDICARE

## 2024-10-14 PROCEDURE — 93797 PHYS/QHP OP CAR RHAB WO ECG: CPT

## 2024-10-16 ENCOUNTER — APPOINTMENT (OUTPATIENT)
Dept: CARDIOLOGY | Facility: CLINIC | Age: 69
End: 2024-10-16
Payer: MEDICARE

## 2024-10-16 PROCEDURE — 93797 PHYS/QHP OP CAR RHAB WO ECG: CPT

## 2024-10-17 ENCOUNTER — APPOINTMENT (OUTPATIENT)
Dept: CARDIOLOGY | Facility: CLINIC | Age: 69
End: 2024-10-17
Payer: MEDICARE

## 2024-10-17 DIAGNOSIS — K76.0 FATTY (CHANGE OF) LIVER, NOT ELSEWHERE CLASSIFIED: ICD-10-CM

## 2024-10-17 PROCEDURE — 93797 PHYS/QHP OP CAR RHAB WO ECG: CPT

## 2024-10-18 ENCOUNTER — TRANSCRIPTION ENCOUNTER (OUTPATIENT)
Age: 69
End: 2024-10-18

## 2024-10-21 ENCOUNTER — TRANSCRIPTION ENCOUNTER (OUTPATIENT)
Age: 69
End: 2024-10-21

## 2024-10-21 ENCOUNTER — APPOINTMENT (OUTPATIENT)
Dept: CARDIOLOGY | Facility: CLINIC | Age: 69
End: 2024-10-21
Payer: MEDICARE

## 2024-10-21 PROCEDURE — 93797 PHYS/QHP OP CAR RHAB WO ECG: CPT

## 2024-10-22 ENCOUNTER — TRANSCRIPTION ENCOUNTER (OUTPATIENT)
Age: 69
End: 2024-10-22

## 2024-10-23 ENCOUNTER — APPOINTMENT (OUTPATIENT)
Dept: CARDIOLOGY | Facility: CLINIC | Age: 69
End: 2024-10-23
Payer: MEDICARE

## 2024-10-23 PROCEDURE — 93797 PHYS/QHP OP CAR RHAB WO ECG: CPT

## 2024-10-24 ENCOUNTER — APPOINTMENT (OUTPATIENT)
Dept: CARDIOLOGY | Facility: CLINIC | Age: 69
End: 2024-10-24
Payer: MEDICARE

## 2024-10-24 PROCEDURE — 93797 PHYS/QHP OP CAR RHAB WO ECG: CPT

## 2024-10-25 ENCOUNTER — TRANSCRIPTION ENCOUNTER (OUTPATIENT)
Age: 69
End: 2024-10-25

## 2024-10-27 RX ORDER — DULAGLUTIDE 1.5 MG/.5ML
1.5 INJECTION, SOLUTION SUBCUTANEOUS WEEKLY
Qty: 4 | Refills: 0 | Status: ACTIVE | COMMUNITY
Start: 2024-10-25

## 2024-10-28 ENCOUNTER — APPOINTMENT (OUTPATIENT)
Dept: CARDIOLOGY | Facility: CLINIC | Age: 69
End: 2024-10-28
Payer: MEDICARE

## 2024-10-28 PROCEDURE — 93797 PHYS/QHP OP CAR RHAB WO ECG: CPT

## 2024-10-29 ENCOUNTER — TRANSCRIPTION ENCOUNTER (OUTPATIENT)
Age: 69
End: 2024-10-29

## 2024-10-30 ENCOUNTER — APPOINTMENT (OUTPATIENT)
Dept: CARDIOLOGY | Facility: CLINIC | Age: 69
End: 2024-10-30
Payer: MEDICARE

## 2024-10-30 PROCEDURE — 93797 PHYS/QHP OP CAR RHAB WO ECG: CPT

## 2024-10-31 ENCOUNTER — APPOINTMENT (OUTPATIENT)
Dept: CARDIOLOGY | Facility: CLINIC | Age: 69
End: 2024-10-31
Payer: MEDICARE

## 2024-10-31 PROCEDURE — 93797 PHYS/QHP OP CAR RHAB WO ECG: CPT

## 2024-11-01 ENCOUNTER — TRANSCRIPTION ENCOUNTER (OUTPATIENT)
Age: 69
End: 2024-11-01

## 2024-11-04 ENCOUNTER — TRANSCRIPTION ENCOUNTER (OUTPATIENT)
Age: 69
End: 2024-11-04

## 2024-11-04 ENCOUNTER — APPOINTMENT (OUTPATIENT)
Dept: CARDIOLOGY | Facility: CLINIC | Age: 69
End: 2024-11-04
Payer: MEDICARE

## 2024-11-04 PROCEDURE — 93797 PHYS/QHP OP CAR RHAB WO ECG: CPT

## 2024-11-05 ENCOUNTER — APPOINTMENT (OUTPATIENT)
Dept: ENDOCRINOLOGY | Facility: CLINIC | Age: 69
End: 2024-11-05
Payer: MEDICARE

## 2024-11-05 VITALS — OXYGEN SATURATION: 95 % | HEART RATE: 84 BPM | DIASTOLIC BLOOD PRESSURE: 86 MMHG | SYSTOLIC BLOOD PRESSURE: 134 MMHG

## 2024-11-05 VITALS — HEIGHT: 61 IN | WEIGHT: 218 LBS | BODY MASS INDEX: 41.16 KG/M2

## 2024-11-05 DIAGNOSIS — E66.01 MORBID (SEVERE) OBESITY DUE TO EXCESS CALORIES: ICD-10-CM

## 2024-11-05 DIAGNOSIS — E03.9 HYPOTHYROIDISM, UNSPECIFIED: ICD-10-CM

## 2024-11-05 PROCEDURE — 99214 OFFICE O/P EST MOD 30 MIN: CPT

## 2024-11-05 PROCEDURE — G2211 COMPLEX E/M VISIT ADD ON: CPT

## 2024-11-05 PROCEDURE — 95251 CONT GLUC MNTR ANALYSIS I&R: CPT

## 2024-11-05 RX ORDER — INSULIN DEGLUDEC INJECTION 100 U/ML
100 INJECTION, SOLUTION SUBCUTANEOUS
Qty: 2 | Refills: 2 | Status: ACTIVE | COMMUNITY
Start: 2024-11-05 | End: 1900-01-01

## 2024-11-06 ENCOUNTER — APPOINTMENT (OUTPATIENT)
Dept: CARDIOLOGY | Facility: CLINIC | Age: 69
End: 2024-11-06
Payer: MEDICARE

## 2024-11-06 PROCEDURE — 93797 PHYS/QHP OP CAR RHAB WO ECG: CPT

## 2024-11-07 ENCOUNTER — APPOINTMENT (OUTPATIENT)
Dept: CARDIOLOGY | Facility: CLINIC | Age: 69
End: 2024-11-07
Payer: MEDICARE

## 2024-11-07 PROCEDURE — 93797 PHYS/QHP OP CAR RHAB WO ECG: CPT

## 2024-11-11 ENCOUNTER — APPOINTMENT (OUTPATIENT)
Dept: CARDIOLOGY | Facility: CLINIC | Age: 69
End: 2024-11-11

## 2024-11-12 ENCOUNTER — APPOINTMENT (OUTPATIENT)
Dept: ENDOCRINOLOGY | Facility: CLINIC | Age: 69
End: 2024-11-12
Payer: MEDICARE

## 2024-11-12 PROCEDURE — G0108 DIAB MANAGE TRN  PER INDIV: CPT

## 2024-11-13 ENCOUNTER — APPOINTMENT (OUTPATIENT)
Dept: CARDIOLOGY | Facility: CLINIC | Age: 69
End: 2024-11-13
Payer: MEDICARE

## 2024-11-13 PROCEDURE — 93797 PHYS/QHP OP CAR RHAB WO ECG: CPT

## 2024-11-13 RX ORDER — TIRZEPATIDE 5 MG/.5ML
5 INJECTION, SOLUTION SUBCUTANEOUS
Qty: 3 | Refills: 0 | Status: ACTIVE | COMMUNITY
Start: 2024-11-05

## 2024-11-14 ENCOUNTER — APPOINTMENT (OUTPATIENT)
Dept: CARDIOLOGY | Facility: CLINIC | Age: 69
End: 2024-11-14
Payer: MEDICARE

## 2024-11-14 PROCEDURE — 93797 PHYS/QHP OP CAR RHAB WO ECG: CPT

## 2024-11-14 RX ORDER — UMECLIDINIUM 62.5 UG/1
62.5 AEROSOL, POWDER ORAL
Qty: 1 | Refills: 0 | Status: ACTIVE | COMMUNITY
Start: 2024-11-14 | End: 1900-01-01

## 2024-11-14 RX ORDER — BUDESONIDE AND FORMOTEROL FUMARATE DIHYDRATE 80; 4.5 UG/1; UG/1
80-4.5 AEROSOL RESPIRATORY (INHALATION) TWICE DAILY
Qty: 1 | Refills: 0 | Status: ACTIVE | COMMUNITY
Start: 2024-11-14 | End: 1900-01-01

## 2024-11-18 ENCOUNTER — APPOINTMENT (OUTPATIENT)
Dept: CARDIOLOGY | Facility: CLINIC | Age: 69
End: 2024-11-18
Payer: MEDICARE

## 2024-11-18 ENCOUNTER — APPOINTMENT (OUTPATIENT)
Dept: INTERNAL MEDICINE | Facility: CLINIC | Age: 69
End: 2024-11-18
Payer: MEDICARE

## 2024-11-18 ENCOUNTER — RX RENEWAL (OUTPATIENT)
Age: 69
End: 2024-11-18

## 2024-11-18 VITALS
BODY MASS INDEX: 40.02 KG/M2 | OXYGEN SATURATION: 97 % | WEIGHT: 212 LBS | SYSTOLIC BLOOD PRESSURE: 130 MMHG | HEART RATE: 86 BPM | DIASTOLIC BLOOD PRESSURE: 90 MMHG | TEMPERATURE: 98 F | HEIGHT: 61 IN | RESPIRATION RATE: 16 BRPM

## 2024-11-18 DIAGNOSIS — I10 ESSENTIAL (PRIMARY) HYPERTENSION: ICD-10-CM

## 2024-11-18 DIAGNOSIS — R03.0 ELEVATED BLOOD-PRESSURE READING, W/OUT DIAGNOSIS OF HYPERTENSION: ICD-10-CM

## 2024-11-18 DIAGNOSIS — L08.9 LOCAL INFECTION OF THE SKIN AND SUBCUTANEOUS TISSUE, UNSPECIFIED: ICD-10-CM

## 2024-11-18 DIAGNOSIS — E11.9 TYPE 2 DIABETES MELLITUS W/OUT COMPLICATIONS: ICD-10-CM

## 2024-11-18 DIAGNOSIS — J44.9 CHRONIC OBSTRUCTIVE PULMONARY DISEASE, UNSPECIFIED: ICD-10-CM

## 2024-11-18 DIAGNOSIS — R21 RASH AND OTHER NONSPECIFIC SKIN ERUPTION: ICD-10-CM

## 2024-11-18 PROCEDURE — 93797 PHYS/QHP OP CAR RHAB WO ECG: CPT

## 2024-11-18 PROCEDURE — G2211 COMPLEX E/M VISIT ADD ON: CPT

## 2024-11-18 PROCEDURE — 90662 IIV NO PRSV INCREASED AG IM: CPT

## 2024-11-18 PROCEDURE — 99214 OFFICE O/P EST MOD 30 MIN: CPT

## 2024-11-18 PROCEDURE — G0008: CPT

## 2024-11-18 RX ORDER — KETOCONAZOLE 20 MG/G
2 CREAM TOPICAL DAILY
Qty: 1 | Refills: 0 | Status: ACTIVE | COMMUNITY
Start: 2024-11-18 | End: 1900-01-01

## 2024-11-18 RX ORDER — PEN NEEDLE, DIABETIC 32 GX 1/6"
32G X 4 MM NEEDLE, DISPOSABLE MISCELLANEOUS
Qty: 100 | Refills: 3 | Status: ACTIVE | COMMUNITY
Start: 2024-11-18 | End: 1900-01-01

## 2024-11-18 RX ORDER — BUDESONIDE AND FORMOTEROL FUMARATE 80; 4.5 UG/1; UG/1
80-4.5 AEROSOL, METERED RESPIRATORY (INHALATION)
Qty: 1 | Refills: 5 | Status: ACTIVE | COMMUNITY
Start: 2024-11-18 | End: 1900-01-01

## 2024-11-18 RX ORDER — MUPIROCIN 20 MG/G
2 OINTMENT TOPICAL
Qty: 1 | Refills: 0 | Status: ACTIVE | COMMUNITY
Start: 2024-11-18 | End: 1900-01-01

## 2024-11-20 ENCOUNTER — APPOINTMENT (OUTPATIENT)
Dept: CARDIOLOGY | Facility: CLINIC | Age: 69
End: 2024-11-20
Payer: MEDICARE

## 2024-11-20 PROCEDURE — 93797 PHYS/QHP OP CAR RHAB WO ECG: CPT

## 2024-11-21 ENCOUNTER — TRANSCRIPTION ENCOUNTER (OUTPATIENT)
Age: 69
End: 2024-11-21

## 2024-11-21 ENCOUNTER — APPOINTMENT (OUTPATIENT)
Dept: CARDIOLOGY | Facility: CLINIC | Age: 69
End: 2024-11-21
Payer: MEDICARE

## 2024-11-21 PROCEDURE — 93797 PHYS/QHP OP CAR RHAB WO ECG: CPT

## 2024-11-22 ENCOUNTER — APPOINTMENT (OUTPATIENT)
Dept: GASTROENTEROLOGY | Facility: CLINIC | Age: 69
End: 2024-11-22
Payer: MEDICARE

## 2024-11-22 DIAGNOSIS — K76.0 FATTY (CHANGE OF) LIVER, NOT ELSEWHERE CLASSIFIED: ICD-10-CM

## 2024-11-22 PROCEDURE — 91200 LIVER ELASTOGRAPHY: CPT

## 2024-11-25 ENCOUNTER — APPOINTMENT (OUTPATIENT)
Dept: CARDIOLOGY | Facility: CLINIC | Age: 69
End: 2024-11-25
Payer: MEDICARE

## 2024-11-25 PROCEDURE — 93797 PHYS/QHP OP CAR RHAB WO ECG: CPT

## 2024-11-27 ENCOUNTER — APPOINTMENT (OUTPATIENT)
Dept: CARDIOLOGY | Facility: CLINIC | Age: 69
End: 2024-11-27
Payer: MEDICARE

## 2024-11-27 PROCEDURE — 93797 PHYS/QHP OP CAR RHAB WO ECG: CPT

## 2024-12-02 ENCOUNTER — APPOINTMENT (OUTPATIENT)
Dept: CARDIOLOGY | Facility: CLINIC | Age: 69
End: 2024-12-02
Payer: MEDICARE

## 2024-12-02 PROCEDURE — 93797 PHYS/QHP OP CAR RHAB WO ECG: CPT

## 2024-12-03 RX ORDER — MIRABEGRON 50 MG/1
50 TABLET, EXTENDED RELEASE ORAL
Qty: 30 | Refills: 0 | Status: ACTIVE | COMMUNITY
Start: 2024-12-03 | End: 1900-01-01

## 2024-12-04 ENCOUNTER — APPOINTMENT (OUTPATIENT)
Dept: CARDIOLOGY | Facility: CLINIC | Age: 69
End: 2024-12-04
Payer: MEDICARE

## 2024-12-04 PROCEDURE — 93797 PHYS/QHP OP CAR RHAB WO ECG: CPT

## 2024-12-05 ENCOUNTER — APPOINTMENT (OUTPATIENT)
Dept: CARDIOLOGY | Facility: CLINIC | Age: 69
End: 2024-12-05
Payer: MEDICARE

## 2024-12-05 ENCOUNTER — APPOINTMENT (OUTPATIENT)
Dept: HEPATOLOGY | Facility: CLINIC | Age: 69
End: 2024-12-05
Payer: MEDICARE

## 2024-12-05 ENCOUNTER — TRANSCRIPTION ENCOUNTER (OUTPATIENT)
Age: 69
End: 2024-12-05

## 2024-12-05 VITALS
DIASTOLIC BLOOD PRESSURE: 75 MMHG | HEART RATE: 90 BPM | WEIGHT: 212.5 LBS | HEIGHT: 61 IN | OXYGEN SATURATION: 98 % | SYSTOLIC BLOOD PRESSURE: 132 MMHG | TEMPERATURE: 98 F | BODY MASS INDEX: 40.12 KG/M2

## 2024-12-05 VITALS
HEART RATE: 94 BPM | SYSTOLIC BLOOD PRESSURE: 153 MMHG | DIASTOLIC BLOOD PRESSURE: 86 MMHG | WEIGHT: 215 LBS | HEIGHT: 61 IN | BODY MASS INDEX: 40.59 KG/M2 | OXYGEN SATURATION: 96 %

## 2024-12-05 VITALS — SYSTOLIC BLOOD PRESSURE: 120 MMHG | DIASTOLIC BLOOD PRESSURE: 81 MMHG

## 2024-12-05 DIAGNOSIS — I25.10 ATHEROSCLEROTIC HEART DISEASE OF NATIVE CORONARY ARTERY W/OUT ANGINA PECTORIS: ICD-10-CM

## 2024-12-05 DIAGNOSIS — I10 ESSENTIAL (PRIMARY) HYPERTENSION: ICD-10-CM

## 2024-12-05 DIAGNOSIS — E11.9 TYPE 2 DIABETES MELLITUS W/OUT COMPLICATIONS: ICD-10-CM

## 2024-12-05 DIAGNOSIS — E66.01 MORBID (SEVERE) OBESITY DUE TO EXCESS CALORIES: ICD-10-CM

## 2024-12-05 DIAGNOSIS — K76.0 FATTY (CHANGE OF) LIVER, NOT ELSEWHERE CLASSIFIED: ICD-10-CM

## 2024-12-05 DIAGNOSIS — E78.5 HYPERLIPIDEMIA, UNSPECIFIED: ICD-10-CM

## 2024-12-05 DIAGNOSIS — R74.8 ABNORMAL LEVELS OF OTHER SERUM ENZYMES: ICD-10-CM

## 2024-12-05 PROCEDURE — G2211 COMPLEX E/M VISIT ADD ON: CPT

## 2024-12-05 PROCEDURE — 93000 ELECTROCARDIOGRAM COMPLETE: CPT

## 2024-12-05 PROCEDURE — 99205 OFFICE O/P NEW HI 60 MIN: CPT

## 2024-12-05 PROCEDURE — 99214 OFFICE O/P EST MOD 30 MIN: CPT

## 2024-12-06 ENCOUNTER — NON-APPOINTMENT (OUTPATIENT)
Age: 69
End: 2024-12-06

## 2024-12-09 ENCOUNTER — APPOINTMENT (OUTPATIENT)
Dept: CARDIOLOGY | Facility: CLINIC | Age: 69
End: 2024-12-09
Payer: MEDICARE

## 2024-12-09 PROCEDURE — 93797 PHYS/QHP OP CAR RHAB WO ECG: CPT

## 2024-12-10 ENCOUNTER — APPOINTMENT (OUTPATIENT)
Dept: CARDIOLOGY | Facility: CLINIC | Age: 69
End: 2024-12-10
Payer: MEDICARE

## 2024-12-10 PROCEDURE — 93798 PHYS/QHP OP CAR RHAB W/ECG: CPT

## 2024-12-11 ENCOUNTER — TRANSCRIPTION ENCOUNTER (OUTPATIENT)
Age: 69
End: 2024-12-11

## 2024-12-12 ENCOUNTER — TRANSCRIPTION ENCOUNTER (OUTPATIENT)
Age: 69
End: 2024-12-12

## 2024-12-12 LAB
A1AT SERPL-MCNC: 194 MG/DL
ALBUMIN SERPL ELPH-MCNC: 4.4 G/DL
ALP BLD-CCNC: 123 U/L
ALT SERPL-CCNC: 15 U/L
ANA SER IF-ACNC: NEGATIVE
ANION GAP SERPL CALC-SCNC: 12 MMOL/L
AST SERPL-CCNC: 33 U/L
BASOPHILS # BLD AUTO: 0.09 K/UL
BASOPHILS NFR BLD AUTO: 1 %
BILIRUB SERPL-MCNC: 0.4 MG/DL
BUN SERPL-MCNC: 19 MG/DL
CALCIUM SERPL-MCNC: 9.5 MG/DL
CERULOPLASMIN SERPL-MCNC: 37 MG/DL
CHLORIDE SERPL-SCNC: 104 MMOL/L
CO2 SERPL-SCNC: 22 MMOL/L
CREAT SERPL-MCNC: 1.05 MG/DL
EGFR: 58 ML/MIN/1.73M2
EOSINOPHIL # BLD AUTO: 0.63 K/UL
EOSINOPHIL NFR BLD AUTO: 7.1 %
FERRITIN SERPL-MCNC: 57 NG/ML
GLUCOSE SERPL-MCNC: 190 MG/DL
HCT VFR BLD CALC: 42.4 %
HGB BLD-MCNC: 13.1 G/DL
IGG SER QL IEP: 891 MG/DL
IMM GRANULOCYTES NFR BLD AUTO: 0.3 %
INR PPP: 1.03 RATIO
IRON SATN MFR SERPL: 21 %
IRON SERPL-MCNC: 61 UG/DL
LYMPHOCYTES # BLD AUTO: 1.57 K/UL
LYMPHOCYTES NFR BLD AUTO: 17.8 %
MAN DIFF?: NORMAL
MCHC RBC-ENTMCNC: 25.9 PG
MCHC RBC-ENTMCNC: 30.9 G/DL
MCV RBC AUTO: 84 FL
MITOCHONDRIA AB SER IF-ACNC: NORMAL
MONOCYTES # BLD AUTO: 0.84 K/UL
MONOCYTES NFR BLD AUTO: 9.5 %
NEUTROPHILS # BLD AUTO: 5.68 K/UL
NEUTROPHILS NFR BLD AUTO: 64.3 %
PLATELET # BLD AUTO: 261 K/UL
POTASSIUM SERPL-SCNC: 4 MMOL/L
PROT SERPL-MCNC: 6.8 G/DL
PT BLD: 12.2 SEC
RBC # BLD: 5.05 M/UL
RBC # FLD: 15.2 %
SMOOTH MUSCLE AB SER QL IF: ABNORMAL
SODIUM SERPL-SCNC: 139 MMOL/L
SOLUBLE LIVER IGG SER IA-ACNC: 1.1
TIBC SERPL-MCNC: 292 UG/DL
TTG IGA SER IA-ACNC: <0.5 U/ML
TTG IGA SER-ACNC: NEGATIVE
TTG IGG SER IA-ACNC: <0.8 U/ML
TTG IGG SER IA-ACNC: <0.8 U/ML
TTG IGG SER IA-ACNC: NEGATIVE
TTG IGG SER IA-ACNC: NEGATIVE
UIBC SERPL-MCNC: 231 UG/DL
WBC # FLD AUTO: 8.84 K/UL

## 2024-12-13 LAB
A1AT PHENOTYP SERPL-IMP: NORMAL
A1AT SERPL-MCNC: 189 MG/DL

## 2024-12-16 ENCOUNTER — TRANSCRIPTION ENCOUNTER (OUTPATIENT)
Age: 69
End: 2024-12-16

## 2024-12-16 ENCOUNTER — RX RENEWAL (OUTPATIENT)
Age: 69
End: 2024-12-16

## 2024-12-17 ENCOUNTER — TRANSCRIPTION ENCOUNTER (OUTPATIENT)
Age: 69
End: 2024-12-17

## 2024-12-18 ENCOUNTER — TRANSCRIPTION ENCOUNTER (OUTPATIENT)
Age: 69
End: 2024-12-18

## 2024-12-24 PROBLEM — F10.90 ALCOHOL USE: Status: ACTIVE | Noted: 2019-02-11

## 2024-12-27 RX ORDER — ROSUVASTATIN CALCIUM 20 MG/1
20 TABLET, FILM COATED ORAL
Qty: 90 | Refills: 2 | Status: ACTIVE | COMMUNITY
Start: 2024-12-27

## 2025-01-06 ENCOUNTER — TRANSCRIPTION ENCOUNTER (OUTPATIENT)
Age: 70
End: 2025-01-06

## 2025-01-07 ENCOUNTER — TRANSCRIPTION ENCOUNTER (OUTPATIENT)
Age: 70
End: 2025-01-07

## 2025-01-07 ENCOUNTER — APPOINTMENT (OUTPATIENT)
Dept: UROGYNECOLOGY | Facility: CLINIC | Age: 70
End: 2025-01-07
Payer: MEDICARE

## 2025-01-07 PROCEDURE — 99459 PELVIC EXAMINATION: CPT

## 2025-01-07 PROCEDURE — 99213 OFFICE O/P EST LOW 20 MIN: CPT

## 2025-01-08 ENCOUNTER — NON-APPOINTMENT (OUTPATIENT)
Age: 70
End: 2025-01-08

## 2025-01-14 RX ORDER — MIRABEGRON 50 MG/1
50 TABLET, FILM COATED, EXTENDED RELEASE ORAL
Qty: 90 | Refills: 2 | Status: ACTIVE | COMMUNITY
Start: 2025-01-14 | End: 1900-01-01

## 2025-01-21 ENCOUNTER — TRANSCRIPTION ENCOUNTER (OUTPATIENT)
Age: 70
End: 2025-01-21

## 2025-01-22 ENCOUNTER — TRANSCRIPTION ENCOUNTER (OUTPATIENT)
Age: 70
End: 2025-01-22

## 2025-01-22 ENCOUNTER — APPOINTMENT (OUTPATIENT)
Dept: DERMATOLOGY | Facility: CLINIC | Age: 70
End: 2025-01-22
Payer: MEDICARE

## 2025-01-22 DIAGNOSIS — L25.9 UNSPECIFIED CONTACT DERMATITIS, UNSPECIFIED CAUSE: ICD-10-CM

## 2025-01-22 PROCEDURE — 99204 OFFICE O/P NEW MOD 45 MIN: CPT

## 2025-01-22 RX ORDER — TRIAMCINOLONE ACETONIDE 1 MG/G
0.1 OINTMENT TOPICAL
Qty: 1 | Refills: 2 | Status: ACTIVE | COMMUNITY
Start: 2025-01-22 | End: 1900-01-01

## 2025-01-23 ENCOUNTER — TRANSCRIPTION ENCOUNTER (OUTPATIENT)
Age: 70
End: 2025-01-23

## 2025-02-06 ENCOUNTER — TRANSCRIPTION ENCOUNTER (OUTPATIENT)
Age: 70
End: 2025-02-06

## 2025-02-07 ENCOUNTER — APPOINTMENT (OUTPATIENT)
Dept: ENDOCRINOLOGY | Facility: CLINIC | Age: 70
End: 2025-02-07
Payer: COMMERCIAL

## 2025-02-07 PROCEDURE — G0108 DIAB MANAGE TRN  PER INDIV: CPT | Mod: 95

## 2025-02-11 ENCOUNTER — APPOINTMENT (OUTPATIENT)
Dept: ENDOCRINOLOGY | Facility: CLINIC | Age: 70
End: 2025-02-11
Payer: MEDICARE

## 2025-02-11 VITALS
WEIGHT: 203 LBS | HEIGHT: 61 IN | HEART RATE: 89 BPM | DIASTOLIC BLOOD PRESSURE: 83 MMHG | OXYGEN SATURATION: 96 % | SYSTOLIC BLOOD PRESSURE: 165 MMHG | BODY MASS INDEX: 38.33 KG/M2

## 2025-02-11 DIAGNOSIS — E03.9 HYPOTHYROIDISM, UNSPECIFIED: ICD-10-CM

## 2025-02-11 DIAGNOSIS — E78.5 HYPERLIPIDEMIA, UNSPECIFIED: ICD-10-CM

## 2025-02-11 DIAGNOSIS — E66.01 MORBID (SEVERE) OBESITY DUE TO EXCESS CALORIES: ICD-10-CM

## 2025-02-11 DIAGNOSIS — E11.9 TYPE 2 DIABETES MELLITUS W/OUT COMPLICATIONS: ICD-10-CM

## 2025-02-11 PROCEDURE — 83036 HEMOGLOBIN GLYCOSYLATED A1C: CPT | Mod: QW

## 2025-02-11 PROCEDURE — 95251 CONT GLUC MNTR ANALYSIS I&R: CPT

## 2025-02-12 ENCOUNTER — APPOINTMENT (OUTPATIENT)
Dept: INTERNAL MEDICINE | Facility: CLINIC | Age: 70
End: 2025-02-12
Payer: MEDICARE

## 2025-02-12 VITALS
DIASTOLIC BLOOD PRESSURE: 90 MMHG | HEIGHT: 61 IN | SYSTOLIC BLOOD PRESSURE: 136 MMHG | BODY MASS INDEX: 38.33 KG/M2 | RESPIRATION RATE: 15 BRPM | TEMPERATURE: 97.8 F | WEIGHT: 203 LBS | OXYGEN SATURATION: 98 % | HEART RATE: 86 BPM

## 2025-02-12 DIAGNOSIS — Z00.00 ENCOUNTER FOR GENERAL ADULT MEDICAL EXAMINATION W/OUT ABNORMAL FINDINGS: ICD-10-CM

## 2025-02-12 PROCEDURE — G0439: CPT

## 2025-02-13 LAB
25(OH)D3 SERPL-MCNC: 33.2 NG/ML
ALBUMIN SERPL ELPH-MCNC: 4.4 G/DL
ALP BLD-CCNC: 93 U/L
ALT SERPL-CCNC: 10 U/L
ANION GAP SERPL CALC-SCNC: 13 MMOL/L
AST SERPL-CCNC: 26 U/L
BASOPHILS # BLD AUTO: 0.14 K/UL
BASOPHILS NFR BLD AUTO: 1.2 %
BILIRUB SERPL-MCNC: 0.2 MG/DL
BUN SERPL-MCNC: 14 MG/DL
CALCIUM SERPL-MCNC: 10.1 MG/DL
CHLORIDE SERPL-SCNC: 106 MMOL/L
CHOLEST SERPL-MCNC: 158 MG/DL
CO2 SERPL-SCNC: 23 MMOL/L
CREAT SERPL-MCNC: 1 MG/DL
CREAT SPEC-SCNC: 430 MG/DL
EGFR: 62 ML/MIN/1.73M2
EOSINOPHIL # BLD AUTO: 0.73 K/UL
EOSINOPHIL NFR BLD AUTO: 6.4 %
FOLATE SERPL-MCNC: >20 NG/ML
GLUCOSE SERPL-MCNC: 117 MG/DL
HCT VFR BLD CALC: 43.1 %
HDLC SERPL-MCNC: 39 MG/DL
HGB BLD-MCNC: 13.3 G/DL
IMM GRANULOCYTES NFR BLD AUTO: 0.3 %
LDLC SERPL CALC-MCNC: 78 MG/DL
LYMPHOCYTES # BLD AUTO: 2.8 K/UL
LYMPHOCYTES NFR BLD AUTO: 24.4 %
MAN DIFF?: NORMAL
MCHC RBC-ENTMCNC: 26.4 PG
MCHC RBC-ENTMCNC: 30.9 G/DL
MCV RBC AUTO: 85.7 FL
MICROALBUMIN 24H UR DL<=1MG/L-MCNC: 2 MG/DL
MICROALBUMIN/CREAT 24H UR-RTO: 5 MG/G
MONOCYTES # BLD AUTO: 1.01 K/UL
MONOCYTES NFR BLD AUTO: 8.8 %
NEUTROPHILS # BLD AUTO: 6.77 K/UL
NEUTROPHILS NFR BLD AUTO: 58.9 %
NONHDLC SERPL-MCNC: 119 MG/DL
PLATELET # BLD AUTO: 290 K/UL
POTASSIUM SERPL-SCNC: 4.7 MMOL/L
PROT SERPL-MCNC: 7 G/DL
RBC # BLD: 5.03 M/UL
RBC # FLD: 14.4 %
SODIUM SERPL-SCNC: 142 MMOL/L
TRIGL SERPL-MCNC: 254 MG/DL
TSH SERPL-ACNC: 3.01 UIU/ML
VIT B12 SERPL-MCNC: 576 PG/ML
WBC # FLD AUTO: 11.49 K/UL

## 2025-02-19 ENCOUNTER — APPOINTMENT (OUTPATIENT)
Dept: DERMATOLOGY | Facility: CLINIC | Age: 70
End: 2025-02-19
Payer: MEDICARE

## 2025-02-19 DIAGNOSIS — L25.9 UNSPECIFIED CONTACT DERMATITIS, UNSPECIFIED CAUSE: ICD-10-CM

## 2025-02-19 PROCEDURE — 99213 OFFICE O/P EST LOW 20 MIN: CPT

## 2025-02-26 NOTE — H&P ADULT - PROBLEM SELECTOR PROBLEM 6
February 26, 2025         Patient's Name and Address   Emerson Baron  [address] [facility] [address]   Patient's Date of Birth  1961       Prescriber's Name and Address  Carlito Hunt DPM    [address]   [place] [address] Prescriber's Phone & Fax Numb  [phone] (P)  [phone] (F)       Diagnosis (ICD-10 and/or Narrative)   Diabetes Mellitus Type 2  Diabetic peripheral neuropathy  Transmetatarsal amputation right foot     Prescriber's NPI  [id number]      Description of Item/Services Ordered:  Diabetic shoes  Diabetic inserts X 3    Prognosis:  Good  Length of Need:  life     I authorize the items/services shown above and certify that the information provided herein is true and accurate.      Electronically signed by:  Carlito Hunt DPM   Date: 02/26/25                      Hypothyroid

## 2025-03-03 ENCOUNTER — NON-APPOINTMENT (OUTPATIENT)
Age: 70
End: 2025-03-03

## 2025-03-03 NOTE — PROGRESS NOTE ADULT - PROBLEM SELECTOR PLAN 1
- Continue with ASA, Lipitor, Lopressor  - Continue plavix  - continue inhalers/nebulizers  - on 02 via NC, wean off 02 supplement as tolerated.  - Pulmonology following, pending noncontrast CT chest  - Hx DM. Endocrine following. on DM regimen per Endo  - Increase activity as tolerated.   - Encourage Chest PT / Pulmonary toileting and Incentive spirometry every 1 hour x 10 while awake.   - Continue with PUD and DVT prophylaxis.   - D/C plan HOME with PT once medically cleared, as recommended by Physical Therapy   - Plan of care discussed with CTS Attending good minus

## 2025-03-04 ENCOUNTER — APPOINTMENT (OUTPATIENT)
Dept: COLORECTAL SURGERY | Facility: CLINIC | Age: 70
End: 2025-03-04
Payer: MEDICARE

## 2025-03-04 ENCOUNTER — NON-APPOINTMENT (OUTPATIENT)
Age: 70
End: 2025-03-04

## 2025-03-04 VITALS
RESPIRATION RATE: 14 BRPM | WEIGHT: 200 LBS | OXYGEN SATURATION: 99 % | HEIGHT: 61 IN | TEMPERATURE: 97.3 F | HEART RATE: 87 BPM | BODY MASS INDEX: 37.76 KG/M2

## 2025-03-04 DIAGNOSIS — N81.6 RECTOCELE: ICD-10-CM

## 2025-03-04 DIAGNOSIS — Z12.11 ENCOUNTER FOR SCREENING FOR MALIGNANT NEOPLASM OF COLON: ICD-10-CM

## 2025-03-04 PROCEDURE — 99204 OFFICE O/P NEW MOD 45 MIN: CPT | Mod: 25

## 2025-03-04 PROCEDURE — 46600 DIAGNOSTIC ANOSCOPY SPX: CPT

## 2025-03-12 ENCOUNTER — NON-APPOINTMENT (OUTPATIENT)
Age: 70
End: 2025-03-12

## 2025-03-13 ENCOUNTER — APPOINTMENT (OUTPATIENT)
Dept: HEPATOLOGY | Facility: CLINIC | Age: 70
End: 2025-03-13

## 2025-03-25 ENCOUNTER — TRANSCRIPTION ENCOUNTER (OUTPATIENT)
Age: 70
End: 2025-03-25

## 2025-03-26 ENCOUNTER — TRANSCRIPTION ENCOUNTER (OUTPATIENT)
Age: 70
End: 2025-03-26

## 2025-03-29 NOTE — H&P ADULT - NSICDXPASTMEDICALHX_GEN_ALL_CORE_FT
FAMILY HISTORY:  Father  Still living? Unknown  DM (diabetes mellitus), Age at diagnosis: Age Unknown  Family history of heart disease, Age at diagnosis: Age Unknown     PAST MEDICAL HISTORY:  Arthritis     Chronic bronchiolitis     Diabetes     Hashimoto's thyroiditis     High triglycerides     Hyperlipidemia     Hypertension     Hypothyroidism     Obesity

## 2025-04-01 ENCOUNTER — APPOINTMENT (OUTPATIENT)
Dept: HEPATOLOGY | Facility: CLINIC | Age: 70
End: 2025-04-01

## 2025-04-15 ENCOUNTER — APPOINTMENT (OUTPATIENT)
Dept: HEPATOLOGY | Facility: CLINIC | Age: 70
End: 2025-04-15

## 2025-04-22 ENCOUNTER — APPOINTMENT (OUTPATIENT)
Dept: HEPATOLOGY | Facility: CLINIC | Age: 70
End: 2025-04-22
Payer: MEDICARE

## 2025-04-22 DIAGNOSIS — Z23 ENCOUNTER FOR IMMUNIZATION: ICD-10-CM

## 2025-04-22 PROCEDURE — 90739 HEPB VACC 2/4 DOSE ADULT IM: CPT

## 2025-04-22 PROCEDURE — G0010: CPT

## 2025-04-23 ENCOUNTER — TRANSCRIPTION ENCOUNTER (OUTPATIENT)
Age: 70
End: 2025-04-23

## 2025-04-24 ENCOUNTER — APPOINTMENT (OUTPATIENT)
Dept: UROGYNECOLOGY | Facility: CLINIC | Age: 70
End: 2025-04-24

## 2025-04-24 VITALS — DIASTOLIC BLOOD PRESSURE: 78 MMHG | SYSTOLIC BLOOD PRESSURE: 135 MMHG

## 2025-04-24 DIAGNOSIS — R32 UNSPECIFIED URINARY INCONTINENCE: ICD-10-CM

## 2025-04-24 DIAGNOSIS — N32.81 OVERACTIVE BLADDER: ICD-10-CM

## 2025-04-24 LAB
BILIRUB UR QL STRIP: NEGATIVE
CLARITY UR: CLEAR
COLLECTION METHOD: NORMAL
GLUCOSE UR-MCNC: NEGATIVE
HCG UR QL: 0.2
HGB UR QL STRIP.AUTO: NEGATIVE
KETONES UR-MCNC: NEGATIVE
LEUKOCYTE ESTERASE UR QL STRIP: NEGATIVE
NITRITE UR QL STRIP: NEGATIVE
PH UR STRIP: 5.5
PROT UR STRIP-MCNC: NEGATIVE
SP GR UR STRIP: 1.03

## 2025-04-24 PROCEDURE — 99214 OFFICE O/P EST MOD 30 MIN: CPT

## 2025-04-24 PROCEDURE — 81003 URINALYSIS AUTO W/O SCOPE: CPT | Mod: QW

## 2025-04-24 PROCEDURE — 51798 US URINE CAPACITY MEASURE: CPT

## 2025-04-28 DIAGNOSIS — R31.29 OTHER MICROSCOPIC HEMATURIA: ICD-10-CM

## 2025-04-28 LAB
APPEARANCE: CLEAR
BACTERIA UR CULT: NORMAL
BACTERIA: NEGATIVE /HPF
BILIRUBIN URINE: NEGATIVE
BLOOD URINE: NEGATIVE
CAST: 1 /LPF
COLOR: NORMAL
EPITHELIAL CELLS: 3 /HPF
GLUCOSE QUALITATIVE U: NEGATIVE MG/DL
HYALINE CASTS: PRESENT
KETONES URINE: ABNORMAL MG/DL
LEUKOCYTE ESTERASE URINE: ABNORMAL
MICROSCOPIC-UA: NORMAL
NITRITE URINE: NEGATIVE
PH URINE: 5.5
PROTEIN URINE: NORMAL MG/DL
RED BLOOD CELLS URINE: 23 /HPF
REVIEW: NORMAL
SPECIFIC GRAVITY URINE: >1.03
UROBILINOGEN URINE: 1 MG/DL
WHITE BLOOD CELLS URINE: 0 /HPF

## 2025-04-29 ENCOUNTER — TRANSCRIPTION ENCOUNTER (OUTPATIENT)
Age: 70
End: 2025-04-29

## 2025-05-03 ENCOUNTER — NON-APPOINTMENT (OUTPATIENT)
Age: 70
End: 2025-05-03

## 2025-05-05 ENCOUNTER — APPOINTMENT (OUTPATIENT)
Dept: CARDIOLOGY | Facility: CLINIC | Age: 70
End: 2025-05-05
Payer: MEDICARE

## 2025-05-05 ENCOUNTER — NON-APPOINTMENT (OUTPATIENT)
Age: 70
End: 2025-05-05

## 2025-05-05 VITALS
HEART RATE: 86 BPM | WEIGHT: 200 LBS | HEIGHT: 61 IN | BODY MASS INDEX: 37.76 KG/M2 | DIASTOLIC BLOOD PRESSURE: 81 MMHG | SYSTOLIC BLOOD PRESSURE: 117 MMHG | OXYGEN SATURATION: 98 %

## 2025-05-05 DIAGNOSIS — I25.10 ATHEROSCLEROTIC HEART DISEASE OF NATIVE CORONARY ARTERY W/OUT ANGINA PECTORIS: ICD-10-CM

## 2025-05-05 DIAGNOSIS — E11.9 TYPE 2 DIABETES MELLITUS W/OUT COMPLICATIONS: ICD-10-CM

## 2025-05-05 DIAGNOSIS — I10 ESSENTIAL (PRIMARY) HYPERTENSION: ICD-10-CM

## 2025-05-05 PROCEDURE — 93000 ELECTROCARDIOGRAM COMPLETE: CPT

## 2025-05-05 PROCEDURE — 99214 OFFICE O/P EST MOD 30 MIN: CPT

## 2025-05-05 PROCEDURE — G2211 COMPLEX E/M VISIT ADD ON: CPT

## 2025-05-06 ENCOUNTER — APPOINTMENT (OUTPATIENT)
Dept: UROGYNECOLOGY | Facility: CLINIC | Age: 70
End: 2025-05-06

## 2025-05-09 ENCOUNTER — TRANSCRIPTION ENCOUNTER (OUTPATIENT)
Age: 70
End: 2025-05-09

## 2025-05-12 ENCOUNTER — NON-APPOINTMENT (OUTPATIENT)
Age: 70
End: 2025-05-12

## 2025-05-12 ENCOUNTER — TRANSCRIPTION ENCOUNTER (OUTPATIENT)
Age: 70
End: 2025-05-12

## 2025-05-13 ENCOUNTER — APPOINTMENT (OUTPATIENT)
Dept: UROGYNECOLOGY | Facility: CLINIC | Age: 70
End: 2025-05-13

## 2025-05-14 ENCOUNTER — TRANSCRIPTION ENCOUNTER (OUTPATIENT)
Age: 70
End: 2025-05-14

## 2025-05-16 ENCOUNTER — RESULT REVIEW (OUTPATIENT)
Age: 70
End: 2025-05-16

## 2025-05-16 ENCOUNTER — APPOINTMENT (OUTPATIENT)
Dept: ULTRASOUND IMAGING | Facility: CLINIC | Age: 70
End: 2025-05-16
Payer: MEDICARE

## 2025-05-16 ENCOUNTER — APPOINTMENT (OUTPATIENT)
Dept: MAMMOGRAPHY | Facility: CLINIC | Age: 70
End: 2025-05-16
Payer: MEDICARE

## 2025-05-16 DIAGNOSIS — N63.20 UNSPECIFIED LUMP IN THE LEFT BREAST, UNSPECIFIED QUADRANT: ICD-10-CM

## 2025-05-16 PROCEDURE — 76642 ULTRASOUND BREAST LIMITED: CPT | Mod: TC,LT

## 2025-05-16 PROCEDURE — G0279: CPT | Mod: TC

## 2025-05-16 PROCEDURE — 77066 DX MAMMO INCL CAD BI: CPT | Mod: TC

## 2025-05-19 ENCOUNTER — NON-APPOINTMENT (OUTPATIENT)
Age: 70
End: 2025-05-19

## 2025-05-19 VITALS — BODY MASS INDEX: 37.76 KG/M2 | WEIGHT: 200 LBS | HEIGHT: 61 IN

## 2025-05-19 DIAGNOSIS — Z87.891 PERSONAL HISTORY OF NICOTINE DEPENDENCE: ICD-10-CM

## 2025-05-20 ENCOUNTER — APPOINTMENT (OUTPATIENT)
Dept: UROGYNECOLOGY | Facility: CLINIC | Age: 70
End: 2025-05-20

## 2025-05-22 ENCOUNTER — APPOINTMENT (OUTPATIENT)
Dept: CT IMAGING | Facility: CLINIC | Age: 70
End: 2025-05-22
Payer: MEDICARE

## 2025-05-22 PROCEDURE — 74178 CT ABD&PLV WO CNTR FLWD CNTR: CPT

## 2025-05-22 PROCEDURE — 71271 CT THORAX LUNG CANCER SCR C-: CPT | Mod: 26

## 2025-05-22 PROCEDURE — 71271 CT THORAX LUNG CANCER SCR C-: CPT | Mod: TC

## 2025-05-27 ENCOUNTER — TRANSCRIPTION ENCOUNTER (OUTPATIENT)
Age: 70
End: 2025-05-27

## 2025-05-27 ENCOUNTER — APPOINTMENT (OUTPATIENT)
Dept: HEPATOLOGY | Facility: CLINIC | Age: 70
End: 2025-05-27
Payer: MEDICARE

## 2025-05-27 DIAGNOSIS — Z23 ENCOUNTER FOR IMMUNIZATION: ICD-10-CM

## 2025-05-27 PROCEDURE — 90739 HEPB VACC 2/4 DOSE ADULT IM: CPT

## 2025-05-27 PROCEDURE — G0010: CPT

## 2025-05-28 ENCOUNTER — APPOINTMENT (OUTPATIENT)
Dept: UROGYNECOLOGY | Facility: CLINIC | Age: 70
End: 2025-05-28

## 2025-05-29 ENCOUNTER — TRANSCRIPTION ENCOUNTER (OUTPATIENT)
Age: 70
End: 2025-05-29

## 2025-05-29 ENCOUNTER — OUTPATIENT (OUTPATIENT)
Dept: OUTPATIENT SERVICES | Facility: HOSPITAL | Age: 70
LOS: 1 days | End: 2025-05-29
Payer: MEDICARE

## 2025-05-29 VITALS
DIASTOLIC BLOOD PRESSURE: 64 MMHG | HEIGHT: 61 IN | SYSTOLIC BLOOD PRESSURE: 130 MMHG | TEMPERATURE: 96 F | RESPIRATION RATE: 18 BRPM | OXYGEN SATURATION: 98 % | HEART RATE: 82 BPM | WEIGHT: 181.22 LBS

## 2025-05-29 DIAGNOSIS — Z29.9 ENCOUNTER FOR PROPHYLACTIC MEASURES, UNSPECIFIED: ICD-10-CM

## 2025-05-29 DIAGNOSIS — E03.9 HYPOTHYROIDISM, UNSPECIFIED: ICD-10-CM

## 2025-05-29 DIAGNOSIS — Z98.890 OTHER SPECIFIED POSTPROCEDURAL STATES: Chronic | ICD-10-CM

## 2025-05-29 DIAGNOSIS — Z12.11 ENCOUNTER FOR SCREENING FOR MALIGNANT NEOPLASM OF COLON: ICD-10-CM

## 2025-05-29 DIAGNOSIS — Z01.818 ENCOUNTER FOR OTHER PREPROCEDURAL EXAMINATION: ICD-10-CM

## 2025-05-29 DIAGNOSIS — Z95.1 PRESENCE OF AORTOCORONARY BYPASS GRAFT: Chronic | ICD-10-CM

## 2025-05-29 DIAGNOSIS — Z98.51 TUBAL LIGATION STATUS: Chronic | ICD-10-CM

## 2025-05-29 DIAGNOSIS — E11.9 TYPE 2 DIABETES MELLITUS WITHOUT COMPLICATIONS: ICD-10-CM

## 2025-05-29 DIAGNOSIS — K76.0 FATTY (CHANGE OF) LIVER, NOT ELSEWHERE CLASSIFIED: ICD-10-CM

## 2025-05-29 DIAGNOSIS — I10 ESSENTIAL (PRIMARY) HYPERTENSION: ICD-10-CM

## 2025-05-29 DIAGNOSIS — Z95.0 PRESENCE OF CARDIAC PACEMAKER: Chronic | ICD-10-CM

## 2025-05-29 DIAGNOSIS — Z98.891 HISTORY OF UTERINE SCAR FROM PREVIOUS SURGERY: Chronic | ICD-10-CM

## 2025-05-29 DIAGNOSIS — I25.10 ATHEROSCLEROTIC HEART DISEASE OF NATIVE CORONARY ARTERY WITHOUT ANGINA PECTORIS: ICD-10-CM

## 2025-05-29 DIAGNOSIS — E78.5 HYPERLIPIDEMIA, UNSPECIFIED: ICD-10-CM

## 2025-05-29 LAB
A1C WITH ESTIMATED AVERAGE GLUCOSE RESULT: 6.9 % — HIGH (ref 4–5.6)
ALBUMIN SERPL ELPH-MCNC: 3.9 G/DL — SIGNIFICANT CHANGE UP (ref 3.3–5.2)
ALBUMIN SERPL ELPH-MCNC: 4.1 G/DL
ALP BLD-CCNC: 95 U/L
ALP SERPL-CCNC: 92 U/L — SIGNIFICANT CHANGE UP (ref 40–120)
ALT FLD-CCNC: 12 U/L — SIGNIFICANT CHANGE UP
ALT SERPL-CCNC: 12 U/L
ANION GAP SERPL CALC-SCNC: 14 MMOL/L
ANION GAP SERPL CALC-SCNC: 16 MMOL/L — SIGNIFICANT CHANGE UP (ref 5–17)
APTT BLD: 29.4 SEC — SIGNIFICANT CHANGE UP (ref 26.1–36.8)
AST SERPL-CCNC: 23 U/L
AST SERPL-CCNC: 33 U/L — HIGH
BASOPHILS # BLD AUTO: 0.08 K/UL
BASOPHILS # BLD AUTO: 0.09 K/UL — SIGNIFICANT CHANGE UP (ref 0–0.2)
BASOPHILS NFR BLD AUTO: 0.8 %
BASOPHILS NFR BLD AUTO: 0.9 % — SIGNIFICANT CHANGE UP (ref 0–2)
BILIRUB SERPL-MCNC: 0.3 MG/DL — LOW (ref 0.4–2)
BILIRUB SERPL-MCNC: 0.4 MG/DL
BUN SERPL-MCNC: 15.7 MG/DL — SIGNIFICANT CHANGE UP (ref 8–20)
BUN SERPL-MCNC: 9 MG/DL
CALCIUM SERPL-MCNC: 9.3 MG/DL
CALCIUM SERPL-MCNC: 9.5 MG/DL — SIGNIFICANT CHANGE UP (ref 8.4–10.5)
CHLORIDE SERPL-SCNC: 103 MMOL/L
CHLORIDE SERPL-SCNC: 103 MMOL/L — SIGNIFICANT CHANGE UP (ref 96–108)
CHOLEST SERPL-MCNC: 143 MG/DL
CO2 SERPL-SCNC: 20 MMOL/L — LOW (ref 22–29)
CO2 SERPL-SCNC: 23 MMOL/L
CREAT SERPL-MCNC: 0.84 MG/DL — SIGNIFICANT CHANGE UP (ref 0.5–1.3)
CREAT SERPL-MCNC: 0.86 MG/DL
EGFR: 76 ML/MIN/1.73M2 — SIGNIFICANT CHANGE UP
EGFR: 76 ML/MIN/1.73M2 — SIGNIFICANT CHANGE UP
EGFRCR SERPLBLD CKD-EPI 2021: 74 ML/MIN/1.73M2
EOSINOPHIL # BLD AUTO: 0.54 K/UL
EOSINOPHIL # BLD AUTO: 0.6 K/UL — HIGH (ref 0–0.5)
EOSINOPHIL NFR BLD AUTO: 5.7 %
EOSINOPHIL NFR BLD AUTO: 5.9 % — SIGNIFICANT CHANGE UP (ref 0–6)
ESTIMATED AVERAGE GLUCOSE: 143 MG/DL
ESTIMATED AVERAGE GLUCOSE: 151 MG/DL — HIGH (ref 68–114)
GLUCOSE SERPL-MCNC: 117 MG/DL
GLUCOSE SERPL-MCNC: 167 MG/DL — HIGH (ref 70–99)
HBA1C MFR BLD HPLC: 6.6 %
HCT VFR BLD CALC: 41.2 % — SIGNIFICANT CHANGE UP (ref 34.5–45)
HCT VFR BLD CALC: 43.4 %
HDLC SERPL-MCNC: 43 MG/DL
HGB BLD-MCNC: 12.9 G/DL — SIGNIFICANT CHANGE UP (ref 11.5–15.5)
HGB BLD-MCNC: 13.1 G/DL
IMM GRANULOCYTES # BLD AUTO: 0.03 K/UL — SIGNIFICANT CHANGE UP (ref 0–0.07)
IMM GRANULOCYTES NFR BLD AUTO: 0.3 % — SIGNIFICANT CHANGE UP (ref 0–0.9)
IMM GRANULOCYTES NFR BLD AUTO: 0.4 %
INR BLD: 0.97 RATIO — SIGNIFICANT CHANGE UP (ref 0.85–1.16)
INR PPP: 0.99 RATIO
INSULIN SERPL-MCNC: 35.4 UU/ML
LDLC SERPL-MCNC: 68 MG/DL
LIVER FIBR SCORE SERPL CALC.FIBROSURE: 9.61
LYMPHOCYTES # BLD AUTO: 2.27 K/UL
LYMPHOCYTES # BLD AUTO: 2.29 K/UL — SIGNIFICANT CHANGE UP (ref 1–3.3)
LYMPHOCYTES NFR BLD AUTO: 22.4 % — SIGNIFICANT CHANGE UP (ref 13–44)
LYMPHOCYTES NFR BLD AUTO: 24 %
MAN DIFF?: NORMAL
MCHC RBC-ENTMCNC: 26.1 PG
MCHC RBC-ENTMCNC: 26.8 PG — LOW (ref 27–34)
MCHC RBC-ENTMCNC: 30.2 G/DL
MCHC RBC-ENTMCNC: 31.3 G/DL — LOW (ref 32–36)
MCV RBC AUTO: 85.7 FL — SIGNIFICANT CHANGE UP (ref 80–100)
MCV RBC AUTO: 86.6 FL
MONOCYTES # BLD AUTO: 0.71 K/UL
MONOCYTES # BLD AUTO: 0.84 K/UL — SIGNIFICANT CHANGE UP (ref 0–0.9)
MONOCYTES NFR BLD AUTO: 7.5 %
MONOCYTES NFR BLD AUTO: 8.2 % — SIGNIFICANT CHANGE UP (ref 2–14)
NEUTROPHILS # BLD AUTO: 5.8 K/UL
NEUTROPHILS # BLD AUTO: 6.36 K/UL — SIGNIFICANT CHANGE UP (ref 1.8–7.4)
NEUTROPHILS NFR BLD AUTO: 61.6 %
NEUTROPHILS NFR BLD AUTO: 62.3 % — SIGNIFICANT CHANGE UP (ref 43–77)
NONHDLC SERPL-MCNC: 100 MG/DL
NRBC # BLD AUTO: 0 K/UL — SIGNIFICANT CHANGE UP (ref 0–0)
NRBC # FLD: 0 K/UL — SIGNIFICANT CHANGE UP (ref 0–0)
NRBC BLD AUTO-RTO: 0 /100 WBCS — SIGNIFICANT CHANGE UP (ref 0–0)
PLATELET # BLD AUTO: 217 K/UL — SIGNIFICANT CHANGE UP (ref 150–400)
PLATELET # BLD AUTO: 236 K/UL
PMV BLD: 12.2 FL — SIGNIFICANT CHANGE UP (ref 7–13)
POTASSIUM SERPL-MCNC: 4.4 MMOL/L — SIGNIFICANT CHANGE UP (ref 3.5–5.3)
POTASSIUM SERPL-SCNC: 4.4 MMOL/L
POTASSIUM SERPL-SCNC: 4.4 MMOL/L — SIGNIFICANT CHANGE UP (ref 3.5–5.3)
PROT SERPL-MCNC: 6.5 G/DL
PROT SERPL-MCNC: 6.6 G/DL — SIGNIFICANT CHANGE UP (ref 6.6–8.7)
PROTHROM AB SERPL-ACNC: 11.3 SEC — SIGNIFICANT CHANGE UP (ref 9.9–13.4)
PT BLD: 11.7 SEC
RBC # BLD: 4.81 M/UL — SIGNIFICANT CHANGE UP (ref 3.8–5.2)
RBC # BLD: 5.01 M/UL
RBC # FLD: 14.1 % — SIGNIFICANT CHANGE UP (ref 10.3–14.5)
RBC # FLD: 14.6 %
SODIUM SERPL-SCNC: 139 MMOL/L — SIGNIFICANT CHANGE UP (ref 135–145)
SODIUM SERPL-SCNC: 140 MMOL/L
TRIGL SERPL-MCNC: 193 MG/DL
WBC # BLD: 10.21 K/UL — SIGNIFICANT CHANGE UP (ref 3.8–10.5)
WBC # FLD AUTO: 10.21 K/UL — SIGNIFICANT CHANGE UP (ref 3.8–10.5)
WBC # FLD AUTO: 9.44 K/UL

## 2025-05-29 PROCEDURE — 36415 COLL VENOUS BLD VENIPUNCTURE: CPT

## 2025-05-29 PROCEDURE — 85730 THROMBOPLASTIN TIME PARTIAL: CPT

## 2025-05-29 PROCEDURE — 85610 PROTHROMBIN TIME: CPT

## 2025-05-29 PROCEDURE — 83036 HEMOGLOBIN GLYCOSYLATED A1C: CPT

## 2025-05-29 PROCEDURE — 93010 ELECTROCARDIOGRAM REPORT: CPT

## 2025-05-29 PROCEDURE — 85025 COMPLETE CBC W/AUTO DIFF WBC: CPT

## 2025-05-29 PROCEDURE — 93005 ELECTROCARDIOGRAM TRACING: CPT

## 2025-05-29 PROCEDURE — 80053 COMPREHEN METABOLIC PANEL: CPT

## 2025-05-29 PROCEDURE — G0463: CPT

## 2025-05-29 RX ORDER — EVOLOCUMAB 140 MG/ML
140 INJECTION, SOLUTION SUBCUTANEOUS
Qty: 2 | Refills: 2 | Status: ACTIVE | COMMUNITY
Start: 2025-05-22

## 2025-05-29 NOTE — H&P PST ADULT - PROBLEM SELECTOR PLAN 2
EKG, labs pending  instructed to continue thyroid medication as scheduled/prescribed and to take normal morning meds DOS with small sip water  Written & verbal instructions provided to pt for all education/instructions, questions encouraged/addressed, pt verbalized understandings of all education/instructions, teach back method utilized

## 2025-05-29 NOTE — H&P PST ADULT - NSICDXPASTSURGICALHX_GEN_ALL_CORE_FT
PAST SURGICAL HISTORY:  Cardiac pacemaker     H/O  section     H/O eye surgery     H/O rectocele repair     H/O tubal ligation     History of D&C     S/P CABG x 3     S/P urethral surgery      PAST SURGICAL HISTORY:  H/O  section     H/O eye surgery     H/O rectocele repair     H/O tubal ligation     History of D&C     S/P CABG x 3     S/P urethral surgery

## 2025-05-29 NOTE — H&P PST ADULT - PROBLEM SELECTOR PLAN 6
EKG, labs pending  instructed to continue BP medication as scheduled/prescribed and to take normal morning meds DOS with small sip water  Written & verbal instructions provided to pt for all education/instructions, questions encouraged/addressed, pt verbalized understandings of all education/instructions, teach back method utilized

## 2025-05-29 NOTE — H&P PST ADULT - ENDOCRINE COMMENTS
hx DM2, hypothyroid, following endocrinologist  hx DM2, hypothyroid, following endocrinologist, wears glucose reader hx DM2- taking mounjaro - (LAST DOSE WAS 6.28.2025 will let surgeon know) hypothyroid, following endocrinologist, wears glucose reader

## 2025-05-29 NOTE — H&P PST ADULT - ASSESSMENT
CAPRINI SCORE    AGE RELATED RISK FACTORS                                                             [ ] Age 41-60 years                                            (1 Point)  [X ] Age: 61-74 years                                           (2 Points)                 [ ] Age= 75 years                                                (3 Points)             DISEASE RELATED RISK FACTORS                                                       [ ] Edema in the lower extremities                 (1 Point)                     [ ] Varicose veins                                               (1 Point)                                 [ X] BMI > 25 Kg/m2                                            (1 Point)                                  [ ] Serious infection (ie PNA)                            (1 Point)                     [ X] Lung disease ( COPD, Emphysema)            (1 Point)                                                                          [ ] Acute myocardial infarction                         (1 Point)                  [ ] Congestive heart failure (in the previous month)  (1 Point)         [ ] Inflammatory bowel disease                            (1 Point)                  [ ] Central venous access, PICC or Port               (2 points)       (within the last month)                                                                [ ] Stroke (in the previous month)                        (5 Points)    [ ] Previous or present malignancy                       (2 points)                                                                                                                                                         HEMATOLOGY RELATED FACTORS                                                         [ ] Prior episodes of VTE                                     (3 Points)                     [ ] Positive family history for VTE                      (3 Points)                  [ ] Prothrombin 76737 A                                     (3 Points)                     [ ] Factor V Leiden                                                (3 Points)                        [ ] Lupus anticoagulants                                      (3 Points)                                                           [ ] Anticardiolipin antibodies                              (3 Points)                                                       [ ] High homocysteine in the blood                   (3 Points)                                             [ ] Other congenital or acquired thrombophilia      (3 Points)                                                [ ] Heparin induced thrombocytopenia                  (3 Points)                                        MOBILITY RELATED FACTORS  [ ] Bed rest                                                         (1 Point)  [ ] Plaster cast                                                    (2 points)  [ ] Bed bound for more than 72 hours           (2 Points)    GENDER SPECIFIC FACTORS  [ ] Pregnancy or had a baby within the last month   (1 Point)  [ ] Post-partum < 6 weeks                                   (1 Point)  [ ] Hormonal therapy  or oral contraception   (1 Point)  [ ] History of pregnancy complications              (1 point)  [ ] Unexplained or recurrent              (1 Point)    OTHER RISK FACTORS                                           (1 Point)  [ ] BMI >40, smoking, diabetes requiring insulin, chemotherapy  blood transfusions and length of surgery over 2 hours    SURGERY RELATED RISK FACTORS  [ ]  Section within the last month     (1 Point)  [ X] Minor surgery                                                  (1 Point)  [ ] Arthroscopic surgery                                       (2 Points)  [ ] Planned major surgery lasting more            (2 Points)      than 45 minutes     [ ] Elective hip or knee joint replacement       (5 points)       surgery                                                TRAUMA RELATED RISK FACTORS  [ ] Fracture of the hip, pelvis, or leg                       (5 Points)  [ ] Spinal cord injury resulting in paralysis             (5 points)       (in the previous month)    [ ] Paralysis  (less than 1 month)                             (5 Points)  [ ] Multiple Trauma within 1 month                        (5 Points)    Total Score [ 5     ]    Caprini Score 0-2: Low Risk, NO VTE prophylaxis required for most patients, encourage ambulation  Caprini Score 3-6: Moderate Risk , pharmacologic VTE prophylaxis is indicated for most patients (in the absence of contraindications)  Caprini Score Greater than or =7: High risk, pharmocologic VTE prophylaxis indicated for most patients (in the absence of contraindications)    OPIOID RISK TOOL    XIMENA EACH BOX THAT APPLIES AND ADD TOTALS AT THE END    FAMILY HISTORY OF SUBSTANCE ABUSE                 FEMALE         MALE                                                Alcohol                             [  ]1 pt          [  ]3pts                                               Illegal Durgs                     [  ]2 pts        [  ]3pts                                               Rx Drugs                           [  ]4 pts        [  ]4 pts    PERSONAL HISTORY OF SUBSTANCE ABUSE                                                                                          Alcohol                             [  ]3 pts       [  ]3 pts                                               Illegal Drugs                     [  ]4 pts        [  ]4 pts                                               Rx Drugs                           [  ]5 pts        [  ]5 pts    AGE BETWEEN 16-45 YEARS                                      [  ]1 pt         [  ]1 pt    HISTORY OF PREADOLESCENT   SEXUAL ABUSE                                                             [  ]3 pts        [  ]0pts    PSYCHOLOGICAL DISEASE                     ADD, OCD, Bipolar, Schizophrenia        [  ]2 pts         [  ]2 pts                      Depression                                               [  ]1 pt           [  ]1 pt           SCORING TOTAL   (0)                                     A score of 3 or lower indicated LOW risk for future opioid abuse  A score of 4 to 7 indicated moderate risk for future opioid abuse  A score of 8 or higher indicates a high risk for opioid abuse                               Pt is a pleasant 71 y/o female, PMH CAD, CABG X 3, NAFLD, elevated LFTs, HTN, emphysema, high cholesterol, hypothyroid, DM2, presents to PST in preparation for screening colonoscopy.  Per surgeon's note, "She underwent ureteral sling for urinary incontinence as well as rectocele repair in . She did well for a while but has now noticed accumulation of stool in a pouch that requires splinting vaginally to evacuate. This occurs when her stool is not formed or when she is constipated. No rectal bleeding. She has a history of anal fissure that has resolved and she has no pain or rectal bleeding. Her last colonoscopy was in 2014."  Pt due for screening colonoscopy, denies rectal bleeding or current constipation/diarrhea, change in bowel pattern, abdominal pain.  Pt reports feeling generally well, denies recent fever/cough/cold, infection or abx use.  Scheduled for colonoscopy on 2025 with Dr. Lujan, pending medical and cardiac optimization.     CAPRINI SCORE    AGE RELATED RISK FACTORS                                                             [ ] Age 41-60 years                                            (1 Point)  [X ] Age: 61-74 years                                           (2 Points)                 [ ] Age= 75 years                                                (3 Points)             DISEASE RELATED RISK FACTORS                                                       [ ] Edema in the lower extremities                 (1 Point)                     [ ] Varicose veins                                               (1 Point)                                 [ X] BMI > 25 Kg/m2                                            (1 Point)                                  [ ] Serious infection (ie PNA)                            (1 Point)                     [ X] Lung disease ( COPD, Emphysema)            (1 Point)                                                                          [ ] Acute myocardial infarction                         (1 Point)                  [ ] Congestive heart failure (in the previous month)  (1 Point)         [ ] Inflammatory bowel disease                            (1 Point)                  [ ] Central venous access, PICC or Port               (2 points)       (within the last month)                                                                [ ] Stroke (in the previous month)                        (5 Points)    [ ] Previous or present malignancy                       (2 points)                                                                                                                                                         HEMATOLOGY RELATED FACTORS                                                         [ ] Prior episodes of VTE                                     (3 Points)                     [ ] Positive family history for VTE                      (3 Points)                  [ ] Prothrombin 84473 A                                     (3 Points)                     [ ] Factor V Leiden                                                (3 Points)                        [ ] Lupus anticoagulants                                      (3 Points)                                                           [ ] Anticardiolipin antibodies                              (3 Points)                                                       [ ] High homocysteine in the blood                   (3 Points)                                             [ ] Other congenital or acquired thrombophilia      (3 Points)                                                [ ] Heparin induced thrombocytopenia                  (3 Points)                                        MOBILITY RELATED FACTORS  [ ] Bed rest                                                         (1 Point)  [ ] Plaster cast                                                    (2 points)  [ ] Bed bound for more than 72 hours           (2 Points)    GENDER SPECIFIC FACTORS  [ ] Pregnancy or had a baby within the last month   (1 Point)  [ ] Post-partum < 6 weeks                                   (1 Point)  [ ] Hormonal therapy  or oral contraception   (1 Point)  [ ] History of pregnancy complications              (1 point)  [ ] Unexplained or recurrent              (1 Point)    OTHER RISK FACTORS                                           (1 Point)  [ ] BMI >40, smoking, diabetes requiring insulin, chemotherapy  blood transfusions and length of surgery over 2 hours    SURGERY RELATED RISK FACTORS  [ ]  Section within the last month     (1 Point)  [ X] Minor surgery                                                  (1 Point)  [ ] Arthroscopic surgery                                       (2 Points)  [ ] Planned major surgery lasting more            (2 Points)      than 45 minutes     [ ] Elective hip or knee joint replacement       (5 points)       surgery                                                TRAUMA RELATED RISK FACTORS  [ ] Fracture of the hip, pelvis, or leg                       (5 Points)  [ ] Spinal cord injury resulting in paralysis             (5 points)       (in the previous month)    [ ] Paralysis  (less than 1 month)                             (5 Points)  [ ] Multiple Trauma within 1 month                        (5 Points)    Total Score [ 5     ]    Caprini Score 0-2: Low Risk, NO VTE prophylaxis required for most patients, encourage ambulation  Caprini Score 3-6: Moderate Risk , pharmacologic VTE prophylaxis is indicated for most patients (in the absence of contraindications)  Caprini Score Greater than or =7: High risk, pharmocologic VTE prophylaxis indicated for most patients (in the absence of contraindications)    OPIOID RISK TOOL    XIMENA EACH BOX THAT APPLIES AND ADD TOTALS AT THE END    FAMILY HISTORY OF SUBSTANCE ABUSE                 FEMALE         MALE                                                Alcohol                             [  ]1 pt          [  ]3pts                                               Illegal Durgs                     [  ]2 pts        [  ]3pts                                               Rx Drugs                           [  ]4 pts        [  ]4 pts    PERSONAL HISTORY OF SUBSTANCE ABUSE                                                                                          Alcohol                             [  ]3 pts       [  ]3 pts                                               Illegal Drugs                     [  ]4 pts        [  ]4 pts                                               Rx Drugs                           [  ]5 pts        [  ]5 pts    AGE BETWEEN 16-45 YEARS                                      [  ]1 pt         [  ]1 pt    HISTORY OF PREADOLESCENT   SEXUAL ABUSE                                                             [  ]3 pts        [  ]0pts    PSYCHOLOGICAL DISEASE                     ADD, OCD, Bipolar, Schizophrenia        [  ]2 pts         [  ]2 pts                      Depression                                               [  ]1 pt           [  ]1 pt           SCORING TOTAL   (0)                                     A score of 3 or lower indicated LOW risk for future opioid abuse  A score of 4 to 7 indicated moderate risk for future opioid abuse  A score of 8 or higher indicates a high risk for opioid abuse

## 2025-05-29 NOTE — H&P PST ADULT - PROBLEM SELECTOR PLAN 3
EKG, labs pending  Asked the patient to consult with cardiologist about holding ASA/Plavix and the pt  agreed.  pt aware to obtain cardiac optimization  Written & verbal instructions provided to pt for all education/instructions, questions encouraged/addressed, pt verbalized understandings of all education/instructions, teach back method utilized

## 2025-05-29 NOTE — H&P PST ADULT - PROBLEM SELECTOR PLAN 5
EKG, labs pending  instructed to HOLD mounjaro until after colonoscopy, educated on importance  Written & verbal instructions provided to pt for all education/instructions, questions encouraged/addressed, pt verbalized understandings of all education/instructions, teach back method utilized EKG, labs pending  instructed to HOLD mounjaro until after colonoscopy, educated on importance  will let surgeon know last dose moujaro was yesterday, pt instructed to call surgeon and let surgeon know also, educated on importance  Written & verbal instructions provided to pt for all education/instructions, questions encouraged/addressed, pt verbalized understandings of all education/instructions, teach back method utilized

## 2025-05-29 NOTE — H&P PST ADULT - HISTORY OF PRESENT ILLNESS
Pt is a pleasant 71 y/o female, PMH CAD, PPM, CABG X 3, elevated LFTs, HTN, emphysema, high cholesterol, hypothyroid, DM2, presents to PST in preparation for screening colonoscopy.  Per surgeon's note, "She underwent ureteral sling for urinary incontinence as well as rectocele repair in 2022. She did well for a while but has now noticed accumulation of stool in a pouch that requires splinting vaginally to evacuate. This occurs when her stoolis not formed or when she is constipated. No rectal bleeding. She has a history of anal fissure that has resolved and she has no pain or rectal bleeding. Her last colonoscopy was in December 2014."  Pt due for screening colonoscopy, denies rectal bleeding or current constipation/diarrhea, change in bowel pattern, abdominal pain.  Pt reports feeling generally well, denies recent fever/cough/cold, infection or abx use.  Scheduled for colonoscopy on 6.4.2025 with Dr. Lujan, pending medical and cardiac optimization.  Pt is a pleasant 69 y/o female, PMH CAD, CABG X 3, NAFLD, elevated LFTs, HTN, emphysema, high cholesterol, hypothyroid, DM2, presents to PST in preparation for screening colonoscopy.  Per surgeon's note, "She underwent ureteral sling for urinary incontinence as well as rectocele repair in 2022. She did well for a while but has now noticed accumulation of stool in a pouch that requires splinting vaginally to evacuate. This occurs when her stool is not formed or when she is constipated. No rectal bleeding. She has a history of anal fissure that has resolved and she has no pain or rectal bleeding. Her last colonoscopy was in December 2014."  Pt due for screening colonoscopy, denies rectal bleeding or current constipation/diarrhea, change in bowel pattern, abdominal pain.  Pt reports feeling generally well, denies recent fever/cough/cold, infection or abx use.  Scheduled for colonoscopy on 6.4.2025 with Dr. Lujan, pending medical and cardiac optimization.

## 2025-05-29 NOTE — H&P PST ADULT - NSICDXPASTMEDICALHX_GEN_ALL_CORE_FT
PAST MEDICAL HISTORY:  Arthritis     CAD (coronary artery disease)     Chronic bronchiolitis     Diabetes     Elevated LFTs     Hashimoto's thyroiditis     High triglycerides     Hyperlipidemia     Hypertension     Hypothyroidism     Obesity     Screening for colon cancer

## 2025-05-29 NOTE — H&P PST ADULT - PROBLEM SELECTOR PLAN 7
Total Score [ 5     ]    Caprini Score 3-6: Moderate Risk , pharmacologic VTE prophylaxis is indicated for most patients (in the absence of contraindications)

## 2025-05-29 NOTE — H&P PST ADULT - PROBLEM SELECTOR PLAN 1
EKG, labs pending  pt instructed to f/u with surgeon regarding instructions for colon prep - educated on importance of colon prep for successful colonoscopy/verbalized understandings  Patient educated on surgical scrub, COVID testing, preadmission instructions, medical clearance and day of procedure medications, verbalizes understanding.   Pt instructed to stop vitamins/supplements/herbal medications/ASA/NSAIDS for one week prior to surgery and discuss with PMD.   Written & verbal instructions provided to pt for all education/instructions, questions encouraged/addressed, pt verbalized understandings of all education/instructions, teach back method utilized

## 2025-05-29 NOTE — H&P PST ADULT - PROBLEM SELECTOR PLAN 4
EKG, labs pending  instructed to continue cholesterol medication as scheduled/prescribed and to take normal morning meds DOS with small sip water  Written & verbal instructions provided to pt for all education/instructions, questions encouraged/addressed, pt verbalized understandings of all education/instructions, teach back method utilized

## 2025-05-29 NOTE — H&P PST ADULT - CARDIOVASCULAR COMMENTS
left anterior CW PPM palpated, well healed surgical scars hx CABG X 2 5/2024, PPM, on ASA/plavix, following DrRonda  - obtaining cardiac clearance hx CABG X 2 5/2024, on ASA/plavix, following Dr. Martinez- obtaining cardiac clearance

## 2025-05-30 ENCOUNTER — APPOINTMENT (OUTPATIENT)
Dept: INTERNAL MEDICINE | Facility: CLINIC | Age: 70
End: 2025-05-30
Payer: MEDICARE

## 2025-05-30 VITALS
HEART RATE: 88 BPM | RESPIRATION RATE: 14 BRPM | HEIGHT: 61 IN | WEIGHT: 198 LBS | BODY MASS INDEX: 37.38 KG/M2 | OXYGEN SATURATION: 96 % | DIASTOLIC BLOOD PRESSURE: 72 MMHG | SYSTOLIC BLOOD PRESSURE: 124 MMHG

## 2025-05-30 DIAGNOSIS — J44.9 CHRONIC OBSTRUCTIVE PULMONARY DISEASE, UNSPECIFIED: ICD-10-CM

## 2025-05-30 DIAGNOSIS — L03.119 CELLULITIS OF UNSPECIFIED PART OF LIMB: ICD-10-CM

## 2025-05-30 DIAGNOSIS — E78.5 HYPERLIPIDEMIA, UNSPECIFIED: ICD-10-CM

## 2025-05-30 DIAGNOSIS — I10 ESSENTIAL (PRIMARY) HYPERTENSION: ICD-10-CM

## 2025-05-30 DIAGNOSIS — Z01.818 ENCOUNTER FOR OTHER PREPROCEDURAL EXAMINATION: ICD-10-CM

## 2025-05-30 DIAGNOSIS — I25.10 ATHEROSCLEROTIC HEART DISEASE OF NATIVE CORONARY ARTERY W/OUT ANGINA PECTORIS: ICD-10-CM

## 2025-05-30 DIAGNOSIS — K76.0 FATTY (CHANGE OF) LIVER, NOT ELSEWHERE CLASSIFIED: ICD-10-CM

## 2025-05-30 PROBLEM — R79.89 OTHER SPECIFIED ABNORMAL FINDINGS OF BLOOD CHEMISTRY: Chronic | Status: ACTIVE | Noted: 2025-05-29

## 2025-05-30 PROCEDURE — G2211 COMPLEX E/M VISIT ADD ON: CPT

## 2025-05-30 PROCEDURE — 99214 OFFICE O/P EST MOD 30 MIN: CPT

## 2025-06-02 ENCOUNTER — RESULT REVIEW (OUTPATIENT)
Age: 70
End: 2025-06-02

## 2025-06-02 ENCOUNTER — APPOINTMENT (OUTPATIENT)
Dept: ULTRASOUND IMAGING | Facility: CLINIC | Age: 70
End: 2025-06-02
Payer: MEDICARE

## 2025-06-02 PROCEDURE — 19083 BX BREAST 1ST LESION US IMAG: CPT | Mod: LT

## 2025-06-02 PROCEDURE — 77065 DX MAMMO INCL CAD UNI: CPT | Mod: LT

## 2025-06-02 PROCEDURE — A4648: CPT

## 2025-06-03 ENCOUNTER — APPOINTMENT (OUTPATIENT)
Dept: UROGYNECOLOGY | Facility: CLINIC | Age: 70
End: 2025-06-03

## 2025-06-03 VITALS
SYSTOLIC BLOOD PRESSURE: 141 MMHG | WEIGHT: 198 LBS | OXYGEN SATURATION: 97 % | DIASTOLIC BLOOD PRESSURE: 81 MMHG | TEMPERATURE: 98.1 F | BODY MASS INDEX: 37.41 KG/M2 | HEART RATE: 71 BPM

## 2025-06-03 LAB
BILIRUB UR QL STRIP: NEGATIVE
CLARITY UR: CLEAR
COLLECTION METHOD: NORMAL
GLUCOSE UR-MCNC: NEGATIVE
HCG UR QL: 0.2 EU/DL
HGB UR QL STRIP.AUTO: NEGATIVE
KETONES UR-MCNC: NEGATIVE
LEUKOCYTE ESTERASE UR QL STRIP: NEGATIVE
NITRITE UR QL STRIP: NEGATIVE
PH UR STRIP: 5.5
PROT UR STRIP-MCNC: NORMAL
SP GR UR STRIP: 1.02

## 2025-06-03 PROCEDURE — 52000 CYSTOURETHROSCOPY: CPT

## 2025-06-03 PROCEDURE — 81003 URINALYSIS AUTO W/O SCOPE: CPT | Mod: QW

## 2025-06-04 ENCOUNTER — APPOINTMENT (OUTPATIENT)
Dept: ENDOCRINOLOGY | Facility: CLINIC | Age: 70
End: 2025-06-04
Payer: MEDICARE

## 2025-06-04 VITALS
BODY MASS INDEX: 37.76 KG/M2 | WEIGHT: 200 LBS | SYSTOLIC BLOOD PRESSURE: 118 MMHG | OXYGEN SATURATION: 98 % | HEART RATE: 85 BPM | HEIGHT: 61 IN | DIASTOLIC BLOOD PRESSURE: 78 MMHG

## 2025-06-04 DIAGNOSIS — E11.9 TYPE 2 DIABETES MELLITUS W/OUT COMPLICATIONS: ICD-10-CM

## 2025-06-04 DIAGNOSIS — E03.9 HYPOTHYROIDISM, UNSPECIFIED: ICD-10-CM

## 2025-06-04 DIAGNOSIS — M85.851 OTHER SPECIFIED DISORDERS OF BONE DENSITY AND STRUCTURE, RIGHT THIGH: ICD-10-CM

## 2025-06-04 DIAGNOSIS — M85.852 OTHER SPECIFIED DISORDERS OF BONE DENSITY AND STRUCTURE, RIGHT THIGH: ICD-10-CM

## 2025-06-04 PROBLEM — I25.10 ATHEROSCLEROTIC HEART DISEASE OF NATIVE CORONARY ARTERY WITHOUT ANGINA PECTORIS: Chronic | Status: ACTIVE | Noted: 2025-05-29

## 2025-06-04 PROBLEM — Z12.11 ENCOUNTER FOR SCREENING FOR MALIGNANT NEOPLASM OF COLON: Chronic | Status: ACTIVE | Noted: 2025-05-29

## 2025-06-04 PROCEDURE — 95251 CONT GLUC MNTR ANALYSIS I&R: CPT

## 2025-06-04 PROCEDURE — G2211 COMPLEX E/M VISIT ADD ON: CPT

## 2025-06-04 PROCEDURE — 99214 OFFICE O/P EST MOD 30 MIN: CPT

## 2025-06-05 ENCOUNTER — NON-APPOINTMENT (OUTPATIENT)
Age: 70
End: 2025-06-05

## 2025-06-10 ENCOUNTER — APPOINTMENT (OUTPATIENT)
Dept: UROGYNECOLOGY | Facility: CLINIC | Age: 70
End: 2025-06-10

## 2025-06-17 ENCOUNTER — APPOINTMENT (OUTPATIENT)
Dept: UROGYNECOLOGY | Facility: CLINIC | Age: 70
End: 2025-06-17

## 2025-06-18 ENCOUNTER — RESULT REVIEW (OUTPATIENT)
Age: 70
End: 2025-06-18

## 2025-06-18 ENCOUNTER — APPOINTMENT (OUTPATIENT)
Dept: ULTRASOUND IMAGING | Facility: HOSPITAL | Age: 70
End: 2025-06-18

## 2025-06-18 ENCOUNTER — OUTPATIENT (OUTPATIENT)
Dept: OUTPATIENT SERVICES | Facility: HOSPITAL | Age: 70
LOS: 1 days | End: 2025-06-18
Payer: MEDICARE

## 2025-06-18 VITALS
OXYGEN SATURATION: 97 % | RESPIRATION RATE: 17 BRPM | TEMPERATURE: 98 F | DIASTOLIC BLOOD PRESSURE: 78 MMHG | SYSTOLIC BLOOD PRESSURE: 144 MMHG | HEART RATE: 75 BPM

## 2025-06-18 DIAGNOSIS — K76.0 FATTY (CHANGE OF) LIVER, NOT ELSEWHERE CLASSIFIED: ICD-10-CM

## 2025-06-18 DIAGNOSIS — Z98.890 OTHER SPECIFIED POSTPROCEDURAL STATES: Chronic | ICD-10-CM

## 2025-06-18 DIAGNOSIS — Z95.1 PRESENCE OF AORTOCORONARY BYPASS GRAFT: Chronic | ICD-10-CM

## 2025-06-18 DIAGNOSIS — Z98.891 HISTORY OF UTERINE SCAR FROM PREVIOUS SURGERY: Chronic | ICD-10-CM

## 2025-06-18 PROCEDURE — 88307 TISSUE EXAM BY PATHOLOGIST: CPT

## 2025-06-18 PROCEDURE — 88341 IMHCHEM/IMCYTCHM EA ADD ANTB: CPT

## 2025-06-18 PROCEDURE — 88307 TISSUE EXAM BY PATHOLOGIST: CPT | Mod: 26

## 2025-06-18 PROCEDURE — 47000 NEEDLE BIOPSY OF LIVER PERQ: CPT

## 2025-06-18 PROCEDURE — 76942 ECHO GUIDE FOR BIOPSY: CPT | Mod: 26

## 2025-06-18 PROCEDURE — 88342 IMHCHEM/IMCYTCHM 1ST ANTB: CPT | Mod: 26

## 2025-06-18 PROCEDURE — 76942 ECHO GUIDE FOR BIOPSY: CPT

## 2025-06-18 PROCEDURE — 88342 IMHCHEM/IMCYTCHM 1ST ANTB: CPT

## 2025-06-18 PROCEDURE — 88341 IMHCHEM/IMCYTCHM EA ADD ANTB: CPT | Mod: 26

## 2025-06-18 PROCEDURE — 88313 SPECIAL STAINS GROUP 2: CPT

## 2025-06-18 PROCEDURE — 88313 SPECIAL STAINS GROUP 2: CPT | Mod: 26

## 2025-06-18 RX ORDER — TIRZEPATIDE 7.5 MG/.5ML
7.5 INJECTION, SOLUTION SUBCUTANEOUS
Refills: 0 | DISCHARGE

## 2025-06-18 RX ORDER — ACETAMINOPHEN 500 MG/5ML
650 LIQUID (ML) ORAL EVERY 6 HOURS
Refills: 0 | Status: ACTIVE | OUTPATIENT
Start: 2025-06-18 | End: 2026-05-17

## 2025-06-18 RX ORDER — ROSUVASTATIN CALCIUM 20 MG/1
1 TABLET, FILM COATED ORAL
Refills: 0 | DISCHARGE

## 2025-06-18 RX ORDER — METOPROLOL SUCCINATE 50 MG/1
1 TABLET, EXTENDED RELEASE ORAL
Refills: 0 | DISCHARGE

## 2025-06-18 RX ORDER — BUDESONIDE AND FORMOTEROL FUMARATE DIHYDRATE 80; 4.5 UG/1; UG/1
2 AEROSOL RESPIRATORY (INHALATION)
Refills: 0 | DISCHARGE

## 2025-06-18 RX ORDER — EVOLOCUMAB 140 MG/ML
140 INJECTION, SOLUTION SUBCUTANEOUS
Refills: 0 | DISCHARGE

## 2025-06-18 NOTE — ASU PATIENT PROFILE, ADULT - NSICDXPASTSURGICALHX_GEN_ALL_CORE_FT
PAST SURGICAL HISTORY:  H/O  section     H/O eye surgery     H/O rectocele repair     H/O tubal ligation     History of D&C     S/P CABG x 3     S/P urethral surgery

## 2025-06-18 NOTE — ASU PATIENT PROFILE, ADULT - FALL HARM RISK - UNIVERSAL INTERVENTIONS
Bed in lowest position, wheels locked, appropriate side rails in place/Call bell, personal items and telephone in reach/Instruct patient to call for assistance before getting out of bed or chair/Non-slip footwear when patient is out of bed/[place] to call system/Physically safe environment - no spills, clutter or unnecessary equipment/Purposeful Proactive Rounding/Room/bathroom lighting operational, light cord in reach

## 2025-06-19 ENCOUNTER — NON-APPOINTMENT (OUTPATIENT)
Age: 70
End: 2025-06-19

## 2025-06-24 ENCOUNTER — OUTPATIENT (OUTPATIENT)
Dept: OUTPATIENT SERVICES | Facility: HOSPITAL | Age: 70
LOS: 1 days | End: 2025-06-24
Payer: MEDICARE

## 2025-06-24 ENCOUNTER — APPOINTMENT (OUTPATIENT)
Dept: UROGYNECOLOGY | Facility: CLINIC | Age: 70
End: 2025-06-24

## 2025-06-24 ENCOUNTER — APPOINTMENT (OUTPATIENT)
Dept: RADIOLOGY | Facility: CLINIC | Age: 70
End: 2025-06-24
Payer: MEDICARE

## 2025-06-24 DIAGNOSIS — Z98.890 OTHER SPECIFIED POSTPROCEDURAL STATES: Chronic | ICD-10-CM

## 2025-06-24 DIAGNOSIS — Z98.51 TUBAL LIGATION STATUS: Chronic | ICD-10-CM

## 2025-06-24 DIAGNOSIS — Z98.891 HISTORY OF UTERINE SCAR FROM PREVIOUS SURGERY: Chronic | ICD-10-CM

## 2025-06-24 DIAGNOSIS — M85.851 OTHER SPECIFIED DISORDERS OF BONE DENSITY AND STRUCTURE, RIGHT THIGH: ICD-10-CM

## 2025-06-24 DIAGNOSIS — Z95.1 PRESENCE OF AORTOCORONARY BYPASS GRAFT: Chronic | ICD-10-CM

## 2025-06-24 PROCEDURE — 77085 DXA BONE DENSITY AXL VRT FX: CPT

## 2025-06-24 PROCEDURE — 77085 DXA BONE DENSITY AXL VRT FX: CPT | Mod: 26

## 2025-06-24 NOTE — PLAN
Hearing Loss: Care Instructions  Overview     Hearing loss is a sudden or slow decrease in how well you hear. It can range from slight to profound. Permanent hearing loss can occur with aging. It also can happen when you are exposed long-term to loud noise. Examples include listening to loud music, riding motorcycles, or being around other loud machines.  Hearing loss can affect your work and home life. It can make you feel lonely or depressed. You may feel that you have lost your independence. But hearing aids and other devices can help you hear better and feel connected to others.  Follow-up care is a key part of your treatment and safety. Be sure to make and go to all appointments, and call your doctor if you are having problems. It's also a good idea to know your test results and keep a list of the medicines you take.  How can you care for yourself at home?  Avoid loud noises whenever possible. This helps keep your hearing from getting worse.  Always wear hearing protection around loud noises.  Wear a hearing aid as directed.  A professional can help you pick a hearing aid that will work best for you.  You can also get hearing aids over the counter for mild to moderate hearing loss.  Have hearing tests as your doctor suggests. They can show whether your hearing has changed. Your hearing aid may need to be adjusted.  Use other devices as needed. These may include:  Telephone amplifiers and hearing aids that can connect to a television, stereo, radio, or microphone.  Devices that use lights or vibrations. These alert you to the doorbell, a ringing telephone, or a baby monitor.  Television closed-captioning. This shows the words at the bottom of the screen. Most new TVs can do this.  TTY (text telephone). This lets you type messages back and forth on the telephone instead of talking or listening. These devices are also called TDD. When messages are typed on the keyboard, they are sent over the phone line to a 
[FreeTextEntry1] : HCM: \par -labs reviewed with pt\par -pt deferring mammo \par -iFOBT provided \par -script for lung cancer screening low dose CT provided \par \par T2DM: a1c 10.6, increase glipizide to 7.5mg for 1 week, if fasting glucose still well above 180, increase to 10mg, check microalbumin, advise f/u with ophtho for yearly diabetic eye exam, pt is on statin and ARB \par HTN: elevated today, pt showed me her log at home, mostly under 140/80, pt to cut down on salt, continue norvasc and losartan \par HLD: continue rosuvastatin \par 
[FreeTextEntry1] : HCM: \par -labs reviewed with pt\par -pt deferring mammo \par -iFOBT provided \par -script for lung cancer screening low dose CT provided \par \par T2DM: a1c 10.6, increase glipizide to 7.5mg for 1 week, if fasting glucose still well above 180, increase to 10mg, check microalbumin, advise f/u with ophtho for yearly diabetic eye exam, pt is on statin and ARB \par HTN: elevated today, pt showed me her log at home, mostly under 140/80, pt to cut down on salt, continue norvasc and losartan \par HLD: continue rosuvastatin \par

## 2025-06-27 ENCOUNTER — TRANSCRIPTION ENCOUNTER (OUTPATIENT)
Age: 70
End: 2025-06-27

## 2025-06-27 PROBLEM — S36.13XA BILE DUCT INJURY: Status: ACTIVE | Noted: 2025-06-27

## 2025-07-01 ENCOUNTER — APPOINTMENT (OUTPATIENT)
Dept: UROGYNECOLOGY | Facility: CLINIC | Age: 70
End: 2025-07-01

## 2025-07-02 ENCOUNTER — TRANSCRIPTION ENCOUNTER (OUTPATIENT)
Age: 70
End: 2025-07-02

## 2025-07-02 LAB
ALBUMIN SERPL ELPH-MCNC: 4 G/DL
ALP BLD-CCNC: 118 U/L
ALT SERPL-CCNC: 10 U/L
ANION GAP SERPL CALC-SCNC: 15 MMOL/L
AST SERPL-CCNC: 21 U/L
BASOPHILS # BLD AUTO: 0.05 K/UL
BASOPHILS NFR BLD AUTO: 0.5 %
BILIRUB SERPL-MCNC: 0.2 MG/DL
BUN SERPL-MCNC: 17 MG/DL
CALCIUM SERPL-MCNC: 9 MG/DL
CHLORIDE SERPL-SCNC: 107 MMOL/L
CO2 SERPL-SCNC: 23 MMOL/L
CREAT SERPL-MCNC: 0.81 MG/DL
EGFRCR SERPLBLD CKD-EPI 2021: 79 ML/MIN/1.73M2
EOSINOPHIL # BLD AUTO: 0.49 K/UL
EOSINOPHIL NFR BLD AUTO: 4.7 %
GLUCOSE SERPL-MCNC: 137 MG/DL
HCT VFR BLD CALC: 41.9 %
HGB BLD-MCNC: 12.6 G/DL
IMM GRANULOCYTES NFR BLD AUTO: 0.4 %
INR PPP: 0.97 RATIO
LYMPHOCYTES # BLD AUTO: 1.85 K/UL
LYMPHOCYTES NFR BLD AUTO: 17.9 %
MAN DIFF?: NORMAL
MCHC RBC-ENTMCNC: 26.9 PG
MCHC RBC-ENTMCNC: 30.1 G/DL
MCV RBC AUTO: 89.5 FL
MITOCHONDRIA AB SER IF-ACNC: NORMAL
MONOCYTES # BLD AUTO: 0.82 K/UL
MONOCYTES NFR BLD AUTO: 7.9 %
NEUTROPHILS # BLD AUTO: 7.09 K/UL
NEUTROPHILS NFR BLD AUTO: 68.6 %
PLATELET # BLD AUTO: 257 K/UL
POTASSIUM SERPL-SCNC: 4.6 MMOL/L
PROT SERPL-MCNC: 6.6 G/DL
PT BLD: 11.5 SEC
RBC # BLD: 4.68 M/UL
RBC # FLD: 14.5 %
SODIUM SERPL-SCNC: 146 MMOL/L
WBC # FLD AUTO: 10.34 K/UL

## 2025-07-03 ENCOUNTER — TRANSCRIPTION ENCOUNTER (OUTPATIENT)
Age: 70
End: 2025-07-03

## 2025-07-03 RX ORDER — URSODIOL 300 MG/1
300 CAPSULE ORAL
Qty: 60 | Refills: 5 | Status: ACTIVE | COMMUNITY
Start: 2025-06-27 | End: 1900-01-01

## 2025-07-04 LAB
ANTI - GP210 ANTIBODY, IGG: 1.7 UNITS
ANTI - SP100 ANTIBODY, IGG: 1.1 UNITS

## 2025-07-07 ENCOUNTER — APPOINTMENT (OUTPATIENT)
Dept: BREAST CENTER | Facility: CLINIC | Age: 70
End: 2025-07-07

## 2025-07-07 PROCEDURE — 99205 OFFICE O/P NEW HI 60 MIN: CPT | Mod: 2W

## 2025-07-08 ENCOUNTER — APPOINTMENT (OUTPATIENT)
Dept: UROGYNECOLOGY | Facility: CLINIC | Age: 70
End: 2025-07-08

## 2025-07-11 ENCOUNTER — NON-APPOINTMENT (OUTPATIENT)
Age: 70
End: 2025-07-11

## 2025-07-15 ENCOUNTER — NON-APPOINTMENT (OUTPATIENT)
Age: 70
End: 2025-07-15

## 2025-07-15 ENCOUNTER — APPOINTMENT (OUTPATIENT)
Dept: UROGYNECOLOGY | Facility: CLINIC | Age: 70
End: 2025-07-15

## 2025-07-16 ENCOUNTER — NON-APPOINTMENT (OUTPATIENT)
Age: 70
End: 2025-07-16

## 2025-07-17 ENCOUNTER — APPOINTMENT (OUTPATIENT)
Dept: MRI IMAGING | Facility: CLINIC | Age: 70
End: 2025-07-17
Payer: MEDICARE

## 2025-07-17 ENCOUNTER — NON-APPOINTMENT (OUTPATIENT)
Age: 70
End: 2025-07-17

## 2025-07-17 ENCOUNTER — OUTPATIENT (OUTPATIENT)
Dept: OUTPATIENT SERVICES | Facility: HOSPITAL | Age: 70
LOS: 1 days | End: 2025-07-17
Payer: MEDICARE

## 2025-07-17 DIAGNOSIS — Z98.891 HISTORY OF UTERINE SCAR FROM PREVIOUS SURGERY: Chronic | ICD-10-CM

## 2025-07-17 DIAGNOSIS — Z98.890 OTHER SPECIFIED POSTPROCEDURAL STATES: Chronic | ICD-10-CM

## 2025-07-17 DIAGNOSIS — Z98.51 TUBAL LIGATION STATUS: Chronic | ICD-10-CM

## 2025-07-17 DIAGNOSIS — R92.8 OTHER ABNORMAL AND INCONCLUSIVE FINDINGS ON DIAGNOSTIC IMAGING OF BREAST: ICD-10-CM

## 2025-07-17 PROCEDURE — A9585: CPT

## 2025-07-17 PROCEDURE — C8937: CPT

## 2025-07-17 PROCEDURE — C8908: CPT

## 2025-07-17 PROCEDURE — 77049 MRI BREAST C-+ W/CAD BI: CPT | Mod: 26

## 2025-07-22 ENCOUNTER — APPOINTMENT (OUTPATIENT)
Dept: UROGYNECOLOGY | Facility: CLINIC | Age: 70
End: 2025-07-22

## 2025-07-23 ENCOUNTER — NON-APPOINTMENT (OUTPATIENT)
Age: 70
End: 2025-07-23

## 2025-07-23 ENCOUNTER — APPOINTMENT (OUTPATIENT)
Dept: BREAST CENTER | Facility: CLINIC | Age: 70
End: 2025-07-23
Payer: MEDICARE

## 2025-07-23 ENCOUNTER — APPOINTMENT (OUTPATIENT)
Dept: CARDIOLOGY | Facility: CLINIC | Age: 70
End: 2025-07-23
Payer: MEDICARE

## 2025-07-23 VITALS
BODY MASS INDEX: 38.19 KG/M2 | OXYGEN SATURATION: 97 % | DIASTOLIC BLOOD PRESSURE: 76 MMHG | SYSTOLIC BLOOD PRESSURE: 121 MMHG | HEART RATE: 78 BPM | HEIGHT: 61 IN | WEIGHT: 202.27 LBS

## 2025-07-23 VITALS
SYSTOLIC BLOOD PRESSURE: 137 MMHG | OXYGEN SATURATION: 96 % | BODY MASS INDEX: 37.95 KG/M2 | DIASTOLIC BLOOD PRESSURE: 85 MMHG | WEIGHT: 201 LBS | HEIGHT: 61 IN | HEART RATE: 79 BPM

## 2025-07-23 DIAGNOSIS — D24.2 BENIGN NEOPLASM OF LEFT BREAST: ICD-10-CM

## 2025-07-23 DIAGNOSIS — Z09 ENCOUNTER FOR FOLLOW-UP EXAMINATION AFTER COMPLETED TREATMENT FOR CONDITIONS OTHER THAN MALIGNANT NEOPLASM: ICD-10-CM

## 2025-07-23 DIAGNOSIS — I25.10 ATHEROSCLEROTIC HEART DISEASE OF NATIVE CORONARY ARTERY W/OUT ANGINA PECTORIS: ICD-10-CM

## 2025-07-23 DIAGNOSIS — I10 ESSENTIAL (PRIMARY) HYPERTENSION: ICD-10-CM

## 2025-07-23 DIAGNOSIS — Z76.89 PERSONS ENCOUNTERING HEALTH SERVICES IN OTHER SPECIFIED CIRCUMSTANCES: ICD-10-CM

## 2025-07-23 PROCEDURE — 99214 OFFICE O/P EST MOD 30 MIN: CPT

## 2025-07-23 PROCEDURE — 99215 OFFICE O/P EST HI 40 MIN: CPT

## 2025-07-23 PROCEDURE — G2211 COMPLEX E/M VISIT ADD ON: CPT

## 2025-07-23 PROCEDURE — 93000 ELECTROCARDIOGRAM COMPLETE: CPT

## 2025-07-27 PROBLEM — D24.1 INTRADUCTAL PAPILLOMA OF RIGHT BREAST: Status: ACTIVE | Noted: 2025-07-27

## 2025-07-27 PROBLEM — Z71.2 PERSON CONSULTING FOR EXPLANATION OF EXAMINATION OR TEST FINDINGS: Status: ACTIVE | Noted: 2025-07-04

## 2025-07-29 ENCOUNTER — APPOINTMENT (OUTPATIENT)
Dept: INTERNAL MEDICINE | Facility: CLINIC | Age: 70
End: 2025-07-29
Payer: MEDICARE

## 2025-07-29 DIAGNOSIS — Z01.818 ENCOUNTER FOR OTHER PREPROCEDURAL EXAMINATION: ICD-10-CM

## 2025-07-29 DIAGNOSIS — E66.813 OBESITY, CLASS 3: ICD-10-CM

## 2025-07-29 DIAGNOSIS — E11.9 TYPE 2 DIABETES MELLITUS W/OUT COMPLICATIONS: ICD-10-CM

## 2025-07-29 PROCEDURE — 99214 OFFICE O/P EST MOD 30 MIN: CPT

## 2025-07-30 ENCOUNTER — NON-APPOINTMENT (OUTPATIENT)
Age: 70
End: 2025-07-30

## 2025-08-01 ENCOUNTER — APPOINTMENT (OUTPATIENT)
Dept: BREAST CENTER | Facility: CLINIC | Age: 70
End: 2025-08-01
Payer: MEDICARE

## 2025-08-01 DIAGNOSIS — Z09 ENCOUNTER FOR FOLLOW-UP EXAMINATION AFTER COMPLETED TREATMENT FOR CONDITIONS OTHER THAN MALIGNANT NEOPLASM: ICD-10-CM

## 2025-08-01 DIAGNOSIS — R92.8 OTHER ABNORMAL AND INCONCLUSIVE FINDINGS ON DIAGNOSTIC IMAGING OF BREAST: ICD-10-CM

## 2025-08-01 DIAGNOSIS — D36.9 BENIGN NEOPLASM, UNSPECIFIED SITE: ICD-10-CM

## 2025-08-01 DIAGNOSIS — D24.2 BENIGN NEOPLASM OF LEFT BREAST: ICD-10-CM

## 2025-08-01 PROCEDURE — 99215 OFFICE O/P EST HI 40 MIN: CPT | Mod: 2W

## 2025-08-04 ENCOUNTER — OUTPATIENT (OUTPATIENT)
Dept: OUTPATIENT SERVICES | Facility: HOSPITAL | Age: 70
LOS: 1 days | End: 2025-08-04
Payer: MEDICARE

## 2025-08-04 VITALS
TEMPERATURE: 97 F | DIASTOLIC BLOOD PRESSURE: 66 MMHG | SYSTOLIC BLOOD PRESSURE: 112 MMHG | RESPIRATION RATE: 18 BRPM | HEART RATE: 69 BPM | HEIGHT: 61 IN | WEIGHT: 191.8 LBS | OXYGEN SATURATION: 99 %

## 2025-08-04 DIAGNOSIS — Z95.1 PRESENCE OF AORTOCORONARY BYPASS GRAFT: Chronic | ICD-10-CM

## 2025-08-04 DIAGNOSIS — Z29.9 ENCOUNTER FOR PROPHYLACTIC MEASURES, UNSPECIFIED: ICD-10-CM

## 2025-08-04 DIAGNOSIS — D24.2 BENIGN NEOPLASM OF LEFT BREAST: ICD-10-CM

## 2025-08-04 DIAGNOSIS — Z98.51 TUBAL LIGATION STATUS: Chronic | ICD-10-CM

## 2025-08-04 DIAGNOSIS — Z98.890 OTHER SPECIFIED POSTPROCEDURAL STATES: Chronic | ICD-10-CM

## 2025-08-04 DIAGNOSIS — J44.9 CHRONIC OBSTRUCTIVE PULMONARY DISEASE, UNSPECIFIED: ICD-10-CM

## 2025-08-04 DIAGNOSIS — Z98.891 HISTORY OF UTERINE SCAR FROM PREVIOUS SURGERY: Chronic | ICD-10-CM

## 2025-08-04 DIAGNOSIS — E03.9 HYPOTHYROIDISM, UNSPECIFIED: ICD-10-CM

## 2025-08-04 DIAGNOSIS — I25.10 ATHEROSCLEROTIC HEART DISEASE OF NATIVE CORONARY ARTERY WITHOUT ANGINA PECTORIS: ICD-10-CM

## 2025-08-04 DIAGNOSIS — Z01.818 ENCOUNTER FOR OTHER PREPROCEDURAL EXAMINATION: ICD-10-CM

## 2025-08-04 DIAGNOSIS — E11.9 TYPE 2 DIABETES MELLITUS WITHOUT COMPLICATIONS: ICD-10-CM

## 2025-08-04 DIAGNOSIS — I10 ESSENTIAL (PRIMARY) HYPERTENSION: ICD-10-CM

## 2025-08-04 PROBLEM — Z12.11 ENCOUNTER FOR SCREENING FOR MALIGNANT NEOPLASM OF COLON: Chronic | Status: INACTIVE | Noted: 2025-05-29 | Resolved: 2025-08-04

## 2025-08-04 LAB
A1C WITH ESTIMATED AVERAGE GLUCOSE RESULT: 6.9 % — HIGH (ref 4–5.6)
ALBUMIN SERPL ELPH-MCNC: 4.2 G/DL — SIGNIFICANT CHANGE UP (ref 3.3–5.2)
ALP SERPL-CCNC: 113 U/L — SIGNIFICANT CHANGE UP (ref 40–120)
ALT FLD-CCNC: 8 U/L — SIGNIFICANT CHANGE UP
ANION GAP SERPL CALC-SCNC: 11 MMOL/L — SIGNIFICANT CHANGE UP (ref 5–17)
APTT BLD: 29.5 SEC — SIGNIFICANT CHANGE UP (ref 26.1–36.8)
AST SERPL-CCNC: 22 U/L — SIGNIFICANT CHANGE UP
BASOPHILS # BLD AUTO: 0.09 K/UL — SIGNIFICANT CHANGE UP (ref 0–0.2)
BASOPHILS NFR BLD AUTO: 1 % — SIGNIFICANT CHANGE UP (ref 0–2)
BILIRUB SERPL-MCNC: 0.2 MG/DL — LOW (ref 0.4–2)
BUN SERPL-MCNC: 15.7 MG/DL — SIGNIFICANT CHANGE UP (ref 8–20)
CALCIUM SERPL-MCNC: 9.7 MG/DL — SIGNIFICANT CHANGE UP (ref 8.4–10.5)
CHLORIDE SERPL-SCNC: 103 MMOL/L — SIGNIFICANT CHANGE UP (ref 96–108)
CO2 SERPL-SCNC: 24 MMOL/L — SIGNIFICANT CHANGE UP (ref 22–29)
CREAT SERPL-MCNC: 0.87 MG/DL — SIGNIFICANT CHANGE UP (ref 0.5–1.3)
EGFR: 73 ML/MIN/1.73M2 — SIGNIFICANT CHANGE UP
EGFR: 73 ML/MIN/1.73M2 — SIGNIFICANT CHANGE UP
EOSINOPHIL # BLD AUTO: 0.57 K/UL — HIGH (ref 0–0.5)
EOSINOPHIL NFR BLD AUTO: 6.1 % — HIGH (ref 0–6)
ESTIMATED AVERAGE GLUCOSE: 151 MG/DL — HIGH (ref 68–114)
GLUCOSE SERPL-MCNC: 114 MG/DL — HIGH (ref 70–99)
HCT VFR BLD CALC: 41.6 % — SIGNIFICANT CHANGE UP (ref 34.5–45)
HGB BLD-MCNC: 12.6 G/DL — SIGNIFICANT CHANGE UP (ref 11.5–15.5)
IMM GRANULOCYTES # BLD AUTO: 0.04 K/UL — SIGNIFICANT CHANGE UP (ref 0–0.07)
IMM GRANULOCYTES NFR BLD AUTO: 0.4 % — SIGNIFICANT CHANGE UP (ref 0–0.9)
INR BLD: 0.97 RATIO — SIGNIFICANT CHANGE UP (ref 0.85–1.16)
LYMPHOCYTES # BLD AUTO: 2.66 K/UL — SIGNIFICANT CHANGE UP (ref 1–3.3)
LYMPHOCYTES NFR BLD AUTO: 28.6 % — SIGNIFICANT CHANGE UP (ref 13–44)
MCHC RBC-ENTMCNC: 26.1 PG — LOW (ref 27–34)
MCHC RBC-ENTMCNC: 30.3 G/DL — LOW (ref 32–36)
MCV RBC AUTO: 86.1 FL — SIGNIFICANT CHANGE UP (ref 80–100)
MONOCYTES # BLD AUTO: 0.82 K/UL — SIGNIFICANT CHANGE UP (ref 0–0.9)
MONOCYTES NFR BLD AUTO: 8.8 % — SIGNIFICANT CHANGE UP (ref 2–14)
NEUTROPHILS # BLD AUTO: 5.13 K/UL — SIGNIFICANT CHANGE UP (ref 1.8–7.4)
NEUTROPHILS NFR BLD AUTO: 55.1 % — SIGNIFICANT CHANGE UP (ref 43–77)
NRBC # BLD AUTO: 0 K/UL — SIGNIFICANT CHANGE UP (ref 0–0)
NRBC # FLD: 0 K/UL — SIGNIFICANT CHANGE UP (ref 0–0)
NRBC BLD AUTO-RTO: 0 /100 WBCS — SIGNIFICANT CHANGE UP (ref 0–0)
PLATELET # BLD AUTO: 248 K/UL — SIGNIFICANT CHANGE UP (ref 150–400)
PMV BLD: 12.2 FL — SIGNIFICANT CHANGE UP (ref 7–13)
POTASSIUM SERPL-MCNC: 4.7 MMOL/L — SIGNIFICANT CHANGE UP (ref 3.5–5.3)
POTASSIUM SERPL-SCNC: 4.7 MMOL/L — SIGNIFICANT CHANGE UP (ref 3.5–5.3)
PROT SERPL-MCNC: 7 G/DL — SIGNIFICANT CHANGE UP (ref 6.6–8.7)
PROTHROM AB SERPL-ACNC: 11 SEC — SIGNIFICANT CHANGE UP (ref 9.9–13.4)
RBC # BLD: 4.83 M/UL — SIGNIFICANT CHANGE UP (ref 3.8–5.2)
RBC # FLD: 13.8 % — SIGNIFICANT CHANGE UP (ref 10.3–14.5)
SODIUM SERPL-SCNC: 138 MMOL/L — SIGNIFICANT CHANGE UP (ref 135–145)
T3 SERPL-MCNC: 110 NG/DL — SIGNIFICANT CHANGE UP (ref 80–200)
T4 AB SER-ACNC: 8.6 UG/DL — SIGNIFICANT CHANGE UP (ref 4.5–12)
TSH SERPL-MCNC: 2.34 UIU/ML — SIGNIFICANT CHANGE UP (ref 0.27–4.2)
WBC # BLD: 9.31 K/UL — SIGNIFICANT CHANGE UP (ref 3.8–10.5)
WBC # FLD AUTO: 9.31 K/UL — SIGNIFICANT CHANGE UP (ref 3.8–10.5)

## 2025-08-04 PROCEDURE — 84443 ASSAY THYROID STIM HORMONE: CPT

## 2025-08-04 PROCEDURE — 80053 COMPREHEN METABOLIC PANEL: CPT

## 2025-08-04 PROCEDURE — 84436 ASSAY OF TOTAL THYROXINE: CPT

## 2025-08-04 PROCEDURE — 36415 COLL VENOUS BLD VENIPUNCTURE: CPT

## 2025-08-04 PROCEDURE — 85730 THROMBOPLASTIN TIME PARTIAL: CPT

## 2025-08-04 PROCEDURE — 83036 HEMOGLOBIN GLYCOSYLATED A1C: CPT

## 2025-08-04 PROCEDURE — G0463: CPT

## 2025-08-04 PROCEDURE — 84480 ASSAY TRIIODOTHYRONINE (T3): CPT

## 2025-08-04 PROCEDURE — 93005 ELECTROCARDIOGRAM TRACING: CPT

## 2025-08-04 PROCEDURE — 93010 ELECTROCARDIOGRAM REPORT: CPT

## 2025-08-04 PROCEDURE — 85025 COMPLETE CBC W/AUTO DIFF WBC: CPT

## 2025-08-04 PROCEDURE — 85610 PROTHROMBIN TIME: CPT

## 2025-08-05 PROBLEM — K76.0 FATTY (CHANGE OF) LIVER, NOT ELSEWHERE CLASSIFIED: Chronic | Status: ACTIVE | Noted: 2025-08-04

## 2025-08-05 PROBLEM — J44.89 OTHER SPECIFIED CHRONIC OBSTRUCTIVE PULMONARY DISEASE: Chronic | Status: ACTIVE | Noted: 2025-08-04

## 2025-08-05 PROBLEM — K74.3 PRIMARY BILIARY CIRRHOSIS: Chronic | Status: ACTIVE | Noted: 2025-08-04

## 2025-08-06 ENCOUNTER — APPOINTMENT (OUTPATIENT)
Dept: COLORECTAL SURGERY | Facility: CLINIC | Age: 70
End: 2025-08-06

## 2025-08-06 ENCOUNTER — RESULT REVIEW (OUTPATIENT)
Age: 70
End: 2025-08-06

## 2025-08-06 ENCOUNTER — OUTPATIENT (OUTPATIENT)
Dept: OUTPATIENT SERVICES | Facility: HOSPITAL | Age: 70
LOS: 1 days | End: 2025-08-06
Payer: MEDICARE

## 2025-08-06 ENCOUNTER — TRANSCRIPTION ENCOUNTER (OUTPATIENT)
Age: 70
End: 2025-08-06

## 2025-08-06 DIAGNOSIS — Z95.1 PRESENCE OF AORTOCORONARY BYPASS GRAFT: Chronic | ICD-10-CM

## 2025-08-06 DIAGNOSIS — Z12.11 ENCOUNTER FOR SCREENING FOR MALIGNANT NEOPLASM OF COLON: ICD-10-CM

## 2025-08-06 DIAGNOSIS — Z98.51 TUBAL LIGATION STATUS: Chronic | ICD-10-CM

## 2025-08-06 DIAGNOSIS — Z98.890 OTHER SPECIFIED POSTPROCEDURAL STATES: Chronic | ICD-10-CM

## 2025-08-06 DIAGNOSIS — Z98.891 HISTORY OF UTERINE SCAR FROM PREVIOUS SURGERY: Chronic | ICD-10-CM

## 2025-08-06 DIAGNOSIS — N81.6 RECTOCELE: ICD-10-CM

## 2025-08-06 PROBLEM — D24.2 BENIGN NEOPLASM OF LEFT BREAST: Chronic | Status: ACTIVE | Noted: 2025-08-04

## 2025-08-06 LAB — GLUCOSE BLDC GLUCOMTR-MCNC: 135 MG/DL — HIGH (ref 70–99)

## 2025-08-06 PROCEDURE — 88305 TISSUE EXAM BY PATHOLOGIST: CPT | Mod: 26

## 2025-08-06 PROCEDURE — 45380 COLONOSCOPY AND BIOPSY: CPT | Mod: PT

## 2025-08-06 PROCEDURE — 45378 DIAGNOSTIC COLONOSCOPY: CPT

## 2025-08-06 PROCEDURE — 82962 GLUCOSE BLOOD TEST: CPT

## 2025-08-06 PROCEDURE — 88305 TISSUE EXAM BY PATHOLOGIST: CPT

## 2025-08-07 PROBLEM — Z09 SURGICAL FOLLOW-UP CARE: Status: RESOLVED | Noted: 2025-07-22 | Resolved: 2025-08-01

## 2025-08-07 PROBLEM — D24.2 INTRADUCTAL PAPILLOMA OF LEFT BREAST: Status: ACTIVE | Noted: 2025-07-07

## 2025-08-07 PROBLEM — Z76.89 ENCOUNTER TO ESTABLISH CARE: Status: ACTIVE | Noted: 2025-07-04

## 2025-08-08 ENCOUNTER — APPOINTMENT (OUTPATIENT)
Dept: ULTRASOUND IMAGING | Facility: CLINIC | Age: 70
End: 2025-08-08
Payer: MEDICARE

## 2025-08-08 ENCOUNTER — RESULT REVIEW (OUTPATIENT)
Age: 70
End: 2025-08-08

## 2025-08-08 ENCOUNTER — NON-APPOINTMENT (OUTPATIENT)
Age: 70
End: 2025-08-08

## 2025-08-08 ENCOUNTER — OUTPATIENT (OUTPATIENT)
Dept: OUTPATIENT SERVICES | Facility: HOSPITAL | Age: 70
LOS: 1 days | End: 2025-08-08
Payer: MEDICARE

## 2025-08-08 DIAGNOSIS — Z98.890 OTHER SPECIFIED POSTPROCEDURAL STATES: Chronic | ICD-10-CM

## 2025-08-08 DIAGNOSIS — D36.9 BENIGN NEOPLASM, UNSPECIFIED SITE: ICD-10-CM

## 2025-08-08 DIAGNOSIS — Z98.51 TUBAL LIGATION STATUS: Chronic | ICD-10-CM

## 2025-08-08 DIAGNOSIS — N63.0 UNSPECIFIED LUMP IN UNSPECIFIED BREAST: ICD-10-CM

## 2025-08-08 PROBLEM — D24.2 INTRADUCTAL PAPILLOMA OF LEFT BREAST: Status: ACTIVE | Noted: 2025-07-22

## 2025-08-08 PROBLEM — R92.8 ABNORMAL ULTRASOUND OF BREAST: Status: ACTIVE | Noted: 2025-08-08

## 2025-08-08 LAB — SURGICAL PATHOLOGY STUDY: SIGNIFICANT CHANGE UP

## 2025-08-08 PROCEDURE — 19285 PERQ DEV BREAST 1ST US IMAG: CPT | Mod: LT

## 2025-08-08 PROCEDURE — C1739: CPT

## 2025-08-08 PROCEDURE — 19285 PERQ DEV BREAST 1ST US IMAG: CPT

## 2025-08-08 PROCEDURE — 19286 PERQ DEV BREAST ADD US IMAG: CPT

## 2025-08-08 PROCEDURE — 19286 PERQ DEV BREAST ADD US IMAG: CPT | Mod: LT

## 2025-08-11 ENCOUNTER — TRANSCRIPTION ENCOUNTER (OUTPATIENT)
Age: 70
End: 2025-08-11

## 2025-08-11 ENCOUNTER — APPOINTMENT (OUTPATIENT)
Dept: BREAST CENTER | Facility: HOSPITAL | Age: 70
End: 2025-08-11

## 2025-08-11 ENCOUNTER — OUTPATIENT (OUTPATIENT)
Dept: INPATIENT UNIT | Facility: HOSPITAL | Age: 70
LOS: 1 days | End: 2025-08-11
Payer: MEDICARE

## 2025-08-11 ENCOUNTER — RESULT REVIEW (OUTPATIENT)
Age: 70
End: 2025-08-11

## 2025-08-11 VITALS
SYSTOLIC BLOOD PRESSURE: 138 MMHG | TEMPERATURE: 98 F | HEART RATE: 69 BPM | RESPIRATION RATE: 20 BRPM | WEIGHT: 191.8 LBS | HEIGHT: 61 IN | DIASTOLIC BLOOD PRESSURE: 59 MMHG | OXYGEN SATURATION: 100 %

## 2025-08-11 VITALS
SYSTOLIC BLOOD PRESSURE: 149 MMHG | TEMPERATURE: 98 F | DIASTOLIC BLOOD PRESSURE: 73 MMHG | OXYGEN SATURATION: 100 % | RESPIRATION RATE: 18 BRPM | HEART RATE: 74 BPM

## 2025-08-11 DIAGNOSIS — D24.2 BENIGN NEOPLASM OF LEFT BREAST: ICD-10-CM

## 2025-08-11 DIAGNOSIS — Z98.890 OTHER SPECIFIED POSTPROCEDURAL STATES: Chronic | ICD-10-CM

## 2025-08-11 DIAGNOSIS — Z98.51 TUBAL LIGATION STATUS: Chronic | ICD-10-CM

## 2025-08-11 LAB
GLUCOSE BLDC GLUCOMTR-MCNC: 106 MG/DL — HIGH (ref 70–99)
GLUCOSE BLDC GLUCOMTR-MCNC: 133 MG/DL — HIGH (ref 70–99)

## 2025-08-11 PROCEDURE — 76098 X-RAY EXAM SURGICAL SPECIMEN: CPT | Mod: 26

## 2025-08-11 PROCEDURE — 19120 REMOVAL OF BREAST LESION: CPT | Mod: LT,XS

## 2025-08-11 PROCEDURE — 85730 THROMBOPLASTIN TIME PARTIAL: CPT

## 2025-08-11 PROCEDURE — 85610 PROTHROMBIN TIME: CPT

## 2025-08-11 PROCEDURE — 84480 ASSAY TRIIODOTHYRONINE (T3): CPT

## 2025-08-11 PROCEDURE — 19301 PARTIAL MASTECTOMY: CPT | Mod: LT

## 2025-08-11 PROCEDURE — 83036 HEMOGLOBIN GLYCOSYLATED A1C: CPT

## 2025-08-11 PROCEDURE — 19126 EXCISION ADDL BREAST LESION: CPT

## 2025-08-11 PROCEDURE — G0463: CPT

## 2025-08-11 PROCEDURE — 93005 ELECTROCARDIOGRAM TRACING: CPT

## 2025-08-11 PROCEDURE — 88307 TISSUE EXAM BY PATHOLOGIST: CPT | Mod: 26

## 2025-08-11 PROCEDURE — 36415 COLL VENOUS BLD VENIPUNCTURE: CPT

## 2025-08-11 PROCEDURE — 19125 EXCISION BREAST LESION: CPT | Mod: LT

## 2025-08-11 PROCEDURE — 76098 X-RAY EXAM SURGICAL SPECIMEN: CPT

## 2025-08-11 PROCEDURE — 85025 COMPLETE CBC W/AUTO DIFF WBC: CPT

## 2025-08-11 PROCEDURE — 84436 ASSAY OF TOTAL THYROXINE: CPT

## 2025-08-11 PROCEDURE — 82962 GLUCOSE BLOOD TEST: CPT

## 2025-08-11 PROCEDURE — 88307 TISSUE EXAM BY PATHOLOGIST: CPT

## 2025-08-11 PROCEDURE — 84443 ASSAY THYROID STIM HORMONE: CPT

## 2025-08-11 PROCEDURE — 80053 COMPREHEN METABOLIC PANEL: CPT

## 2025-08-11 DEVICE — CLIP APPLIER COVIDIEN SURGICLIP III 9" SM: Type: IMPLANTABLE DEVICE | Site: LEFT | Status: FUNCTIONAL

## 2025-08-11 RX ORDER — LABETALOL HYDROCHLORIDE 200 MG/1
10 TABLET, FILM COATED ORAL ONCE
Refills: 0 | Status: COMPLETED | OUTPATIENT
Start: 2025-08-11 | End: 2025-08-11

## 2025-08-11 RX ORDER — HYDROMORPHONE/SOD CHLOR,ISO/PF 2 MG/10 ML
0.5 SYRINGE (ML) INJECTION
Refills: 0 | Status: DISCONTINUED | OUTPATIENT
Start: 2025-08-11 | End: 2025-08-11

## 2025-08-11 RX ORDER — CEFAZOLIN SODIUM IN 0.9 % NACL 3 G/100 ML
2000 INTRAVENOUS SOLUTION, PIGGYBACK (ML) INTRAVENOUS ONCE
Refills: 0 | Status: DISCONTINUED | OUTPATIENT
Start: 2025-08-11 | End: 2025-08-11

## 2025-08-11 RX ORDER — ONDANSETRON HCL/PF 4 MG/2 ML
4 VIAL (ML) INJECTION ONCE
Refills: 0 | Status: COMPLETED | OUTPATIENT
Start: 2025-08-11 | End: 2025-08-11

## 2025-08-11 RX ORDER — SODIUM CHLORIDE 9 G/1000ML
1000 INJECTION, SOLUTION INTRAVENOUS
Refills: 0 | Status: DISCONTINUED | OUTPATIENT
Start: 2025-08-11 | End: 2025-08-11

## 2025-08-11 RX ORDER — FENTANYL CITRATE-0.9 % NACL/PF 100MCG/2ML
25 SYRINGE (ML) INTRAVENOUS
Refills: 0 | Status: DISCONTINUED | OUTPATIENT
Start: 2025-08-11 | End: 2025-08-11

## 2025-08-11 RX ORDER — URSODIOL 300 MG/1
1 CAPSULE ORAL
Refills: 0 | DISCHARGE

## 2025-08-11 RX ADMIN — Medication 25 MICROGRAM(S): at 09:10

## 2025-08-11 RX ADMIN — Medication 25 MICROGRAM(S): at 09:30

## 2025-08-11 RX ADMIN — SODIUM CHLORIDE 75 MILLILITER(S): 9 INJECTION, SOLUTION INTRAVENOUS at 09:07

## 2025-08-11 RX ADMIN — Medication 4 MILLIGRAM(S): at 09:09

## 2025-08-11 RX ADMIN — LABETALOL HYDROCHLORIDE 10 MILLIGRAM(S): 200 TABLET, FILM COATED ORAL at 09:52

## 2025-08-15 LAB — SURGICAL PATHOLOGY STUDY: SIGNIFICANT CHANGE UP

## 2025-08-18 ENCOUNTER — RX RENEWAL (OUTPATIENT)
Age: 70
End: 2025-08-18

## 2025-08-20 ENCOUNTER — APPOINTMENT (OUTPATIENT)
Dept: BREAST CENTER | Facility: CLINIC | Age: 70
End: 2025-08-20

## 2025-08-20 VITALS
HEART RATE: 82 BPM | BODY MASS INDEX: 37.76 KG/M2 | TEMPERATURE: 98 F | HEIGHT: 61 IN | SYSTOLIC BLOOD PRESSURE: 126 MMHG | WEIGHT: 200.03 LBS | OXYGEN SATURATION: 97 % | DIASTOLIC BLOOD PRESSURE: 80 MMHG

## 2025-08-20 DIAGNOSIS — Z00.00 ENCOUNTER FOR GENERAL ADULT MEDICAL EXAMINATION W/OUT ABNORMAL FINDINGS: ICD-10-CM

## 2025-08-20 DIAGNOSIS — D36.9 BENIGN NEOPLASM, UNSPECIFIED SITE: ICD-10-CM

## 2025-08-20 DIAGNOSIS — D24.2 BENIGN NEOPLASM OF LEFT BREAST: ICD-10-CM

## 2025-08-20 DIAGNOSIS — Z09 ENCOUNTER FOR FOLLOW-UP EXAMINATION AFTER COMPLETED TREATMENT FOR CONDITIONS OTHER THAN MALIGNANT NEOPLASM: ICD-10-CM

## 2025-08-21 ENCOUNTER — NON-APPOINTMENT (OUTPATIENT)
Age: 70
End: 2025-08-21

## 2025-08-21 ENCOUNTER — RX RENEWAL (OUTPATIENT)
Age: 70
End: 2025-08-21

## 2025-09-09 ENCOUNTER — APPOINTMENT (OUTPATIENT)
Dept: HEPATOLOGY | Facility: CLINIC | Age: 70
End: 2025-09-09
Payer: MEDICARE

## 2025-09-09 VITALS
WEIGHT: 198 LBS | SYSTOLIC BLOOD PRESSURE: 119 MMHG | BODY MASS INDEX: 37.38 KG/M2 | OXYGEN SATURATION: 100 % | HEART RATE: 86 BPM | HEIGHT: 61 IN | TEMPERATURE: 97.8 F | DIASTOLIC BLOOD PRESSURE: 74 MMHG

## 2025-09-09 DIAGNOSIS — K76.0 FATTY (CHANGE OF) LIVER, NOT ELSEWHERE CLASSIFIED: ICD-10-CM

## 2025-09-09 DIAGNOSIS — R74.8 ABNORMAL LEVELS OF OTHER SERUM ENZYMES: ICD-10-CM

## 2025-09-09 DIAGNOSIS — S36.13XA INJURY OF BILE DUCT, INITIAL ENCOUNTER: ICD-10-CM

## 2025-09-09 DIAGNOSIS — Z23 ENCOUNTER FOR IMMUNIZATION: ICD-10-CM

## 2025-09-09 PROCEDURE — 99214 OFFICE O/P EST MOD 30 MIN: CPT

## 2025-09-15 ENCOUNTER — TRANSCRIPTION ENCOUNTER (OUTPATIENT)
Age: 70
End: 2025-09-15

## 2025-09-16 ENCOUNTER — TRANSCRIPTION ENCOUNTER (OUTPATIENT)
Age: 70
End: 2025-09-16

## 2025-09-19 ENCOUNTER — TRANSCRIPTION ENCOUNTER (OUTPATIENT)
Age: 70
End: 2025-09-19

## (undated) DEVICE — DRSG OPSITE 13.75 X 4"

## (undated) DEVICE — BLADE SCALPEL SAFETYLOCK #15

## (undated) DEVICE — NDL COUNTER FOAM AND MAGNET 40-70

## (undated) DEVICE — SUT PROLENE 7-0 4-24" BV-1

## (undated) DEVICE — RADIOGRAPHY DVC SPEC TRANSPEC

## (undated) DEVICE — VENODYNE/SCD SLEEVE CALF LARGE

## (undated) DEVICE — FEEDING TUBE NG SUMP 16FR 48"

## (undated) DEVICE — STAPLER SKIN VISI-STAT 35 WIDE

## (undated) DEVICE — SUT BLUNT SZ 5

## (undated) DEVICE — TUBING TUR 2 PRONG

## (undated) DEVICE — SYNOVIS VASCULAR PROBE 1.5MM 15CM

## (undated) DEVICE — DRSG STOCKINETTE IMPERVIOUS XL

## (undated) DEVICE — DRSG TEGADERM 6"X8"

## (undated) DEVICE — DRAIN CHANNEL 32FR ROUND HUBLESS FULL FLUTED

## (undated) DEVICE — TUBING ATS SUCTION LINE

## (undated) DEVICE — STEALTH CLAMP INSERT FIBRA/FIBRA 90MM

## (undated) DEVICE — SYR LUER LOK 20CC

## (undated) DEVICE — SUT PROLENE 6-0 4-30" C-1

## (undated) DEVICE — GOWN XXXL

## (undated) DEVICE — NDL BLUNT 18G LOOP VESSEL MAXI WHITE

## (undated) DEVICE — SUT PROLENE 4-0 36" SH

## (undated) DEVICE — SOL NORMOSOL-R PH7.4 1000ML

## (undated) DEVICE — PACK UNIVERSAL CARDIAC

## (undated) DEVICE — BEAVER BLADE MINI SHARP ALL ROUND (BLUE)

## (undated) DEVICE — DRAPE TOWEL BLUE STICKY

## (undated) DEVICE — SUT SOFSILK 0 18" TIES

## (undated) DEVICE — MEDTRONIC CLEARVIEW BLOWER MISTER KIT W TUBING SET

## (undated) DEVICE — SOL IRR BAG H2O 3000ML

## (undated) DEVICE — SAVI SCOUT HANDPIECE

## (undated) DEVICE — PACK UNIVERSAL CARDIAC SUPPLEMNTAL B

## (undated) DEVICE — PREP CHLORAPREP HI-LITE ORANGE 26ML

## (undated) DEVICE — SUT TICRON 5 30" KV-40

## (undated) DEVICE — BLADE SCALPEL SAFETYLOCK #10

## (undated) DEVICE — DRSG ACE BANDAGE 6"

## (undated) DEVICE — GLV 6.5 PROTEXIS (WHITE)

## (undated) DEVICE — POSITIONER CARDIAC BUMP

## (undated) DEVICE — LAP PAD 18 X 18"

## (undated) DEVICE — VISITEC 4X4

## (undated) DEVICE — DOPPLER PROBE 20MHZ DISP

## (undated) DEVICE — SOL IRR BAG NS 0.9% 3000ML

## (undated) DEVICE — MEDICATION LABELS W MARKER

## (undated) DEVICE — CONNECTOR CARDIAC 1:1 FOR HUBLESS DRAINS

## (undated) DEVICE — DRAIN CHANNEL 19FR ROUND FULL FLUTED

## (undated) DEVICE — CHEST DRAIN OASIS DRY SUCTION WATER SEAL

## (undated) DEVICE — DRAIN RESERVOIR FOR JACKSON PRATT 100CC CARDINAL

## (undated) DEVICE — Device

## (undated) DEVICE — SYS VEIN HARVESTING VIRTUOSAPH PLUS W/ RADIAL

## (undated) DEVICE — DRAPE INSTRUMENT POUCH 6.75" X 11"

## (undated) DEVICE — PREP BETADINE SPONGE STICKS

## (undated) DEVICE — SUT POLYSORB 4-0 30" CVF-23

## (undated) DEVICE — SOL ANTI FOG

## (undated) DEVICE — WARMING BLANKET FULL ADULT

## (undated) DEVICE — TOURNIQUET SET 12FR (1 RED, 1 BLUE, 1 SNARE) 7"

## (undated) DEVICE — SAW BLADE MICROAIRE STERNUM 1X34X9.4MM

## (undated) DEVICE — GLV 7.5 PROTEXIS (WHITE)

## (undated) DEVICE — SUT SILK 2-0 18" SH (POP-OFF)

## (undated) DEVICE — SUT SOFSILK 0 30" V-20

## (undated) DEVICE — WARMING BLANKET UPPER ADULT

## (undated) DEVICE — STEALTH CLAMP INSERT FIBRA/FIBRA 60MM

## (undated) DEVICE — DRSG DERMABOND 0.7ML

## (undated) DEVICE — SUT POLYSORB 2 30" GS-26

## (undated) DEVICE — GOWN TRIMAX XXL

## (undated) DEVICE — SOL IRR POUR H2O 1000ML

## (undated) DEVICE — PACING CABLE (BROWN) A/V TEMP SCREW DOWN 12FT

## (undated) DEVICE — TUBING SUCTION 20FT

## (undated) DEVICE — SUT PROLENE 4-0 36" RB-1

## (undated) DEVICE — DRAPE SPLIT SHEET 77" X 108"

## (undated) DEVICE — DRSG OPSITE 2.5 X 2"

## (undated) DEVICE — SUT STAINLESS STEEL 5 18" SCC

## (undated) DEVICE — VESSEL LOOP MAXI-BLUE 0.120" X 16"

## (undated) DEVICE — SUT SOFSILK 0 30" TIES

## (undated) DEVICE — DRSG KLING 6"

## (undated) DEVICE — MULTIPLE PERFUSION SET FEMALE 1 INLET LEG W 4 LEGS 15" (BLUE/RED)

## (undated) DEVICE — SUT VICRYL PLUS 4-0 27" PS-2 UNDYED

## (undated) DEVICE — VASOVIEW HEMOPRO 2

## (undated) DEVICE — DRAPE MAYO STAND 30"

## (undated) DEVICE — TUBING INSUFFLATION LAP FILTER 10FT

## (undated) DEVICE — SUT PDS II 2-0 27" CT-1

## (undated) DEVICE — GLV 7 PROTEXIS (WHITE)

## (undated) DEVICE — SUCTION YANKAUER NO CONTROL VENT

## (undated) DEVICE — SUT SURGICAL STEEL 6 30" BP-1

## (undated) DEVICE — AORTIC PUNCH 5MM STANDARD HANDLE

## (undated) DEVICE — VENTING ADAPTER "Y" (RED/BLUE) 7.5"

## (undated) DEVICE — SAW BLADE STRYKER STERNUM 31MM X 6.27 X .79

## (undated) DEVICE — SOL IRR POUR H2O 250ML

## (undated) DEVICE — PACKING GAUZE PLAIN 2"

## (undated) DEVICE — LONE STAR RETRACTOR RING 32.5CM X 18.3CM DISP

## (undated) DEVICE — SPECIMEN CONTAINER 100ML

## (undated) DEVICE — SUT VICRYL 2-0 27" CT-1 UNDYED

## (undated) DEVICE — SOL IRR POUR NS 0.9% 500ML

## (undated) DEVICE — GLV 8 PROTEXIS (WHITE)

## (undated) DEVICE — PACK CARDIAC YELLOW

## (undated) DEVICE — PREP BETADINE KIT

## (undated) DEVICE — KIT DEFENDO 4 OLY 4 PC

## (undated) DEVICE — VENODYNE/SCD SLEEVE CALF MEDIUM

## (undated) DEVICE — DRAPE SLUSH / WARMER 44 X 66"

## (undated) DEVICE — SUT MONOCRYL 4-0 18" PS-2

## (undated) DEVICE — CONNECTOR INTERSEPT "Y" 1/4 X 1/4 X 1/4"

## (undated) DEVICE — SUT VICRYL 0 36" CTX UNDYED

## (undated) DEVICE — DRAPE 3/4 SHEET W REINFORCEMENT 56X77"

## (undated) DEVICE — DRSG TELFA 3 X 4

## (undated) DEVICE — SUCTION CATH ARGYLE WHISTLE TIP 14FR STRAIGHT PACKED

## (undated) DEVICE — SUT DOUBLE 6 WIRE STERNAL

## (undated) DEVICE — SENSOR MYOCARDIAL TEMP 15MM

## (undated) DEVICE — FOLEY TRAY 16FR 5CC LTX UMETER CLOSED

## (undated) DEVICE — DRAPE XL SHEET 77X98"

## (undated) DEVICE — SUMP PERICARDIAL 20FR 1/4" ADULT

## (undated) DEVICE — SUT POLYSORB 0 36" GS-25 UNDYED

## (undated) DEVICE — ELCTR PLASMA BLADE X 3.0S WIDE TIP

## (undated) DEVICE — FOLEY TRAY 16FR LF URINE METER SURESTEP

## (undated) DEVICE — DRSG STERISTRIPS 0.5 X 4"

## (undated) DEVICE — WARMING BLANKET LOWER ADULT

## (undated) DEVICE — AORTIC PUNCH 4.0MM STANDARD HANDLE

## (undated) DEVICE — DRAPE LIGHT HANDLE COVER (BLUE)

## (undated) DEVICE — PACING CABLE (BLUE) ATRIAL TEMP SCREW DOWN 12FT

## (undated) DEVICE — ELCTR BOVIE TIP BLADE VALLEYLAB 6.5"

## (undated) DEVICE — PHRENIC NERVE PAD MEDIUM

## (undated) DEVICE — SUT VICRYL 2-0 36" CT-1

## (undated) DEVICE — GLV 8 PROTEXIS ORTHO (CREAM)

## (undated) DEVICE — SUT PROLENE 4-0 36" BB

## (undated) DEVICE — SUT BOOT STANDARD (ASSORTED) 5 PAIR

## (undated) DEVICE — SPONGE PEANUT AUTO COUNT

## (undated) DEVICE — BULLDOG SPRING CLIP LATIS/LATIS 6MM 1/2 FORCE (BLUE)

## (undated) DEVICE — FOLEY CATH 2-WAY 18FR 5CC LATEX HYDROGEL

## (undated) DEVICE — STRYKER INTERPULSE HANDPIECE W IRR SUCTION TUBE

## (undated) DEVICE — NDL HYPO REGULAR BEVEL 22G X 1.5" (TURQUOISE)

## (undated) DEVICE — DRAPE TOWEL BLUE 17" X 24"

## (undated) DEVICE — AORTIC PUNCH 4.0MM LONG LENGTH HANDLE

## (undated) DEVICE — VESSEL LOOP MAXI-RED  0.120" X 16"

## (undated) DEVICE — ELCTR BOVIE TIP BLADE INSULATED 4" EDGE

## (undated) DEVICE — SUT PLEDGET SOFT LARGE 3/8" X 3/16" X 1/16" X6

## (undated) DEVICE — GOWN TRIMAX LG

## (undated) DEVICE — ELCTR GROUNDING PAD ADULT COVIDIEN

## (undated) DEVICE — DRAPE IOBAN 33" X 23"

## (undated) DEVICE — CONNECTOR STRAIGHT 3/8 X 1/2"

## (undated) DEVICE — DRAPE 1/2 SHEET 40X57"

## (undated) DEVICE — COVER PROBE W/GEL 18X120CM STRL 50/BX

## (undated) DEVICE — WARMING BLANKET DUO-THERM HYPER/HYPOTHERM ADULT

## (undated) DEVICE — SYR ASEPTO

## (undated) DEVICE — TUBING TRUWAVE PRESSURE MALE/FEMALE 72"

## (undated) DEVICE — CONNECTOR "Y" 1/2 X 3/8 X 3/8"

## (undated) DEVICE — BLADE SCALPEL SAFETYLOCK #11

## (undated) DEVICE — POSITIONER FOAM EGG CRATE ULNAR 2PCS (PINK)

## (undated) DEVICE — LONE STAR ELASTIC STAY HOOK 5MM SHARP

## (undated) DEVICE — SUT PROLENE 8-0 24" BV175-6

## (undated) DEVICE — SUT MONOCRYL 4-0 27" PS-2 UNDYED

## (undated) DEVICE — SUT POLYSORB 2-0 30" GS-21 UNDYED

## (undated) DEVICE — MARKING PEN W RULER

## (undated) DEVICE — PACK MINOR WITH LAP

## (undated) DEVICE — DRSG DERMABOND PRINEO 60CM

## (undated) DEVICE — SOL IRR POUR NS 0.9% 1000ML

## (undated) DEVICE — SUT SOFSILK 2 60" TIES

## (undated) DEVICE — FORCEP RADIAL JAW 4 240CM DISP

## (undated) DEVICE — GLV 8.5 PROTEXIS (WHITE)

## (undated) DEVICE — DRAPE 3/4 SHEET 52X76"

## (undated) DEVICE — FOLEY TRAY 16FR 5CC LF LUBRISIL ADVANCE TEMP CLOSED

## (undated) DEVICE — SUT PROLENE 7-0 24" BV175-6

## (undated) DEVICE — BULLDOG SPRING CLIP 6MM SOFT/SOFT

## (undated) DEVICE — STAPLER SKIN PROXIMATE

## (undated) DEVICE — SUT PLEDGET PRE PUNCH 4.8 X 9.5 X 1.5 MM

## (undated) DEVICE — SUT BIOSYN 4-0 18" P-12

## (undated) DEVICE — CHEST DRAIN PLEUR-EVAC WET/WET ADULT-PEDS SINGLE (QUICK)

## (undated) DEVICE — SUT PROLENE 3-0 36" SH